# Patient Record
Sex: FEMALE | Race: WHITE | Employment: UNEMPLOYED | URBAN - METROPOLITAN AREA
[De-identification: names, ages, dates, MRNs, and addresses within clinical notes are randomized per-mention and may not be internally consistent; named-entity substitution may affect disease eponyms.]

---

## 2017-10-04 PROBLEM — G89.4 CHRONIC PAIN SYNDROME: Status: ACTIVE | Noted: 2017-10-04

## 2019-04-05 ENCOUNTER — HOSPITAL ENCOUNTER (OUTPATIENT)
Dept: ULTRASOUND IMAGING | Age: 59
Discharge: HOME | End: 2019-04-07
Payer: COMMERCIAL

## 2019-04-05 DIAGNOSIS — R60.0 LOCALIZED EDEMA: ICD-10-CM

## 2019-04-05 PROCEDURE — 93971 EXTREMITY STUDY: CPT

## 2019-05-07 ENCOUNTER — APPOINTMENT (OUTPATIENT)
Dept: CT IMAGING | Age: 59
DRG: 870 | End: 2019-05-07
Payer: COMMERCIAL

## 2019-05-07 ENCOUNTER — APPOINTMENT (OUTPATIENT)
Dept: GENERAL RADIOLOGY | Age: 59
DRG: 870 | End: 2019-05-07
Payer: COMMERCIAL

## 2019-05-07 ENCOUNTER — ANESTHESIA (OUTPATIENT)
Dept: ICU | Age: 59
DRG: 870 | End: 2019-05-07
Payer: COMMERCIAL

## 2019-05-07 ENCOUNTER — ANESTHESIA EVENT (OUTPATIENT)
Dept: ICU | Age: 59
DRG: 870 | End: 2019-05-07
Payer: COMMERCIAL

## 2019-05-07 ENCOUNTER — HOSPITAL ENCOUNTER (INPATIENT)
Age: 59
LOS: 17 days | Discharge: NURSING, REHAB OR LONG-TERM CARE | DRG: 870 | End: 2019-05-24
Attending: EMERGENCY MEDICINE | Admitting: INTERNAL MEDICINE
Payer: COMMERCIAL

## 2019-05-07 DIAGNOSIS — D68.9 COAGULOPATHY (HCC): ICD-10-CM

## 2019-05-07 DIAGNOSIS — N17.9 ACUTE RENAL FAILURE, UNSPECIFIED ACUTE RENAL FAILURE TYPE (HCC): ICD-10-CM

## 2019-05-07 DIAGNOSIS — I87.1 VENOUS STENOSIS OF RIGHT UPPER EXTREMITY: ICD-10-CM

## 2019-05-07 DIAGNOSIS — A41.9 SEPTIC SHOCK (HCC): ICD-10-CM

## 2019-05-07 DIAGNOSIS — J18.9 PNEUMONIA DUE TO ORGANISM: ICD-10-CM

## 2019-05-07 DIAGNOSIS — R65.21 SEPTIC SHOCK (HCC): ICD-10-CM

## 2019-05-07 DIAGNOSIS — Z79.2 ANTIBIOTIC LONG-TERM USE: ICD-10-CM

## 2019-05-07 DIAGNOSIS — K52.9 COLITIS: ICD-10-CM

## 2019-05-07 DIAGNOSIS — I87.8 POOR VENOUS ACCESS: ICD-10-CM

## 2019-05-07 DIAGNOSIS — A41.9 SEPSIS, DUE TO UNSPECIFIED ORGANISM: ICD-10-CM

## 2019-05-07 DIAGNOSIS — R56.9 SEIZURE-LIKE ACTIVITY (HCC): Primary | ICD-10-CM

## 2019-05-07 DIAGNOSIS — R41.0 DISORIENTATION: ICD-10-CM

## 2019-05-07 DIAGNOSIS — Z01.89 ENCOUNTER FOR LUMBAR PUNCTURE: ICD-10-CM

## 2019-05-07 LAB
AADO2: 412.1 MMHG
ABO/RH: NORMAL
ACETAMINOPHEN LEVEL: 7.8 MCG/ML (ref 10–30)
ALBUMIN SERPL-MCNC: 2.5 G/DL (ref 3.5–5.2)
ALP BLD-CCNC: 157 U/L (ref 35–104)
ALT SERPL-CCNC: 3440 U/L (ref 0–32)
AMMONIA: 76 UMOL/L (ref 11–51)
AMPHETAMINE SCREEN, URINE: NOT DETECTED
AMYLASE: 115 U/L (ref 20–100)
ANION GAP SERPL CALCULATED.3IONS-SCNC: 12 MMOL/L (ref 7–16)
ANION GAP SERPL CALCULATED.3IONS-SCNC: 13 MMOL/L (ref 7–16)
ANION GAP SERPL CALCULATED.3IONS-SCNC: 25 MMOL/L (ref 7–16)
ANISOCYTOSIS: ABNORMAL
ANISOCYTOSIS: ABNORMAL
ANTIBODY SCREEN: NORMAL
APTT: 39.3 SEC (ref 24.5–35.1)
AST SERPL-CCNC: 5412 U/L (ref 0–31)
B.E.: -9.2 MMOL/L (ref -3–3)
B.E.: -9.5 MMOL/L (ref -3–3)
BACTERIA: ABNORMAL /HPF
BACTERIA: ABNORMAL /HPF
BARBITURATE SCREEN URINE: NOT DETECTED
BASOPHILS ABSOLUTE: 0 E9/L (ref 0–0.2)
BASOPHILS ABSOLUTE: 0 E9/L (ref 0–0.2)
BASOPHILS RELATIVE PERCENT: 0.3 % (ref 0–2)
BASOPHILS RELATIVE PERCENT: 0.9 % (ref 0–2)
BENZODIAZEPINE SCREEN, URINE: NOT DETECTED
BETA-HYDROXYBUTYRATE: 1.12 MMOL/L (ref 0.02–0.27)
BILIRUB SERPL-MCNC: 0.7 MG/DL (ref 0–1.2)
BILIRUB SERPL-MCNC: 0.9 MG/DL (ref 0–1.2)
BILIRUBIN DIRECT: 0.6 MG/DL (ref 0–0.3)
BILIRUBIN URINE: ABNORMAL
BILIRUBIN URINE: ABNORMAL
BILIRUBIN, INDIRECT: 0.1 MG/DL (ref 0–1)
BLOOD BANK DISPENSE STATUS: NORMAL
BLOOD BANK PRODUCT CODE: NORMAL
BLOOD, URINE: ABNORMAL
BLOOD, URINE: ABNORMAL
BPU ID: NORMAL
BUN BLDV-MCNC: 70 MG/DL (ref 8–23)
BUN BLDV-MCNC: 71 MG/DL (ref 8–23)
BUN BLDV-MCNC: 73 MG/DL (ref 8–23)
BURR CELLS: ABNORMAL
CALCIUM SERPL-MCNC: 7 MG/DL (ref 8.6–10.2)
CALCIUM SERPL-MCNC: 7.4 MG/DL (ref 8.6–10.2)
CALCIUM SERPL-MCNC: 8.9 MG/DL (ref 8.6–10.2)
CANNABINOID SCREEN URINE: NOT DETECTED
CHLORIDE BLD-SCNC: 100 MMOL/L (ref 98–107)
CHLORIDE BLD-SCNC: 100 MMOL/L (ref 98–107)
CHLORIDE BLD-SCNC: 84 MMOL/L (ref 98–107)
CHLORIDE URINE RANDOM: <20 MMOL/L
CHP ED QC CHECK: NORMAL
CLARITY: ABNORMAL
CLARITY: ABNORMAL
CO2: 19 MMOL/L (ref 22–29)
CO2: 20 MMOL/L (ref 22–29)
CO2: 20 MMOL/L (ref 22–29)
COCAINE METABOLITE SCREEN URINE: NOT DETECTED
COHB: 0.3 % (ref 0–1.5)
COHB: 0.4 % (ref 0–1.5)
COLOR: ABNORMAL
COLOR: YELLOW
CREAT SERPL-MCNC: 3.2 MG/DL (ref 0.5–1)
CREAT SERPL-MCNC: 3.3 MG/DL (ref 0.5–1)
CREAT SERPL-MCNC: 4.2 MG/DL (ref 0.5–1)
CREATININE URINE: 100 MG/DL (ref 29–226)
CRITICAL: ABNORMAL
CRITICAL: ABNORMAL
DATE ANALYZED: ABNORMAL
DATE ANALYZED: ABNORMAL
DATE OF COLLECTION: ABNORMAL
DATE OF COLLECTION: ABNORMAL
DESCRIPTION BLOOD BANK: NORMAL
EOSINOPHIL, URINE: 0 % (ref 0–1)
EOSINOPHILS ABSOLUTE: 0 E9/L (ref 0.05–0.5)
EOSINOPHILS ABSOLUTE: 0.17 E9/L (ref 0.05–0.5)
EOSINOPHILS RELATIVE PERCENT: 0.2 % (ref 0–6)
EOSINOPHILS RELATIVE PERCENT: 0.9 % (ref 0–6)
EPITHELIAL CELLS, UA: ABNORMAL /HPF
EPITHELIAL CELLS, UA: ABNORMAL /HPF
ETHANOL: <10 MG/DL (ref 0–0.08)
FILM ARRAY ADENOVIRUS: NORMAL
FILM ARRAY BORDETELLA PERTUSSIS: NORMAL
FILM ARRAY CHLAMYDOPHILIA PNEUMONIAE: NORMAL
FILM ARRAY CORONAVIRUS 229E: NORMAL
FILM ARRAY CORONAVIRUS HKU1: NORMAL
FILM ARRAY CORONAVIRUS NL63: NORMAL
FILM ARRAY CORONAVIRUS OC43: NORMAL
FILM ARRAY INFLUENZA A VIRUS 09H1: NORMAL
FILM ARRAY INFLUENZA A VIRUS H1: NORMAL
FILM ARRAY INFLUENZA A VIRUS H3: NORMAL
FILM ARRAY INFLUENZA A VIRUS: NORMAL
FILM ARRAY INFLUENZA B: NORMAL
FILM ARRAY METAPNEUMOVIRUS: NORMAL
FILM ARRAY MYCOPLASMA PNEUMONIAE: NORMAL
FILM ARRAY PARAINFLUENZA VIRUS 1: NORMAL
FILM ARRAY PARAINFLUENZA VIRUS 2: NORMAL
FILM ARRAY PARAINFLUENZA VIRUS 3: NORMAL
FILM ARRAY PARAINFLUENZA VIRUS 4: NORMAL
FILM ARRAY RESPIRATORY SYNCITIAL VIRUS: NORMAL
FILM ARRAY RHINOVIRUS/ENTEROVIRUS: NORMAL
FIO2: 80 %
GFR AFRICAN AMERICAN: 13
GFR AFRICAN AMERICAN: 17
GFR AFRICAN AMERICAN: 18
GFR NON-AFRICAN AMERICAN: 11 ML/MIN/1.73
GFR NON-AFRICAN AMERICAN: 14 ML/MIN/1.73
GFR NON-AFRICAN AMERICAN: 15 ML/MIN/1.73
GLUCOSE BLD-MCNC: 112 MG/DL
GLUCOSE BLD-MCNC: 165 MG/DL (ref 74–99)
GLUCOSE BLD-MCNC: 240 MG/DL (ref 74–99)
GLUCOSE BLD-MCNC: 241 MG/DL (ref 74–99)
GLUCOSE BLD-MCNC: 63 MG/DL (ref 74–99)
GLUCOSE URINE: NEGATIVE MG/DL
GLUCOSE URINE: NEGATIVE MG/DL
HCO3: 16.6 MMOL/L (ref 22–26)
HCO3: 16.8 MMOL/L (ref 22–26)
HCT VFR BLD CALC: 35.2 % (ref 34–48)
HCT VFR BLD CALC: 45.7 % (ref 34–48)
HEMOGLOBIN: 11.6 G/DL (ref 11.5–15.5)
HEMOGLOBIN: 15.2 G/DL (ref 11.5–15.5)
HHB: 2.5 % (ref 0–5)
HHB: 4.3 % (ref 0–5)
INR BLD: 4.3
INR BLD: 8.4
KETONES, URINE: 15 MG/DL
KETONES, URINE: ABNORMAL MG/DL
LAB: ABNORMAL
LAB: ABNORMAL
LACTATE DEHYDROGENASE: 903 U/L (ref 135–214)
LACTIC ACID: 2.1 MMOL/L (ref 0.5–2.2)
LACTIC ACID: 2.2 MMOL/L (ref 0.5–2.2)
LACTIC ACID: 2.6 MMOL/L (ref 0.5–2.2)
LACTIC ACID: 3 MMOL/L (ref 0.5–2.2)
LACTIC ACID: 3.4 MMOL/L (ref 0.5–2.2)
LEUKOCYTE ESTERASE, URINE: ABNORMAL
LEUKOCYTE ESTERASE, URINE: ABNORMAL
LIPASE: 127 U/L (ref 13–60)
LIPASE: 169 U/L (ref 13–60)
LYMPHOCYTES ABSOLUTE: 0.94 E9/L (ref 1.5–4)
LYMPHOCYTES ABSOLUTE: 1.5 E9/L (ref 1.5–4)
LYMPHOCYTES RELATIVE PERCENT: 4.3 % (ref 20–42)
LYMPHOCYTES RELATIVE PERCENT: 7.8 % (ref 20–42)
Lab: ABNORMAL
Lab: ABNORMAL
MAGNESIUM: 1.6 MG/DL (ref 1.6–2.6)
MAGNESIUM: 1.6 MG/DL (ref 1.6–2.6)
MCH RBC QN AUTO: 34.5 PG (ref 26–35)
MCH RBC QN AUTO: 34.6 PG (ref 26–35)
MCHC RBC AUTO-ENTMCNC: 33 % (ref 32–34.5)
MCHC RBC AUTO-ENTMCNC: 33.3 % (ref 32–34.5)
MCV RBC AUTO: 103.6 FL (ref 80–99.9)
MCV RBC AUTO: 105.1 FL (ref 80–99.9)
METER GLUCOSE: 109 MG/DL (ref 74–99)
METER GLUCOSE: 111 MG/DL (ref 74–99)
METER GLUCOSE: 119 MG/DL (ref 74–99)
METER GLUCOSE: 63 MG/DL (ref 74–99)
METER GLUCOSE: 88 MG/DL (ref 74–99)
METER GLUCOSE: <40 MG/DL (ref 74–99)
METHADONE SCREEN, URINE: NOT DETECTED
METHB: 0.2 % (ref 0–1.5)
METHB: 0.3 % (ref 0–1.5)
MODE: ABNORMAL
MODE: AC
MONOCYTES ABSOLUTE: 0.94 E9/L (ref 0.1–0.95)
MONOCYTES ABSOLUTE: 1.4 E9/L (ref 0.1–0.95)
MONOCYTES RELATIVE PERCENT: 5.2 % (ref 2–12)
MONOCYTES RELATIVE PERCENT: 6.1 % (ref 2–12)
NEUTROPHILS ABSOLUTE: 15.79 E9/L (ref 1.8–7.3)
NEUTROPHILS ABSOLUTE: 21.06 E9/L (ref 1.8–7.3)
NEUTROPHILS RELATIVE PERCENT: 84.3 % (ref 43–80)
NEUTROPHILS RELATIVE PERCENT: 89.6 % (ref 43–80)
NITRITE, URINE: NEGATIVE
NITRITE, URINE: NEGATIVE
O2 CONTENT: 18.3 ML/DL
O2 SATURATION: 95.7 % (ref 92–98.5)
O2 SATURATION: 97.5 % (ref 92–98.5)
O2HB: 95.1 % (ref 94–97)
O2HB: 96.9 % (ref 94–97)
OPERATOR ID: 1023
OPERATOR ID: 2593
OPIATE SCREEN URINE: POSITIVE
PATIENT TEMP: 37 C
PATIENT TEMP: 38 C
PCO2: 35.9 MMHG (ref 35–45)
PCO2: 38.5 MMHG (ref 35–45)
PDW BLD-RTO: 13.1 FL (ref 11.5–15)
PDW BLD-RTO: 13.3 FL (ref 11.5–15)
PEEP/CPAP: 5 CMH2O
PFO2: 1.26 MMHG/%
PH BLOOD GAS: 7.26 (ref 7.35–7.45)
PH BLOOD GAS: 7.28 (ref 7.35–7.45)
PH UA: 5 (ref 5–9)
PH UA: 5 (ref 5–9)
PHENCYCLIDINE SCREEN URINE: NOT DETECTED
PHOSPHORUS: 4.6 MG/DL (ref 2.5–4.5)
PLATELET # BLD: 233 E9/L (ref 130–450)
PLATELET # BLD: 329 E9/L (ref 130–450)
PMV BLD AUTO: 11.5 FL (ref 7–12)
PMV BLD AUTO: 11.9 FL (ref 7–12)
PO2: 100.6 MMHG (ref 60–100)
PO2: 92.7 MMHG (ref 60–100)
POTASSIUM REFLEX MAGNESIUM: 3.6 MMOL/L (ref 3.5–5)
POTASSIUM REFLEX MAGNESIUM: 3.7 MMOL/L (ref 3.5–5)
POTASSIUM SERPL-SCNC: 4.4 MMOL/L (ref 3.5–5)
POTASSIUM, UR: 48.2 MMOL/L
PROCALCITONIN: 99.25 NG/ML (ref 0–0.08)
PROMYELOCYTES PERCENT: 1.7 % (ref 0–0)
PROPOXYPHENE SCREEN: NOT DETECTED
PROTEIN UA: 30 MG/DL
PROTEIN UA: 30 MG/DL
PROTHROMBIN TIME: 47.5 SEC (ref 9.3–12.4)
PROTHROMBIN TIME: 93 SEC (ref 9.3–12.4)
RBC # BLD: 3.35 E12/L (ref 3.5–5.5)
RBC # BLD: 4.41 E12/L (ref 3.5–5.5)
RBC UA: ABNORMAL /HPF (ref 0–2)
RBC UA: ABNORMAL /HPF (ref 0–2)
RI(T): 4.1
RR MECHANICAL: 14 B/MIN
SALICYLATE, SERUM: <0.3 MG/DL (ref 0–30)
SODIUM BLD-SCNC: 129 MMOL/L (ref 132–146)
SODIUM BLD-SCNC: 132 MMOL/L (ref 132–146)
SODIUM BLD-SCNC: 132 MMOL/L (ref 132–146)
SODIUM URINE: 22 MMOL/L
SOURCE, BLOOD GAS: ABNORMAL
SOURCE, BLOOD GAS: ABNORMAL
SPECIFIC GRAVITY UA: 1.02 (ref 1–1.03)
SPECIFIC GRAVITY UA: >=1.03 (ref 1–1.03)
THB: 12.5 G/DL (ref 11.5–16.5)
THB: 13.6 G/DL (ref 11.5–16.5)
TIME ANALYZED: 1411
TIME ANALYZED: 621
TOTAL PROTEIN: 6.2 G/DL (ref 6.4–8.3)
TRICYCLIC ANTIDEPRESSANTS SCREEN SERUM: NEGATIVE NG/ML
TROPONIN: <0.01 NG/ML (ref 0–0.03)
UREA NITROGEN, UR: 251 MG/DL (ref 800–1666)
UROBILINOGEN, URINE: 0.2 E.U./DL
UROBILINOGEN, URINE: 1 E.U./DL
VT MECHANICAL: 400 ML
WBC # BLD: 18.8 E9/L (ref 4.5–11.5)
WBC # BLD: 23.4 E9/L (ref 4.5–11.5)
WBC UA: ABNORMAL /HPF (ref 0–5)
WBC UA: ABNORMAL /HPF (ref 0–5)
YEAST: ABNORMAL
YEAST: ABNORMAL

## 2019-05-07 PROCEDURE — 93010 ELECTROCARDIOGRAM REPORT: CPT | Performed by: INTERNAL MEDICINE

## 2019-05-07 PROCEDURE — 82805 BLOOD GASES W/O2 SATURATION: CPT

## 2019-05-07 PROCEDURE — 5A1955Z RESPIRATORY VENTILATION, GREATER THAN 96 CONSECUTIVE HOURS: ICD-10-PCS | Performed by: INTERNAL MEDICINE

## 2019-05-07 PROCEDURE — 94002 VENT MGMT INPAT INIT DAY: CPT

## 2019-05-07 PROCEDURE — 2500000003 HC RX 250 WO HCPCS: Performed by: EMERGENCY MEDICINE

## 2019-05-07 PROCEDURE — 84100 ASSAY OF PHOSPHORUS: CPT

## 2019-05-07 PROCEDURE — 2580000003 HC RX 258: Performed by: EMERGENCY MEDICINE

## 2019-05-07 PROCEDURE — 82247 BILIRUBIN TOTAL: CPT

## 2019-05-07 PROCEDURE — 83735 ASSAY OF MAGNESIUM: CPT

## 2019-05-07 PROCEDURE — 82962 GLUCOSE BLOOD TEST: CPT

## 2019-05-07 PROCEDURE — 82140 ASSAY OF AMMONIA: CPT

## 2019-05-07 PROCEDURE — 36556 INSERT NON-TUNNEL CV CATH: CPT

## 2019-05-07 PROCEDURE — 71045 X-RAY EXAM CHEST 1 VIEW: CPT

## 2019-05-07 PROCEDURE — 70450 CT HEAD/BRAIN W/O DYE: CPT

## 2019-05-07 PROCEDURE — 6360000002 HC RX W HCPCS: Performed by: INTERNAL MEDICINE

## 2019-05-07 PROCEDURE — 6360000002 HC RX W HCPCS

## 2019-05-07 PROCEDURE — 2580000003 HC RX 258

## 2019-05-07 PROCEDURE — 0BH17EZ INSERTION OF ENDOTRACHEAL AIRWAY INTO TRACHEA, VIA NATURAL OR ARTIFICIAL OPENING: ICD-10-PCS | Performed by: INTERNAL MEDICINE

## 2019-05-07 PROCEDURE — 6370000000 HC RX 637 (ALT 250 FOR IP): Performed by: INTERNAL MEDICINE

## 2019-05-07 PROCEDURE — 80307 DRUG TEST PRSMV CHEM ANLYZR: CPT

## 2019-05-07 PROCEDURE — 31500 INSERT EMERGENCY AIRWAY: CPT

## 2019-05-07 PROCEDURE — 2500000003 HC RX 250 WO HCPCS

## 2019-05-07 PROCEDURE — C9113 INJ PANTOPRAZOLE SODIUM, VIA: HCPCS | Performed by: INTERNAL MEDICINE

## 2019-05-07 PROCEDURE — 2580000003 HC RX 258: Performed by: INTERNAL MEDICINE

## 2019-05-07 PROCEDURE — G0480 DRUG TEST DEF 1-7 CLASSES: HCPCS

## 2019-05-07 PROCEDURE — 85610 PROTHROMBIN TIME: CPT

## 2019-05-07 PROCEDURE — 36415 COLL VENOUS BLD VENIPUNCTURE: CPT

## 2019-05-07 PROCEDURE — 83690 ASSAY OF LIPASE: CPT

## 2019-05-07 PROCEDURE — 87581 M.PNEUMON DNA AMP PROBE: CPT

## 2019-05-07 PROCEDURE — 82570 ASSAY OF URINE CREATININE: CPT

## 2019-05-07 PROCEDURE — 80048 BASIC METABOLIC PNL TOTAL CA: CPT

## 2019-05-07 PROCEDURE — 87798 DETECT AGENT NOS DNA AMP: CPT

## 2019-05-07 PROCEDURE — 74176 CT ABD & PELVIS W/O CONTRAST: CPT

## 2019-05-07 PROCEDURE — 87088 URINE BACTERIA CULTURE: CPT

## 2019-05-07 PROCEDURE — 36430 TRANSFUSION BLD/BLD COMPNT: CPT

## 2019-05-07 PROCEDURE — 87040 BLOOD CULTURE FOR BACTERIA: CPT

## 2019-05-07 PROCEDURE — 82150 ASSAY OF AMYLASE: CPT

## 2019-05-07 PROCEDURE — P9059 PLASMA, FRZ BETWEEN 8-24HOUR: HCPCS

## 2019-05-07 PROCEDURE — 99253 IP/OBS CNSLTJ NEW/EST LOW 45: CPT | Performed by: SURGERY

## 2019-05-07 PROCEDURE — 86900 BLOOD TYPING SEROLOGIC ABO: CPT

## 2019-05-07 PROCEDURE — 84133 ASSAY OF URINE POTASSIUM: CPT

## 2019-05-07 PROCEDURE — 84484 ASSAY OF TROPONIN QUANT: CPT

## 2019-05-07 PROCEDURE — 87486 CHLMYD PNEUM DNA AMP PROBE: CPT

## 2019-05-07 PROCEDURE — 80074 ACUTE HEPATITIS PANEL: CPT

## 2019-05-07 PROCEDURE — 6360000002 HC RX W HCPCS: Performed by: EMERGENCY MEDICINE

## 2019-05-07 PROCEDURE — 83605 ASSAY OF LACTIC ACID: CPT

## 2019-05-07 PROCEDURE — 2500000003 HC RX 250 WO HCPCS: Performed by: INTERNAL MEDICINE

## 2019-05-07 PROCEDURE — 84540 ASSAY OF URINE/UREA-N: CPT

## 2019-05-07 PROCEDURE — 85730 THROMBOPLASTIN TIME PARTIAL: CPT

## 2019-05-07 PROCEDURE — 99285 EMERGENCY DEPT VISIT HI MDM: CPT

## 2019-05-07 PROCEDURE — 85025 COMPLETE CBC W/AUTO DIFF WBC: CPT

## 2019-05-07 PROCEDURE — 82947 ASSAY GLUCOSE BLOOD QUANT: CPT

## 2019-05-07 PROCEDURE — 86901 BLOOD TYPING SEROLOGIC RH(D): CPT

## 2019-05-07 PROCEDURE — 2000000000 HC ICU R&B

## 2019-05-07 PROCEDURE — 84300 ASSAY OF URINE SODIUM: CPT

## 2019-05-07 PROCEDURE — 87633 RESP VIRUS 12-25 TARGETS: CPT

## 2019-05-07 PROCEDURE — 31500 INSERT EMERGENCY AIRWAY: CPT | Performed by: ANESTHESIOLOGY

## 2019-05-07 PROCEDURE — 82010 KETONE BODYS QUAN: CPT

## 2019-05-07 PROCEDURE — 81001 URINALYSIS AUTO W/SCOPE: CPT

## 2019-05-07 PROCEDURE — 82248 BILIRUBIN DIRECT: CPT

## 2019-05-07 PROCEDURE — 83615 LACTATE (LD) (LDH) ENZYME: CPT

## 2019-05-07 PROCEDURE — 84145 PROCALCITONIN (PCT): CPT

## 2019-05-07 PROCEDURE — 80053 COMPREHEN METABOLIC PANEL: CPT

## 2019-05-07 PROCEDURE — P9047 ALBUMIN (HUMAN), 25%, 50ML: HCPCS | Performed by: INTERNAL MEDICINE

## 2019-05-07 PROCEDURE — 93005 ELECTROCARDIOGRAM TRACING: CPT | Performed by: EMERGENCY MEDICINE

## 2019-05-07 PROCEDURE — 86850 RBC ANTIBODY SCREEN: CPT

## 2019-05-07 PROCEDURE — 82436 ASSAY OF URINE CHLORIDE: CPT

## 2019-05-07 PROCEDURE — 87205 SMEAR GRAM STAIN: CPT

## 2019-05-07 RX ORDER — DEXTROSE MONOHYDRATE 25 G/50ML
INJECTION, SOLUTION INTRAVENOUS
Status: COMPLETED
Start: 2019-05-07 | End: 2019-05-07

## 2019-05-07 RX ORDER — SODIUM CHLORIDE, SODIUM LACTATE, POTASSIUM CHLORIDE, CALCIUM CHLORIDE 600; 310; 30; 20 MG/100ML; MG/100ML; MG/100ML; MG/100ML
INJECTION, SOLUTION INTRAVENOUS CONTINUOUS
Status: DISCONTINUED | OUTPATIENT
Start: 2019-05-07 | End: 2019-05-07

## 2019-05-07 RX ORDER — 0.9 % SODIUM CHLORIDE 0.9 %
250 INTRAVENOUS SOLUTION INTRAVENOUS ONCE
Status: COMPLETED | OUTPATIENT
Start: 2019-05-07 | End: 2019-05-07

## 2019-05-07 RX ORDER — SODIUM CHLORIDE 0.9 % (FLUSH) 0.9 %
10 SYRINGE (ML) INJECTION EVERY 12 HOURS SCHEDULED
Status: DISCONTINUED | OUTPATIENT
Start: 2019-05-07 | End: 2019-05-24 | Stop reason: HOSPADM

## 2019-05-07 RX ORDER — MIDAZOLAM HYDROCHLORIDE 1 MG/ML
INJECTION INTRAMUSCULAR; INTRAVENOUS PRN
Status: DISCONTINUED | OUTPATIENT
Start: 2019-05-07 | End: 2019-05-07 | Stop reason: HOSPADM

## 2019-05-07 RX ORDER — 0.9 % SODIUM CHLORIDE 0.9 %
1000 INTRAVENOUS SOLUTION INTRAVENOUS ONCE
Status: COMPLETED | OUTPATIENT
Start: 2019-05-07 | End: 2019-05-07

## 2019-05-07 RX ORDER — 0.9 % SODIUM CHLORIDE 0.9 %
30 INTRAVENOUS SOLUTION INTRAVENOUS ONCE
Status: COMPLETED | OUTPATIENT
Start: 2019-05-07 | End: 2019-05-07

## 2019-05-07 RX ORDER — SODIUM CHLORIDE 0.9 % (FLUSH) 0.9 %
10 SYRINGE (ML) INJECTION PRN
Status: DISCONTINUED | OUTPATIENT
Start: 2019-05-07 | End: 2019-05-24 | Stop reason: HOSPADM

## 2019-05-07 RX ORDER — DEXTROSE, SODIUM CHLORIDE, SODIUM LACTATE, POTASSIUM CHLORIDE, AND CALCIUM CHLORIDE 5; .6; .31; .03; .02 G/100ML; G/100ML; G/100ML; G/100ML; G/100ML
INJECTION, SOLUTION INTRAVENOUS CONTINUOUS
Status: DISCONTINUED | OUTPATIENT
Start: 2019-05-07 | End: 2019-05-09

## 2019-05-07 RX ORDER — PROPOFOL 10 MG/ML
10 INJECTION, EMULSION INTRAVENOUS
Status: DISCONTINUED | OUTPATIENT
Start: 2019-05-07 | End: 2019-05-08

## 2019-05-07 RX ORDER — 0.9 % SODIUM CHLORIDE 0.9 %
250 INTRAVENOUS SOLUTION INTRAVENOUS ONCE
Status: DISCONTINUED | OUTPATIENT
Start: 2019-05-07 | End: 2019-05-10

## 2019-05-07 RX ORDER — ALBUMIN (HUMAN) 12.5 G/50ML
50 SOLUTION INTRAVENOUS ONCE
Status: COMPLETED | OUTPATIENT
Start: 2019-05-07 | End: 2019-05-07

## 2019-05-07 RX ORDER — 0.9 % SODIUM CHLORIDE 0.9 %
1000 INTRAVENOUS SOLUTION INTRAVENOUS ONCE
Status: DISCONTINUED | OUTPATIENT
Start: 2019-05-07 | End: 2019-05-10

## 2019-05-07 RX ORDER — ONDANSETRON 2 MG/ML
4 INJECTION INTRAMUSCULAR; INTRAVENOUS EVERY 6 HOURS PRN
Status: DISCONTINUED | OUTPATIENT
Start: 2019-05-07 | End: 2019-05-24 | Stop reason: HOSPADM

## 2019-05-07 RX ORDER — LEVOFLOXACIN 5 MG/ML
500 INJECTION, SOLUTION INTRAVENOUS ONCE
Status: DISCONTINUED | OUTPATIENT
Start: 2019-05-07 | End: 2019-05-07

## 2019-05-07 RX ORDER — MIDAZOLAM HYDROCHLORIDE 1 MG/ML
INJECTION INTRAMUSCULAR; INTRAVENOUS
Status: COMPLETED
Start: 2019-05-07 | End: 2019-05-07

## 2019-05-07 RX ORDER — PETROLATUM 42 G/100G
OINTMENT TOPICAL 3 TIMES DAILY
Status: DISCONTINUED | OUTPATIENT
Start: 2019-05-07 | End: 2019-05-24 | Stop reason: HOSPADM

## 2019-05-07 RX ORDER — PETROLATUM 42 G/100G
OINTMENT TOPICAL 3 TIMES DAILY PRN
Status: DISCONTINUED | OUTPATIENT
Start: 2019-05-07 | End: 2019-05-24 | Stop reason: HOSPADM

## 2019-05-07 RX ORDER — LIDOCAINE HYDROCHLORIDE 10 MG/ML
INJECTION, SOLUTION EPIDURAL; INFILTRATION; INTRACAUDAL; PERINEURAL
Status: COMPLETED
Start: 2019-05-07 | End: 2019-05-07

## 2019-05-07 RX ORDER — PANTOPRAZOLE SODIUM 40 MG/10ML
40 INJECTION, POWDER, LYOPHILIZED, FOR SOLUTION INTRAVENOUS ONCE
Status: COMPLETED | OUTPATIENT
Start: 2019-05-07 | End: 2019-05-07

## 2019-05-07 RX ADMIN — SODIUM CHLORIDE 2586 ML: 9 INJECTION, SOLUTION INTRAVENOUS at 00:53

## 2019-05-07 RX ADMIN — Medication 10 MCG/MIN: at 07:41

## 2019-05-07 RX ADMIN — FAMOTIDINE 20 MG: 10 INJECTION, SOLUTION INTRAVENOUS at 09:12

## 2019-05-07 RX ADMIN — PHYTONADIONE 10 MG: 10 INJECTION, EMULSION INTRAMUSCULAR; INTRAVENOUS; SUBCUTANEOUS at 09:59

## 2019-05-07 RX ADMIN — PROPOFOL 10 MCG/KG/MIN: 10 INJECTION, EMULSION INTRAVENOUS at 10:21

## 2019-05-07 RX ADMIN — METRONIDAZOLE 500 MG: 500 INJECTION, SOLUTION INTRAVENOUS at 06:33

## 2019-05-07 RX ADMIN — PROPOFOL 10 MCG/KG/MIN: 10 INJECTION, EMULSION INTRAVENOUS at 22:42

## 2019-05-07 RX ADMIN — SODIUM CHLORIDE 250 ML: 9 INJECTION, SOLUTION INTRAVENOUS at 09:12

## 2019-05-07 RX ADMIN — PETROLATUM: 42 OINTMENT TOPICAL at 20:57

## 2019-05-07 RX ADMIN — SODIUM CHLORIDE, SODIUM LACTATE, POTASSIUM CHLORIDE, CALCIUM CHLORIDE AND DEXTROSE MONOHYDRATE: 5; 600; 310; 30; 20 INJECTION, SOLUTION INTRAVENOUS at 10:35

## 2019-05-07 RX ADMIN — MIDAZOLAM HYDROCHLORIDE 2 MG: 1 INJECTION, SOLUTION INTRAMUSCULAR; INTRAVENOUS at 07:28

## 2019-05-07 RX ADMIN — Medication 10 ML: at 20:56

## 2019-05-07 RX ADMIN — METRONIDAZOLE 500 MG: 500 INJECTION, SOLUTION INTRAVENOUS at 16:00

## 2019-05-07 RX ADMIN — Medication 10 ML: at 09:13

## 2019-05-07 RX ADMIN — Medication 500 MG: at 20:57

## 2019-05-07 RX ADMIN — PETROLATUM: 42 OINTMENT TOPICAL at 16:30

## 2019-05-07 RX ADMIN — CEFEPIME HYDROCHLORIDE 2 G: 2 INJECTION, POWDER, FOR SOLUTION INTRAVENOUS at 05:26

## 2019-05-07 RX ADMIN — Medication 50 MCG/HR: at 07:41

## 2019-05-07 RX ADMIN — MIDAZOLAM 2 MG: 1 INJECTION INTRAMUSCULAR; INTRAVENOUS at 07:42

## 2019-05-07 RX ADMIN — PHENYLEPHRINE HYDROCHLORIDE 100 MCG: 10 INJECTION INTRAVENOUS at 07:28

## 2019-05-07 RX ADMIN — PANTOPRAZOLE SODIUM 40 MG: 40 INJECTION, POWDER, LYOPHILIZED, FOR SOLUTION INTRAVENOUS at 10:00

## 2019-05-07 RX ADMIN — LIDOCAINE HYDROCHLORIDE 5 ML: 10 INJECTION, SOLUTION EPIDURAL; INFILTRATION; INTRACAUDAL; PERINEURAL at 07:15

## 2019-05-07 RX ADMIN — Medication 500 MG: at 16:48

## 2019-05-07 RX ADMIN — ALBUMIN (HUMAN) 50 G: 0.25 INJECTION, SOLUTION INTRAVENOUS at 11:30

## 2019-05-07 RX ADMIN — DEXTROSE MONOHYDRATE 50 ML: 25 INJECTION, SOLUTION INTRAVENOUS at 10:13

## 2019-05-07 RX ADMIN — SODIUM CHLORIDE 1000 ML: 9 INJECTION, SOLUTION INTRAVENOUS at 03:31

## 2019-05-07 RX ADMIN — SODIUM CHLORIDE, POTASSIUM CHLORIDE, SODIUM LACTATE AND CALCIUM CHLORIDE: 600; 310; 30; 20 INJECTION, SOLUTION INTRAVENOUS at 09:11

## 2019-05-07 RX ADMIN — DEXTROSE MONOHYDRATE 50 ML: 25 INJECTION, SOLUTION INTRAVENOUS at 10:35

## 2019-05-07 RX ADMIN — SODIUM CHLORIDE, SODIUM LACTATE, POTASSIUM CHLORIDE, CALCIUM CHLORIDE AND DEXTROSE MONOHYDRATE: 5; 600; 310; 30; 20 INJECTION, SOLUTION INTRAVENOUS at 17:27

## 2019-05-07 ASSESSMENT — PULMONARY FUNCTION TESTS
PIF_VALUE: 19
PIF_VALUE: 25
PIF_VALUE: 22
PIF_VALUE: 27
PIF_VALUE: 23
PIF_VALUE: 24
PIF_VALUE: 25
PIF_VALUE: 25
PIF_VALUE: 22
PIF_VALUE: 21
PIF_VALUE: 26
PIF_VALUE: 25
PIF_VALUE: 22
PIF_VALUE: 23
PIF_VALUE: 24

## 2019-05-07 ASSESSMENT — PAIN SCALES - WONG BAKER
WONGBAKER_NUMERICALRESPONSE: 0
WONGBAKER_NUMERICALRESPONSE: 8
WONGBAKER_NUMERICALRESPONSE: 0
WONGBAKER_NUMERICALRESPONSE: 0

## 2019-05-07 ASSESSMENT — PAIN DESCRIPTION - LOCATION: LOCATION: ABDOMEN

## 2019-05-07 ASSESSMENT — PAIN DESCRIPTION - PAIN TYPE: TYPE: ACUTE PAIN

## 2019-05-07 ASSESSMENT — PAIN DESCRIPTION - ORIENTATION: ORIENTATION: MID

## 2019-05-07 NOTE — FLOWSHEET NOTE
Inpatient Wound Care(Initial Consult)4409    Admit Date: 5/7/2019 12:26 AM    Reason for consult:  Bilateral buttocks    Significant history:  Admitted from home for lethargy   Arthritis     Asthma     High blood pressure     Hyperlipidemia     Irregular heart beat     Migraines, neuralgic     PONV (postoperative nausea and vomiting)     Thyroid disorder     Findings:       05/07/19 1551   Wound 05/07/19 Buttocks Left   Date First Assessed/Time First Assessed: 05/07/19 0810   Present on Hospital Admission: Yes  Location: Buttocks  Wound Location Orientation: Left   Wound Image   (both buttocks in one photo)   Wound Deep tissue/Injury   Dressing/Treatment Pharmaceutical agent (see MAR)   Wound Length (cm) 9 cm   Wound Width (cm) 6 cm   Wound Depth (cm)   (obscured)   Wound Surface Area (cm^2) 54 cm^2   Change in Wound Size % (l*w) -28.57   Wound Assessment Purple   Rea-wound Assessment Blanchable erythema   Purple%Wound Bed 100   Wound 05/07/19 Buttocks Right;Mid; Inner purple, non blanchable, surrounded by reddened non blanchable area   Date First Assessed/Time First Assessed: 05/07/19 0813   Location: Buttocks  Wound Location Orientation: Right;Mid; Inner  Wound Description (Comments): purple, non blanchable, surrounded by reddened non blanchable area   Wound Deep tissue/Injury   Dressing/Treatment Pharmaceutical agent (see MAR)   Wound Length (cm) 9 cm   Wound Width (cm) 11 cm   Wound Depth (cm)   (obscured)   Wound Surface Area (cm^2) 99 cm^2   Change in Wound Size % (l*w) -37.5   Wound Assessment Purple   Drainage Amount None   Rea-wound Assessment Blanchable erythema   Purple%Wound Bed 100     **Informed Consent**    The patient has given verbal consent to have photos taken of bilateral buttocks and inserted into their chart as part of their permanent medical record for purposes of documentation, treatment management and/or medical review.    All Images taken on 5/7/19 of patient name: Esequiel House were transmitted and stored on secured [employer] located within [facility] by a registered [employer] Mobile Application Device. Impression:  Bilateral buttocks DTI      Plan:  Aquaphor to buttocks and dry skin  Continue with Preventive care.  Marilee Hendrickson 5/7/2019 3:55 PM

## 2019-05-07 NOTE — ED NOTES
x2 attempts made for ABGs. Pulse still very thready, and pt very cold to touch. Unsuccessful at this time for ABGs. Dr Damien Mata aware of difficulty.  Bear hugger placed on pt at this time and warm fluids started     Luz Fernandez RN  05/07/19 [id number]

## 2019-05-07 NOTE — CONSULTS
GENERAL SURGERY  CONSULT NOTE  5/7/2019    Physician Consulted: Dr. Macey Simmons  Reason for Consult: ?C diff  Referring Physician: Dr. Mihai Blue is a 59 y.o. female who presents for evaluation for possible C diff colitis. Patient was reported to have had watery, non-bloody, diarrhea for 2 weeks after being on Abx (Clindamycin, Levaquin) for RLE Skin infx. Very weak and dehydrated for past 2-3 days, so came to ED. Presented to ED in Septic Shock w MSOF - hypotensive, leukocytosis 18, LA 3.4, AST 5000s, ALT 3000s, Procal 99, and INR 8.4. Currently intubated and sedated in MICU, on pressors, so hx limited and gathered from chart. Surgery consulted due possible C diff based on clinical history and CT scan without contrast showing thickening of rectum and sigmoid. Past Medical History:   Diagnosis Date    Arthritis     Asthma     High blood pressure     Hyperlipidemia     Irregular heart beat     Migraines, neuralgic     PONV (postoperative nausea and vomiting)     Thyroid disorder        Past Surgical History:   Procedure Laterality Date    CHOLECYSTECTOMY  September 21, 1992    DILATION AND CURETTAGE OF UTERUS  1983    LUMBAR SPINE SURGERY  July 5, 2001    LUMBAR SPINE SURGERY  2005    LASE    NERVE BLOCK  05/29/13    therapeutic caudal with modified Charlestine Gala OTHER SURGICAL HISTORY N/A 5-9-16    Surgical Replacement medtronic lumbar spinal cord stimulator    SPINE SURGERY  07/30/2007    Spinal Cord Stimulator Implant    TUBAL LIGATION  April 8, 1994       Medications Prior to Admission    Prior to Admission medications    Medication Sig Start Date End Date Taking? Authorizing Provider   morphine (MS CONTIN) 30 MG extended release tablet Take 1 tablet by mouth 2 times daily for 30 days THIS PRESCRIPTION IS A 30 DAY SUPPLY. ( ICD: 10  G 89.4).  Earliest Fill Date: 1/6/18 1/6/18 5/7/19 Yes Jane Otero MD   pregabalin (LYRICA) 200 MG capsule Take 1 capsule by mouth every 8 hours . Patient taking differently: Take 100 mg by mouth every 8 hours. 12/6/17 5/7/19 Yes OSMANI Patricia CNP   orphenadrine (NORFLEX) 100 MG extended release tablet Take 1 tablet by mouth 2 times daily 3/14/17 5/7/19 Yes OSMANI Nixon CNP   ALBUTEROL SULFATE IN Inhale 2 puffs into the lungs 2 times daily   Yes Historical Provider, MD   guaiFENesin (MUCINEX) 600 MG SR tablet Take 1,200 mg by mouth 2 times daily   Yes Historical Provider, MD   Cholecalciferol (VITAMIN D3) 2000 UNITS CAPS Take 1 capsule by mouth daily   Yes Historical Provider, MD   Ferrous Gluconate (IRON) 240 (27 FE) MG TABS Take 2 tablets by mouth daily   Yes Historical Provider, MD   b complex vitamins capsule Take 1 capsule by mouth daily   Yes Historical Provider, MD   Coenzyme Q10 (CO Q 10) 100 MG CAPS Take 1 capsule by mouth daily   Yes Historical Provider, MD   sucralfate (CARAFATE) 1 GM tablet Take 1 g by mouth 4 times daily. Yes Historical Provider, MD   meclizine (ANTIVERT) 25 MG tablet Take 25 mg by mouth 2 times daily. Yes Historical Provider, MD   atenolol (TENORMIN) 25 MG tablet Take 25 mg by mouth 2 times daily. Yes Historical Provider, MD   thyroid (ARMOUR THYROID) 30 MG tablet Take 30 mg by mouth daily. Yes Historical Provider, MD   loratadine-pseudoephedrine (CLARITIN-D 24 HOUR)  MG per tablet Take 1 tablet by mouth daily. Yes Historical Provider, MD   Calcium Carbonate Antacid (TUMS PO) Take 1 tablet by mouth as needed. Yes Historical Provider, MD   zolpidem (AMBIEN) 10 MG tablet Take 1 tablet by mouth nightly as needed for Sleep for up to 90 days 3 MONTH SUPPLY. DO NOT FILL UNTIL 1-6-17. . 1/6/18 4/6/18  Gerard White MD   orphenadrine (NORFLEX) 100 MG extended release tablet Take 1 tablet by mouth 2 times daily 12/6/17 3/6/18  Mahesh BlandonOSMANI - CNP   Glucosamine-Chondroit-Vit C-Mn (GLUCOSAMINE CHONDR 1500 COMPLX PO) Take by mouth    Historical Provider, MD   orphenadrine (NORFLEX) 100 MG extended release tablet Take 1 tablet by mouth 2 times daily 6/12/17 7/12/17  OSMANI Guerra CNP   Handicap Placard MISC by Does not apply route 2 year duration.  8/16/16   OSMANI Guerra CNP   Probiotic Product (PROBIOTIC DAILY PO) Take 1 capsule by mouth daily    Historical Provider, MD       Allergies   Allergen Reactions    Aciphex [Rabeprazole Sodium] Diarrhea     Weakness dizzy    Aleve [Naproxen Sodium]      Stomach upset    Amitriptyline-Perphenazine [Perphenazine-Amitriptyline]      Leg cramps involuntary movements    Amoxil [Amoxicillin]      diarrhea    Aspirin      Stomach upset    Bentyl [Dicyclomine Hcl]      Yeast infection,itch    Celebrex [Celecoxib]      Stomach ache    Cephalexin     Codeine      headache    Compazine [Prochlorperazine Maleate]      Involuntary leg movement    Cyclosporine     Demerol      Weak and dizzy    Ditropan [Oxybutynin Chloride]      Dizzy,nausea    Doxycycline      Yeast infection    Effexor [Venlafaxine Hydrochloride]      Dizzy, nausea, stomache pain    Elavil [Amitriptyline Hcl]     Entex La      itching    Fentanyl      Stop breathing, must use very slowly    Flexeril [Cyclobenzaprine Hcl]      Dizzy,diarrhea    Keflex [Cephalexin]      Itching, raw caused tinea fungal infection    Ketorolac Tromethamine      Stomach upset    Lansoprazole     Lipitor [Atorvastatin]      High liver enzymes    Loprox [Ciclopirox]      itch    Medrol [Methylprednisolone]      Bleeding and chill    Midazolam Hcl     Motrin [Ibuprofen Micronized]      Stomach upset    Perphenazine      Legs cramp involuntary movement    Phenergan [Promethazine Hcl]      Legs jerking    Potassium-Containing Compounds      diarrhea    Prevacid [Lansoprazole]      diarrhea    Prochlorperazine Edisylate      Legs jerking    Protonix [Pantoprazole Sodium]      diarrhea    Reglan [Metoclopramide Hcl]      Arms jerking,involuntary,involuntary leg movement    Septra [Bactrim]      Itching ,infected rash, tinea fungal infection    Talwin Nx [Pentazocine-Naloxone]      Stomach ache    Tape Kathlee Phelps Tape]      rash    Tetracyclines & Related      Yeast infection    Toradol [Ketorolac Tromethamine]      Stomach ache    Ultracet [Tramadol-Acetaminophen]      Dizzy weak nausea    Vancenase [Beclomethasone Dipropionate]      dizzy    Versed [Midazolam]      Stop breathe, must use very slowly    Vicodin [Hydrocodone-Acetaminophen]      Stomach ache    Vioxx      Stomach ache    Amoxil [Amoxicillin] Diarrhea and Rash     Rash,diarrhea    Baclofen Nausea And Vomiting       Family History   Problem Relation Age of Onset    Cancer Father     Heart Disease Father     Heart Disease Mother     High Cholesterol Mother     Mental Illness Mother     Diabetes Maternal Grandmother        Social History     Tobacco Use    Smoking status: Former Smoker    Smokeless tobacco: Never Used   Substance Use Topics    Alcohol use: No    Drug use: No         Review of Systems: pertinent ROS listed in HPI, all others negative       PHYSICAL EXAM:    Vitals:    05/07/19 1400   BP: (!) 118/55   Pulse: 83   Resp: 15   Temp:    SpO2: 100%       GENERAL:  Intubated and Sedated on Pressors  HEAD:  Normocephalic. Atraumatic. LUNGS:  No increased work of breathing. CARDIOVASCULAR: RR  ABDOMEN:  Soft, non-distended, non-tender. No guarding, rigidity, rebound. Of note, pt did react to pain w/ re-insertion of NG Tube.   SKIN:  Cool extremities, multiple old wounds b/l legs      LABS:    CBC  Recent Labs     05/07/19  0050   WBC 18.8*   HGB 15.2   HCT 45.7        BMP  Recent Labs     05/07/19  1118      K 3.6      CO2 19*   BUN 70*   CREATININE 3.3*   CALCIUM 7.0*     Liver Function  Recent Labs     05/07/19  0050 05/07/19  0935   AMYLASE  --  115*   LIPASE 169* 127*   BILITOT 0.9 0.7   BILIDIR  --  0.6*   AST 5,412*  -- ALT 3,440*  --    ALKPHOS 157*  --    PROT 6.2*  --    LABALBU 2.5*  --      No results for input(s): LACTATE in the last 72 hours. Recent Labs     05/07/19  1118   INR 4.3       RADIOLOGY:    Ct Abdomen Pelvis Wo Contrast Additional Contrast? None    Result Date: 5/7/2019  EXAM:  CT Abdomen and Pelvis Without Contrast EXAM DATE/TIME:  5/7/2019 2:30 AM CLINICAL HISTORY:  59years old, female; Pain and signs and symptoms; Other: Diarrhea; Abdominal pain; Generalized TECHNIQUE:  Imaging protocol: Axial computed tomography images of the abdomen and pelvis without contrast. Coronal and sagittal reformatted images were created and reviewed. Radiation optimization: All CT scans at this facility use at least one of these dose optimization techniques: automated exposure control; mA and/or kV adjustment per patient size (includes targeted exams where dose is matched to clinical indication); or iterative reconstruction. COMPARISON:  No relevant prior studies available. FINDINGS:  Tubes, catheters and devices: Subcutaneous stimulator at the anterolateral aspect of the left lower quadrant abdomen with an associated electrode extending into the spinal canal at the level of L2-3 and extending superiorly with tip at the level of T6. Lungs: Moderate bilateral dependent atelectasis versus consolidations with air bronchograms. Mild to moderate subsegmental atelectasis in the visualized lungs bilaterally. ABDOMEN:  Liver: Hepatomegaly to 16.5 cm in length at the midclavicular line and diffuse hepatic steatosis. Gallbladder and bile ducts: The gallbladder is surgically absent with cholecystectomy clips in the gallbladder fossa. No biliary ductal dilatation. Pancreas: Moderate pancreatic atrophy. No ductal dilatation. Spleen: Grossly unremarkable. No splenomegaly. Adrenals: Unremarkable. No mass. Kidneys and ureters: Grossly unremarkable. No hydronephrosis. No nephrolithiasis. No significant perinephric stranding.   Stomach and bowel: Moderate diffuse mucosal thickening from the rectum to the mid sigmoid colon with mild adjacent stranding, mild mucosal thickening throughout the descending colon with adjacent stranding, and suggestion of mucosal thickening throughout the distal transverse colon as well as from the proximal most transverse colon to the cecum concerning for infectious/inflammatory proctocolitis. The unopacified loops of small bowel appear grossly unremarkable without evidence of obstruction. Moderate hiatal hernia. Appendix: Normal contrast-filled appendix inferior to the cecum. No periappendiceal free air or abscess seen. PELVIS:  Bladder: The urinary bladder is decompressed but appears grossly unremarkable. No bladder calculi. Reproductive: Grossly unremarkable ovaries and uterus. ABDOMEN and PELVIS:  Intraperitoneal space: No significant fluid collection. No free air. Prominent intra-abdominal fat. Bones/joints: Diffuse age related osteopenia and moderate degenerative changes. Moderate levoscoliosis of the lower lumbar spine. No acute fracture. No dislocation. Soft tissues: Huge amount of subcutaneous fat consistent with morbid obesity. Vasculature: The abdominal aorta appears grossly unremarkable. Incidental bilateral inferior pelvic phleboliths. Lymph nodes: Unremarkable. No enlarged lymph nodes. 1. Moderate diffuse mucosal thickening from the rectum to the mid sigmoid colon with mild adjacent stranding, mild mucosal thickening throughout the descending colon with adjacent stranding, and suggestion of mucosal thickening throughout the distal transverse colon as well as from the proximal most transverse colon to the cecum concerning for infectious/inflammatory proctocolitis. 2. Moderate hiatal hernia. 3. Moderate bilateral dependent atelectases versus consolidations with air bronchograms. 4. No nephrolithiasis or obstructive uropathy. No perinephric stranding. 5. Normal appendix.  6. Additional intubation FINDINGS: Endotracheal tube is 1.7 cm above the jono. Nasogastric tube is coiled in expected location of the mid esophagus. There are interstitial and hazy opacities bilaterally notable at left hilar location and left lung base appearing to have increased slightly compared to prior. The heart is normal in size. No evidence pneumothorax. 1. Abnormal location of nasogastric tube which appears to be coiled within the mid esophagus. 2. Increased interstitial and hazy opacities at left hilar location and left lung base which could suggest increasing pneumonia or atelectasis. Short-term follow-up could be helpful for further evaluation. ALERT:  THIS IS AN ABNORMAL REPORT. Xr Chest Portable    Result Date: 2019  Patient MRN:  [id number] : 1960 Age: 59 years Gender: Female Order Date:  2019 12:45 AM EXAM: XR CHEST PORTABLE COMPARISON: 2004 INDICATION:  chest pain chest pain FINDINGS: Interstitial opacities are seen in bilateral perihilar and infrahilar locations. No obvious pleural effusion. The heart is normal size. No pneumothorax. Spinal stimulator demonstrated. Interstitial opacities in perihilar and infrahilar locations could suggest peribronchial thickening/inflammation. Continued follow-up could be helpful for further evaluation. ASSESSMENT/PLAN:  59 y.o. female with septic shock and MSOF of unknown etiology, rule out Cdiff    Abdominal exam is benign and CT scan does show some thickening of rectum and sigmoid, but not all that indicative as cause for her current condition  Continue fluid resuscitation  Continue broad spectrum Abx per medical team  Cdiff, Blood Cx pending  No acute operative intervention at this time    Plan discussed with Dr. David Childers.     Emmanuel Trevino DO  Resident, PGY-1  2019  3:07 PM

## 2019-05-07 NOTE — H&P
tablet by mouth 2 times daily for 30 days THIS PRESCRIPTION IS A 30 DAY SUPPLY. ( ICD: 10  G 89.4). Earliest Fill Date: 1/6/18  pregabalin (LYRICA) 200 MG capsule, Take 1 capsule by mouth every 8 hours . (Patient taking differently: Take 100 mg by mouth every 8 hours. )  orphenadrine (NORFLEX) 100 MG extended release tablet, Take 1 tablet by mouth 2 times daily  ALBUTEROL SULFATE IN, Inhale 2 puffs into the lungs 2 times daily  guaiFENesin (MUCINEX) 600 MG SR tablet, Take 1,200 mg by mouth 2 times daily  Cholecalciferol (VITAMIN D3) 2000 UNITS CAPS, Take 1 capsule by mouth daily  Ferrous Gluconate (IRON) 240 (27 FE) MG TABS, Take 2 tablets by mouth daily  b complex vitamins capsule, Take 1 capsule by mouth daily  Coenzyme Q10 (CO Q 10) 100 MG CAPS, Take 1 capsule by mouth daily  sucralfate (CARAFATE) 1 GM tablet, Take 1 g by mouth 4 times daily. meclizine (ANTIVERT) 25 MG tablet, Take 25 mg by mouth 2 times daily. atenolol (TENORMIN) 25 MG tablet, Take 25 mg by mouth 2 times daily. thyroid (ARMOUR THYROID) 30 MG tablet, Take 30 mg by mouth daily. loratadine-pseudoephedrine (CLARITIN-D 24 HOUR)  MG per tablet, Take 1 tablet by mouth daily. Calcium Carbonate Antacid (TUMS PO), Take 1 tablet by mouth as needed. zolpidem (AMBIEN) 10 MG tablet, Take 1 tablet by mouth nightly as needed for Sleep for up to 90 days 3 MONTH SUPPLY. DO NOT FILL UNTIL 1-6-17. .  orphenadrine (NORFLEX) 100 MG extended release tablet, Take 1 tablet by mouth 2 times daily  Glucosamine-Chondroit-Vit C-Mn (GLUCOSAMINE CHONDR 1500 COMPLX PO), Take by mouth  orphenadrine (NORFLEX) 100 MG extended release tablet, Take 1 tablet by mouth 2 times daily  Handicap Placard MISC, by Does not apply route 2 year duration. Probiotic Product (PROBIOTIC DAILY PO), Take 1 capsule by mouth daily    Allergies:  Aciphex [rabeprazole sodium]; Aleve [naproxen sodium]; Amitriptyline-perphenazine [perphenazine-amitriptyline];  Amoxil

## 2019-05-07 NOTE — ED PROVIDER NOTES
Department of Emergency Medicine   ED  Provider Note  Admit Date/RoomTime: 5/7/2019 12:26 AM  ED Room: TAVARES/TAVARES      History of Present Illness:  5/7/19, Time: 12:35 AM         Marilou Estrada is a 59 y.o. female presenting to the ED for AMS, beginning today. The complaint has been constant, moderate in severity, and worsened by nothing. SO reports that the patient has had diarrhea for the past 2 weeks and was on an antibiotic. SO reports that the patient's diarrhea has improved over the weekend but she has become altered. SO states that the patient is on morphine sulfate for back problems. Unable to obtain a complete HPI due to this patient's mental status. Review of Systems:   Unable to obtain a complete ROS due to the patients mental status.     --------------------------------------------- PAST HISTORY ---------------------------------------------  Past Medical History:  has a past medical history of Arthritis, Asthma, High blood pressure, Hyperlipidemia, Irregular heart beat, Migraines, neuralgic, PONV (postoperative nausea and vomiting), and Thyroid disorder. Past Surgical History:  has a past surgical history that includes Dilation and curettage of uterus (1983); Cholecystectomy (September 21, 1992); Tubal ligation (April 8, 1994); Lumbar spine surgery (July 5, 2001); Lumbar spine surgery (2005); Spine surgery (07/30/2007); Nerve Block (05/29/13); and other surgical history (N/A, 5-9-16). Social History:  reports that she has quit smoking. She has never used smokeless tobacco. She reports that she does not drink alcohol or use drugs. Family History: family history includes Cancer in her father; Diabetes in her maternal grandmother; Heart Disease in her father and mother; High Cholesterol in her mother; Mental Illness in her mother. The patients home medications have been reviewed. Allergies: Aciphex [rabeprazole sodium]; Aleve [naproxen sodium];  Amitriptyline-perphenazine [perphenazine-amitriptyline]; Amoxil [amoxicillin]; Aspirin; Bentyl [dicyclomine hcl]; Celebrex [celecoxib]; Cephalexin; Codeine; Compazine [prochlorperazine maleate]; Cyclosporine; Demerol; Ditropan [oxybutynin chloride]; Doxycycline; Effexor [venlafaxine hydrochloride]; Elavil [amitriptyline hcl]; Entex la; Fentanyl; Flexeril [cyclobenzaprine hcl]; Keflex [cephalexin]; Ketorolac tromethamine; Lansoprazole; Lipitor [atorvastatin]; Loprox [ciclopirox]; Medrol [methylprednisolone]; Midazolam hcl; Motrin [ibuprofen micronized]; Perphenazine; Phenergan [promethazine hcl]; Potassium-containing compounds; Prevacid [lansoprazole]; Prochlorperazine edisylate; Protonix [pantoprazole sodium]; Reglan [metoclopramide hcl]; Septra [bactrim]; Talwin nx [pentazocine-naloxone]; Tape [adhesive tape]; Tetracyclines & related; Toradol [ketorolac tromethamine]; Ultracet [tramadol-acetaminophen]; Vancenase [beclomethasone dipropionate]; Versed [midazolam]; Vicodin [hydrocodone-acetaminophen]; Vioxx; Amoxil [amoxicillin]; and Baclofen      ---------------------------------------------------PHYSICAL EXAM--------------------------------------    Constitutional/General: only answering to yes and no questions. Head: Normocephalic and atraumatic  Eyes: PERRL, EOMI. Mouth: Oropharynx clear, mucous membranes dry  Neck: Supple. Full ROM. Respiratory: Lungs clear to auscultation bilaterally, no wheezes, rales, or rhonchi. Not in respiratory distress   Cardiovascular:  Regular rate. Regular rhythm. No murmurs, gallops, or rubs. 2+ distal pulses  GI:  Abdomen Soft, right sided tenderness, Non distended. No rebound, guarding, or rigidity. Musculoskeletal: Moves all extremities x 4. Warm and well perfused. Integument: skin pale and cold.   Neurologic: GCS 15, 5/5 strength.   -------------------------------------------------- RESULTS -------------------------------------------------  I have personally reviewed all laboratory and imaging results for this patient. Results are listed below.      LABS:  Results for orders placed or performed during the hospital encounter of 05/07/19   Troponin   Result Value Ref Range    Troponin <0.01 0.00 - 0.03 ng/mL   Protime-INR   Result Value Ref Range    Protime 93.0 (H) 9.3 - 12.4 sec    INR 8.4 (HH)    APTT   Result Value Ref Range    aPTT 39.3 (H) 24.5 - 35.1 sec   CBC Auto Differential   Result Value Ref Range    WBC 18.8 (H) 4.5 - 11.5 E9/L    RBC 4.41 3.50 - 5.50 E12/L    Hemoglobin 15.2 11.5 - 15.5 g/dL    Hematocrit 45.7 34.0 - 48.0 %    .6 (H) 80.0 - 99.9 fL    MCH 34.5 26.0 - 35.0 pg    MCHC 33.3 32.0 - 34.5 %    RDW 13.1 11.5 - 15.0 fL    Platelets 665 175 - 761 E9/L    MPV 11.9 7.0 - 12.0 fL    Neutrophils % 84.3 (H) 43.0 - 80.0 %    Lymphocytes % 7.8 (L) 20.0 - 42.0 %    Monocytes % 5.2 2.0 - 12.0 %    Eosinophils % 0.9 0.0 - 6.0 %    Basophils % 0.9 0.0 - 2.0 %    Neutrophils # 15.79 (H) 1.80 - 7.30 E9/L    Lymphocytes # 1.50 1.50 - 4.00 E9/L    Monocytes # 0.94 0.10 - 0.95 E9/L    Eosinophils # 0.17 0.05 - 0.50 E9/L    Basophils # 0.00 0.00 - 0.20 E9/L    Promyelocytes Relative 1.7 0 - 0 %    Anisocytosis 2+     Damaso Cells 1+    Comprehensive Metabolic Panel   Result Value Ref Range    Sodium 129 (L) 132 - 146 mmol/L    Potassium 4.4 3.5 - 5.0 mmol/L    Chloride 84 (L) 98 - 107 mmol/L    CO2 20 (L) 22 - 29 mmol/L    Anion Gap 25 (H) 7 - 16 mmol/L    Glucose 63 (L) 74 - 99 mg/dL    BUN 73 (H) 8 - 23 mg/dL    CREATININE 4.2 (H) 0.5 - 1.0 mg/dL    GFR Non-African American 11 >=60 mL/min/1.73    GFR African American 13     Calcium 8.9 8.6 - 10.2 mg/dL    Total Protein 6.2 (L) 6.4 - 8.3 g/dL    Alb 2.5 (L) 3.5 - 5.2 g/dL    Total Bilirubin 0.9 0.0 - 1.2 mg/dL    Alkaline Phosphatase 157 (H) 35 - 104 U/L    ALT 3,440 (H) 0 - 32 U/L    AST 5,412 (H) 0 - 31 U/L   Lipase   Result Value Ref Range    Lipase 169 (H) 13 - 60 U/L   Lactic Acid, Plasma   Result Value Ref Range    Lactic Acid 3.4 (H) 0.5 - 2.2 mmol/L   Urinalysis   Result Value Ref Range    Color, UA Yellow Straw/Yellow    Clarity, UA SL CLOUDY Clear    Glucose, Ur Negative Negative mg/dL    Bilirubin Urine MODERATE (A) Negative    Ketones, Urine 15 (A) Negative mg/dL    Specific Gravity, UA >=1.030 1.005 - 1.030    Blood, Urine LARGE (A) Negative    pH, UA 5.0 5.0 - 9.0    Protein, UA 30 (A) Negative mg/dL    Urobilinogen, Urine 1.0 <2.0 E.U./dL    Nitrite, Urine Negative Negative    Leukocyte Esterase, Urine TRACE (A) Negative   Lactic Acid, Plasma   Result Value Ref Range    Lactic Acid 2.6 (H) 0.5 - 2.2 mmol/L   Microscopic Urinalysis   Result Value Ref Range    WBC, UA 2-5 0 - 5 /HPF    RBC, UA NONE 0 - 2 /HPF    Epi Cells MANY /HPF    Bacteria, UA MODERATE (A) /HPF    Yeast, UA MODERATE    Blood Gas, Arterial   Result Value Ref Range    Date Analyzed [id number]     Time Analyzed 0621     Source: Blood Arterial     pH, Blood Gas 7.259 (L) 7.350 - 7.450    PCO2 38.5 35.0 - 45.0 mmHg    PO2 92.7 60.0 - 100.0 mmHg    HCO3 16.8 (L) 22.0 - 26.0 mmol/L    B.E. -9.5 (L) -3.0 - 3.0 mmol/L    O2 Sat 95.7 92.0 - 98.5 %    O2Hb 95.1 94.0 - 97.0 %    COHb 0.3 0.0 - 1.5 %    MetHb 0.3 0.0 - 1.5 %    O2 Content 18.3 mL/dL    HHb 4.3 0.0 - 5.0 %    tHb (est) 13.6 11.5 - 16.5 g/dL    Mode NRB 15L     Date Of Collection      Time Collected      Pt Temp 38.0 C     ID [id number]     Lab [id number]     Critical(s) Notified . No Critical Values    POCT Glucose   Result Value Ref Range    Glucose 112 mg/dL    QC OK? okay    EKG 12 Lead   Result Value Ref Range    Ventricular Rate 93 BPM    Atrial Rate 93 BPM    P-R Interval 172 ms    QRS Duration 100 ms    Q-T Interval 396 ms    QTc Calculation (Bazett) 492 ms    P Axis 60 degrees    R Axis 61 degrees    T Axis 36 degrees       RADIOLOGY:  Interpreted by Radiologist.  CT Head WO Contrast   Final Result   1. No acute intracranial hemorrhage identified. 2. Additional nonacute/incidental findings as described above. This report has been electronically signed by David Tubbs MD.      [facility] Additional Contrast? None   Final Result   1. Moderate diffuse mucosal thickening from the rectum to the mid sigmoid colon    with mild adjacent stranding, mild mucosal thickening throughout the descending    colon with adjacent stranding, and suggestion of mucosal thickening throughout    the distal transverse colon as well as from the proximal most transverse colon    to the cecum concerning for infectious/inflammatory proctocolitis. 2. Moderate hiatal hernia. 3. Moderate bilateral dependent atelectases versus consolidations with air    bronchograms. 4. No nephrolithiasis or obstructive uropathy. No perinephric stranding. 5. Normal appendix. 6. Additional nonacute/incidental findings as described above. This report has been electronically signed by David Tubbs MD.      XR CHEST PORTABLE    (Results Pending)       EKG Interpretation    EKG: This EKG is signed and interpreted by the EP. Time: 0041  Rate: 93  Rhythm: Sinus  Interpretation: non-specific EKG and prolonged QT interval  Comparison: no previous EKG available    ------------------------- NURSING NOTES AND VITALS REVIEWED ---------------------------   The nursing notes within the ED encounter and vital signs as below have been reviewed by myself. BP (!) 83/52   Pulse 93   Temp 96.5 °F (35.8 °C) (Rectal)   Resp 20   Ht 5' (1.524 m)   Wt 190 lb (86.2 kg)   SpO2 91%   BMI 37.11 kg/m²   Oxygen Saturation Interpretation: Normal    The patients available past medical records and past encounters were reviewed.       ------------------------------ ED COURSE/MEDICAL DECISION MAKING----------------------  Medications   cefepime (MAXIPIME) 2 g IVPB extended (mini-bag) (2 g Intravenous New Bag 5/7/19 2949)   metronidazole (FLAGYL) 500 mg in NaCl 100 mL IVPB premix (500 mg Intravenous New Bag 5/7/19 9113)   levofloxacin (LEVAQUIN) 500 MG/100ML infusion 500 mg (has no administration in time range)   vancomycin (VANCOCIN) oral solution 125 mg (has no administration in time range)   0.9 % sodium chloride bolus (has no administration in time range)   phytonadione (ADULT) (VITAMIN K) 10 mg in dextrose 5 % 100 mL IVPB (has no administration in time range)   0.9 % sodium chloride bolus (0 mL/kg × 86.2 kg Intravenous Stopped 5/7/19 0310)   0.9 % sodium chloride bolus (0 mLs Intravenous Stopped 5/7/19 0456)       Medical Decision Making:    Patient arrived in the ED for altered mental status. Fluids given. XR of the chest, CT of the abdomen pelvis with IV contrast and CT of the head without contrast ordered. Lab work and EKG ordered. Patient's clinical presentation suspicious for pneumonia and C. diff colitis. I did review all diagnostics. He PG was eventually obtained demonstrated a metabolic acidosis. Patient was given vitamin K and FFP was ordered. Patient's blood pressure did improve with IV fluids. 2 large bore IVs were established. Central venous catheter attempted was made ×2 as below. Initial antibiotics were given for septic shock including cefepime and Flagyl. After reviewing CT scan of the lower half of the chest Levaquin was added as well as oral vancomycin. PROCEDURE  5/7/19       Time: 0213    CENTRAL LINE INSERTION ATTEMPT/FAILURE  Risks, benefits and alternatives (for applicable procedures below) described. Performed By: Ambika Hartley DO. Indication: vascular access, poor peripheral access and inability to gain peripheral access. Informed consent: The patient was counseled regarding the procedure, it's indications, risks, potential complications and alternatives and any questions were answered. Consent was obtained. .  Procedure: After routine sterile preparation, a left Central line was attempted by internal jugular vein approach and was unsuccessful. Ultrasound Guidance:   used.   Post-procedure Findings: A post procedural chest x-ray  was not indicated. Stopped   when INR was reported to be > 8. PROCEDURE  5/7/19       Time: 0213    CENTRAL LINE INSERTION ATTEMPT/FAILURE  Risks, benefits and alternatives (for applicable procedures below) described. Performed By: Binh Tierney DO. Indication: vascular access, poor peripheral access and inability to gain peripheral access. Informed consent: The patient was counseled regarding the procedure, it's indications, risks, potential complications and alternatives and any questions were answered. Consent was obtained. .  Procedure: After routine sterile preparation, a left Central line was attempted by femoral vein approach and was unsuccessful. Ultrasound Guidance:   used. Post-procedure Findings: A post procedural chest x-ray  was not indicated. Re-Evaluations:           Re-evaluation. Patients symptoms are improving with fluids     Consultations: Consultation was made with ICU attending Dr. Jessica Presley. He accepted admission. Consultation was made with Dr. Ember Mathews accepted admission. Counseling: The emergency provider has spoken with the patient and discussed todays results, in addition to providing specific details for the plan of care and counseling regarding the diagnosis and prognosis. Questions are answered at this time and they are agreeable with the plan.     --------------------------------- IMPRESSION AND DISPOSITION ---------------------------------    IMPRESSION  1. Septic shock ([address]    2. Pneumonia due to organism    3. Colitis    4. Acute renal failure, unspecified acute renal failure type ([address]    5. Sepsis, due to unspecified organism ([address]    6. Disorientation    7.  Coagulopathy ([facility] [address]        DISPOSITION  Disposition: Admit to CCU/ICU  Patient condition is stable    5/7/19, 12:35 AM.    This note is prepared by Kyle Grant, acting as Scribe for Brian Morales DO:  The scribe's documentation has been prepared under my direction and personally reviewed by me in its entirety. I confirm that the note above accurately reflects all work, treatment, procedures, and medical decision making performed by me.            Jean-Paul Askew,   05/07/19 1145

## 2019-05-07 NOTE — PROGRESS NOTES
[facility] Quality Flow/Interdisciplinary Rounds Progress Note        Quality Flow Rounds held on May 7, 2019    Disciplines Attending:  Bedside Nurse, Charge nurse, CRISTIANA, OT, PT, , and . Kavita Martin was admitted on 5/7/2019 12:26 AM    Anticipated Discharge Date:  Expected Discharge Date: 05/14/19    Disposition:    Lorenzo Score:  Lorenzo Scale Score: 11    Readmission Risk              Risk of Unplanned Readmission:        14           Discussed patient goal for the day, patient clinical progression, and barriers to discharge.   The following Goal(s) of the Day/Commitment(s) have been identified:  Consult for general surgery, monitor labs,       Kalani Ruby  May 7, 2019

## 2019-05-07 NOTE — CONSULTS
Department of Internal Medicine  Nephrology Resident Consult Note      Reason for Consult:  Acute kidney injury  Requesting Physician:  Dr. Newman Joseph:  lethargy    History Obtained From:  spouse, family member - son, electronic medical record    HISTORY OF PRESENT ILLNESS:  Briefly, Mrs. Phill Ballesteros is a 42-year-old female with h/o of hypothyroidism, lumbar radiculopathy s/p laminectomy, HLD, asthma, was brought to the ED for worsening lethargy. She was recently treated for lower extremity cellulitis with clindamycin and then levofloxacin around 2-3 weeks ago. She developed profuse diarrhea shortly thereafter. Family reported some vomiting initially and poor oral intake. Over the past 3 days, she became severely weak and dehydrated. At the ED, she was in shock requiring vasopressor despite aggressive fluid resuscitation and was intubated for airway protection. Labs were notable for creatinine of 4.2 mg/dL, BUN of 73 mg/dL, lactic acid of 3.4, sodium of 129 mmol/L, liver enzymes were also markedly elevated, reasons for this consult. Of note, she has no known history of kidney disease. She was on furosemide at home, but has not been taken since the onset of diarrhea.       Past Medical History:        Diagnosis Date    Arthritis     Asthma     High blood pressure     Hyperlipidemia     Irregular heart beat     Migraines, neuralgic     PONV (postoperative nausea and vomiting)     Thyroid disorder      Past Surgical History:        Procedure Laterality Date    CHOLECYSTECTOMY  September 21, 1992    DILATION AND CURETTAGE OF UTERUS  1983    LUMBAR SPINE SURGERY  July 5, 2001    LUMBAR SPINE SURGERY  2005    LASE    NERVE BLOCK  05/29/13    therapeutic caudal with modified Julio Somers OTHER SURGICAL HISTORY N/A 5-9-16    Surgical Replacement medtronic lumbar spinal cord stimulator    SPINE SURGERY  07/30/2007    Spinal Cord Stimulator Implant    TUBAL LIGATION  April 8, 1994     Current [promethazine hcl]; Potassium-containing compounds; Prevacid [lansoprazole]; Prochlorperazine edisylate; Protonix [pantoprazole sodium]; Reglan [metoclopramide hcl]; Septra [bactrim]; Talwin nx [pentazocine-naloxone]; Tape [adhesive tape]; Tetracyclines & related; Toradol [ketorolac tromethamine]; Ultracet [tramadol-acetaminophen]; Vancenase [beclomethasone dipropionate]; Versed [midazolam]; Vicodin [hydrocodone-acetaminophen]; Vioxx; Amoxil [amoxicillin]; and Baclofen    Social History:    TOBACCO:   reports that she has quit smoking. She has never used smokeless tobacco.  ETOH:   reports that she does not drink alcohol. DRUGS:   reports that she does not use drugs.     Family History:       Problem Relation Age of Onset    Cancer Father     Heart Disease Father     Heart Disease Mother     High Cholesterol Mother     Mental Illness Mother     Diabetes Maternal Grandmother      REVIEW OF SYSTEMS:    Review of systems not obtained due to patient factors - intubation  PHYSICAL EXAM:      Vitals:    VITALS:  BP 85/72   Pulse 90   Temp 97.9 °F (36.6 °C) (Bladder)   Resp 15   Ht 5' (1.524 m)   Wt 209 lb 3.2 oz (94.9 kg)   SpO2 95%   BMI 40.86 kg/m²   24HR INTAKE/OUTPUT:    Intake/Output Summary (Last 24 hours) at 5/7/2019 1406  Last data filed at 5/7/2019 0853  Gross per 24 hour   Intake --   Output 257 ml   Net -257 ml       Constitutional:  Intubated, sedated  HEENT:  MESSI, dry mucus membranes  Respiratory:  Clear breath sounds bilaterally  Cardiovascular/Edema:  Regular rate and rhythm, no murmurs appreciated  Gastrointestinal:  Soft, non-tender, bowel sounds present  Neurologic: sedated, withdraws to pain  Skin:  Cold to touch, mild edema, poor skin turgor  Other:  Erythematous lesion on lower extrtemities    DATA:    CBC:   Lab Results   Component Value Date    WBC 18.8 05/07/2019    RBC 4.41 05/07/2019    HGB 15.2 05/07/2019    HCT 45.7 05/07/2019    .6 05/07/2019    MCH 34.5 05/07/2019 distal transverse colon as well as from the proximal most transverse colon    to the cecum concerning for infectious/inflammatory proctocolitis. 2. Moderate hiatal hernia. 3. Moderate bilateral dependent atelectases versus consolidations with air    bronchograms. 4. No nephrolithiasis or obstructive uropathy. No perinephric stranding. 5. Normal appendix. 6. Additional nonacute/incidental findings as described above.        This report has been electronically signed by Martina Peng MD.       IMPRESSION/RECOMMENDATIONS:    Briefly, Mrs. Darlyn Aviles is a 27-year-old female with h/o of hypothyroidism, lumbar radiculopathy s/p laminectomy, HLD, asthma, was brought to the ED for worsening lethargy. She was recently treated for lower extremity cellulitis with clindamycin and then levofloxacin around 2-3 weeks ago. She developed profuse diarrhea shortly thereafter. Family reported some vomiting initially and poor oral intake. Over the past 3 days, she became severely weak and dehydrated. At the ED, she was in shock requiring vasopressor despite aggressive fluid resuscitation and was intubated for airway protection. Labs were notable for creatinine of 4.2 mg/dL, BUN of 73 mg/dL, lactic acid of 3.4, sodium of 129 mmol/L, liver enzymes were also markedly elevated, reasons for this consult. Of note, she has no known history of kidney disease. She was on furosemide at home, but has not been taken since the onset of diarrhea.     STEFANIA, stage 3, volume responsive pre-renal STEFANIA  (diarrhea, poor oral intake, shock), FeNa 0.5% vs established ATN  Creatinine slowly improving with fluid resuscitation     Respiratory failure, status post intubation  Acidemia (pH 7.259), with HAGMA (uremia, lactic acidosis, starvation ketoacidosis) and metabolic alkalosis ( vomiting)     Hemodynamic shock, hypovolemic and septic shock  Hyponatremia, multifactorial, due to hypovolemia and decreased GFR  Diarrhea  Shock liver  Hypoglycemia secondary to #6  Coagulopathy, high PT/INR    Plan:    Change IVF to D5 lactated ringers at 150 ml/hour  BMP every 8 hours  Albumin 50 gms IV once  Continue monitoring renal function  Family appraised regarding potential need for dialysis  Review urine sediment    Discussed with ICU team.    Thank you Dr. Celina Akers for allowing us to participate in the care of Mrs. Eun Evans.     Electronically signed by Ruba Gilbert MD on 5/7/2019 at 2:34 PM

## 2019-05-07 NOTE — CONSULTS
Consults     Department of Internal Medicine  Infectious Diseases   Consult Note      Reason for Consult:  Septic shock       Requesting Physician:  Dr Rafy Luevano:            Pt is currently intubated and sedated . History was obtained from the pt's family . Discussed with MICU team .    This is a 59 yrs old female presented to the ER with diarrhea,dehydration and generalized weakness . Pt was treated with clindamycin and then oral levaquin for lower extremity cellulitis 2 weeks ago . Pt developed diarrhea ~ 1 week ago. She had nausea - denied abdomen pain . Over past 2-3 days, she became very weak and dehydrated . She was hypothermic and hypotensive in the ER . IV fluid and levophed started . Intubated . WBC 18.8 K, ALT 3440,AST 5412 , lactic acid 3.4 , CT scan of abdomen and pelvis showed colon wall thickening . She was given IV vancomycin, oral vancomycin, and IV flagyl .      Past Medical History:      Past Medical History:   Diagnosis Date    Arthritis     Asthma     High blood pressure     Hyperlipidemia     Irregular heart beat     Migraines, neuralgic     PONV (postoperative nausea and vomiting)     Thyroid disorder          Past Surgical History:      Past Surgical History:   Procedure Laterality Date    CHOLECYSTECTOMY  September 21, 1992    DILATION AND CURETTAGE OF UTERUS  1983    LUMBAR SPINE SURGERY  July 5, 2001    LUMBAR SPINE SURGERY  2005    LASE    NERVE BLOCK  05/29/13    therapeutic caudal with modified Mikaela Ford OTHER SURGICAL HISTORY N/A 5-9-16    Surgical Replacement medtronic lumbar spinal cord stimulator    SPINE SURGERY  07/30/2007    Spinal Cord Stimulator Implant    TUBAL LIGATION  April 8, 1994         Current Medications:      Current Facility-Administered Medications   Medication Dose Route Frequency Provider Last Rate Last Dose    dextrose 50 % solution             0.9 % sodium chloride bolus  250 mL Intravenous Once Luis Rodriguez DO  phytonadione (ADULT) (VITAMIN K) 10 mg in dextrose 5 % 100 mL IVPB  10 mg Intravenous Once Idelia Rom,  mL/hr at 05/07/19 0959 10 mg at 05/07/19 0959    0.9 % sodium chloride bolus  250 mL Intravenous Once Reid Almanza MD 20 mL/hr at 05/07/19 0912 250 mL at 05/07/19 0912    metronidazole (FLAGYL) 500 mg in NaCl 100 mL IVPB premix  500 mg Intravenous Q8H Reid Almanza MD        norepinephrine (LEVOPHED) 16 mg in dextrose 5% 250 mL infusion  2 mcg/min Intravenous Continuous Reid Almanza MD 9.4 mL/hr at 05/07/19 0741 10 mcg/min at 05/07/19 0741    fentaNYL 5 mcg/mL in D5W 250 mL infusion  25 mcg/hr Intravenous Continuous Reid Almanza MD 10 mL/hr at 05/07/19 0807 50 mcg/hr at 05/07/19 0807    sodium chloride flush 0.9 % injection 10 mL  10 mL Intravenous 2 times per day Dequan Mejia MD   10 mL at 05/07/19 0913    sodium chloride flush 0.9 % injection 10 mL  10 mL Intravenous PRN Dequan Mejia MD        magnesium hydroxide (MILK OF MAGNESIA) 400 MG/5ML suspension 30 mL  30 mL Oral Daily PRN Dequan Mejia MD        ondansetron [facility]) injection 4 mg  4 mg Intravenous Q6H PRN Dequan Mejia MD        famotidine (PEPCID) injection 20 mg  20 mg Intravenous Daily Dequan Mejia MD   20 mg at 05/07/19 2896    lactated ringers infusion   Intravenous Continuous Lola Leal  mL/hr at 05/07/19 0911      vancomycin (VANCOCIN) 1,250 mg in dextrose 5 % 250 mL IVPB  1,250 mg Intravenous Once Lola Leal MD        propofol 1000 MG/100ML injection  10 mcg/kg/min Intravenous Titrated Dequan Camacho MD        vancomycin (VANCOCIN) oral solution 500 mg  500 mg Oral 4x Daily Dequan Camacho MD         Facility-Administered Medications Ordered in Other Encounters   Medication Dose Route Frequency Provider Last Rate Last Dose    midazolam (VERSED) injection    PRN Mell Mariano RN   2 mg at 05/07/19 0728    phenylephrine (ABHISHEK-SYNEPHRINE) injection    PRN Yuri Alex Anna Baldwin RN   100 mcg at 05/07/19 8879       Allergies:  Aciphex [rabeprazole sodium]; Aleve [naproxen sodium]; Amitriptyline-perphenazine [perphenazine-amitriptyline]; Amoxil [amoxicillin]; Aspirin; Bentyl [dicyclomine hcl]; Celebrex [celecoxib]; Cephalexin; Codeine; Compazine [prochlorperazine maleate]; Cyclosporine; Demerol; Ditropan [oxybutynin chloride]; Doxycycline; Effexor [venlafaxine hydrochloride]; Elavil [amitriptyline hcl]; Entex la; Fentanyl; Flexeril [cyclobenzaprine hcl]; Keflex [cephalexin]; Ketorolac tromethamine; Lansoprazole; Lipitor [atorvastatin]; Loprox [ciclopirox]; Medrol [methylprednisolone]; Midazolam hcl; Motrin [ibuprofen micronized]; Perphenazine; Phenergan [promethazine hcl]; Potassium-containing compounds; Prevacid [lansoprazole]; Prochlorperazine edisylate; Protonix [pantoprazole sodium]; Reglan [metoclopramide hcl]; Septra [bactrim]; Talwin nx [pentazocine-naloxone]; Tape [adhesive tape]; Tetracyclines & related; Toradol [ketorolac tromethamine]; Ultracet [tramadol-acetaminophen]; Vancenase [beclomethasone dipropionate]; Versed [midazolam]; Vicodin [hydrocodone-acetaminophen]; Vioxx;  Amoxil [amoxicillin]; and Baclofen    Social History:      Social History     Socioeconomic History    Marital status:      Spouse name: Not on file    Number of children: Not on file    Years of education: Not on file    Highest education level: Not on file   Occupational History    Not on file   Social Needs    Financial resource strain: Not on file    Food insecurity:     Worry: Not on file     Inability: Not on file    Transportation needs:     Medical: Not on file     Non-medical: Not on file   Tobacco Use    Smoking status: Former Smoker    Smokeless tobacco: Never Used   Substance and Sexual Activity    Alcohol use: No    Drug use: No    Sexual activity: Not on file   Lifestyle    Physical activity:     Days per week: Not on file     Minutes per session: Not on file  Stress: Not on file   Relationships    Social connections:     Talks on phone: Not on file     Gets together: Not on file     Attends Confucianism service: Not on file     Active member of club or organization: Not on file     Attends meetings of clubs or organizations: Not on file     Relationship status: Not on file    Intimate partner violence:     Fear of current or ex partner: Not on file     Emotionally abused: Not on file     Physically abused: Not on file     Forced sexual activity: Not on file   Other Topics Concern    Not on file   Social History Narrative    Not on file         Family History:     Family History   Problem Relation Age of Onset    Cancer Father     Heart Disease Father     Heart Disease Mother     High Cholesterol Mother     Mental Illness Mother     Diabetes Maternal Grandmother        REVIEW OF SYSTEMS:      CONSTITUTIONAL:  Hypothermia   GASTROINTESTINAL:  Diarrhea   GENITOURINARY:  Low urine output   INTEGUMENT:no rash   MUSCULOSKELETAL:  Weakness   NEUROLOGICAL:  Sedated     PHYSICAL EXAM:      Vitals:     Vitals:    05/07/19 0853   BP: 92/63   Pulse: 94   Resp: 15   Temp: 97 °F (36.1 °C)   SpO2: 95%       General Appearance:    Intubated, sedated    Head:    Normocephalic, atraumatic   Eyes:    No pallor, no icterus,   Ears:    No obvious deformity or drainage.    Nose:   No nasal drainage   Throat:   Mucosa dry, no oral thrush   Neck:   Supple, no lymphadenopathy   Lungs:     Clear to auscultation bilaterally   Heart:    Regular rate and rhythm, no murmur, rub or gallop   Abdomen:     Soft, non-tender, bowel sounds present    Extremities:   +++ edema, cold to touch   Pulses:   Dorsalis pedis palpable    Skin:   no rashes or lesions     CBC with Differential:      Lab Results   Component Value Date    WBC 18.8 05/07/2019    RBC 4.41 05/07/2019    HGB 15.2 05/07/2019    HCT 45.7 05/07/2019     05/07/2019    .6 05/07/2019    MCH 34.5 05/07/2019    MCHC 33.3

## 2019-05-07 NOTE — CONSULTS
[facility]  Internal Medicine Residency Program  History and Physical  MICU/consult    Patient:  Karyle Revere 59 y.o. female MRN: [id number]     Date of Service: 5/7/2019    Hospital Day: 1      Chief complaint: AMS  History of Present Illness   The patient is a 59 y.o. female with a PMH of HTN, HLD, Asthma, lumbar radiculopathy s/p  Laminectomy presented to ED for AMS. Per , patient was on multiple antibiotics (levoquin and then clindamycin) for treatment of lower extremities skin infection for 2-3 weeks. Started from 2 weeks ago, she began to have diarrhea, 2-3 times a day, no blood. She was able to ambulate 3 days ago. However, her condition getting worse since 2 days ago, she become more confused, weak and could not ambulate. She was then brought to Ed by  last night for further evaluation. Off the note, She was on chronic MS contin and lyrica in the past for treatment of back pain. Denies any anticoagulation use,  Not on tylenol for a long time, not on aspirin. In the ED, her BP was low 82/52, hypoxic on 15L Oxygen non rebreather mask . She was confused. Labs significant for Cr. 4.2, AG 25, lactic acid 2.6, ALT/AST 3440/5412, leukocytosis WBC 18.8, INR 8.4, initial ABG 3.556/43.2/88.1/40.8 c/w metabolic acidosis. Ct head was unremarkable, CT abdomen showed infectious/inflammatory proctocolitis, hepatomegaly with hepatic steatosis. She received 30cc/kg IVF plus another 1L fluid bolus, one dose of cefepime, flagyl (vanc and levaquin not given) in the ED. She was then transferred to MICU. Upon arrival, she was in septic shock, hypoxic with no improvement after 15L high flow oxygen, decision was made to intubate her. She was intubated and sedated on propofol and fentanyl.       Past Medical History:      Diagnosis Date    Arthritis     Asthma     High blood pressure     Hyperlipidemia     Irregular heart beat     Migraines, neuralgic     PONV (postoperative nausea 25 MG tablet Take 25 mg by mouth 2 times daily. Yes Historical Provider, MD   atenolol (TENORMIN) 25 MG tablet Take 25 mg by mouth 2 times daily. Yes Historical Provider, MD   thyroid (ARMOUR THYROID) 30 MG tablet Take 30 mg by mouth daily. Yes Historical Provider, MD   loratadine-pseudoephedrine (CLARITIN-D 24 HOUR)  MG per tablet Take 1 tablet by mouth daily. Yes Historical Provider, MD   Calcium Carbonate Antacid (TUMS PO) Take 1 tablet by mouth as needed. Yes Historical Provider, MD   zolpidem (AMBIEN) 10 MG tablet Take 1 tablet by mouth nightly as needed for Sleep for up to 90 days 3 MONTH SUPPLY. DO NOT FILL UNTIL 1-6-17. . 1/6/18 4/6/18  Clifford Bautista MD   orphenadrine (NORFLEX) 100 MG extended release tablet Take 1 tablet by mouth 2 times daily 12/6/17 3/6/18  OSMANI Zimmerman CNP   Glucosamine-Chondroit-Vit C-Mn (GLUCOSAMINE CHONDR 1500 COMPLX PO) Take by mouth    Historical Provider, MD   orphenadrine (NORFLEX) 100 MG extended release tablet Take 1 tablet by mouth 2 times daily 6/12/17 7/12/17  OSMANI Sunshine CNP   Handicap Placard MISC by Does not apply route 2 year duration. 8/16/16   OSMANI Sunshine CNP   Probiotic Product (PROBIOTIC DAILY PO) Take 1 capsule by mouth daily    Historical Provider, MD       Allergies:  Aciphex [rabeprazole sodium]; Aleve [naproxen sodium]; Amitriptyline-perphenazine [perphenazine-amitriptyline]; Amoxil [amoxicillin]; Aspirin; Bentyl [dicyclomine hcl]; Celebrex [celecoxib]; Cephalexin; Codeine; Compazine [prochlorperazine maleate]; Cyclosporine; Demerol; Ditropan [oxybutynin chloride]; Doxycycline; Effexor [venlafaxine hydrochloride]; Elavil [amitriptyline hcl]; Entex la; Fentanyl; Flexeril [cyclobenzaprine hcl]; Keflex [cephalexin]; Ketorolac tromethamine; Lansoprazole; Lipitor [atorvastatin]; Loprox [ciclopirox]; Medrol [methylprednisolone]; Midazolam hcl; Motrin [ibuprofen micronized]; Perphenazine;  Phenergan [promethazine hcl]; Potassium-containing compounds; Prevacid [lansoprazole]; Prochlorperazine edisylate; Protonix [pantoprazole sodium]; Reglan [metoclopramide hcl]; Septra [bactrim]; Talwin nx [pentazocine-naloxone]; Tape [adhesive tape]; Tetracyclines & related; Toradol [ketorolac tromethamine]; Ultracet [tramadol-acetaminophen]; Vancenase [beclomethasone dipropionate]; Versed [midazolam]; Vicodin [hydrocodone-acetaminophen]; Vioxx; Amoxil [amoxicillin]; and Baclofen    Social History:   TOBACCO:   reports that she has quit smoking. She has never used smokeless tobacco.  ETOH:   reports that she does not drink alcohol. OCCUPATION:     Family History:       Problem Relation Age of Onset    Cancer Father     Heart Disease Father     Heart Disease Mother     High Cholesterol Mother     Mental Illness Mother     Diabetes Maternal Grandmother        REVIEW OF SYSTEMS:    · Constitutional: No fever, no chills, no change in weight; good appetite  · HEENT: No blurred vision, no ear problems, no sore throat, no rhinorrhea. · Respiratory: No cough, no sputum production, no pleuritic chest pain, no shortness of breath  · Cardiology: No angina, no dyspnea on exertion, no paroxysmal nocturnal dyspnea, no orthopnea, no palpitation, no leg swelling. · Gastroenterology: No dysphagia, no reflux; no abdominal pain, no nausea or vomiting; no constipation or diarrhea.  No hematochezia   · Genitourinary: No dysuria, no frequency, hesitancy; no hematuria  · Musculoskeletal: no joint pain, no myalgia, no change in range of movement  · Neurology: no focal weakness in extremities, no slurred speech, no double vision, no tingling or numbness sensation  · Endocrinology: no temperature intolerance, no polyphagia, polydipsia or polyuria  · Hematology: no increased bleeding, no bruising, no lymphadenopathy  · Skin: no skin changes noticed by patient  · Psychology: no depressed mood, no suicidal ideation    Physical Exam   · Vitals: BP 110/68   Pulse 91   Temp 97 °F (36.1 °C) (Bladder)   Resp 15   Ht 5' (1.524 m)   Wt 209 lb 3.2 oz (94.9 kg)   SpO2 93%   BMI 40.86 kg/m²   ·    ·      · General Appearance:Intubated and sedated   · Skin: cold and pale blue in the finger tips, multiple rashes over lower extremities   · Head: normocephalic and atraumatic  · Eyes: pupils equal, round, and reactive to light, extraocular eye movements intact, conjunctivae normal  · ENT: tympanic membrane, external ear and ear canal normal bilaterally, nose without deformity, nasal mucosa and turbinates normal without polyps  · Neck: supple and non-tender without mass, no thyromegaly or thyroid nodules, no cervical lymphadenopathy  · Pulmonary/Chest: clear to auscultation bilaterally- no wheezes, rales or rhonchi, normal air movement, no respiratory distress  · Cardiovascular: normal rate, regular rhythm, normal S1 and S2, no murmurs, rubs, clicks, or gallops, distal pulses intact, no carotid bruits  · Abdomen: soft, right sided tenderness, Non distended.    No rebound tenderness   · Extremities: no cyanosis, clubbing or edema  · Musculoskeletal: normal range of motion, no joint swelling, deformity or tenderness  · Neurologic: reflexes normal and symmetric, no cranial nerve deficit, gait, coordination and speech normal     Lines     site day    Art line   None    TLC R Fem    PICC None    Hemoaccess None       Mechanical Ventilation:   Mode: A/C SIMV  PS  low tidal   TV:400  ml RR: 14  PEEP 5 cmH2O PS   FiO2 100%     ABG:     Lab Results   Component Value Date    PH 7.259 05/07/2019    PCO2 38.5 05/07/2019    PO2 92.7 05/07/2019    HCO3 16.8 05/07/2019    BE -9.5 05/07/2019    THB 13.6 05/07/2019    O2SAT 95.7 05/07/2019     Labs and Imaging Studies   Basic Labs  CBC:   Lab Results   Component Value Date    WBC 18.8 05/07/2019    RBC 4.41 05/07/2019    HGB 15.2 05/07/2019    HCT 45.7 05/07/2019    .6 05/07/2019    RDW 13.1 05/07/2019     05/07/2019 CMP:  Lab Results   Component Value Date     05/07/2019    K 4.4 05/07/2019    CL 84 05/07/2019    CO2 20 05/07/2019    BUN 73 05/07/2019    PROT 6.2 05/07/2019     PT/INR:  No results found for: PTINR    Imaging Studies:     Ct Abdomen Pelvis Wo Contrast Additional Contrast? None    Result Date: 5/7/2019  EXAM:  CT Abdomen and Pelvis Without Contrast EXAM DATE/TIME:  5/7/2019 2:30 AM CLINICAL HISTORY:  59years old, female; Pain and signs and symptoms; Other: Diarrhea; Abdominal pain; Generalized TECHNIQUE:  Imaging protocol: Axial computed tomography images of the abdomen and pelvis without contrast. Coronal and sagittal reformatted images were created and reviewed. Radiation optimization: All CT scans at this facility use at least one of these dose optimization techniques: automated exposure control; mA and/or kV adjustment per patient size (includes targeted exams where dose is matched to clinical indication); or iterative reconstruction. COMPARISON:  No relevant prior studies available. FINDINGS:  Tubes, catheters and devices: Subcutaneous stimulator at the anterolateral aspect of the left lower quadrant abdomen with an associated electrode extending into the spinal canal at the level of L2-3 and extending superiorly with tip at the level of T6. Lungs: Moderate bilateral dependent atelectasis versus consolidations with air bronchograms. Mild to moderate subsegmental atelectasis in the visualized lungs bilaterally. ABDOMEN:  Liver: Hepatomegaly to 16.5 cm in length at the midclavicular line and diffuse hepatic steatosis. Gallbladder and bile ducts: The gallbladder is surgically absent with cholecystectomy clips in the gallbladder fossa. No biliary ductal dilatation. Pancreas: Moderate pancreatic atrophy. No ductal dilatation. Spleen: Grossly unremarkable. No splenomegaly. Adrenals: Unremarkable. No mass. Kidneys and ureters: Grossly unremarkable. No hydronephrosis. No nephrolithiasis.  No significant perinephric stranding. Stomach and bowel: Moderate diffuse mucosal thickening from the rectum to the mid sigmoid colon with mild adjacent stranding, mild mucosal thickening throughout the descending colon with adjacent stranding, and suggestion of mucosal thickening throughout the distal transverse colon as well as from the proximal most transverse colon to the cecum concerning for infectious/inflammatory proctocolitis. The unopacified loops of small bowel appear grossly unremarkable without evidence of obstruction. Moderate hiatal hernia. Appendix: Normal contrast-filled appendix inferior to the cecum. No periappendiceal free air or abscess seen. PELVIS:  Bladder: The urinary bladder is decompressed but appears grossly unremarkable. No bladder calculi. Reproductive: Grossly unremarkable ovaries and uterus. ABDOMEN and PELVIS:  Intraperitoneal space: No significant fluid collection. No free air. Prominent intra-abdominal fat. Bones/joints: Diffuse age related osteopenia and moderate degenerative changes. Moderate levoscoliosis of the lower lumbar spine. No acute fracture. No dislocation. Soft tissues: Huge amount of subcutaneous fat consistent with morbid obesity. Vasculature: The abdominal aorta appears grossly unremarkable. Incidental bilateral inferior pelvic phleboliths. Lymph nodes: Unremarkable. No enlarged lymph nodes. 1. Moderate diffuse mucosal thickening from the rectum to the mid sigmoid colon with mild adjacent stranding, mild mucosal thickening throughout the descending colon with adjacent stranding, and suggestion of mucosal thickening throughout the distal transverse colon as well as from the proximal most transverse colon to the cecum concerning for infectious/inflammatory proctocolitis. 2. Moderate hiatal hernia. 3. Moderate bilateral dependent atelectases versus consolidations with air bronchograms. 4. No nephrolithiasis or obstructive uropathy. No perinephric stranding.  5. Normal appendix. 6. Additional nonacute/incidental findings as described above. This report has been electronically signed by Fab Izquierdo MD.    Ct Head Wo Contrast    Result Date: 2019  EXAM:  CT Head Without Contrast EXAM DATE/TIME:  2019 12:45 AM CLINICAL HISTORY:  59years old, female; Signs and symptoms; Altered mental status/memory loss; Confusion or disorientation; Additional info: Decreased alertness TECHNIQUE:  Imaging protocol: Axial computed tomography images of the head/brain without contrast. Coronal and sagittal reformatted images were created and reviewed. Radiation optimization: All CT scans at this facility use at least one of these dose optimization techniques: automated exposure control; mA and/or kV adjustment per patient size (includes targeted exams where dose is matched to clinical indication); or iterative reconstruction. COMPARISON:  No relevant prior studies available. FINDINGS:  Brain: No acute intracranial hemorrhage identified. Diffuse age-related mild cerebral atrophy and subtle small vessel ischemic changes. Incidental physiologic calcifications in the bilateral basal ganglia. Ventricles: Unremarkable for age. Sella: The pituitary gland appears grossly unremarkable. Bones/joints: Incidental hyperostosis frontalis interna. No acute fracture. Sinuses: Unremarkable. No acute sinusitis. No air-fluid levels. Mastoid air cells: Unremarkable. No mastoid effusions. Orbits: Unremarkable. Soft tissues: Unremarkable. Vasculature: Mild bilateral intracranial internal carotid artery atherosclerotic calcifications. 1. No acute intracranial hemorrhage identified. 2. Additional nonacute/incidental findings as described above.  This report has been electronically signed by Fab Izquierdo MD.    Xr Chest Portable    Result Date: 2019  Patient MRN:  [id number] : 1960 Age: 59 years Gender: Female Order Date:  2019 12:45 AM EXAM: XR CHEST PORTABLE COMPARISON:  2004 INDICATION:  chest pain chest pain FINDINGS: Interstitial opacities are seen in bilateral perihilar and infrahilar locations. No obvious pleural effusion. The heart is normal size. No pneumothorax. Spinal stimulator demonstrated. Interstitial opacities in perihilar and infrahilar locations could suggest peribronchial thickening/inflammation. Continued follow-up could be helpful for further evaluation.       EKG: normal sinus rhythm, Qt prolonged    Resident's Assessment and Plan     Sydnie Rincon is a 59 y.o. female    Neurology   Acute encephalopathy  2/2 septic shock vs drug overdose   Currently Intubated and sedated   Will check ammonia, TSH, Vitamin B12  Serum drug screen and urine drug screen  On chronic opioids for pain control   Monitor neuro status       Cardiovascular  Septic shock   2/2 possible C diff colitis   Started IV flagyl Q8h and PO vancomycin 500 mg 4 times daily   On pressors, levophed   Received 30 cc/kg IVF and 1 L fluid bolus, continue IVF  Pan culture sent  Check C diff EIA  ID consult        Pulmonary   Acute hypoxic respiratory failure  2/2 septic shock   S/p intubation   Initial ABG 1.456/10.2/28.7/12.7 c/w metabolic acidosis  Vent settin/400/5/100%  CXR: Interstitial opacities in perihilar and infrahilar   Check pro calcitonin   Daily ABG and CXR      GI  ? C diff colitis   Hx of 2-3 weeks antibiotics use and diarrhea after   Leukocytosis WBC 18.8>15  Ct abdomen showed  infectious/inflammatory proctocolitis  Started IV flagyl Q8h and PO vancomycin 500 mg 4 times daily   GS consult   Check C diff EIA      Shock liver   Transaminitis ALT/AST 3440/5412  Ct abdomen showed hepatomegaly with hepatic steatosis  INR 8.4, monitor INR  Will check serum drug screen   Monitor LFT   Give FFP and vitamin K       Renal   STEFANIA  Likely 2/2 dehydration from septic shock and diarrhea   Baseline Cr. 0.9, initial Cr. 4.2  Obtain urine electrolytes, creatinine and urea  IVF  Nephrology consult Monitor renal function     HAGMA  2/2 lactic acidosis and may have other contribute  Initial AG 25  Lactic acid mild elevated 2.6, continue trend   Check serum drug screen   IVF  Nephrology consult         PT/OT evaluation:  DVT prophylaxis/ GI prophylaxis: Pepcid  Disposition: MICU    Trev Marc MD, PGY-1   Attending physician: Dr. Vandana Lovett personally saw, examined and provided care for the patient. Radiographs, labs and medication list were reviewed by me independently. I spoke with bedside nursing, therapists and consultants. Critical care services and times documented are independent of procedures and multidisciplinary rounds with Residents. Additionally comprehensive, multidisciplinary rounds were conducted with the MICU team. The case was discussed in detail and plans for care were established. Review of Residents documentation was conducted and revisions were made as appropriate. I agree with the above documented exam, problem list and plan of care. Septic shock   Colitis ? C diff  IVF   Vanco PO and Flagyl  GS       Care reviewed with nursing staff, medical and surgical specialty care, primary care and the patient's family as available. \    Chart review/lab review/X-ray viewing/documentation and had long Conversation with patient/family re: prognosis, care options and any end of life issues:      Critical care time spent reviewing labs/films, examining patient, collaborating with other physicians more than 35  Minutes  excluding procedures . Anna Bradley M.D.   5/7/2019  11:59 PM

## 2019-05-07 NOTE — PROCEDURES
[address] [facility]  Department of Internal Medicine  Internal Medicine Residency Program  Procedure Note    Procedure: Insertion of TLC via  Right femoral vein    Indications:  Hemodynamic/CVP monitoring and IV access    Anesthesia: Local infiltration of 1% lidocaine. Consent:  Informed written consent obtained. Technique:  Procedure was done using strict aseptic technique. The patient's neck was prepped and draped in the usual sterile fashion. Ultrasound was used to identify the jugular vein. This area of the neck overlying the IJ was injected with 1% lidocaine. Then using the Seldinger technique, a large-bore needle was placed into the femoral vein with good backflow. A guidewire was placed. Then using an 11 blade, a small incision was made in the patient's skin. The large-bore needle was removed. A dilator was passed over the guidewire. Once completed, a triple lumen catheter was placed over the guidewire with the guidewire then subsequently removed. All three ports were drawn and flushed with good flow. Then the catheter was sutured in place, and a sterile dressing was applied. Number of sticks: 1. Number of Kits used: 1. Complications: No immediate complication. Estimated blood loss: About 5 ml. Comment: Patient tolerated the procedure well. Placement:  CXR was ordered to confirm placement and is in good position.     Libra De La Vega MD. PGY 3  ICU Attending: Dr. Hilda Muniz

## 2019-05-08 ENCOUNTER — APPOINTMENT (OUTPATIENT)
Dept: GENERAL RADIOLOGY | Age: 59
DRG: 870 | End: 2019-05-08
Payer: COMMERCIAL

## 2019-05-08 LAB
AADO2: 373.7 MMHG
ALBUMIN SERPL-MCNC: 2.4 G/DL (ref 3.5–5.2)
ALP BLD-CCNC: 102 U/L (ref 35–104)
ALT SERPL-CCNC: 1018 U/L (ref 0–32)
ANION GAP SERPL CALCULATED.3IONS-SCNC: 12 MMOL/L (ref 7–16)
ANION GAP SERPL CALCULATED.3IONS-SCNC: 13 MMOL/L (ref 7–16)
ANION GAP SERPL CALCULATED.3IONS-SCNC: 13 MMOL/L (ref 7–16)
ANISOCYTOSIS: ABNORMAL
ANISOCYTOSIS: ABNORMAL
AST SERPL-CCNC: 582 U/L (ref 0–31)
B.E.: -7.3 MMOL/L (ref -3–3)
BASOPHILS ABSOLUTE: 0 E9/L (ref 0–0.2)
BASOPHILS RELATIVE PERCENT: 0 % (ref 0–2)
BASOPHILS RELATIVE PERCENT: 0.2 % (ref 0–2)
BASOPHILS RELATIVE PERCENT: 0.5 % (ref 0–2)
BILIRUB SERPL-MCNC: 1 MG/DL (ref 0–1.2)
BILIRUBIN DIRECT: 0.7 MG/DL (ref 0–0.3)
BILIRUBIN, INDIRECT: 0.3 MG/DL (ref 0–1)
BUN BLDV-MCNC: 58 MG/DL (ref 8–23)
BUN BLDV-MCNC: 65 MG/DL (ref 8–23)
BUN BLDV-MCNC: 67 MG/DL (ref 8–23)
C DIFFICILE TOXIN, EIA: ABNORMAL
CALCIUM SERPL-MCNC: 6.9 MG/DL (ref 8.6–10.2)
CALCIUM SERPL-MCNC: 7.2 MG/DL (ref 8.6–10.2)
CALCIUM SERPL-MCNC: 7.4 MG/DL (ref 8.6–10.2)
CHLORIDE BLD-SCNC: 100 MMOL/L (ref 98–107)
CHLORIDE BLD-SCNC: 105 MMOL/L (ref 98–107)
CHLORIDE BLD-SCNC: 99 MMOL/L (ref 98–107)
CO2: 18 MMOL/L (ref 22–29)
CO2: 19 MMOL/L (ref 22–29)
CO2: 19 MMOL/L (ref 22–29)
COHB: 1.2 % (ref 0–1.5)
CREAT SERPL-MCNC: 2 MG/DL (ref 0.5–1)
CREAT SERPL-MCNC: 2.6 MG/DL (ref 0.5–1)
CREAT SERPL-MCNC: 2.8 MG/DL (ref 0.5–1)
CRITICAL: ABNORMAL
DATE ANALYZED: ABNORMAL
DATE OF COLLECTION: ABNORMAL
EKG ATRIAL RATE: 93 BPM
EKG P AXIS: 60 DEGREES
EKG P-R INTERVAL: 172 MS
EKG Q-T INTERVAL: 396 MS
EKG QRS DURATION: 100 MS
EKG QTC CALCULATION (BAZETT): 492 MS
EKG R AXIS: 61 DEGREES
EKG T AXIS: 36 DEGREES
EKG VENTRICULAR RATE: 93 BPM
EOSINOPHILS ABSOLUTE: 0 E9/L (ref 0.05–0.5)
EOSINOPHILS RELATIVE PERCENT: 0 % (ref 0–6)
EOSINOPHILS RELATIVE PERCENT: 0.2 % (ref 0–6)
EOSINOPHILS RELATIVE PERCENT: 0.4 % (ref 0–6)
FIO2: 70 %
FOLATE: 10.2 NG/ML (ref 4.8–24.2)
GFR AFRICAN AMERICAN: 21
GFR AFRICAN AMERICAN: 23
GFR AFRICAN AMERICAN: 31
GFR NON-AFRICAN AMERICAN: 17 ML/MIN/1.73
GFR NON-AFRICAN AMERICAN: 19 ML/MIN/1.73
GFR NON-AFRICAN AMERICAN: 25 ML/MIN/1.73
GLUCOSE BLD-MCNC: 149 MG/DL (ref 74–99)
GLUCOSE BLD-MCNC: 160 MG/DL (ref 74–99)
GLUCOSE BLD-MCNC: 172 MG/DL (ref 74–99)
HCO3: 16.3 MMOL/L (ref 22–26)
HCT VFR BLD CALC: 33.9 % (ref 34–48)
HCT VFR BLD CALC: 35.4 % (ref 34–48)
HCT VFR BLD CALC: 35.7 % (ref 34–48)
HEMOCCULT STL QL: NORMAL
HEMOCCULT STL QL: NORMAL
HEMOCCULT STL QL: POSITIVE
HEMOGLOBIN: 11.5 G/DL (ref 11.5–15.5)
HEMOGLOBIN: 11.7 G/DL (ref 11.5–15.5)
HEMOGLOBIN: 12 G/DL (ref 11.5–15.5)
HHB: 3.8 % (ref 0–5)
HYPOCHROMIA: ABNORMAL
INR BLD: 1.6
LAB: ABNORMAL
LACTIC ACID: 2.3 MMOL/L (ref 0.5–2.2)
LACTIC ACID: 2.6 MMOL/L (ref 0.5–2.2)
LACTIC ACID: 3.3 MMOL/L (ref 0.5–2.2)
LACTIC ACID: 3.6 MMOL/L (ref 0.5–2.2)
LYMPHOCYTES ABSOLUTE: 0.38 E9/L (ref 1.5–4)
LYMPHOCYTES ABSOLUTE: 0.72 E9/L (ref 1.5–4)
LYMPHOCYTES ABSOLUTE: 0.96 E9/L (ref 1.5–4)
LYMPHOCYTES RELATIVE PERCENT: 1.7 % (ref 20–42)
LYMPHOCYTES RELATIVE PERCENT: 2.6 % (ref 20–42)
LYMPHOCYTES RELATIVE PERCENT: 4 % (ref 20–42)
Lab: ABNORMAL
MAGNESIUM: 1.6 MG/DL (ref 1.6–2.6)
MAGNESIUM: 1.8 MG/DL (ref 1.6–2.6)
MCH RBC QN AUTO: 33.8 PG (ref 26–35)
MCH RBC QN AUTO: 34 PG (ref 26–35)
MCH RBC QN AUTO: 34.2 PG (ref 26–35)
MCHC RBC AUTO-ENTMCNC: 33.1 % (ref 32–34.5)
MCHC RBC AUTO-ENTMCNC: 33.6 % (ref 32–34.5)
MCHC RBC AUTO-ENTMCNC: 33.9 % (ref 32–34.5)
MCV RBC AUTO: 100.3 FL (ref 80–99.9)
MCV RBC AUTO: 101.7 FL (ref 80–99.9)
MCV RBC AUTO: 102.3 FL (ref 80–99.9)
METER GLUCOSE: 116 MG/DL (ref 74–99)
METER GLUCOSE: 129 MG/DL (ref 74–99)
METER GLUCOSE: 144 MG/DL (ref 74–99)
METHB: 0.3 % (ref 0–1.5)
MODE: AC
MONOCYTES ABSOLUTE: 0.72 E9/L (ref 0.1–0.95)
MONOCYTES ABSOLUTE: 0.94 E9/L (ref 0.1–0.95)
MONOCYTES ABSOLUTE: 0.96 E9/L (ref 0.1–0.95)
MONOCYTES RELATIVE PERCENT: 2.6 % (ref 2–12)
MONOCYTES RELATIVE PERCENT: 4 % (ref 2–12)
MONOCYTES RELATIVE PERCENT: 5.2 % (ref 2–12)
NEUTROPHILS ABSOLUTE: 17.58 E9/L (ref 1.8–7.3)
NEUTROPHILS ABSOLUTE: 22.17 E9/L (ref 1.8–7.3)
NEUTROPHILS ABSOLUTE: 22.65 E9/L (ref 1.8–7.3)
NEUTROPHILS RELATIVE PERCENT: 92 % (ref 43–80)
NEUTROPHILS RELATIVE PERCENT: 93 % (ref 43–80)
NEUTROPHILS RELATIVE PERCENT: 93.9 % (ref 43–80)
NUCLEATED RED BLOOD CELLS: 0.9 /100 WBC
O2 SATURATION: 96.1 % (ref 92–98.5)
O2HB: 94.7 % (ref 94–97)
OPERATOR ID: 2067
PATIENT TEMP: 37 C
PCO2: 28.1 MMHG (ref 35–45)
PDW BLD-RTO: 13.2 FL (ref 11.5–15)
PDW BLD-RTO: 13.3 FL (ref 11.5–15)
PDW BLD-RTO: 13.3 FL (ref 11.5–15)
PEEP/CPAP: 5 CMH2O
PFO2: 1.11 MMHG/%
PH BLOOD GAS: 7.38 (ref 7.35–7.45)
PHOSPHORUS: 1.5 MG/DL (ref 2.5–4.5)
PLATELET # BLD: 176 E9/L (ref 130–450)
PLATELET # BLD: 220 E9/L (ref 130–450)
PLATELET # BLD: 233 E9/L (ref 130–450)
PMV BLD AUTO: 11.1 FL (ref 7–12)
PMV BLD AUTO: 11.1 FL (ref 7–12)
PMV BLD AUTO: 11.2 FL (ref 7–12)
PO2: 77.7 MMHG (ref 60–100)
POTASSIUM REFLEX MAGNESIUM: 3.2 MMOL/L (ref 3.5–5)
POTASSIUM REFLEX MAGNESIUM: 4.1 MMOL/L (ref 3.5–5)
POTASSIUM SERPL-SCNC: 3.3 MMOL/L (ref 3.5–5)
PROMYELOCYTES PERCENT: 0.9 % (ref 0–0)
PROTHROMBIN TIME: 18.3 SEC (ref 9.3–12.4)
RBC # BLD: 3.38 E12/L (ref 3.5–5.5)
RBC # BLD: 3.46 E12/L (ref 3.5–5.5)
RBC # BLD: 3.51 E12/L (ref 3.5–5.5)
RI(T): 4.81
RR MECHANICAL: 14 B/MIN
SODIUM BLD-SCNC: 131 MMOL/L (ref 132–146)
SODIUM BLD-SCNC: 131 MMOL/L (ref 132–146)
SODIUM BLD-SCNC: 136 MMOL/L (ref 132–146)
SOURCE, BLOOD GAS: ABNORMAL
THB: 12.8 G/DL (ref 11.5–16.5)
TIME ANALYZED: 519
TOTAL PROTEIN: 4.7 G/DL (ref 6.4–8.3)
VITAMIN B-12: >2000 PG/ML (ref 211–946)
VITAMIN D 25-HYDROXY: 13 NG/ML (ref 30–100)
VT MECHANICAL: 400 ML
WBC # BLD: 18.9 E9/L (ref 4.5–11.5)
WBC # BLD: 24.1 E9/L (ref 4.5–11.5)
WBC # BLD: 24.1 E9/L (ref 4.5–11.5)

## 2019-05-08 PROCEDURE — C9113 INJ PANTOPRAZOLE SODIUM, VIA: HCPCS | Performed by: INTERNAL MEDICINE

## 2019-05-08 PROCEDURE — 36620 INSERTION CATHETER ARTERY: CPT

## 2019-05-08 PROCEDURE — 6360000002 HC RX W HCPCS: Performed by: INTERNAL MEDICINE

## 2019-05-08 PROCEDURE — 85025 COMPLETE CBC W/AUTO DIFF WBC: CPT

## 2019-05-08 PROCEDURE — 87040 BLOOD CULTURE FOR BACTERIA: CPT

## 2019-05-08 PROCEDURE — 85610 PROTHROMBIN TIME: CPT

## 2019-05-08 PROCEDURE — 2500000003 HC RX 250 WO HCPCS: Performed by: INTERNAL MEDICINE

## 2019-05-08 PROCEDURE — 6370000000 HC RX 637 (ALT 250 FOR IP): Performed by: STUDENT IN AN ORGANIZED HEALTH CARE EDUCATION/TRAINING PROGRAM

## 2019-05-08 PROCEDURE — 71045 X-RAY EXAM CHEST 1 VIEW: CPT

## 2019-05-08 PROCEDURE — 2580000003 HC RX 258: Performed by: INTERNAL MEDICINE

## 2019-05-08 PROCEDURE — 74018 RADEX ABDOMEN 1 VIEW: CPT

## 2019-05-08 PROCEDURE — 82607 VITAMIN B-12: CPT

## 2019-05-08 PROCEDURE — 82805 BLOOD GASES W/O2 SATURATION: CPT

## 2019-05-08 PROCEDURE — 83735 ASSAY OF MAGNESIUM: CPT

## 2019-05-08 PROCEDURE — 83605 ASSAY OF LACTIC ACID: CPT

## 2019-05-08 PROCEDURE — 94003 VENT MGMT INPAT SUBQ DAY: CPT

## 2019-05-08 PROCEDURE — 82306 VITAMIN D 25 HYDROXY: CPT

## 2019-05-08 PROCEDURE — 6370000000 HC RX 637 (ALT 250 FOR IP): Performed by: INTERNAL MEDICINE

## 2019-05-08 PROCEDURE — 80076 HEPATIC FUNCTION PANEL: CPT

## 2019-05-08 PROCEDURE — 84100 ASSAY OF PHOSPHORUS: CPT

## 2019-05-08 PROCEDURE — 99232 SBSQ HOSP IP/OBS MODERATE 35: CPT | Performed by: SURGERY

## 2019-05-08 PROCEDURE — 82746 ASSAY OF FOLIC ACID SERUM: CPT

## 2019-05-08 PROCEDURE — 82962 GLUCOSE BLOOD TEST: CPT

## 2019-05-08 PROCEDURE — 36592 COLLECT BLOOD FROM PICC: CPT

## 2019-05-08 PROCEDURE — 2000000000 HC ICU R&B

## 2019-05-08 PROCEDURE — 36415 COLL VENOUS BLD VENIPUNCTURE: CPT

## 2019-05-08 PROCEDURE — 80048 BASIC METABOLIC PNL TOTAL CA: CPT

## 2019-05-08 PROCEDURE — 87324 CLOSTRIDIUM AG IA: CPT

## 2019-05-08 RX ORDER — PANTOPRAZOLE SODIUM 40 MG/10ML
40 INJECTION, POWDER, LYOPHILIZED, FOR SOLUTION INTRAVENOUS DAILY
Status: DISCONTINUED | OUTPATIENT
Start: 2019-05-08 | End: 2019-05-10

## 2019-05-08 RX ORDER — HEPARIN SODIUM 10000 [USP'U]/ML
5000 INJECTION, SOLUTION INTRAVENOUS; SUBCUTANEOUS EVERY 8 HOURS
Status: DISCONTINUED | OUTPATIENT
Start: 2019-05-08 | End: 2019-05-08

## 2019-05-08 RX ORDER — POTASSIUM CHLORIDE 29.8 MG/ML
20 INJECTION INTRAVENOUS
Status: COMPLETED | OUTPATIENT
Start: 2019-05-08 | End: 2019-05-08

## 2019-05-08 RX ORDER — LACTULOSE 10 G/15ML
20 SOLUTION ORAL 3 TIMES DAILY
Status: DISCONTINUED | OUTPATIENT
Start: 2019-05-08 | End: 2019-05-08

## 2019-05-08 RX ORDER — MAGNESIUM SULFATE IN WATER 40 MG/ML
2 INJECTION, SOLUTION INTRAVENOUS ONCE
Status: COMPLETED | OUTPATIENT
Start: 2019-05-08 | End: 2019-05-08

## 2019-05-08 RX ORDER — HEPARIN SODIUM 10000 [USP'U]/ML
5000 INJECTION, SOLUTION INTRAVENOUS; SUBCUTANEOUS 3 TIMES DAILY
Status: DISCONTINUED | OUTPATIENT
Start: 2019-05-08 | End: 2019-05-24 | Stop reason: HOSPADM

## 2019-05-08 RX ORDER — ERGOCALCIFEROL 1.25 MG/1
50000 CAPSULE ORAL WEEKLY
Status: DISCONTINUED | OUTPATIENT
Start: 2019-05-08 | End: 2019-05-24 | Stop reason: HOSPADM

## 2019-05-08 RX ORDER — BISACODYL 10 MG
10 SUPPOSITORY, RECTAL RECTAL DAILY
Status: DISCONTINUED | OUTPATIENT
Start: 2019-05-08 | End: 2019-05-10

## 2019-05-08 RX ADMIN — POTASSIUM CHLORIDE 20 MEQ: 29.8 INJECTION, SOLUTION INTRAVENOUS at 05:01

## 2019-05-08 RX ADMIN — Medication 17 MCG/MIN: at 03:15

## 2019-05-08 RX ADMIN — SODIUM CHLORIDE, SODIUM LACTATE, POTASSIUM CHLORIDE, CALCIUM CHLORIDE AND DEXTROSE MONOHYDRATE: 5; 600; 310; 30; 20 INJECTION, SOLUTION INTRAVENOUS at 00:01

## 2019-05-08 RX ADMIN — SODIUM CHLORIDE, SODIUM LACTATE, POTASSIUM CHLORIDE, CALCIUM CHLORIDE AND DEXTROSE MONOHYDRATE: 5; 600; 310; 30; 20 INJECTION, SOLUTION INTRAVENOUS at 08:28

## 2019-05-08 RX ADMIN — Medication 500 MG: at 09:00

## 2019-05-08 RX ADMIN — Medication 10 ML: at 08:00

## 2019-05-08 RX ADMIN — PANTOPRAZOLE SODIUM 40 MG: 40 INJECTION, POWDER, FOR SOLUTION INTRAVENOUS at 09:00

## 2019-05-08 RX ADMIN — MAGNESIUM SULFATE 2 G: 2 INJECTION INTRAVENOUS at 03:15

## 2019-05-08 RX ADMIN — POTASSIUM CHLORIDE 20 MEQ: 29.8 INJECTION, SOLUTION INTRAVENOUS at 03:15

## 2019-05-08 RX ADMIN — SODIUM CHLORIDE, SODIUM LACTATE, POTASSIUM CHLORIDE, CALCIUM CHLORIDE AND DEXTROSE MONOHYDRATE: 5; 600; 310; 30; 20 INJECTION, SOLUTION INTRAVENOUS at 23:54

## 2019-05-08 RX ADMIN — METRONIDAZOLE 500 MG: 500 INJECTION, SOLUTION INTRAVENOUS at 00:10

## 2019-05-08 RX ADMIN — METRONIDAZOLE 500 MG: 500 INJECTION, SOLUTION INTRAVENOUS at 16:00

## 2019-05-08 RX ADMIN — SODIUM CHLORIDE 0.2 MCG/KG/HR: 9 INJECTION, SOLUTION INTRAVENOUS at 15:50

## 2019-05-08 RX ADMIN — METRONIDAZOLE 500 MG: 500 INJECTION, SOLUTION INTRAVENOUS at 23:56

## 2019-05-08 RX ADMIN — SODIUM CHLORIDE, SODIUM LACTATE, POTASSIUM CHLORIDE, CALCIUM CHLORIDE AND DEXTROSE MONOHYDRATE: 5; 600; 310; 30; 20 INJECTION, SOLUTION INTRAVENOUS at 15:49

## 2019-05-08 RX ADMIN — Medication 500 MG: at 12:55

## 2019-05-08 RX ADMIN — VASOPRESSIN 0.03 UNITS/MIN: 20 INJECTION INTRAVENOUS at 20:40

## 2019-05-08 RX ADMIN — PETROLATUM: 42 OINTMENT TOPICAL at 14:07

## 2019-05-08 RX ADMIN — Medication 500 MG: at 20:15

## 2019-05-08 RX ADMIN — POTASSIUM CHLORIDE 20 MEQ: 29.8 INJECTION, SOLUTION INTRAVENOUS at 03:49

## 2019-05-08 RX ADMIN — Medication 500 MG: at 17:00

## 2019-05-08 RX ADMIN — FAMOTIDINE 20 MG: 10 INJECTION, SOLUTION INTRAVENOUS at 10:00

## 2019-05-08 RX ADMIN — VASOPRESSIN 0.03 UNITS/MIN: 20 INJECTION INTRAVENOUS at 10:30

## 2019-05-08 RX ADMIN — PETROLATUM: 42 OINTMENT TOPICAL at 09:00

## 2019-05-08 RX ADMIN — SODIUM PHOSPHATE, MONOBASIC, MONOHYDRATE 15 MMOL: 276; 142 INJECTION, SOLUTION INTRAVENOUS at 10:27

## 2019-05-08 RX ADMIN — METRONIDAZOLE 500 MG: 500 INJECTION, SOLUTION INTRAVENOUS at 08:00

## 2019-05-08 RX ADMIN — PROPOFOL 10 MCG/KG/MIN: 10 INJECTION, EMULSION INTRAVENOUS at 12:45

## 2019-05-08 RX ADMIN — POTASSIUM CHLORIDE 20 MEQ: 29.8 INJECTION, SOLUTION INTRAVENOUS at 06:11

## 2019-05-08 RX ADMIN — BISACODYL 10 MG: 10 SUPPOSITORY RECTAL at 11:27

## 2019-05-08 RX ADMIN — HEPARIN SODIUM 5000 UNITS: 10000 INJECTION INTRAVENOUS; SUBCUTANEOUS at 15:30

## 2019-05-08 RX ADMIN — PETROLATUM: 42 OINTMENT TOPICAL at 20:03

## 2019-05-08 RX ADMIN — Medication 25 MCG/HR: at 12:53

## 2019-05-08 RX ADMIN — Medication 10 ML: at 20:03

## 2019-05-08 RX ADMIN — HEPARIN SODIUM 5000 UNITS: 10000 INJECTION INTRAVENOUS; SUBCUTANEOUS at 20:48

## 2019-05-08 ASSESSMENT — PULMONARY FUNCTION TESTS
PIF_VALUE: 9
PIF_VALUE: 18
PIF_VALUE: 18
PIF_VALUE: 13
PIF_VALUE: 12
PIF_VALUE: 24
PIF_VALUE: 12
PIF_VALUE: 12
PIF_VALUE: 29
PIF_VALUE: 12
PIF_VALUE: 24
PIF_VALUE: 26
PIF_VALUE: 18
PIF_VALUE: 11
PIF_VALUE: 22
PIF_VALUE: 14
PIF_VALUE: 11
PIF_VALUE: 9
PIF_VALUE: 21
PIF_VALUE: 12
PIF_VALUE: 10
PIF_VALUE: 11
PIF_VALUE: 19
PIF_VALUE: 10
PIF_VALUE: 12
PIF_VALUE: 27
PIF_VALUE: 14

## 2019-05-08 ASSESSMENT — PAIN SCALES - WONG BAKER
WONGBAKER_NUMERICALRESPONSE: 0

## 2019-05-08 ASSESSMENT — PAIN SCALES - GENERAL: PAINLEVEL_OUTOF10: 0

## 2019-05-08 NOTE — PROGRESS NOTES
Patient continues to reach for lines and equipment despite alternative measures to deter. Two point bilateral soft wrist restraints maintained for patient safety.   Electronically signed by Jose Palma RN on 5/8/2019 at 10:59 AM

## 2019-05-08 NOTE — PLAN OF CARE
RN  Outcome: Met This Shift  5/7/2019 2040 by Parul Drew RN  Outcome: Met This Shift     Problem: Falls - Risk of:  Goal: Absence of physical injury  Description  Absence of physical injury  5/8/2019 0023 by Parul Drew RN  Outcome: Met This Shift  5/7/2019 2040 by Parul Drew RN  Outcome: Met This Shift     Problem: Gas Exchange - Impaired:  Goal: Levels of oxygenation will improve  Description  Levels of oxygenation will improve  5/8/2019 0023 by Parul Drew RN  Outcome: Not Met This Shift  5/7/2019 2040 by Parul Drew RN  Outcome: Not Met This Shift     Problem: Infection, Septic Shock:  Goal: Will show no infection signs and symptoms  Description  Will show no infection signs and symptoms  Outcome: Not Met This Shift     Problem: Venous Thromboembolism:  Goal: Will show no signs or symptoms of venous thromboembolism  Description  Will show no signs or symptoms of venous thromboembolism  Outcome: Met This Shift     Problem: Venous Thromboembolism:  Goal: Absence of signs or symptoms of impaired coagulation  Description  Absence of signs or symptoms of impaired coagulation  Outcome: Met This Shift     Problem: Skin Integrity:  Goal: Will show no infection signs and symptoms  Description  Will show no infection signs and symptoms  5/8/2019 0023 by Parul Drew RN  Outcome: Met This Shift  5/7/2019 2040 by Parul Drew RN  Outcome: Not Met This Shift     Problem: Skin Integrity:  Goal: Absence of new skin breakdown  Description  Absence of new skin breakdown  5/8/2019 0023 by Parul Drew RN  Outcome: Met This Shift  5/7/2019 2040 by Parul Drew RN  Outcome: Met This Shift   Plan of care discussed with patient / family.

## 2019-05-08 NOTE — PROGRESS NOTES
Nutrition Assessment    Type and Reason for Visit: Initial, Consult    Nutrition Summary: Pt at nutritional risk d/t inability to consume po 2/2 intubation. Noted multi-system organ failure. At further risk d/t B/L SDTI. Will monitor nutrition progression/provide recs as needed. Nutrition Recommendations: Continue NPO (Please consult if nutrition support initiated). Noted ammonia 76, continue to monitor   Propofol providing ~150 lipid kcals at current rate     Malnutrition Assessment:  · Malnutrition Status: Insufficient data  · Findings of the 6 clinical characteristics of malnutrition (Minimum of 2 out of 6 clinical characteristics is required to make the diagnosis of moderate or severe Protein Calorie Malnutrition based on AND/ASPEN Guidelines):  1. Energy Intake-Less than or equal to 50% of estimated energy requirement, (since admit x 1 day)    2. Weight Loss-Unable to assess   3. Fat Loss-No significant subcutaneous fat loss  4. Muscle Loss-No significant muscle mass loss   5. Fluid Accumulation-No significant fluid accumulation  6.  Strength-Not measured    Nutrition Risk Level:  Moderate    Nutrient Needs:  · Estimated Daily Total Kcal: 3133-5150(PS10: MV 10.9, tmax 38.4; RMR 1871)  · Estimated Daily Protein (g): 70-80(1.5-1.8 g/kg ) Monitor ammonia w/ shock liver   · Estimated Daily Total Fluid (ml/day): per critical care management     Nutrition Diagnosis:   · Problem: Inadequate oral intake  · Etiology: related to Impaired respiratory function-inability to consume food     Signs and symptoms:  as evidenced by NPO status due to medical condition, Intubation    Objective Information:  · Nutrition-Focused Physical Findings: Pt intubated/sedated, hypotension on pressor (MAP 68-73), +I/O's, +1/+2 edema, hypoactive BS, diarrhea noted PTA, OGT LIS, hypophosphatemia      · Wound Type: Multiple, Deep Tissue Injury(abrasions )     · Current Nutrition Therapies:  · Oral Diet Orders: NPO · Anthropometric Measures:  · Ht: 5' (152.4 cm)   · Current Body Wt: 200 lb (90.7 kg)(5/8 bedscale )  · Admission Body Wt: 209 lb (94.8 kg)(5/7 first measured )  · Usual Body Wt: (UTO no EMR hx on file )  · % Weight Change:  unable to properly assess wt changes   · Ideal Body Wt: 100 lb (45.4 kg), % Ideal Body 200%  · BMI Classification: BMI 35.0 - 39.9 Obese Class II    Nutrition Interventions:   Continued Inpatient Monitoring, Coordination of Care, Education not appropriate at this time    Nutrition Evaluation:   · Evaluation: Goals set   · Goals: Nutrition Progression     · Monitoring: Nutrition Progression, Skin Integrity, Wound Healing, I&O, Mental Status/Confusion, Weight, Pertinent Labs, Monitor Bowel Function, Monitor Hemodynamic Status, Diarrhea      Electronically signed by Nyla Curling, RD, LD on 5/8/19 at 2:50 PM    Contact Number: [phone]

## 2019-05-08 NOTE — PROGRESS NOTES
INR 1.6 05/08/2019     Warfarin PT/INR:  No components found for: Eva Raymond  ABG:    Lab Results   Component Value Date    PH 7.382 05/08/2019    PCO2 28.1 05/08/2019    PO2 77.7 05/08/2019    HCO3 16.3 05/08/2019    BE -7.3 05/08/2019    O2SAT 96.1 05/08/2019       Urine studies:  Urine sodium: 22  Urine chloride: <20  Urine creatinine: 100  Urine urea: 251  Urine potassium: 48.2    Fraction excretion of sodium: 0.5%  Fraction excretion of urea: 12.6%      Radiology Review:      CT head 5/7/19   1. No acute intracranial hemorrhage identified. 2. Additional nonacute/incidental findings as described above.        This report has been electronically signed by Jyoti Alvarez MD.     CT abdomen 5/7/19   1. Moderate diffuse mucosal thickening from the rectum to the mid sigmoid colon    with mild adjacent stranding, mild mucosal thickening throughout the descending    colon with adjacent stranding, and suggestion of mucosal thickening throughout    the distal transverse colon as well as from the proximal most transverse colon    to the cecum concerning for infectious/inflammatory proctocolitis. 2. Moderate hiatal hernia. 3. Moderate bilateral dependent atelectases versus consolidations with air    bronchograms. 4. No nephrolithiasis or obstructive uropathy. No perinephric stranding. 5. Normal appendix. 6. Additional nonacute/incidental findings as described above.        This report has been electronically signed by Jyoti Alvarez MD.     CXR 5/8/19       1. Satisfactory placement of nasogastric tube.       2. Persistent interstitial pulmonary edema, pneumonia, or atelectasis   bilaterally. Continued follow-up could be helpful for further   evaluation. BRIEF SUMMARY OF INITIAL CONSULT:  Briefly, Mrs. West Jack is a 25-year-old female with h/o of hypothyroidism, lumbar radiculopathy s/p laminectomy, HLD, asthma, was brought to the ED for worsening lethargy.  She was recently treated for lower extremity cellulitis with clindamycin and then levofloxacin around 2-3 weeks ago. She developed profuse diarrhea shortly thereafter. Family reported some vomiting initially and poor oral intake. Over the past 3 days, she became severely weak and dehydrated. At the ED, she was in shock requiring vasopressor despite aggressive fluid resuscitation and was intubated for airway protection. Labs were notable for creatinine of 4.2 mg/dL, BUN of 73 mg/dL, lactic acid of 3.4, sodium of 129 mmol/L, liver enzymes were also markedly elevated, reasons for this consult. Of note, she has no known history of kidney disease. She was on furosemide at home, but has not been taken since the onset of diarrhea.       IMPRESSION/RECOMMENDATIONS:      STEFANIA, stage 3, volume responsive pre-renal STEFANIA  (diarrhea, poor oral intake, shock), FeNa 0.5% , FeUrea 12%  Creatinine continues to improve with fair urine output    Normal pH (7.382) with HAGMA (uremia, lactic acidosis)   Hemodynamic shock, hypovolemic and septic shock  Hyponatremia, multifactorial, due to hypovolemia and decreased GFR  Hypophosphatemia, multifactorial, due to poor oral intake and vit D deficiency (25-hydroxy Vit D 13 ng/ml)  Respiratory failure, status post intubation  Shock liver, imrpoving  Hypoglycemia secondary to #6, resolved  Coagulopathy, high PT/INR  Suspected C. difficile infection, on vancomycin and metronidazole    Plan:    Continue D5 lactated ringers, reduce rate to 125 ml/hour  Repeat BMP at 4 pm  BMP daily  Continue to monitor renal function  Ergocalciferol 50,000 weekly  No need for dialysis for now      Electronically signed by Oscar Salmeron MD on 5/8/2019 at 1:49 PM

## 2019-05-08 NOTE — PROGRESS NOTES
P Quality Flow/Interdisciplinary Rounds Progress Note        Quality Flow Rounds held on May 8, 2019    Disciplines Attending:  FRANKLYN, RN, PT/OT    Edel Finney was admitted on 5/7/2019 12:26 AM    Anticipated Discharge Date:  Expected Discharge Date: 05/14/19    Disposition:    Lorenzo Score:  Lorenzo Scale Score: 11    Readmission Risk              Risk of Unplanned Readmission:        16           Discussed patient goal for the day, patient clinical progression, and barriers to discharge. The following Goal(s) of the Day/Commitment(s) have been identified:  KUB this a.m., wean pressor, sedation and vent as tolerated.        Keyshawn Madrigal  May 8, 2019

## 2019-05-08 NOTE — PROGRESS NOTES
Sedation vacation, stopped propofol per resident, patient tolerated one hour off propofol before unable to tolerate, coughing, increase in HR and BP  Patient did not open eyes to command, blinking noticed

## 2019-05-08 NOTE — PROGRESS NOTES
[facility]  PROGRESS NOTE  ATTENDING NOTE    S:  Intubated, sedated, on pressors. Art line put in today. O:  BP (!) 124/53   Pulse 95   Temp 101.1 °F (38.4 °C)   Resp 24   Ht 5' (1.524 m)   Wt 209 lb 3.2 oz (94.9 kg)   SpO2 92%   BMI 40.86 kg/m²   Gen:  NAD  Abd:  Soft, nd, TTP with deep palpation. Patient had a BM today that was green and bloody per report    ASSESSMENT/PLAN:  Colitis--c diff vs. Ischemic  --await stool cultures--if negative will need flex sig for diagnosis  --c/w weaning levophed and continue hydration  --c/w antibiotics. No surgical intervention needed at this time.       Bill Armas MD, MSc, FACS  5/8/2019  1:50 PM

## 2019-05-08 NOTE — CONSULTS
Department of Internal Medicine  Nephrology Resident Consult Note      Events reviewed. No events overnight. SUBJECTIVE: We are following Mrs. Haji for acute kidney injury. She remains intubated and sedated.     PHYSICAL EXAM:      Vitals:    VITALS:  BP (!) 124/53   Pulse 95   Temp 101.1 °F (38.4 °C)   Resp 24   Ht 5' (1.524 m)   Wt 209 lb 3.2 oz (94.9 kg)   SpO2 92%   BMI 40.86 kg/m²   24HR INTAKE/OUTPUT:      Intake/Output Summary (Last 24 hours) at 5/8/2019 1349  Last data filed at 5/8/2019 1200  Gross per 24 hour   Intake 5855 ml   Output 1685 ml   Net 4170 ml       Constitutional:  Intubated, sedated  HEENT:  MESSI, dry mucus membranes  Respiratory:  Coarse breath sounds bilaterally, frothy secretions from ET  Cardiovascular/Edema:  Regular rate and rhythm, no murmurs appreciated  Gastrointestinal:  Soft, non-tender, bowel sounds present  Neurologic: sedated, withdraws to pain  Skin:  warm, + edema  Other:  Erythematous lesion on lower extrtemities    DATA:    CBC:   Lab Results   Component Value Date    WBC 18.9 05/08/2019    RBC 3.38 05/08/2019    HGB 11.5 05/08/2019    HCT 33.9 05/08/2019    .3 05/08/2019    MCH 34.0 05/08/2019    MCHC 33.9 05/08/2019    RDW 13.2 05/08/2019     05/08/2019    MPV 11.1 05/08/2019     CMP:    Lab Results   Component Value Date     05/08/2019    K 4.1 05/08/2019     05/08/2019    CO2 19 05/08/2019    BUN 65 05/08/2019    CREATININE 2.6 05/08/2019    GFRAA 23 05/08/2019    LABGLOM 19 05/08/2019    GLUCOSE 160 05/08/2019    PROT 4.7 05/08/2019    LABALBU 2.4 05/08/2019    LABALBU 4.4 08/09/2011    CALCIUM 7.4 05/08/2019    BILITOT 1.0 05/08/2019    ALKPHOS 102 05/08/2019     05/08/2019    ALT 1,018 05/08/2019     Magnesium:    Lab Results   Component Value Date    MG 1.8 05/08/2019     Phosphorus:    Lab Results   Component Value Date    PHOS 1.5 05/08/2019     PT/INR:    Lab Results   Component Value Date    PROTIME 18.3 05/08/2019 INR 1.6 05/08/2019     Warfarin PT/INR:  No components found for: Sneha Sands  ABG:    Lab Results   Component Value Date    PH 7.382 05/08/2019    PCO2 28.1 05/08/2019    PO2 77.7 05/08/2019    HCO3 16.3 05/08/2019    BE -7.3 05/08/2019    O2SAT 96.1 05/08/2019       Urine studies:  Urine sodium: 22  Urine chloride: <20  Urine creatinine: 100  Urine urea: 251  Urine potassium: 48.2    Fraction excretion of sodium: 0.5%  Fraction excretion of urea: 12.6%      Radiology Review:      CT head 5/7/19   1. No acute intracranial hemorrhage identified. 2. Additional nonacute/incidental findings as described above.        This report has been electronically signed by Arley Oquendo MD.     CT abdomen 5/7/19   1. Moderate diffuse mucosal thickening from the rectum to the mid sigmoid colon    with mild adjacent stranding, mild mucosal thickening throughout the descending    colon with adjacent stranding, and suggestion of mucosal thickening throughout    the distal transverse colon as well as from the proximal most transverse colon    to the cecum concerning for infectious/inflammatory proctocolitis. 2. Moderate hiatal hernia. 3. Moderate bilateral dependent atelectases versus consolidations with air    bronchograms. 4. No nephrolithiasis or obstructive uropathy. No perinephric stranding. 5. Normal appendix. 6. Additional nonacute/incidental findings as described above.        This report has been electronically signed by Arley Oquendo MD.     CXR 5/8/19       1. Satisfactory placement of nasogastric tube.       2. Persistent interstitial pulmonary edema, pneumonia, or atelectasis   bilaterally. Continued follow-up could be helpful for further   evaluation. BRIEF SUMMARY OF INITIAL CONSULT:  Briefly, Mrs. Jaqui Maguire is a 23-year-old female with h/o of hypothyroidism, lumbar radiculopathy s/p laminectomy, HLD, asthma, was brought to the ED for worsening lethargy.  She was recently treated for lower extremity cellulitis with clindamycin and then levofloxacin around 2-3 weeks ago. She developed profuse diarrhea shortly thereafter. Family reported some vomiting initially and poor oral intake. Over the past 3 days, she became severely weak and dehydrated. At the ED, she was in shock requiring vasopressor despite aggressive fluid resuscitation and was intubated for airway protection. Labs were notable for creatinine of 4.2 mg/dL, BUN of 73 mg/dL, lactic acid of 3.4, sodium of 129 mmol/L, liver enzymes were also markedly elevated, reasons for this consult. Of note, she has no known history of kidney disease. She was on furosemide at home, but has not been taken since the onset of diarrhea.       IMPRESSION/RECOMMENDATIONS:      STEFANIA, stage 3, volume responsive pre-renal STEFANIA  (diarrhea, poor oral intake, shock), FeNa 0.5% , FeUrea 12%  Creatinine continues to improve with fair urine output    Normal pH (7.382) with HAGMA (uremia, lactic acidosis)   Hemodynamic shock, hypovolemic and septic shock  Hyponatremia, multifactorial, due to hypovolemia and decreased GFR  Hypophosphatemia, multifactorial, due to poor oral intake and vit D deficiency (25-hydroxy Vit D 13 ng/ml)  Respiratory failure, status post intubation  Shock liver, imrpoving  Hypoglycemia secondary to #6, resolved  Coagulopathy, high PT/INR  Suspected C. difficile infection, on vancomycin and metronidazole    Plan:    Continue D5 lactated ringers, reduce rate to 125 ml/hour  Repeat BMP at 4 pm  BMP daily  Continue to monitor renal function  No need for dialysis for now      Electronically signed by Wilder Ramos MD on 5/8/2019 at 1:49 PM

## 2019-05-08 NOTE — PROGRESS NOTES
C/C: Septic shock, resp failure, MOFS , ? C diff infection     Discussed with pt's family   Pt remained intubated, sedated   On Levophed   Afebrile     Current Facility-Administered Medications   Medication Dose Route Frequency Provider Last Rate Last Dose    bisacodyl (DULCOLAX) suppository 10 mg  10 mg Rectal Daily Francis Abbasi DO   10 mg at 05/08/19 1127    lactulose (CHRONULAC) 10 GM/15ML solution 20 g  20 g Oral TID Dequan Camacho MD   Stopped at 05/08/19 0800    vasopressin 20 Units in dextrose 5 % 100 mL infusion  0.03 Units/min Intravenous Continuous Dequan Camacho MD   Stopped at 05/08/19 1032    pantoprazole (PROTONIX) injection 40 mg  40 mg Intravenous Daily Dequan Camacho MD   40 mg at 05/08/19 0900    sodium phosphate 15 mmol in dextrose 5 % 250 mL IVPB  15 mmol Intravenous Once Eusebia Tapia MD 62.5 mL/hr at 05/08/19 1027 15 mmol at 05/08/19 1027    0.9 % sodium chloride bolus  250 mL Intravenous Once Rocco Liu DO        metronidazole (FLAGYL) 500 mg in NaCl 100 mL IVPB premix  500 mg Intravenous Geronimo Lee MD   Stopped at 05/08/19 1128    norepinephrine (LEVOPHED) 16 mg in dextrose 5% 250 mL infusion  2 mcg/min Intravenous Continuous Annelise De Luna MD 11.3 mL/hr at 05/08/19 0830 12 mcg/min at 05/08/19 0830    fentaNYL 5 mcg/mL in D5W 250 mL infusion  25 mcg/hr Intravenous Continuous Annelise De Luna MD 5 mL/hr at 05/07/19 1422 25 mcg/hr at 05/07/19 1422    sodium chloride flush 0.9 % injection 10 mL  10 mL Intravenous 2 times per day Anjali Sparks MD   10 mL at 05/08/19 0800    sodium chloride flush 0.9 % injection 10 mL  10 mL Intravenous PRN Anjali Sparks MD        magnesium hydroxide (MILK OF MAGNESIA) 400 MG/5ML suspension 30 mL  30 mL Oral Daily PRN Anjali Sparks MD        ondansetron [facility]) injection 4 mg  4 mg Intravenous Q6H PRN Anjali Sparks MD        famotidine (PEPCID) injection 20 mg  20 mg Intravenous Daily Anjali Sparks MD   20 mg at 05/08/19 1000    propofol 1000 MG/100ML injection  10 mcg/kg/min Intravenous Titrated Dequan Camacho MD 5.7 mL/hr at 05/08/19 1052 10 mcg/kg/min at 05/08/19 1052    vancomycin (VANCOCIN) oral solution 500 mg  500 mg Oral 4x Daily Dequan Camacho MD   500 mg at 05/08/19 0900    dextrose 5 % in lactated ringers infusion   Intravenous Continuous Manus MD Hector 125 mL/hr at 05/08/19 0914      0.9 % sodium chloride bolus  1,000 mL Intravenous Once Audelia Nugent MD        mineral oil-hydrophilic petrolatum (HYDROPHOR) ointment   Topical TID Jaky Villa MD        And    mineral oil-hydrophilic petrolatum (HYDROPHOR) ointment   Topical TID PRN Jaky Villa MD               REVIEW OF SYSTEMS:       CONSTITUTIONAL:  Hypothermia   GASTROINTESTINAL:  Diarrhea   GENITOURINARY:  Urine out put - improving    INTEGUMENT:no rash   MUSCULOSKELETAL:  Weakness   NEUROLOGICAL:  Sedated       Objective:    BP (!) 124/53   Pulse 95   Temp 101.1 °F (38.4 °C)   Resp 24   Ht 5' (1.524 m)   Wt 209 lb 3.2 oz (94.9 kg)   SpO2 92%   BMI 40.86 kg/m²       General Appearance:    Intubated, sedated    Head:    Normocephalic, atraumatic   Eyes:    No pallor, no icterus,   Ears:    No obvious deformity or drainage.    Nose:   No nasal drainage   Throat:   Mucosa dry, no oral thrush   Neck:   Supple, no lymphadenopathy   Lungs:     Clear to auscultation bilaterally   Heart:    Regular rate and rhythm, no murmur, rub or gallop   Abdomen:     Soft, non-tender, bowel sounds present    Extremities:   +++ edema, cold to touch   Pulses:   Dorsalis pedis palpable    Skin:   no rashes or lesions      CBC with Differential:      Lab Results   Component Value Date    WBC 24.1 05/08/2019    RBC 3.51 05/08/2019    HGB 12.0 05/08/2019    HCT 35.7 05/08/2019     05/08/2019    .7 05/08/2019    MCH 34.2 05/08/2019    MCHC 33.6 05/08/2019    RDW 13.3 05/08/2019    NRBC 0.9 05/08/2019    LYMPHOPCT 2.6 05/08/2019    PROMYELOPCT 0.9 05/08/2019    MONOPCT 2.6 05/08/2019    BASOPCT 0.5 05/08/2019    MONOSABS 0.72 05/08/2019    LYMPHSABS 0.72 05/08/2019    EOSABS 0.00 05/08/2019    BASOSABS 0.00 05/08/2019       CMP:    Lab Results   Component Value Date     05/08/2019    K 4.1 05/08/2019     05/08/2019    CO2 19 05/08/2019    BUN 65 05/08/2019    CREATININE 2.6 05/08/2019    GFRAA 23 05/08/2019    LABGLOM 19 05/08/2019    GLUCOSE 160 05/08/2019    PROT 4.7 05/08/2019    LABALBU 2.4 05/08/2019    LABALBU 4.4 08/09/2011    CALCIUM 7.4 05/08/2019    BILITOT 1.0 05/08/2019    ALKPHOS 102 05/08/2019     05/08/2019    ALT 1,018 05/08/2019       Hepatic Function Panel:    Lab Results   Component Value Date    ALKPHOS 102 05/08/2019    ALT 1,018 05/08/2019     05/08/2019    PROT 4.7 05/08/2019    BILITOT 1.0 05/08/2019    BILIDIR 0.7 05/08/2019    IBILI 0.3 05/08/2019    LABALBU 2.4 05/08/2019    LABALBU 4.4 08/09/2011       MICROBIOLOGY:     Blood culture - neg to date      Radiology :     Chest X ray     CT scan of abdomen and pelvis -     1. Moderate diffuse mucosal thickening from the rectum to the mid sigmoid colon   with mild adjacent stranding, mild mucosal thickening throughout the descending   colon with adjacent stranding, and suggestion of mucosal thickening throughout   the distal transverse colon as well as from the proximal most transverse colon   to the cecum concerning for infectious/inflammatory proctocolitis. 2. Moderate hiatal hernia. 3. Moderate bilateral dependent atelectases versus consolidations with air   bronchograms. 4. No nephrolithiasis or obstructive uropathy. No perinephric stranding. 5. Normal appendix. 6. Additional nonacute/incidental findings as described above.            IMPRESSION:      1. Septic shock due to  C diff infection ( clinically )   2. MOFS   3. Lactic acidosis, shock liver, leukocytosis   4. Respiratory failure         RECOMMENDATIONS:      1.  Oral vancomycin 500 mg po q 6 hrs , Flagyl 500 mg IV q 8 hrs   2. CBC with diff   3. Stool for C diff toxin assay ( but pt is already on vanco and flagyl - reduces assays sensitivity )  4.  Surgery consult appreciated               12:32 PM      5/8/2019

## 2019-05-08 NOTE — PROGRESS NOTES
PEEP 5 cmH2O PS   FiO2 100%     ABG:     Lab Results   Component Value Date    PH 7.382 05/08/2019    PCO2 28.1 05/08/2019    PO2 77.7 05/08/2019    HCO3 16.3 05/08/2019    BE -7.3 05/08/2019    THB 12.8 05/08/2019    O2SAT 96.1 05/08/2019     Labs and Imaging Studies   Basic Labs  CBC:   Lab Results   Component Value Date    WBC 18.9 05/08/2019    RBC 3.38 05/08/2019    HGB 11.5 05/08/2019    HCT 33.9 05/08/2019    .3 05/08/2019    RDW 13.2 05/08/2019     05/08/2019     CMP:  Lab Results   Component Value Date     05/08/2019    K 4.1 05/08/2019     05/08/2019    CO2 19 05/08/2019    BUN 65 05/08/2019    PROT 4.7 05/08/2019     PT/INR:  No results found for: PTINR    Imaging Studies:     Ct Abdomen Pelvis Wo Contrast Additional Contrast? None    Result Date: 5/7/2019  EXAM:  CT Abdomen and Pelvis Without Contrast EXAM DATE/TIME:  5/7/2019 2:30 AM CLINICAL HISTORY:  59years old, female; Pain and signs and symptoms; Other: Diarrhea; Abdominal pain; Generalized TECHNIQUE:  Imaging protocol: Axial computed tomography images of the abdomen and pelvis without contrast. Coronal and sagittal reformatted images were created and reviewed. Radiation optimization: All CT scans at this facility use at least one of these dose optimization techniques: automated exposure control; mA and/or kV adjustment per patient size (includes targeted exams where dose is matched to clinical indication); or iterative reconstruction. COMPARISON:  No relevant prior studies available. FINDINGS:  Tubes, catheters and devices: Subcutaneous stimulator at the anterolateral aspect of the left lower quadrant abdomen with an associated electrode extending into the spinal canal at the level of L2-3 and extending superiorly with tip at the level of T6. Lungs: Moderate bilateral dependent atelectasis versus consolidations with air bronchograms. Mild to moderate subsegmental atelectasis in the visualized lungs bilaterally. ABDOMEN:  Liver: Hepatomegaly to 16.5 cm in length at the midclavicular line and diffuse hepatic steatosis. Gallbladder and bile ducts: The gallbladder is surgically absent with cholecystectomy clips in the gallbladder fossa. No biliary ductal dilatation. Pancreas: Moderate pancreatic atrophy. No ductal dilatation. Spleen: Grossly unremarkable. No splenomegaly. Adrenals: Unremarkable. No mass. Kidneys and ureters: Grossly unremarkable. No hydronephrosis. No nephrolithiasis. No significant perinephric stranding. Stomach and bowel: Moderate diffuse mucosal thickening from the rectum to the mid sigmoid colon with mild adjacent stranding, mild mucosal thickening throughout the descending colon with adjacent stranding, and suggestion of mucosal thickening throughout the distal transverse colon as well as from the proximal most transverse colon to the cecum concerning for infectious/inflammatory proctocolitis. The unopacified loops of small bowel appear grossly unremarkable without evidence of obstruction. Moderate hiatal hernia. Appendix: Normal contrast-filled appendix inferior to the cecum. No periappendiceal free air or abscess seen. PELVIS:  Bladder: The urinary bladder is decompressed but appears grossly unremarkable. No bladder calculi. Reproductive: Grossly unremarkable ovaries and uterus. ABDOMEN and PELVIS:  Intraperitoneal space: No significant fluid collection. No free air. Prominent intra-abdominal fat. Bones/joints: Diffuse age related osteopenia and moderate degenerative changes. Moderate levoscoliosis of the lower lumbar spine. No acute fracture. No dislocation. Soft tissues: Huge amount of subcutaneous fat consistent with morbid obesity. Vasculature: The abdominal aorta appears grossly unremarkable. Incidental bilateral inferior pelvic phleboliths. Lymph nodes: Unremarkable. No enlarged lymph nodes.      1. Moderate diffuse mucosal thickening from the rectum to the mid sigmoid colon with mild adjacent stranding, mild mucosal thickening throughout the descending colon with adjacent stranding, and suggestion of mucosal thickening throughout the distal transverse colon as well as from the proximal most transverse colon to the cecum concerning for infectious/inflammatory proctocolitis. 2. Moderate hiatal hernia. 3. Moderate bilateral dependent atelectases versus consolidations with air bronchograms. 4. No nephrolithiasis or obstructive uropathy. No perinephric stranding. 5. Normal appendix. 6. Additional nonacute/incidental findings as described above. This report has been electronically signed by Lizette Webb MD.    Ct Head Wo Contrast    Result Date: 5/7/2019  EXAM:  CT Head Without Contrast EXAM DATE/TIME:  5/7/2019 12:45 AM CLINICAL HISTORY:  59years old, female; Signs and symptoms; Altered mental status/memory loss; Confusion or disorientation; Additional info: Decreased alertness TECHNIQUE:  Imaging protocol: Axial computed tomography images of the head/brain without contrast. Coronal and sagittal reformatted images were created and reviewed. Radiation optimization: All CT scans at this facility use at least one of these dose optimization techniques: automated exposure control; mA and/or kV adjustment per patient size (includes targeted exams where dose is matched to clinical indication); or iterative reconstruction. COMPARISON:  No relevant prior studies available. FINDINGS:  Brain: No acute intracranial hemorrhage identified. Diffuse age-related mild cerebral atrophy and subtle small vessel ischemic changes. Incidental physiologic calcifications in the bilateral basal ganglia. Ventricles: Unremarkable for age. Sella: The pituitary gland appears grossly unremarkable. Bones/joints: Incidental hyperostosis frontalis interna. No acute fracture. Sinuses: Unremarkable. No acute sinusitis. No air-fluid levels. Mastoid air cells: Unremarkable. No mastoid effusions. Orbits: Unremarkable.   Soft tissues: Unremarkable. Vasculature: Mild bilateral intracranial internal carotid artery atherosclerotic calcifications. 1. No acute intracranial hemorrhage identified. 2. Additional nonacute/incidental findings as described above. This report has been electronically signed by Thao Gilbert MD.    Xr Chest Portable    Result Date: 2019  Patient MRN:  [id number] : 1960 Age: 59 years Gender: Female Order Date:  2019 12:45 AM EXAM: XR CHEST PORTABLE COMPARISON: 2004 INDICATION:  chest pain chest pain FINDINGS: Interstitial opacities are seen in bilateral perihilar and infrahilar locations. No obvious pleural effusion. The heart is normal size. No pneumothorax. Spinal stimulator demonstrated. Interstitial opacities in perihilar and infrahilar locations could suggest peribronchial thickening/inflammation. Continued follow-up could be helpful for further evaluation. EKG: normal sinus rhythm, Qt prolonged    Resident's Assessment and Plan     Roxie Rucker is a 59 y.o. female    Neurology   Acute encephalopathy  2/2 septic shock vs drug overdose   -Currently Intubated and sedated   -Will check ammonia: 76   - lactulose order  -On chronic opioids for pain control   -Monitor neuro status       Cardiovascular  Septic shock   2/2 possible C diff colitis   -On pressors, levophed, wean with MAP >65  -Received 30 cc/kg IVF and 1 L fluid bolus, continue IVF  -Pan culture sent: no growth, C. Diff EIA : C.  Diff toxin A and B detected  -continie IV flagyl Q8h and PO vancomycin 500 mg 4 times daily   -ID consult : input appreciated     Pulmonary   Acute hypoxic respiratory failure  2/2 septic shock   -S/p intubation on mechanical ventilation for airway protection  -Initial ABG 8.064/17.9/90.2/62.9 c/w metabolic acidosis  -CXR: Interstitial opacities in perihilar and infrahilar   -Pro calcitonin :99.25  Daily ABG and CXR    GI  C diff colitis   -Hx of 2-3 weeks antibiotics use and diarrhea after   -Leukocytosis WBC 18.9 today  -Ct abdomen showed  infectious/inflammatory proctocolitis  -C diff EIA: c. Diff toxin A and B  -continue IV flagyl Q8h and PO vancomycin 500 mg 4 times daily   -GS consult : no active intervention    Shock liver   Transaminitis ALT/AST 3440/5412>>1018/587  -Ct abdomen showed hepatomegaly with hepatic steatosis  -NR 8.4, monitor INR 1.6 today  -serum drug screen   -Monitor LFT   -s/p Give FFP and vitamin K     Renal   STEFANIA, prerenal  Likely 2/2 dehydration from septic shock and diarrhea   -Baseline Cr. 0.9, initial Cr. 4.2>>2.6  -IVF  -Nephrology consult   -Monitor renal function     Lactic acidosis  Likely 2/2 sepsis vs transminitis  -Lactic acid mild elevated 2.6>3.3, continue trend  -Continue IVF  -Nephrology consult     Macrocytic anemia   Hb: 11.5, MCV: 105.1  -Folate and vitamin B 12: wnl  -monitor CBC    PT/OT evaluation:  DVT prophylaxis/ GI prophylaxis: Pepcid / heparin  Disposition: MICU    Dalton Brizuela MD, PGY-1  Internal Medicine resident   Attending physician: Dr. Irving Perdomo  I personally saw, examined and provided care for the patient. Radiographs, labs and medication list were reviewed by me independently. I spoke with bedside nursing, therapists and consultants. Critical care services and times documented are independent of procedures and multidisciplinary rounds with Residents. Additionally comprehensive, multidisciplinary rounds were conducted with the MICU team. The case was discussed in detail and plans for care were established. Review of Residents documentation was conducted and revisions were made as appropriate. I agree with the above documented exam, problem list and plan of care. I personally saw, examined and provided care for the patient. Radiographs, labs and medication list were reviewed by me independently. I spoke with bedside nursing, therapists and consultants.  Critical care services and times documented are independent of procedures and multidisciplinary rounds with Residents. Additionally comprehensive, multidisciplinary rounds were conducted with the MICU team. The case was discussed in detail and plans for care were established. Review of Residents documentation was conducted and revisions were made as appropriate. I agree with the above documented exam, problem list and plan of care.     Septic shock   C diff colitis  resp failure  Improving   Liberate from MV in am   Discuss with    Ruben Rowe

## 2019-05-08 NOTE — PROCEDURES
Sierra Chase is a 59 y.o. female patient. 1. Septic shock ([address]    2. Pneumonia due to organism    3. Colitis    4. Acute renal failure, unspecified acute renal failure type ([address]    5. Sepsis, due to unspecified organism ([facility] [address]    6. Disorientation    7. Coagulopathy ([address]      Past Medical History:   Diagnosis Date    Arthritis     Asthma     High blood pressure     Hyperlipidemia     Irregular heart beat     Migraines, neuralgic     PONV (postoperative nausea and vomiting)     Thyroid disorder      Blood pressure (!) 95/52, pulse 90, temperature 100.9 °F (38.3 °C), temperature source Bladder, resp. rate 17, height 5' (1.524 m), weight 209 lb 3.2 oz (94.9 kg), SpO2 93 %. Insert Arterial Line  Date/Time: 5/8/2019 8:56 AM  Performed by: Kostas Monroy DO  Authorized by: Niels Joshi MD   Consent: Written consent obtained. Procedure consent: procedure consent matches procedure scheduled  Relevant documents: relevant documents present and verified  Test results: test results available and properly labeled  Site marked: the operative site was marked  Imaging studies: imaging studies available  Required items: required blood products, implants, devices, and special equipment available  Patient identity confirmed: arm band  Time out: Immediately prior to procedure a \"time out\" was called to verify the correct patient, procedure, equipment, support staff and site/side marked as required. Preparation: Patient was prepped and draped in the usual sterile fashion.   Indications: multiple ABGs, respiratory failure and hemodynamic monitoring  Location: right radial  Anesthesia: local infiltration    Anesthesia:  Local Anesthetic: lidocaine 1% without epinephrine  Davey's test normal: yes  Needle gauge: 20  Seldinger technique: Seldinger technique used  Number of attempts: 1  Post-procedure: line sutured and dressing applied  Post-procedure CMS: normal  Patient tolerance: Patient tolerated the procedure well with no immediate complications          Airam Clark, DO  5/8/2019

## 2019-05-09 ENCOUNTER — APPOINTMENT (OUTPATIENT)
Dept: GENERAL RADIOLOGY | Age: 59
DRG: 870 | End: 2019-05-09
Payer: COMMERCIAL

## 2019-05-09 LAB
AADO2: 247.7 MMHG
ALBUMIN SERPL-MCNC: 1.9 G/DL (ref 3.5–5.2)
ALP BLD-CCNC: 76 U/L (ref 35–104)
ALT SERPL-CCNC: 551 U/L (ref 0–32)
ANION GAP SERPL CALCULATED.3IONS-SCNC: 15 MMOL/L (ref 7–16)
ANION GAP SERPL CALCULATED.3IONS-SCNC: 9 MMOL/L (ref 7–16)
ANISOCYTOSIS: ABNORMAL
AST SERPL-CCNC: 156 U/L (ref 0–31)
B.E.: -4.1 MMOL/L (ref -3–3)
BASOPHILS ABSOLUTE: 0.09 E9/L (ref 0–0.2)
BASOPHILS RELATIVE PERCENT: 0.5 % (ref 0–2)
BILIRUB SERPL-MCNC: 1.4 MG/DL (ref 0–1.2)
BILIRUBIN DIRECT: 0.5 MG/DL (ref 0–0.3)
BILIRUBIN, INDIRECT: 0.9 MG/DL (ref 0–1)
BUN BLDV-MCNC: 36 MG/DL (ref 8–23)
BUN BLDV-MCNC: 50 MG/DL (ref 8–23)
CALCIUM SERPL-MCNC: 7.1 MG/DL (ref 8.6–10.2)
CALCIUM SERPL-MCNC: 7.4 MG/DL (ref 8.6–10.2)
CHLORIDE BLD-SCNC: 105 MMOL/L (ref 98–107)
CHLORIDE BLD-SCNC: 106 MMOL/L (ref 98–107)
CO2: 18 MMOL/L (ref 22–29)
CO2: 23 MMOL/L (ref 22–29)
COHB: 1.1 % (ref 0–1.5)
CREAT SERPL-MCNC: 1.1 MG/DL (ref 0.5–1)
CREAT SERPL-MCNC: 1.4 MG/DL (ref 0.5–1)
CRITICAL: ABNORMAL
DATE ANALYZED: ABNORMAL
DATE OF COLLECTION: ABNORMAL
EOSINOPHILS ABSOLUTE: 0.06 E9/L (ref 0.05–0.5)
EOSINOPHILS RELATIVE PERCENT: 0.3 % (ref 0–6)
FIO2: 50 %
GFR AFRICAN AMERICAN: 46
GFR AFRICAN AMERICAN: >60
GFR NON-AFRICAN AMERICAN: 38 ML/MIN/1.73
GFR NON-AFRICAN AMERICAN: 51 ML/MIN/1.73
GLUCOSE BLD-MCNC: 150 MG/DL (ref 74–99)
GLUCOSE BLD-MCNC: 167 MG/DL (ref 74–99)
HAV IGM SER IA-ACNC: NORMAL
HCO3: 18.1 MMOL/L (ref 22–26)
HCT VFR BLD CALC: 34.3 % (ref 34–48)
HEMOGLOBIN: 11.8 G/DL (ref 11.5–15.5)
HEPATITIS B CORE IGM ANTIBODY: NORMAL
HEPATITIS B SURFACE ANTIGEN INTERPRETATION: NORMAL
HEPATITIS C ANTIBODY INTERPRETATION: NORMAL
HHB: 5.3 % (ref 0–5)
IMMATURE GRANULOCYTES #: 0.5 E9/L
IMMATURE GRANULOCYTES %: 2.7 % (ref 0–5)
INR BLD: 1.6
LAB: ABNORMAL
LACTIC ACID: 2.5 MMOL/L (ref 0.5–2.2)
LACTIC ACID: 2.8 MMOL/L (ref 0.5–2.2)
LACTIC ACID: 2.9 MMOL/L (ref 0.5–2.2)
LACTIC ACID: 3.4 MMOL/L (ref 0.5–2.2)
LYMPHOCYTES ABSOLUTE: 1.35 E9/L (ref 1.5–4)
LYMPHOCYTES RELATIVE PERCENT: 7.3 % (ref 20–42)
Lab: ABNORMAL
MAGNESIUM: 1.4 MG/DL (ref 1.6–2.6)
MAGNESIUM: 1.6 MG/DL (ref 1.6–2.6)
MCH RBC QN AUTO: 34 PG (ref 26–35)
MCHC RBC AUTO-ENTMCNC: 34.4 % (ref 32–34.5)
MCV RBC AUTO: 98.8 FL (ref 80–99.9)
METER GLUCOSE: 123 MG/DL (ref 74–99)
METER GLUCOSE: 132 MG/DL (ref 74–99)
METER GLUCOSE: 143 MG/DL (ref 74–99)
METHB: 0 % (ref 0–1.5)
MODE: AC
MONOCYTES ABSOLUTE: 0.58 E9/L (ref 0.1–0.95)
MONOCYTES RELATIVE PERCENT: 3.1 % (ref 2–12)
NEUTROPHILS ABSOLUTE: 16.03 E9/L (ref 1.8–7.3)
NEUTROPHILS RELATIVE PERCENT: 86.1 % (ref 43–80)
O2 SATURATION: 94.6 % (ref 92–98.5)
O2HB: 93.6 % (ref 94–97)
OPERATOR ID: 2067
ORGANISM: ABNORMAL
PATIENT TEMP: 37 C
PCO2: 25.5 MMHG (ref 35–45)
PDW BLD-RTO: 13.2 FL (ref 11.5–15)
PEEP/CPAP: 5 CMH2O
PFO2: 1.35 MMHG/%
PH BLOOD GAS: 7.47 (ref 7.35–7.45)
PHOSPHORUS: 1.4 MG/DL (ref 2.5–4.5)
PHOSPHORUS: 1.5 MG/DL (ref 2.5–4.5)
PLATELET # BLD: 126 E9/L (ref 130–450)
PMV BLD AUTO: 11.4 FL (ref 7–12)
PO2: 67.6 MMHG (ref 60–100)
POTASSIUM REFLEX MAGNESIUM: 3.2 MMOL/L (ref 3.5–5)
POTASSIUM SERPL-SCNC: 3.3 MMOL/L (ref 3.5–5)
PROTHROMBIN TIME: 17.9 SEC (ref 9.3–12.4)
RBC # BLD: 3.47 E12/L (ref 3.5–5.5)
RI(T): 3.66
RR MECHANICAL: 14 B/MIN
SODIUM BLD-SCNC: 138 MMOL/L (ref 132–146)
SODIUM BLD-SCNC: 138 MMOL/L (ref 132–146)
SOURCE, BLOOD GAS: ABNORMAL
THB: 12.7 G/DL (ref 11.5–16.5)
TIME ANALYZED: 533
TOTAL PROTEIN: 4.3 G/DL (ref 6.4–8.3)
URINE CULTURE, ROUTINE: ABNORMAL
URINE CULTURE, ROUTINE: ABNORMAL
VT MECHANICAL: 400 ML
WBC # BLD: 18.6 E9/L (ref 4.5–11.5)

## 2019-05-09 PROCEDURE — 2580000003 HC RX 258: Performed by: INTERNAL MEDICINE

## 2019-05-09 PROCEDURE — 6360000002 HC RX W HCPCS: Performed by: INTERNAL MEDICINE

## 2019-05-09 PROCEDURE — 99232 SBSQ HOSP IP/OBS MODERATE 35: CPT | Performed by: SURGERY

## 2019-05-09 PROCEDURE — 2000000000 HC ICU R&B

## 2019-05-09 PROCEDURE — 85610 PROTHROMBIN TIME: CPT

## 2019-05-09 PROCEDURE — 94003 VENT MGMT INPAT SUBQ DAY: CPT

## 2019-05-09 PROCEDURE — C9113 INJ PANTOPRAZOLE SODIUM, VIA: HCPCS | Performed by: INTERNAL MEDICINE

## 2019-05-09 PROCEDURE — 83735 ASSAY OF MAGNESIUM: CPT

## 2019-05-09 PROCEDURE — 83605 ASSAY OF LACTIC ACID: CPT

## 2019-05-09 PROCEDURE — 6370000000 HC RX 637 (ALT 250 FOR IP): Performed by: INTERNAL MEDICINE

## 2019-05-09 PROCEDURE — P9047 ALBUMIN (HUMAN), 25%, 50ML: HCPCS | Performed by: INTERNAL MEDICINE

## 2019-05-09 PROCEDURE — 82805 BLOOD GASES W/O2 SATURATION: CPT

## 2019-05-09 PROCEDURE — 82962 GLUCOSE BLOOD TEST: CPT

## 2019-05-09 PROCEDURE — 2500000003 HC RX 250 WO HCPCS: Performed by: INTERNAL MEDICINE

## 2019-05-09 PROCEDURE — 36415 COLL VENOUS BLD VENIPUNCTURE: CPT

## 2019-05-09 PROCEDURE — 80076 HEPATIC FUNCTION PANEL: CPT

## 2019-05-09 PROCEDURE — 84100 ASSAY OF PHOSPHORUS: CPT

## 2019-05-09 PROCEDURE — 71045 X-RAY EXAM CHEST 1 VIEW: CPT

## 2019-05-09 PROCEDURE — 85025 COMPLETE CBC W/AUTO DIFF WBC: CPT

## 2019-05-09 PROCEDURE — 80048 BASIC METABOLIC PNL TOTAL CA: CPT

## 2019-05-09 PROCEDURE — 37799 UNLISTED PX VASCULAR SURGERY: CPT

## 2019-05-09 PROCEDURE — 99291 CRITICAL CARE FIRST HOUR: CPT | Performed by: INTERNAL MEDICINE

## 2019-05-09 RX ORDER — ALBUMIN (HUMAN) 12.5 G/50ML
25 SOLUTION INTRAVENOUS 2 TIMES DAILY
Status: COMPLETED | OUTPATIENT
Start: 2019-05-09 | End: 2019-05-11

## 2019-05-09 RX ORDER — HEPARIN SODIUM (PORCINE) LOCK FLUSH IV SOLN 100 UNIT/ML 100 UNIT/ML
3 SOLUTION INTRAVENOUS EVERY 12 HOURS SCHEDULED
Status: DISCONTINUED | OUTPATIENT
Start: 2019-05-09 | End: 2019-05-24 | Stop reason: HOSPADM

## 2019-05-09 RX ORDER — POTASSIUM CHLORIDE 29.8 MG/ML
40 INJECTION INTRAVENOUS ONCE
Status: COMPLETED | OUTPATIENT
Start: 2019-05-09 | End: 2019-05-10

## 2019-05-09 RX ORDER — HEPARIN SODIUM (PORCINE) LOCK FLUSH IV SOLN 100 UNIT/ML 100 UNIT/ML
3 SOLUTION INTRAVENOUS PRN
Status: DISCONTINUED | OUTPATIENT
Start: 2019-05-09 | End: 2019-05-24 | Stop reason: HOSPADM

## 2019-05-09 RX ORDER — DEXTROSE, SODIUM CHLORIDE, AND POTASSIUM CHLORIDE 5; .9; .15 G/100ML; G/100ML; G/100ML
INJECTION INTRAVENOUS CONTINUOUS
Status: DISCONTINUED | OUTPATIENT
Start: 2019-05-09 | End: 2019-05-12

## 2019-05-09 RX ORDER — MAGNESIUM SULFATE 1 G/100ML
1 INJECTION INTRAVENOUS ONCE
Status: COMPLETED | OUTPATIENT
Start: 2019-05-09 | End: 2019-05-09

## 2019-05-09 RX ORDER — SODIUM CHLORIDE 0.9 % (FLUSH) 0.9 %
10 SYRINGE (ML) INJECTION PRN
Status: DISCONTINUED | OUTPATIENT
Start: 2019-05-09 | End: 2019-05-24 | Stop reason: HOSPADM

## 2019-05-09 RX ORDER — POTASSIUM CHLORIDE 29.8 MG/ML
20 INJECTION INTRAVENOUS
Status: DISPENSED | OUTPATIENT
Start: 2019-05-09 | End: 2019-05-09

## 2019-05-09 RX ORDER — LIDOCAINE HYDROCHLORIDE 10 MG/ML
5 INJECTION, SOLUTION EPIDURAL; INFILTRATION; INTRACAUDAL; PERINEURAL ONCE
Status: DISCONTINUED | OUTPATIENT
Start: 2019-05-09 | End: 2019-05-10

## 2019-05-09 RX ORDER — FUROSEMIDE 10 MG/ML
40 INJECTION INTRAMUSCULAR; INTRAVENOUS ONCE
Status: COMPLETED | OUTPATIENT
Start: 2019-05-09 | End: 2019-05-09

## 2019-05-09 RX ADMIN — METRONIDAZOLE 500 MG: 500 INJECTION, SOLUTION INTRAVENOUS at 23:37

## 2019-05-09 RX ADMIN — MAGNESIUM SULFATE HEPTAHYDRATE 1 G: 1 INJECTION, SOLUTION INTRAVENOUS at 10:15

## 2019-05-09 RX ADMIN — HEPARIN SODIUM 5000 UNITS: 10000 INJECTION INTRAVENOUS; SUBCUTANEOUS at 20:57

## 2019-05-09 RX ADMIN — FUROSEMIDE 40 MG: 10 INJECTION, SOLUTION INTRAMUSCULAR; INTRAVENOUS at 17:01

## 2019-05-09 RX ADMIN — ALBUMIN (HUMAN) 25 G: 0.25 INJECTION, SOLUTION INTRAVENOUS at 11:15

## 2019-05-09 RX ADMIN — PANTOPRAZOLE SODIUM 40 MG: 40 INJECTION, POWDER, FOR SOLUTION INTRAVENOUS at 08:33

## 2019-05-09 RX ADMIN — Medication 10 ML: at 20:29

## 2019-05-09 RX ADMIN — POTASSIUM CHLORIDE 40 MEQ: 29.8 INJECTION, SOLUTION INTRAVENOUS at 21:47

## 2019-05-09 RX ADMIN — Medication 10 ML: at 08:33

## 2019-05-09 RX ADMIN — SODIUM CHLORIDE, SODIUM LACTATE, POTASSIUM CHLORIDE, CALCIUM CHLORIDE AND DEXTROSE MONOHYDRATE: 5; 600; 310; 30; 20 INJECTION, SOLUTION INTRAVENOUS at 06:58

## 2019-05-09 RX ADMIN — SODIUM CHLORIDE 0.6 MCG/KG/HR: 9 INJECTION, SOLUTION INTRAVENOUS at 02:37

## 2019-05-09 RX ADMIN — Medication 3 MCG/MIN: at 22:44

## 2019-05-09 RX ADMIN — SODIUM CHLORIDE 0.4 MCG/KG/HR: 9 INJECTION, SOLUTION INTRAVENOUS at 13:49

## 2019-05-09 RX ADMIN — POTASSIUM PHOSPHATE, MONOBASIC AND POTASSIUM PHOSPHATE, DIBASIC 15 MMOL: 224; 236 INJECTION, SOLUTION, CONCENTRATE INTRAVENOUS at 08:23

## 2019-05-09 RX ADMIN — PETROLATUM: 42 OINTMENT TOPICAL at 13:53

## 2019-05-09 RX ADMIN — Medication 500 MG: at 13:00

## 2019-05-09 RX ADMIN — MAGNESIUM SULFATE IN DEXTROSE 1 G: 10 INJECTION, SOLUTION INTRAVENOUS at 21:45

## 2019-05-09 RX ADMIN — Medication 500 MG: at 17:00

## 2019-05-09 RX ADMIN — PETROLATUM: 42 OINTMENT TOPICAL at 20:29

## 2019-05-09 RX ADMIN — METRONIDAZOLE 500 MG: 500 INJECTION, SOLUTION INTRAVENOUS at 16:00

## 2019-05-09 RX ADMIN — SODIUM CHLORIDE 0.5 MCG/KG/HR: 9 INJECTION, SOLUTION INTRAVENOUS at 20:55

## 2019-05-09 RX ADMIN — HEPARIN SODIUM 5000 UNITS: 10000 INJECTION INTRAVENOUS; SUBCUTANEOUS at 14:00

## 2019-05-09 RX ADMIN — ALBUMIN (HUMAN) 25 G: 0.25 INJECTION, SOLUTION INTRAVENOUS at 21:12

## 2019-05-09 RX ADMIN — ALTEPLASE 2 MG: 2.2 INJECTION, POWDER, LYOPHILIZED, FOR SOLUTION INTRAVENOUS at 22:55

## 2019-05-09 RX ADMIN — PETROLATUM: 42 OINTMENT TOPICAL at 08:34

## 2019-05-09 RX ADMIN — DEXTROSE, SODIUM CHLORIDE, AND POTASSIUM CHLORIDE: 5; .9; .15 INJECTION INTRAVENOUS at 11:15

## 2019-05-09 RX ADMIN — MAGNESIUM SULFATE HEPTAHYDRATE 1 G: 1 INJECTION, SOLUTION INTRAVENOUS at 08:50

## 2019-05-09 RX ADMIN — Medication 175 MCG/HR: at 19:18

## 2019-05-09 RX ADMIN — FAMOTIDINE 20 MG: 10 INJECTION, SOLUTION INTRAVENOUS at 08:34

## 2019-05-09 RX ADMIN — POTASSIUM CHLORIDE 20 MEQ: 29.8 INJECTION, SOLUTION INTRAVENOUS at 06:50

## 2019-05-09 RX ADMIN — Medication 500 MG: at 09:00

## 2019-05-09 RX ADMIN — Medication 100 MCG/HR: at 10:04

## 2019-05-09 RX ADMIN — Medication 500 MG: at 20:56

## 2019-05-09 RX ADMIN — HEPARIN SODIUM 5000 UNITS: 10000 INJECTION INTRAVENOUS; SUBCUTANEOUS at 08:00

## 2019-05-09 RX ADMIN — METRONIDAZOLE 500 MG: 500 INJECTION, SOLUTION INTRAVENOUS at 08:00

## 2019-05-09 ASSESSMENT — PAIN SCALES - GENERAL
PAINLEVEL_OUTOF10: 0

## 2019-05-09 ASSESSMENT — PULMONARY FUNCTION TESTS
PIF_VALUE: 17
PIF_VALUE: 18
PIF_VALUE: 19
PIF_VALUE: 16
PIF_VALUE: 15
PIF_VALUE: 14
PIF_VALUE: 11
PIF_VALUE: 14
PIF_VALUE: 17
PIF_VALUE: 18
PIF_VALUE: 13
PIF_VALUE: 17
PIF_VALUE: 15
PIF_VALUE: 14
PIF_VALUE: 18
PIF_VALUE: 18
PIF_VALUE: 14
PIF_VALUE: 19
PIF_VALUE: 13
PIF_VALUE: 14
PIF_VALUE: 16
PIF_VALUE: 18
PIF_VALUE: 16
PIF_VALUE: 15
PIF_VALUE: 14
PIF_VALUE: 17
PIF_VALUE: 23
PIF_VALUE: 14
PIF_VALUE: 18

## 2019-05-09 NOTE — PROGRESS NOTES
C/C: Septic shock, resp failure, MOFS , C diff infection      Discussed with pt's family   Pt remained in the ventilator   Loose stool   On levophed   Afebrile       Current Facility-Administered Medications   Medication Dose Route Frequency Provider Last Rate Last Dose    potassium phosphate 15 mmol in dextrose 5 % 250 mL IVPB  15 mmol Intravenous Once Pankaj Denton MD 62.5 mL/hr at 05/09/19 0823 15 mmol at 05/09/19 0823    magnesium sulfate 1 g in dextrose 5% 100 mL IVPB  1 g Intravenous Once Dequan Camacho MD        albumin human 25 % solution 25 g  25 g Intravenous BID Jf Ashford MD        dextrose 5 % and 0.9 % NaCl with KCl 20 mEq infusion   Intravenous Continuous Jf Archer MD        bisacodyl (DULCOLAX) suppository 10 mg  10 mg Rectal Daily Janeal Midget, DO   10 mg at 05/08/19 1127    vasopressin 20 Units in dextrose 5 % 100 mL infusion  0.03 Units/min Intravenous Continuous Dequan Camacho MD   Stopped at 05/08/19 2250    pantoprazole (PROTONIX) injection 40 mg  40 mg Intravenous Daily Dequan Camacho MD   40 mg at 05/09/19 0833    dexmedetomidine (PRECEDEX) 400 mcg in sodium chloride 0.9 % 100 mL infusion  0.2 mcg/kg/hr Intravenous Continuous Pankaj Denton MD 13.6 mL/hr at 05/09/19 0237 0.6 mcg/kg/hr at 05/09/19 0237    heparin (porcine) injection 5,000 Units  5,000 Units Subcutaneous TID Dequan Camacho MD   5,000 Units at 05/09/19 0800    vitamin D (ERGOCALCIFEROL) capsule 50,000 Units  50,000 Units Oral Weekly Osvaldo Candelario MD   Stopped at 05/08/19 2015    0.9 % sodium chloride bolus  250 mL Intravenous Once Velora Cables, DO        metronidazole (FLAGYL) 500 mg in NaCl 100 mL IVPB premix  500 mg Intravenous Rafael Gaona MD   Stopped at 05/09/19 0953    norepinephrine (LEVOPHED) 16 mg in dextrose 5% 250 mL infusion  2 mcg/min Intravenous Continuous Chucho Restrepo MD 2.8 mL/hr at 05/09/19 0638 3 mcg/min at 05/09/19 0638    fentaNYL 5 mcg/mL in D5W 250 mL infusion  25 mcg/hr Intravenous Continuous Dago Jimenez MD 20 mL/hr at 05/09/19 1004 100 mcg/hr at 05/09/19 1004    sodium chloride flush 0.9 % injection 10 mL  10 mL Intravenous 2 times per day Beverley Rayo MD   10 mL at 05/09/19 2227    sodium chloride flush 0.9 % injection 10 mL  10 mL Intravenous PRN Beverley Rayo MD        magnesium hydroxide (MILK OF MAGNESIA) 400 MG/5ML suspension 30 mL  30 mL Oral Daily PRN Beverley Rayo MD        ondansetron [facility]) injection 4 mg  4 mg Intravenous Q6H PRN Beverley Rayo MD        famotidine (PEPCID) injection 20 mg  20 mg Intravenous Daily Beverley Rayo MD   20 mg at 05/09/19 2941    vancomycin (VANCOCIN) oral solution 500 mg  500 mg Oral 4x Daily Dequan Camacho MD   500 mg at 05/09/19 0900    0.9 % sodium chloride bolus  1,000 mL Intravenous Once Beverley Rayo MD        mineral oil-hydrophilic petrolatum (HYDROPHOR) ointment   Topical TID Justin Brown MD        And    mineral oil-hydrophilic petrolatum (HYDROPHOR) ointment   Topical TID PRN Justin Brown MD               REVIEW OF SYSTEMS:       CONSTITUTIONAL:  no fever   GASTROINTESTINAL:  Diarrhea   GENITOURINARY:  Urine out put - improving    INTEGUMENT:no rash   MUSCULOSKELETAL:  Weakness   NEUROLOGICAL:  Sedated       Objective:    /72   Pulse 79   Temp 100.8 °F (38.2 °C) (Core)   Resp 26   Ht 5' (1.524 m)   Wt 229 lb (103.9 kg)   SpO2 93%   BMI 44.72 kg/m²       General Appearance:    Intubated, sedated    Head:    Normocephalic, atraumatic   Eyes:    No pallor, no icterus,   Ears:    No obvious deformity or drainage.    Nose:   No nasal drainage   Throat:   Mucosa dry, no oral thrush   Neck:   Supple, no lymphadenopathy   Lungs:     Scattered rhonchi   Heart:    Regular rate and rhythm   Abdomen:     Soft, bowel sounds present , FMS with loose stool    Extremities:   +++ edema, cold to touch   Pulses:   Dorsalis pedis palpable    Skin:   no rashes or lesions      CBC with atelectases versus consolidations with air   bronchograms. 4. No nephrolithiasis or obstructive uropathy. No perinephric stranding. 5. Normal appendix. 6. Additional nonacute/incidental findings as described above.            IMPRESSION:      1. Septic shock due to  C diff infection    2. MOFS   3. Lactic acidosis, shock liver, leukocytosis   4. Respiratory failure   5. Candiduria         RECOMMENDATIONS:      1. Oral vancomycin 500 mg po q 6 hrs , Flagyl 500 mg IV q 8 hrs   2. CBC with diff   3. No other abx for now   4.  Contact isolation                    10:17 AM      5/9/2019

## 2019-05-09 NOTE — FLOWSHEET NOTE
05/08/19 2000   Assessment   Less Restrictive Alternative PC;RP;RO;VR;PM;AL   Special Consideration/Risk Factors N   Justification   Clinical Justification L;T;E;U   Education   Discontinuation Criteria Absence   Criteria Explained Yes   Patient's Response NL   Family Notification S   Restraint Monitoring Q60 Minutes   Visual/Safety Check (q 60 mins) SD   Restraint  Monitoring Q2 Hours   Circulation NS   Range of Motion P   Fluids IV   Food/Meal N   Elimination UC   Restraint Type   Soft Restraint B Wrist CONTINUED   Vital Signs   Temp 100.4 °F (38 °C)   Temp Source CORE   Pulse 79   Heart Rate Source Monitor   Resp 21   BP (!) 107/54   BP Location Arterial   MAP (mmHg) 73   Patient Position Lying left side;Semi fowlers   Level of Consciousness 1   MEWS Score 3   Patient Currently in Pain Other (comment)  (CPOT)       Pt continues to reach for lines and tubes, despite attempts to deter. Restraints continued for patient safety.

## 2019-05-09 NOTE — PROGRESS NOTES
[facility] IV team PICC line placement  Electronically signed by Leora Salamanca RN on 5/9/2019 at 7:13 PM

## 2019-05-09 NOTE — FLOWSHEET NOTE
05/09/19 0000   Assessment   Less Restrictive Alternative PC;RP;RO;VR;PM;AL   Special Consideration/Risk Factors N   Justification   Clinical Justification L;T;E;U   Education   Discontinuation Criteria Absence   Criteria Explained Yes   Patient's Response NL   Family Notification S   Restraint Monitoring Q60 Minutes   Visual/Safety Check (q 60 mins) SD   Restraint  Monitoring Q2 Hours   Circulation NS   Range of Motion P   Fluids IV   Food/Meal N   Elimination UC   Restraint Type   Soft Restraint B Wrist CONTINUED   Vital Signs   Temp 99.3 °F (37.4 °C)   Temp Source CORE   Pulse 82   Heart Rate Source Monitor   Resp 22   BP (!) 112/54   BP Location Arterial   MAP (mmHg) 74   Patient Position Semi fowlers   Level of Consciousness 1   MEWS Score 3   Patient Currently in Pain Other (comment)  (CPOT)     Pt continues to reach for lines and tubes, despite attempts to deter. Restraints continued for patient safety.

## 2019-05-09 NOTE — CARE COORDINATION
SW Discharge planning:    SW met with patient and patients . Patient in on a vent. Information provided by patients . Patient lives with her  in a 1 story home with a couple steps to enter. Patient owns a ww, shower chair, cane, and bsc. No history of heladio or hhc. SW will follow and assist as needed.  Jose Enrique Marley [facility]

## 2019-05-09 NOTE — PLAN OF CARE
Problem: Restraint Use - Nonviolent/Non-Self-Destructive Behavior:  Goal: Absence of restraint-related injury  Description  Absence of restraint-related injury  5/9/2019 0055 by Deondre Crowe RN  Outcome: Met This Shift     Problem: Restraint Use - Nonviolent/Non-Self-Destructive Behavior:  Goal: Absence of restraint indications  Description  Absence of restraint indications  5/9/2019 0055 by Deondre Crowe RN  Outcome: Not Met This Shift     Problem: Infection, Septic Shock:  Goal: Will show no infection signs and symptoms  Description  Will show no infection signs and symptoms  Outcome: Not Met This Shift

## 2019-05-09 NOTE — PROGRESS NOTES
use.     Past Medical History:   Diagnosis Date    Arthritis     Asthma     High blood pressure     Hyperlipidemia     Irregular heart beat     Migraines, neuralgic     PONV (postoperative nausea and vomiting)     Thyroid disorder      Past Surgical History:   Procedure Laterality Date    CHOLECYSTECTOMY  September 21, 1992    DILATION AND CURETTAGE OF UTERUS  1983    LUMBAR SPINE SURGERY  July 5, 2001   [address] SURGERY  2005    LASE    NERVE BLOCK  05/29/13    therapeutic caudal with modified Lori Stone OTHER SURGICAL HISTORY N/A 5-9-16    Surgical Replacement medtronic lumbar spinal cord stimulator    SPINE SURGERY  07/30/2007    Spinal Cord Stimulator Implant    TUBAL LIGATION  April 8, 1994       Family History   Problem Relation Age of Onset    Cancer Father     Heart Disease Father     Heart Disease Mother     High Cholesterol Mother     Mental Illness Mother     Diabetes Maternal Grandmother      Allergies   Allergen Reactions    Aciphex [Rabeprazole Sodium] Diarrhea     Weakness dizzy    Aleve [Naproxen Sodium]      Stomach upset    Amitriptyline-Perphenazine [Perphenazine-Amitriptyline]      Leg cramps involuntary movements    Amoxil [Amoxicillin]      diarrhea    Aspirin      Stomach upset    Bentyl [Dicyclomine Hcl]      Yeast infection,itch    Celebrex [Celecoxib]      Stomach ache    Cephalexin     Codeine      headache    Compazine [Prochlorperazine Maleate]      Involuntary leg movement    Cyclosporine     Demerol      Weak and dizzy    Ditropan [Oxybutynin Chloride]      Dizzy,nausea    Doxycycline      Yeast infection    Effexor [Venlafaxine Hydrochloride]      Dizzy, nausea, stomache pain    Elavil [Amitriptyline Hcl]     Entex La      itching    Fentanyl      Stop breathing, must use very slowly    Flexeril [Cyclobenzaprine Hcl]      Dizzy,diarrhea    Keflex [Cephalexin]      Itching, raw caused tinea fungal infection    Ketorolac Tromethamine Stomach upset    Lansoprazole     Lipitor [Atorvastatin]      High liver enzymes    Loprox [Ciclopirox]      itch    Medrol [Methylprednisolone]      Bleeding and chill    Midazolam Hcl     Motrin [Ibuprofen Micronized]      Stomach upset    Perphenazine      Legs cramp involuntary movement    Phenergan [Promethazine Hcl]      Legs jerking    Potassium-Containing Compounds      diarrhea    Prevacid [Lansoprazole]      diarrhea    Prochlorperazine Edisylate      Legs jerking    Protonix [Pantoprazole Sodium]      diarrhea    Reglan [Metoclopramide Hcl]      Arms jerking,involuntary,involuntary leg movement    Septra [Bactrim]      Itching ,infected rash, tinea fungal infection    Talwin Nx [Pentazocine-Naloxone]      Stomach ache    Tape Patterson Schlichter Tape]      rash    Tetracyclines & Related      Yeast infection    Toradol [Ketorolac Tromethamine]      Stomach ache    Ultracet [Tramadol-Acetaminophen]      Dizzy weak nausea    Vancenase [Beclomethasone Dipropionate]      dizzy    Versed [Midazolam]      Stop breathe, must use very slowly    Vicodin [Hydrocodone-Acetaminophen]      Stomach ache    Vioxx      Stomach ache    Amoxil [Amoxicillin] Diarrhea and Rash     Rash,diarrhea    Baclofen Nausea And Vomiting     Social History     Tobacco History     Smoking Status  Former Smoker    Smokeless Tobacco Use  Never Used          Alcohol History     Alcohol Use Status  No          Drug Use     Drug Use Status  No          Sexual Activity     Sexually Active  Not Asked                  Physical Exam:   /64   Pulse 75   Temp 99 °F (37.2 °C) (Core)   Resp 19   Ht 5' (1.524 m)   Wt 229 lb (103.9 kg)   SpO2 94%   BMI 44.72 kg/m²      CONSTITUTIONAL: Pt sedated on Fentanyl. Intubated. NEURO: Unresponsive, sedated. Appears stated age. HEENT: Dry mucous membranes. Intubated. Poor hygiene. LUNGS: Currently on ventilator. Lungs clear to auscultation at this time.    CARDIOVASCULAR: Regular

## 2019-05-09 NOTE — PROGRESS NOTES
symmetric, no cranial nerve deficit, gait, coordination and speech normal     Lines     site day    Art line   None    TLC R Fem    PICC None    Hemoaccess None       Mechanical Ventilation:   Mode: A/C SIMV  PS  low tidal   TV:400  ml RR: 14  PEEP 5 cmH2O PS   FiO2 100%     ABG:     Lab Results   Component Value Date    PH 7.468 05/09/2019    PCO2 25.5 05/09/2019    PO2 67.6 05/09/2019    HCO3 18.1 05/09/2019    BE -4.1 05/09/2019    THB 12.7 05/09/2019    O2SAT 94.6 05/09/2019     Labs and Imaging Studies   Basic Labs  CBC:   Lab Results   Component Value Date    WBC 18.6 05/09/2019    RBC 3.47 05/09/2019    HGB 11.8 05/09/2019    HCT 34.3 05/09/2019    MCV 98.8 05/09/2019    RDW 13.2 05/09/2019     05/09/2019     CMP:  Lab Results   Component Value Date     05/09/2019    K 3.2 05/09/2019     05/09/2019    CO2 18 05/09/2019    BUN 50 05/09/2019    PROT 4.3 05/09/2019     PT/INR:  No results found for: PTINR    Imaging Studies:     Ct Abdomen Pelvis Wo Contrast Additional Contrast? None    Result Date: 5/7/2019  EXAM:  CT Abdomen and Pelvis Without Contrast EXAM DATE/TIME:  5/7/2019 2:30 AM CLINICAL HISTORY:  59years old, female; Pain and signs and symptoms; Other: Diarrhea; Abdominal pain; Generalized TECHNIQUE:  Imaging protocol: Axial computed tomography images of the abdomen and pelvis without contrast. Coronal and sagittal reformatted images were created and reviewed. Radiation optimization: All CT scans at this facility use at least one of these dose optimization techniques: automated exposure control; mA and/or kV adjustment per patient size (includes targeted exams where dose is matched to clinical indication); or iterative reconstruction. COMPARISON:  No relevant prior studies available.  FINDINGS:  Tubes, catheters and devices: Subcutaneous stimulator at the anterolateral aspect of the left lower quadrant abdomen with an associated electrode extending into the spinal canal at the level of L2-3 and extending superiorly with tip at the level of T6. Lungs: Moderate bilateral dependent atelectasis versus consolidations with air bronchograms. Mild to moderate subsegmental atelectasis in the visualized lungs bilaterally. ABDOMEN:  Liver: Hepatomegaly to 16.5 cm in length at the midclavicular line and diffuse hepatic steatosis. Gallbladder and bile ducts: The gallbladder is surgically absent with cholecystectomy clips in the gallbladder fossa. No biliary ductal dilatation. Pancreas: Moderate pancreatic atrophy. No ductal dilatation. Spleen: Grossly unremarkable. No splenomegaly. Adrenals: Unremarkable. No mass. Kidneys and ureters: Grossly unremarkable. No hydronephrosis. No nephrolithiasis. No significant perinephric stranding. Stomach and bowel: Moderate diffuse mucosal thickening from the rectum to the mid sigmoid colon with mild adjacent stranding, mild mucosal thickening throughout the descending colon with adjacent stranding, and suggestion of mucosal thickening throughout the distal transverse colon as well as from the proximal most transverse colon to the cecum concerning for infectious/inflammatory proctocolitis. The unopacified loops of small bowel appear grossly unremarkable without evidence of obstruction. Moderate hiatal hernia. Appendix: Normal contrast-filled appendix inferior to the cecum. No periappendiceal free air or abscess seen. PELVIS:  Bladder: The urinary bladder is decompressed but appears grossly unremarkable. No bladder calculi. Reproductive: Grossly unremarkable ovaries and uterus. ABDOMEN and PELVIS:  Intraperitoneal space: No significant fluid collection. No free air. Prominent intra-abdominal fat. Bones/joints: Diffuse age related osteopenia and moderate degenerative changes. Moderate levoscoliosis of the lower lumbar spine. No acute fracture. No dislocation. Soft tissues: Huge amount of subcutaneous fat consistent with morbid obesity. Vasculature:  The abdominal aorta appears grossly unremarkable. Incidental bilateral inferior pelvic phleboliths. Lymph nodes: Unremarkable. No enlarged lymph nodes. 1. Moderate diffuse mucosal thickening from the rectum to the mid sigmoid colon with mild adjacent stranding, mild mucosal thickening throughout the descending colon with adjacent stranding, and suggestion of mucosal thickening throughout the distal transverse colon as well as from the proximal most transverse colon to the cecum concerning for infectious/inflammatory proctocolitis. 2. Moderate hiatal hernia. 3. Moderate bilateral dependent atelectases versus consolidations with air bronchograms. 4. No nephrolithiasis or obstructive uropathy. No perinephric stranding. 5. Normal appendix. 6. Additional nonacute/incidental findings as described above. This report has been electronically signed by David Tubbs MD.    Ct Head Wo Contrast    Result Date: 5/7/2019  EXAM:  CT Head Without Contrast EXAM DATE/TIME:  5/7/2019 12:45 AM CLINICAL HISTORY:  59years old, female; Signs and symptoms; Altered mental status/memory loss; Confusion or disorientation; Additional info: Decreased alertness TECHNIQUE:  Imaging protocol: Axial computed tomography images of the head/brain without contrast. Coronal and sagittal reformatted images were created and reviewed. Radiation optimization: All CT scans at this facility use at least one of these dose optimization techniques: automated exposure control; mA and/or kV adjustment per patient size (includes targeted exams where dose is matched to clinical indication); or iterative reconstruction. COMPARISON:  No relevant prior studies available. FINDINGS:  Brain: No acute intracranial hemorrhage identified. Diffuse age-related mild cerebral atrophy and subtle small vessel ischemic changes. Incidental physiologic calcifications in the bilateral basal ganglia. Ventricles: Unremarkable for age. Sella:  The pituitary gland appears grossly airway protection  -Initial ABG 3.270/39.6/60.9/48.5 c/w metabolic acidosis  -CXR: Interstitial opacities in perihilar and infrahilar   -Pro calcitonin :99.25  -Daily ABG and CXR  >started on albumin and iv lasix 1 dose    GI  C diff colitis   -Hx of 2-3 weeks antibiotics use and diarrhea after   -Leukocytosis WBC 18.9 today  -Ct abdomen showed  infectious/inflammatory proctocolitis  -C diff EIA: c. Diff toxin A and B  -continue IV flagyl Q8h and PO vancomycin 500 mg 4 times daily   -GS consult : no active intervention    Shock liver   Transaminitis ALT/AST 3440/5412>>1018/587  -Ct abdomen showed hepatomegaly with hepatic steatosis  -NR 8.4, monitor INR 1.6 today  -serum drug screen   -Monitor LFT   -s/p Give FFP and vitamin K     Renal   STEFANIA, prerenal, improving  Likely 2/2 dehydration from septic shock and diarrhea   -Baseline Cr. 0.9, initial Cr. 4.2>>2.6  -IVF  -Nephrology consult   -Monitor renal function     Lactic acidosis  Likely 2/2 sepsis vs transminitis  -Lactic acid mild elevated 2.6>3.3, continue trend  -Continue  IVF at 60 ml/hr  -Nephrology consult     Macrocytic anemia   Hb: 11.5, MCV: 105.1  -Folate and vitamin B 12: wnl  -monitor CBC    PT/OT evaluation:  DVT prophylaxis/ GI prophylaxis: Pepcid / heparin  Disposition: MICU    Ranulfo Shah MD, PGY-1  Internal Medicine resident   Attending physician: Dr. Shirley Tran    I personally saw, examined and provided care for the patient. Radiographs, labs and medication list were reviewed by me independently. I spoke with bedside nursing, therapists and consultants. Critical care services and times documented are independent of procedures and multidisciplinary rounds with Residents. Additionally comprehensive, multidisciplinary rounds were conducted with the MICU team. The case was discussed in detail and plans for care were established. Review of Residents documentation was conducted and revisions were made as appropriate.  I agree with the above documented exam, problem list and plan of care. Acute Hypoxic resp failure  C diff sepsis  Still weak  Hold on feeding   Wean sedation   Liberate from MV   Possible extubation when off pressors and more alert and awake       Care reviewed with nursing staff, medical and surgical specialty care, primary care and the patient's family as available. Chart review/lab review/X-ray viewing/documentation and had long Conversation with patient/family re: prognosis, care options and any end of life issues:      Critical care time spent reviewing labs/films, examining patient, collaborating with other physicians more than 35  Minutes  excluding procedures . Kiara Redd M.D.   5/9/2019  10:16 PM

## 2019-05-09 NOTE — PROGRESS NOTES
Department of Internal Medicine  Nephrology Consult Note      Events reviewed. No events overnight. SUBJECTIVE: We are following Mrs. Haji for acute kidney injury. She remains intubated and sedated.     PHYSICAL EXAM:      Vitals:    VITALS:  BP (!) 124/53   Pulse 95   Temp 101.1 °F (38.4 °C)   Resp 24   Ht 5' (1.524 m)   Wt 209 lb 3.2 oz (94.9 kg)   SpO2 92%   BMI 40.86 kg/m²   24HR INTAKE/OUTPUT:      Intake/Output Summary (Last 24 hours) at 5/8/2019 1349  Last data filed at 5/8/2019 1200  Gross per 24 hour   Intake 5855 ml   Output 1685 ml   Net 4170 ml       Constitutional:  Intubated, sedated  HEENT:  MESSI, dry mucus membranes  Respiratory:  Coarse breath sounds bilaterally, frothy secretions from ET  Cardiovascular/Edema:  Regular rate and rhythm, no murmurs appreciated  Gastrointestinal:  Soft, non-tender, bowel sounds present  Neurologic: sedated, withdraws to pain  Skin:  warm, + edema  Other:  Erythematous lesion on lower extrtemities    DATA:    CBC:   Lab Results   Component Value Date    WBC 18.9 05/08/2019    RBC 3.38 05/08/2019    HGB 11.5 05/08/2019    HCT 33.9 05/08/2019    .3 05/08/2019    MCH 34.0 05/08/2019    MCHC 33.9 05/08/2019    RDW 13.2 05/08/2019     05/08/2019    MPV 11.1 05/08/2019     CMP:    Lab Results   Component Value Date     05/08/2019    K 4.1 05/08/2019     05/08/2019    CO2 19 05/08/2019    BUN 65 05/08/2019    CREATININE 2.6 05/08/2019    GFRAA 23 05/08/2019    LABGLOM 19 05/08/2019    GLUCOSE 160 05/08/2019    PROT 4.7 05/08/2019    LABALBU 2.4 05/08/2019    LABALBU 4.4 08/09/2011    CALCIUM 7.4 05/08/2019    BILITOT 1.0 05/08/2019    ALKPHOS 102 05/08/2019     05/08/2019    ALT 1,018 05/08/2019     Magnesium:    Lab Results   Component Value Date    MG 1.8 05/08/2019     Phosphorus:    Lab Results   Component Value Date    PHOS 1.5 05/08/2019     PT/INR:    Lab Results   Component Value Date    PROTIME 18.3 05/08/2019    INR 1.6 05/08/2019     Warfarin PT/INR:  No components found for: Kwan England  ABG:    Lab Results   Component Value Date    PH 7.382 05/08/2019    PCO2 28.1 05/08/2019    PO2 77.7 05/08/2019    HCO3 16.3 05/08/2019    BE -7.3 05/08/2019    O2SAT 96.1 05/08/2019       Urine studies:  Urine sodium: 22  Urine chloride: <20  Urine creatinine: 100  Urine urea: 251  Urine potassium: 48.2    Fraction excretion of sodium: 0.5%  Fraction excretion of urea: 12.6%      Radiology Review:      CT head 5/7/19   1. No acute intracranial hemorrhage identified. 2. Additional nonacute/incidental findings as described above.        This report has been electronically signed by Kaden Damon MD.     CT abdomen 5/7/19   1. Moderate diffuse mucosal thickening from the rectum to the mid sigmoid colon    with mild adjacent stranding, mild mucosal thickening throughout the descending    colon with adjacent stranding, and suggestion of mucosal thickening throughout    the distal transverse colon as well as from the proximal most transverse colon    to the cecum concerning for infectious/inflammatory proctocolitis. 2. Moderate hiatal hernia. 3. Moderate bilateral dependent atelectases versus consolidations with air    bronchograms. 4. No nephrolithiasis or obstructive uropathy. No perinephric stranding. 5. Normal appendix. 6. Additional nonacute/incidental findings as described above.        This report has been electronically signed by Kaden Damon MD.       Chest x-ray May 19 thousand 19    Significant worsening of right-sided infiltrate and atelectasis and   small right effusion                     BRIEF SUMMARY OF INITIAL CONSULT:  Briefly, Mrs. Michelle Diego is a 73-year-old female with h/o of hypothyroidism, lumbar radiculopathy s/p laminectomy, HLD, asthma, was brought to the ED for worsening lethargy. She was recently treated for lower extremity cellulitis with clindamycin and then levofloxacin around 2-3 weeks ago.  She developed profuse diarrhea shortly thereafter. Family reported some vomiting initially and poor oral intake. Over the past 3 days, she became severely weak and dehydrated. At the ED, she was in shock requiring vasopressor despite aggressive fluid resuscitation and was intubated for airway protection. Labs were notable for creatinine of 4.2 mg/dL, BUN of 73 mg/dL, lactic acid of 3.4, sodium of 129 mmol/L, liver enzymes were also markedly elevated, reasons for this consult. Of note, she has no known history of kidney disease. She was on furosemide at home, but has not been taken since the onset of diarrhea. Problems resolved:    Hyponatremia, multifactorial, due to hypovolemia and decreased GFR, resolved  Hypoglycemia secondary to #6, resolved  Coagulopathy, high PT/INR, resolved    IMPRESSION/RECOMMENDATIONS:      STEFANIA, stage 3, volume responsive pre-renal STEFANIA  (diarrhea, poor oral intake, shock), FeNa 0.5% , FeUrea 12%. Renal function continues to improve with decreasing creatinine and increased urine output. Chest x-ray showing increasing bilateral infiltrates probably consistent with volume overload.     Respiratory failure, status post intubation; chest x-ray to increasing infiltrates  Alkalemia (pH: 7.468) with respiratory alkalosis, HAGMA (uremia, lactic acidosis)   Hemodynamic shock, hypovolemic and septic shock, melena likely improving with decreasing dose of pressors  Hypophosphatemia, multifactorial, due to poor oral intake and vit D deficiency (25-hydroxy Vit D 13 ng/ml)  Vitamin D deficiency on ergocalciferol  Hypomagnesemia, secondary to no intake and diarrhea  Shock liver, LFTs improving  C. difficile infection, on vancomycin and metronidazole  Severe hypoalbuminemia, multifactorial  Nutrition, nothing by mouth    Plan:    Change IV fluids to D5 normal + 20 KCL at 60 cc/hour   Lasix 40 mg IV ×1  Albumin 25 g twice a day ×6 doses  Replace potassium  Replace phosphorus  Replace magnesium  Continue to monitor renal function    Discussed with ICU team.    Electronically signed by Oscar Salmeron MD on 5/8/2019 at 1:49 PM

## 2019-05-09 NOTE — PLAN OF CARE
Problem: Restraint Use - Nonviolent/Non-Self-Destructive Behavior:  Goal: Absence of restraint-related injury  Description  Absence of restraint-related injury  Outcome: Met This Shift     Problem: Restraint Use - Nonviolent/Non-Self-Destructive Behavior:  Goal: Absence of restraint indications  Description  Absence of restraint indications  Outcome: Not Met This Shift       Careplan reviewed with family as available.

## 2019-05-09 NOTE — PROGRESS NOTES
Subjective: The patient is intubated and sedated  Remains on pressor support  Had a bm today      Objective:    BP 87/67   Pulse 78   Temp 100.8 °F (38.2 °C) (Core)   Resp 22   Ht 5' (1.524 m)   Wt 229 lb (103.9 kg)   SpO2 94%   BMI 44.72 kg/m²     In: 4204 [I.V.:4114; NG/GT:90]  Out: 2415     HEENT: intubated NCAT,  PERRLA, No JVD  Heart:  RRR, no murmurs, gallops, or rubs.   Lungs:  CTA bilaterally, no wheeze, rales or rhonchi  Abd: bowel sounds hypoactive, distended but soft abdomen   Extrem:  No clubbing, cyanosis, or edema     Recent Labs     05/08/19  0605 05/08/19  1249 05/09/19  0525   WBC 24.1* 18.9* 18.6*   HGB 12.0 11.5 11.8   HCT 35.7 33.9* 34.3    176 126*       Recent Labs     05/08/19  0605 05/08/19  1743 05/09/19  0525   * 136 138   K 4.1 3.3* 3.2*    105 105   CO2 19* 18* 18*   BUN 65* 58* 50*   CREATININE 2.6* 2.0* 1.4*   CALCIUM 7.4* 6.9* 7.1*       Assessment:    Patient Active Problem List   Diagnosis    Post lumbar laminectomy syndrome    Herniated disc L4-L5    Lumbar sprain     Status post lumbar spinal cord stimulator 2007    Spinal cord stimulator dysfunction/Charging of SCS    Lumbar radiculopathy    Chronic pain syndrome    Septic shock (HCC)    Colitis       Plan:    Admit to micu for eval of sepsis / shock from GI source - Cdiff positive   intubated on fentanyl  aggressive Iv fluids, pressors - wean as able   abx therapy with po vanc and flagyl per ID   black liquid stool in fms   Supportive care ,   Multiorgan system failure including acute liver failure - improvign lft's k   Continue  icu care   Discussed  with  today and daily at bedside      Guarded prognosis but appears to be improving       DVT and GERD prophylaxis  All consultants notes reviewed    Rodrigue Zapata MD  12:28 PM  5/9/2019

## 2019-05-09 NOTE — PROGRESS NOTES
[facility]  PROGRESS NOTE  ATTENDING NOTE    S:  Intubated, sedated, on pressors but greatly decreased    O:  BP 87/67   Pulse 78   Temp 100.8 °F (38.2 °C) (Core)   Resp 22   Ht 5' (1.524 m)   Wt 229 lb (103.9 kg)   SpO2 94%   BMI 44.72 kg/m²   Gen:  NAD  Abd:  Soft, nd, TTP with deep palpation--mild, improved since yesterday     FMS in now with green and dark    ASSESSMENT/PLAN:  Colitis--c diff   --c/w weaning levophed and continue hydration  --c/w antibiotics. No surgical intervention needed at this time.       Rene Armstrong MD, MSc, FACS  5/9/2019  12:24 PM

## 2019-05-09 NOTE — FLOWSHEET NOTE
Patient continues to reach for lines and tubes despite alternative measures to deter. Two point soft restraints maintained for patient safety.   Electronically signed by Roger Mccall RN on 5/9/2019 at 12:29 PM

## 2019-05-10 ENCOUNTER — APPOINTMENT (OUTPATIENT)
Dept: GENERAL RADIOLOGY | Age: 59
DRG: 870 | End: 2019-05-10
Payer: COMMERCIAL

## 2019-05-10 LAB
AADO2: 226.7 MMHG
ALBUMIN SERPL-MCNC: 2.3 G/DL (ref 3.5–5.2)
ALP BLD-CCNC: 47 U/L (ref 35–104)
ALT SERPL-CCNC: 295 U/L (ref 0–32)
ANION GAP SERPL CALCULATED.3IONS-SCNC: 12 MMOL/L (ref 7–16)
ANISOCYTOSIS: ABNORMAL
AST SERPL-CCNC: 95 U/L (ref 0–31)
B.E.: -1.4 MMOL/L (ref -3–3)
BASOPHILS ABSOLUTE: 0 E9/L (ref 0–0.2)
BASOPHILS RELATIVE PERCENT: 0.2 % (ref 0–2)
BILIRUB SERPL-MCNC: 1.7 MG/DL (ref 0–1.2)
BILIRUBIN DIRECT: 0.4 MG/DL (ref 0–0.3)
BILIRUBIN, INDIRECT: 1.3 MG/DL (ref 0–1)
BUN BLDV-MCNC: 29 MG/DL (ref 8–23)
CALCIUM SERPL-MCNC: 7 MG/DL (ref 8.6–10.2)
CHLORIDE BLD-SCNC: 104 MMOL/L (ref 98–107)
CO2: 23 MMOL/L (ref 22–29)
COHB: 1 % (ref 0–1.5)
CREAT SERPL-MCNC: 0.9 MG/DL (ref 0.5–1)
CRITICAL: ABNORMAL
DATE ANALYZED: ABNORMAL
DATE OF COLLECTION: ABNORMAL
EOSINOPHILS ABSOLUTE: 0 E9/L (ref 0.05–0.5)
EOSINOPHILS RELATIVE PERCENT: 0.6 % (ref 0–6)
FIO2: 50 %
GFR AFRICAN AMERICAN: >60
GFR NON-AFRICAN AMERICAN: >60 ML/MIN/1.73
GLUCOSE BLD-MCNC: 132 MG/DL (ref 74–99)
HCO3: 21.8 MMOL/L (ref 22–26)
HCT VFR BLD CALC: 33 % (ref 34–48)
HEMOGLOBIN: 11.2 G/DL (ref 11.5–15.5)
HHB: 3.6 % (ref 0–5)
HYPOCHROMIA: ABNORMAL
INR BLD: 1.5
LAB: ABNORMAL
LACTIC ACID: 2.3 MMOL/L (ref 0.5–2.2)
LACTIC ACID: 2.3 MMOL/L (ref 0.5–2.2)
LACTIC ACID: 2.6 MMOL/L (ref 0.5–2.2)
LACTIC ACID: 2.6 MMOL/L (ref 0.5–2.2)
LYMPHOCYTES ABSOLUTE: 0.83 E9/L (ref 1.5–4)
LYMPHOCYTES RELATIVE PERCENT: 6.1 % (ref 20–42)
Lab: ABNORMAL
MAGNESIUM: 1.6 MG/DL (ref 1.6–2.6)
MCH RBC QN AUTO: 34.4 PG (ref 26–35)
MCHC RBC AUTO-ENTMCNC: 33.9 % (ref 32–34.5)
MCV RBC AUTO: 101.2 FL (ref 80–99.9)
METER GLUCOSE: 112 MG/DL (ref 74–99)
METER GLUCOSE: 138 MG/DL (ref 74–99)
METER GLUCOSE: 143 MG/DL (ref 74–99)
METER GLUCOSE: 146 MG/DL (ref 74–99)
METHB: 0.2 % (ref 0–1.5)
MODE: AC
MONOCYTES ABSOLUTE: 0.7 E9/L (ref 0.1–0.95)
MONOCYTES RELATIVE PERCENT: 5.2 % (ref 2–12)
NEUTROPHILS ABSOLUTE: 12.37 E9/L (ref 1.8–7.3)
NEUTROPHILS RELATIVE PERCENT: 88.7 % (ref 43–80)
NUCLEATED RED BLOOD CELLS: 0.9 /100 WBC
O2 SATURATION: 96.4 % (ref 92–98.5)
O2HB: 95.2 % (ref 94–97)
OPERATOR ID: 2067
PATIENT TEMP: 37 C
PCO2: 31.8 MMHG (ref 35–45)
PDW BLD-RTO: 13.5 FL (ref 11.5–15)
PEEP/CPAP: 5 CMH2O
PFO2: 1.63 MMHG/%
PH BLOOD GAS: 7.45 (ref 7.35–7.45)
PHOSPHORUS: 1.7 MG/DL (ref 2.5–4.5)
PLATELET # BLD: 90 E9/L (ref 130–450)
PLATELET CONFIRMATION: NORMAL
PMV BLD AUTO: 12 FL (ref 7–12)
PO2: 81.5 MMHG (ref 60–100)
POTASSIUM REFLEX MAGNESIUM: 4.4 MMOL/L (ref 3.5–5)
PROTHROMBIN TIME: 17.1 SEC (ref 9.3–12.4)
RBC # BLD: 3.26 E12/L (ref 3.5–5.5)
RI(T): 2.78
RR MECHANICAL: 14 B/MIN
SODIUM BLD-SCNC: 139 MMOL/L (ref 132–146)
SOURCE, BLOOD GAS: ABNORMAL
THB: 12.2 G/DL (ref 11.5–16.5)
TIME ANALYZED: 352
TOTAL PROTEIN: 4.8 G/DL (ref 6.4–8.3)
VT MECHANICAL: 400 ML
WBC # BLD: 13.9 E9/L (ref 4.5–11.5)

## 2019-05-10 PROCEDURE — 82805 BLOOD GASES W/O2 SATURATION: CPT

## 2019-05-10 PROCEDURE — 2580000003 HC RX 258: Performed by: INTERNAL MEDICINE

## 2019-05-10 PROCEDURE — 2500000003 HC RX 250 WO HCPCS: Performed by: INTERNAL MEDICINE

## 2019-05-10 PROCEDURE — 36592 COLLECT BLOOD FROM PICC: CPT

## 2019-05-10 PROCEDURE — P9047 ALBUMIN (HUMAN), 25%, 50ML: HCPCS | Performed by: INTERNAL MEDICINE

## 2019-05-10 PROCEDURE — 2000000000 HC ICU R&B

## 2019-05-10 PROCEDURE — 83735 ASSAY OF MAGNESIUM: CPT

## 2019-05-10 PROCEDURE — C1751 CATH, INF, PER/CENT/MIDLINE: HCPCS

## 2019-05-10 PROCEDURE — 71045 X-RAY EXAM CHEST 1 VIEW: CPT

## 2019-05-10 PROCEDURE — 99291 CRITICAL CARE FIRST HOUR: CPT | Performed by: INTERNAL MEDICINE

## 2019-05-10 PROCEDURE — 99232 SBSQ HOSP IP/OBS MODERATE 35: CPT | Performed by: SURGERY

## 2019-05-10 PROCEDURE — 6360000002 HC RX W HCPCS: Performed by: INTERNAL MEDICINE

## 2019-05-10 PROCEDURE — 80076 HEPATIC FUNCTION PANEL: CPT

## 2019-05-10 PROCEDURE — 83605 ASSAY OF LACTIC ACID: CPT

## 2019-05-10 PROCEDURE — 36600 WITHDRAWAL OF ARTERIAL BLOOD: CPT

## 2019-05-10 PROCEDURE — 36415 COLL VENOUS BLD VENIPUNCTURE: CPT

## 2019-05-10 PROCEDURE — 6370000000 HC RX 637 (ALT 250 FOR IP): Performed by: INTERNAL MEDICINE

## 2019-05-10 PROCEDURE — 80048 BASIC METABOLIC PNL TOTAL CA: CPT

## 2019-05-10 PROCEDURE — 94003 VENT MGMT INPAT SUBQ DAY: CPT

## 2019-05-10 PROCEDURE — 85025 COMPLETE CBC W/AUTO DIFF WBC: CPT

## 2019-05-10 PROCEDURE — 85610 PROTHROMBIN TIME: CPT

## 2019-05-10 PROCEDURE — 84100 ASSAY OF PHOSPHORUS: CPT

## 2019-05-10 PROCEDURE — C9113 INJ PANTOPRAZOLE SODIUM, VIA: HCPCS | Performed by: INTERNAL MEDICINE

## 2019-05-10 PROCEDURE — 82962 GLUCOSE BLOOD TEST: CPT

## 2019-05-10 RX ORDER — MIDODRINE HYDROCHLORIDE 5 MG/1
5 TABLET ORAL
Status: DISCONTINUED | OUTPATIENT
Start: 2019-05-11 | End: 2019-05-14

## 2019-05-10 RX ORDER — CHLORHEXIDINE GLUCONATE 0.12 MG/ML
15 RINSE ORAL 2 TIMES DAILY
Status: DISCONTINUED | OUTPATIENT
Start: 2019-05-10 | End: 2019-05-24 | Stop reason: HOSPADM

## 2019-05-10 RX ORDER — MAGNESIUM SULFATE IN WATER 40 MG/ML
2 INJECTION, SOLUTION INTRAVENOUS ONCE
Status: COMPLETED | OUTPATIENT
Start: 2019-05-10 | End: 2019-05-10

## 2019-05-10 RX ORDER — FUROSEMIDE 10 MG/ML
40 INJECTION INTRAMUSCULAR; INTRAVENOUS ONCE
Status: COMPLETED | OUTPATIENT
Start: 2019-05-10 | End: 2019-05-10

## 2019-05-10 RX ORDER — ALBUMIN (HUMAN) 12.5 G/50ML
25 SOLUTION INTRAVENOUS ONCE
Status: COMPLETED | OUTPATIENT
Start: 2019-05-10 | End: 2019-05-10

## 2019-05-10 RX ADMIN — Medication 500 MG: at 08:15

## 2019-05-10 RX ADMIN — SODIUM PHOSPHATE, MONOBASIC, MONOHYDRATE 30 MMOL: 276; 142 INJECTION, SOLUTION INTRAVENOUS at 09:18

## 2019-05-10 RX ADMIN — SODIUM CHLORIDE 0.7 MCG/KG/HR: 9 INJECTION, SOLUTION INTRAVENOUS at 09:38

## 2019-05-10 RX ADMIN — CHLORHEXIDINE GLUCONATE 0.12% ORAL RINSE 15 ML: 1.2 LIQUID ORAL at 20:54

## 2019-05-10 RX ADMIN — DEXTROSE, SODIUM CHLORIDE, AND POTASSIUM CHLORIDE: 5; .9; .15 INJECTION INTRAVENOUS at 17:17

## 2019-05-10 RX ADMIN — Medication 10 ML: at 08:05

## 2019-05-10 RX ADMIN — HEPARIN SODIUM 5000 UNITS: 10000 INJECTION INTRAVENOUS; SUBCUTANEOUS at 08:06

## 2019-05-10 RX ADMIN — Medication 10 ML: at 20:49

## 2019-05-10 RX ADMIN — FAMOTIDINE 20 MG: 10 INJECTION, SOLUTION INTRAVENOUS at 08:05

## 2019-05-10 RX ADMIN — METRONIDAZOLE 500 MG: 500 INJECTION, SOLUTION INTRAVENOUS at 16:19

## 2019-05-10 RX ADMIN — Medication 2 MCG/MIN: at 13:51

## 2019-05-10 RX ADMIN — Medication 500 MG: at 21:21

## 2019-05-10 RX ADMIN — PETROLATUM: 42 OINTMENT TOPICAL at 08:04

## 2019-05-10 RX ADMIN — Medication 500 MG: at 13:10

## 2019-05-10 RX ADMIN — MAGNESIUM SULFATE HEPTAHYDRATE 2 G: 40 INJECTION, SOLUTION INTRAVENOUS at 13:40

## 2019-05-10 RX ADMIN — PETROLATUM: 42 OINTMENT TOPICAL at 20:54

## 2019-05-10 RX ADMIN — Medication 100 MCG/HR: at 15:06

## 2019-05-10 RX ADMIN — HEPARIN SODIUM 5000 UNITS: 10000 INJECTION INTRAVENOUS; SUBCUTANEOUS at 20:47

## 2019-05-10 RX ADMIN — PETROLATUM: 42 OINTMENT TOPICAL at 13:09

## 2019-05-10 RX ADMIN — FUROSEMIDE 40 MG: 10 INJECTION, SOLUTION INTRAMUSCULAR; INTRAVENOUS at 13:09

## 2019-05-10 RX ADMIN — METRONIDAZOLE 500 MG: 500 INJECTION, SOLUTION INTRAVENOUS at 08:06

## 2019-05-10 RX ADMIN — Medication 10 ML: at 21:13

## 2019-05-10 RX ADMIN — ALBUMIN (HUMAN) 25 G: 0.25 INJECTION, SOLUTION INTRAVENOUS at 20:51

## 2019-05-10 RX ADMIN — HEPARIN SODIUM 5000 UNITS: 10000 INJECTION INTRAVENOUS; SUBCUTANEOUS at 13:10

## 2019-05-10 RX ADMIN — Medication 10 ML: at 05:39

## 2019-05-10 RX ADMIN — Medication 10 ML: at 05:38

## 2019-05-10 RX ADMIN — Medication 200 MCG/HR: at 03:27

## 2019-05-10 RX ADMIN — Medication 500 MG: at 17:06

## 2019-05-10 RX ADMIN — ALBUMIN (HUMAN) 25 G: 0.25 INJECTION, SOLUTION INTRAVENOUS at 08:05

## 2019-05-10 RX ADMIN — SODIUM CHLORIDE 0.7 MCG/KG/HR: 9 INJECTION, SOLUTION INTRAVENOUS at 17:16

## 2019-05-10 RX ADMIN — SODIUM CHLORIDE 0.7 MCG/KG/HR: 9 INJECTION, SOLUTION INTRAVENOUS at 03:45

## 2019-05-10 RX ADMIN — DEXTROSE, SODIUM CHLORIDE, AND POTASSIUM CHLORIDE: 5; .9; .15 INJECTION INTRAVENOUS at 03:28

## 2019-05-10 RX ADMIN — PANTOPRAZOLE SODIUM 40 MG: 40 INJECTION, POWDER, FOR SOLUTION INTRAVENOUS at 08:05

## 2019-05-10 RX ADMIN — Medication 10 ML: at 05:34

## 2019-05-10 RX ADMIN — ALBUMIN (HUMAN) 25 G: 0.25 INJECTION, SOLUTION INTRAVENOUS at 14:04

## 2019-05-10 ASSESSMENT — PULMONARY FUNCTION TESTS
PIF_VALUE: 17
PIF_VALUE: 23
PIF_VALUE: 12
PIF_VALUE: 14
PIF_VALUE: 23
PIF_VALUE: 8
PIF_VALUE: 22
PIF_VALUE: 11
PIF_VALUE: 16
PIF_VALUE: 11
PIF_VALUE: 15
PIF_VALUE: 12
PIF_VALUE: 17
PIF_VALUE: 14
PIF_VALUE: 19
PIF_VALUE: 21
PIF_VALUE: 34
PIF_VALUE: 23
PIF_VALUE: 14
PIF_VALUE: 9
PIF_VALUE: 13
PIF_VALUE: 21
PIF_VALUE: 12
PIF_VALUE: 14
PIF_VALUE: 14
PIF_VALUE: 22
PIF_VALUE: 16
PIF_VALUE: 23
PIF_VALUE: 15
PIF_VALUE: 13
PIF_VALUE: 24
PIF_VALUE: 16
PIF_VALUE: 14
PIF_VALUE: 16

## 2019-05-10 ASSESSMENT — PAIN SCALES - GENERAL: PAINLEVEL_OUTOF10: 0

## 2019-05-10 NOTE — PLAN OF CARE
Problem: Restraint Use - Nonviolent/Non-Self-Destructive Behavior:  Goal: Absence of restraint-related injury  Description  Absence of restraint-related injury  5/10/2019 0134 by Alexander Last RN  Outcome: Met This Shift     Problem: Restraint Use - Nonviolent/Non-Self-Destructive Behavior:  Goal: Absence of restraint indications  Description  Absence of restraint indications  5/10/2019 0134 by Alexander Last RN  Outcome: Not Met This Shift

## 2019-05-10 NOTE — PLAN OF CARE
Problem: Restraint Use - Nonviolent/Non-Self-Destructive Behavior:  Goal: Absence of restraint-related injury  Description  Absence of restraint-related injury  Outcome: Met This Shift     Problem: Restraint Use - Nonviolent/Non-Self-Destructive Behavior:  Goal: Absence of restraint indications  Description  Absence of restraint indications  Outcome: Not Met This Shift

## 2019-05-10 NOTE — PROGRESS NOTES
Subjective: The patient is intubated and sedated  Remains on pressor support  Had a bm today      Objective:    /73   Pulse 81   Temp 99.3 °F (37.4 °C)   Resp 22   Ht 5' (1.524 m)   Wt 226 lb 13.7 oz (102.9 kg)   SpO2 94%   BMI 44.30 kg/m²     In: 4017 [I.V.:3937; NG/GT:80]  Out: 4025     HEENT: intubated NCAT,  PERRLA, No JVD  Heart:  RRR, no murmurs, gallops, or rubs.   Lungs:  CTA bilaterally, no wheeze, rales or rhonchi  Abd: bowel sounds hypoactive, distended but soft abdomen   Extrem:  No clubbing, cyanosis, or edema     Recent Labs     05/08/19  1249 05/09/19  0525 05/10/19  0540   WBC 18.9* 18.6* 13.9*   HGB 11.5 11.8 11.2*   HCT 33.9* 34.3 33.0*    126* 90*       Recent Labs     05/09/19  0525 05/09/19  2030 05/10/19  0540    138 139   K 3.2* 3.3* 4.4    106 104   CO2 18* 23 23   BUN 50* 36* 29*   CREATININE 1.4* 1.1* 0.9   CALCIUM 7.1* 7.4* 7.0*       Assessment:    Patient Active Problem List   Diagnosis    Post lumbar laminectomy syndrome    Herniated disc L4-L5    Lumbar sprain     Status post lumbar spinal cord stimulator 2007    Spinal cord stimulator dysfunction/Charging of SCS    Lumbar radiculopathy    Chronic pain syndrome    Septic shock ([facility] [address]    Colitis       Plan:    Admit to micu for eval of sepsis / shock from GI source - Cdiff positive   intubated on fentanyl, wean pressors as able   Diurese d/t volume overlad on CXR with aggressive fluids   abx therapy with po vanc and flagyl per ID   black liquid stool in fms   Supportive care ,   Multiorgan system failure including acute liver failure - improvign lft's    Continue  icu care   Discussed  with  today and daily at bedside      Guarded prognosis but appears to be improving       DVT and GERD prophylaxis  All consultants notes reviewed    Prescott Dandy, MD  11:30 AM  5/10/2019

## 2019-05-10 NOTE — PROGRESS NOTES
[facility]  PROGRESS NOTE  ATTENDING NOTE    S:  Intubated, sedated, on pressors still    O:  BP (!) 95/54   Pulse 72   Temp 99.9 °F (37.7 °C)   Resp 20   Ht 5' (1.524 m)   Wt 226 lb 13.7 oz (102.9 kg)   SpO2 94%   BMI 44.30 kg/m²   Gen:  NAD  Abd:  Soft, nd, TTP with deep palpation--mild, improving    FMS in now with green and dark    ASSESSMENT/PLAN:  Colitis--c diff   --c/w weaning levophed and continue hydration  --c/w antibiotics. --ok to start tube feeds  No surgical intervention needed at this time.       Luz Miramontes MD, MSc, FACS  5/10/2019  4:50 PM

## 2019-05-10 NOTE — PROGRESS NOTES
Lines     site day    Art line   None    TLC R Fem    PICC None    Hemoaccess None       Mechanical Ventilation:   Mode: A/C SIMV  PS  low tidal   TV:400  ml RR: 14  PEEP 5 cmH2O PS   FiO2 100%     ABG:     Lab Results   Component Value Date    PH 7.454 05/10/2019    PCO2 31.8 05/10/2019    PO2 81.5 05/10/2019    HCO3 21.8 05/10/2019    BE -1.4 05/10/2019    THB 12.2 05/10/2019    O2SAT 96.4 05/10/2019     Labs and Imaging Studies   Basic Labs  CBC:   Lab Results   Component Value Date    WBC 13.9 05/10/2019    RBC 3.26 05/10/2019    HGB 11.2 05/10/2019    HCT 33.0 05/10/2019    .2 05/10/2019    RDW 13.5 05/10/2019    PLT 90 05/10/2019     CMP:  Lab Results   Component Value Date     05/10/2019    K 4.4 05/10/2019     05/10/2019    CO2 23 05/10/2019    BUN 29 05/10/2019    PROT 4.8 05/10/2019     PT/INR:  No results found for: PTINR    I    Assessment and Plan     Martina Magana is a 59 y.o. female    Neurology   Acute encephalopathy, improving  2/2 septic shock vs drug overdose   -Currently Intubated and sedated   -hx of chronic opioids for pain control   -Monitor mental status   -SBT daily  Weans sedation   Possible extubation   Replace magnesium     Cardiovascular  Septic shock:  2/2 possible C diff colitis   +5 L  Lasix as needed    Pulmonary   Acute hypoxic respiratory failure vs pulmonary edema  2/2 septic shock   -S/p intubation on mechanical ventilation for airway protection  -Initial ABG 0.627/91.4/05.9/05.8 c/w metabolic acidosis  -CXR: Interstitial opacities in perihilar and infrahilar   Got repeated Lasix by ID  Will get CXR in am     GI  C diff colitis   -Hx of 2-3 weeks antibiotics use and diarrhea after   -wbc 13.9   -Ct abdomen showed  infectious/inflammatory proctocolitis  -C diff EIA: c. Diff toxin A and B  -continue IV flagyl Q8h and PO vancomycin 500 mg 4 times daily   -GS consult : no active intervention    Shock liver   Transaminitis ALT/AST 3440/5412>>1018/587  Improved   INR 1.5   Renal   STEFANIA, prerenal, improving  Likely 2/2 dehydration from septic shock and diarrhea   -Baseline Cr. 0.9, initial Cr. 4.2>>2.6  -IVF  -Nephrology consult   -Lasix as she has pulmonary edema     Lactic acidosis  Improved     Macrocytic anemia   Hb: 11.5, MCV: 105.1  -Folate and vitamin B 12: wnl  -monitor CBC    PT/OT evaluation:  DVT prophylaxis/ GI prophylaxis: Pepcid / heparin  Disposition: MICU  Will liberate from MV as soon       Care reviewed with nursing staff, medical and surgical specialty care, primary care and the patient's family as available. Chart review/lab review/X-ray viewing/documentation and had long Conversation with patient/family re: prognosis, care options and any end of life issues:      Critical care time spent reviewing labs/films, examining patient, collaborating with other physicians more than 35  Minutes  excluding procedures . Srinath Hamilton M.D.   5/10/2019  12:57 PM        Laron Hamilton MD,

## 2019-05-10 NOTE — FLOWSHEET NOTE
05/10/19 0000   Assessment   Less Restrictive Alternative PC;RP;RE;DE;RO;PM;AL;VR   Special Consideration/Risk Factors N   Justification   Clinical Justification L;T;E;U   Education   Discontinuation Criteria Absence   Criteria Explained Yes   Patient's Response NL   Family Notification S   Restraint Monitoring Q60 Minutes   Visual/Safety Check (q 60 mins) SD   Restraint  Monitoring Q2 Hours   Circulation NS;ED   Range of Motion P   Fluids IV   Food/Meal N   Elimination UC   Restraint Type   Soft Restraint B Wrist CONTINUED   Vital Signs   Temp 99.3 °F (37.4 °C)   Temp Source CORE   Pulse 86   Heart Rate Source Monitor   Resp 21   BP (!) 97/58   BP Location Left upper arm   BP Upper/Lower Upper   MAP (mmHg) 67   Patient Position Semi fowlers   Level of Consciousness 1   MEWS Score 4   Patient Currently in Pain Other (comment)  (CPOT)     Pt continues to reach for lines and tubes, despite attempts to deter. Restraints continued for patient safety.

## 2019-05-10 NOTE — PROGRESS NOTES
Stopped at 05/08/19 2015    0.9 % sodium chloride bolus  250 mL Intravenous Once Meghan Herrera DO        metronidazole (FLAGYL) 500 mg in NaCl 100 mL IVPB premix  500 mg Intravenous Q8H Isis Meredith MD   Stopped at 05/10/19 0906    norepinephrine (LEVOPHED) 16 mg in dextrose 5% 250 mL infusion  2 mcg/min Intravenous Continuous Isis Meredith MD   Stopped at 05/10/19 0655    fentaNYL 5 mcg/mL in D5W 250 mL infusion  25 mcg/hr Intravenous Continuous Isis Meredith MD 20 mL/hr at 05/10/19 0649 100 mcg/hr at 05/10/19 0649    sodium chloride flush 0.9 % injection 10 mL  10 mL Intravenous 2 times per day Agustina Wesley MD   10 mL at 05/10/19 0805    sodium chloride flush 0.9 % injection 10 mL  10 mL Intravenous PRN Agustina Wesley MD   10 mL at 05/10/19 0539    magnesium hydroxide (MILK OF MAGNESIA) 400 MG/5ML suspension 30 mL  30 mL Oral Daily PRN Agustina Wesley MD        ondansetron [facility]) injection 4 mg  4 mg Intravenous Q6H PRN Agustina Wesley MD        famotidine (PEPCID) injection 20 mg  20 mg Intravenous Daily Agustina Wesley MD   20 mg at 05/10/19 0805    vancomycin (VANCOCIN) oral solution 500 mg  500 mg Oral 4x Daily Dequan Camacho MD   500 mg at 05/10/19 0815    0.9 % sodium chloride bolus  1,000 mL Intravenous Once Agustina Wesley MD        mineral oil-hydrophilic petrolatum (HYDROPHOR) ointment   Topical TID Erasmo Jon MD        And    mineral oil-hydrophilic petrolatum (HYDROPHOR) ointment   Topical TID PRN Erasmo Jon MD               REVIEW OF SYSTEMS:       CONSTITUTIONAL:  no fever   GASTROINTESTINAL:  Diarrhea   GENITOURINARY:  Urine out put - improving    INTEGUMENT:no rash   MUSCULOSKELETAL:  Weakness   NEUROLOGICAL:  Sedated       Objective:    /73   Pulse 81   Temp 99.3 °F (37.4 °C)   Resp 22   Ht 5' (1.524 m)   Wt 226 lb 13.7 oz (102.9 kg)   SpO2 94%   BMI 44.30 kg/m²       General Appearance:    Intubated, more awake    Head:    Normocephalic, atraumatic   Eyes:    No pallor, no icterus,   Ears:    No obvious deformity or drainage. Nose:   No nasal drainage   Throat:   Mucosa dry, no oral thrush   Neck:   Supple, no lymphadenopathy   Lungs:     Scattered rhonchi   Heart:    Regular rate and rhythm   Abdomen:     Soft, bowel sounds present , FMS with loose stool    Extremities:   +++ edema, cold to touch   Pulses:   Dorsalis pedis palpable    Skin:   no rashes or lesions      CBC with Differential:      Lab Results   Component Value Date    WBC 13.9 05/10/2019    RBC 3.26 05/10/2019    HGB 11.2 05/10/2019    HCT 33.0 05/10/2019    PLT 90 05/10/2019    .2 05/10/2019    MCH 34.4 05/10/2019    MCHC 33.9 05/10/2019    RDW 13.5 05/10/2019    NRBC 0.9 05/10/2019    LYMPHOPCT 6.1 05/10/2019    PROMYELOPCT 0.9 05/08/2019    MONOPCT 5.2 05/10/2019    BASOPCT 0.2 05/10/2019    MONOSABS 0.70 05/10/2019    LYMPHSABS 0.83 05/10/2019    EOSABS 0.00 05/10/2019    BASOSABS 0.00 05/10/2019       CMP:    Lab Results   Component Value Date     05/10/2019    K 4.4 05/10/2019     05/10/2019    CO2 23 05/10/2019    BUN 29 05/10/2019    CREATININE 0.9 05/10/2019    GFRAA >60 05/10/2019    LABGLOM >60 05/10/2019    GLUCOSE 132 05/10/2019    PROT 4.8 05/10/2019    LABALBU 2.3 05/10/2019    LABALBU 4.4 08/09/2011    CALCIUM 7.0 05/10/2019    BILITOT 1.7 05/10/2019    ALKPHOS 47 05/10/2019    AST 95 05/10/2019     05/10/2019       Hepatic Function Panel:    Lab Results   Component Value Date    ALKPHOS 47 05/10/2019     05/10/2019    AST 95 05/10/2019    PROT 4.8 05/10/2019    BILITOT 1.7 05/10/2019    BILIDIR 0.4 05/10/2019    IBILI 1.3 05/10/2019    LABALBU 2.3 05/10/2019    LABALBU 4.4 08/09/2011       MICROBIOLOGY:     Blood culture - neg to date   Urine cx - candida        Radiology :     Chest X ray-  Significant worsening of right-sided infiltrate and atelectasis and  small right effusion       CT scan of abdomen and pelvis -     1.  Moderate diffuse mucosal thickening from the rectum to the mid sigmoid colon   with mild adjacent stranding, mild mucosal thickening throughout the descending   colon with adjacent stranding, and suggestion of mucosal thickening throughout   the distal transverse colon as well as from the proximal most transverse colon   to the cecum concerning for infectious/inflammatory proctocolitis. 2. Moderate hiatal hernia. 3. Moderate bilateral dependent atelectases versus consolidations with air   bronchograms. 4. No nephrolithiasis or obstructive uropathy. No perinephric stranding. 5. Normal appendix. 6. Additional nonacute/incidental findings as described above.            IMPRESSION:      1. Septic shock due to  C diff infection    2. MOFS - slowly improving   3. Lactic acidosis, shock liver, leukocytosis   4. Respiratory failure   5. Candiduria         RECOMMENDATIONS:      1. Oral vancomycin 500 mg po q 6 hrs , Flagyl 500 mg IV q 8 hrs   2. CBC with diff   3. No other abx for now   4.  Contact isolation                    11:30 AM      5/10/2019

## 2019-05-10 NOTE — FLOWSHEET NOTE
05/09/19 2000   Assessment   Less Restrictive Alternative PC;RP;RE;DE;RO;VR;PM;AL   Special Consideration/Risk Factors N   Justification   Clinical Justification L;T;E;U   Education   Discontinuation Criteria Absence   Criteria Explained Yes   Patient's Response NL   Family Notification S   Restraint Monitoring Q60 Minutes   Visual/Safety Check (q 60 mins) SD   Restraint  Monitoring Q2 Hours   Circulation NS   Range of Motion P   Fluids IV   Food/Meal N   Elimination UC   Restraint Type   Soft Restraint B Wrist CONTINUED   Vital Signs   Temp 100.2 °F (37.9 °C)   Temp Source CORE   Pulse 75   Heart Rate Source Monitor   Resp 19   /65   BP Location Left upper arm   BP Upper/Lower Upper   MAP (mmHg) 83   Patient Position Semi fowlers   Level of Consciousness 1   MEWS Score 2   Patient Currently in Pain Other (comment)  (CPOT)     Pt continues to reach for lines and tubes, despite attempts to deter. Restraints continued for patient safety.

## 2019-05-10 NOTE — PROGRESS NOTES
Department of Internal Medicine  Nephrology Consult Note      Events reviewed. No events overnight. SUBJECTIVE: We are following Mrs. Haji for acute kidney injury. She remains intubated and sedated.   Urine output is adequate  PHYSICAL EXAM:      Vitals:    VITALS:  BP (!) 124/53   Pulse 95   Temp 101.1 °F (38.4 °C)   Resp 24   Ht 5' (1.524 m)   Wt 209 lb 3.2 oz (94.9 kg)   SpO2 92%   BMI 40.86 kg/m²   24HR INTAKE/OUTPUT:      Intake/Output Summary (Last 24 hours) at 5/8/2019 1349  Last data filed at 5/8/2019 1200  Gross per 24 hour   Intake 5855 ml   Output 1685 ml   Net 4170 ml       Constitutional:  Intubated, sedated  HEENT:  MESSI, mucous membranes are moist  Respiratory:  Coarse breath sounds bilaterally, frothy secretions from ET  Cardiovascular/Edema:  Regular rate and rhythm, no murmurs appreciated  Gastrointestinal:  Soft, non-tender, bowel sounds present  Neurologic: sedated, withdraws to pain  Skin:  warm, + edema  Other:  Erythematous lesion on lower extrtemities    DATA:    CBC:   Lab Results   Component Value Date    WBC 18.9 05/08/2019    RBC 3.38 05/08/2019    HGB 11.5 05/08/2019    HCT 33.9 05/08/2019    .3 05/08/2019    MCH 34.0 05/08/2019    MCHC 33.9 05/08/2019    RDW 13.2 05/08/2019     05/08/2019    MPV 11.1 05/08/2019     CMP:    Lab Results   Component Value Date     05/08/2019    K 4.1 05/08/2019     05/08/2019    CO2 19 05/08/2019    BUN 65 05/08/2019    CREATININE 2.6 05/08/2019    GFRAA 23 05/08/2019    LABGLOM 19 05/08/2019    GLUCOSE 160 05/08/2019    PROT 4.7 05/08/2019    LABALBU 2.4 05/08/2019    LABALBU 4.4 08/09/2011    CALCIUM 7.4 05/08/2019    BILITOT 1.0 05/08/2019    ALKPHOS 102 05/08/2019     05/08/2019    ALT 1,018 05/08/2019     Magnesium:    Lab Results   Component Value Date    MG 1.8 05/08/2019     Phosphorus:    Lab Results   Component Value Date    PHOS 1.5 05/08/2019     PT/INR:    Lab Results   Component Value Date PROTIME 18.3 05/08/2019    INR 1.6 05/08/2019     Warfarin PT/INR:  No components found for: PTPATWAR, PTINRWAR  ABG:    Lab Results   Component Value Date    PH 7.382 05/08/2019    PCO2 28.1 05/08/2019    PO2 77.7 05/08/2019    HCO3 16.3 05/08/2019    BE -7.3 05/08/2019    O2SAT 96.1 05/08/2019       Urine studies:  Urine sodium: 22  Urine chloride: <20  Urine creatinine: 100  Urine urea: 251  Urine potassium: 48.2    Fraction excretion of sodium: 0.5%  Fraction excretion of urea: 12.6%      Radiology Review:      CT head 5/7/19   1. No acute intracranial hemorrhage identified. 2. Additional nonacute/incidental findings as described above.        This report has been electronically signed by Zunilda Carlson MD.     CT abdomen 5/7/19   1. Moderate diffuse mucosal thickening from the rectum to the mid sigmoid colon    with mild adjacent stranding, mild mucosal thickening throughout the descending    colon with adjacent stranding, and suggestion of mucosal thickening throughout    the distal transverse colon as well as from the proximal most transverse colon    to the cecum concerning for infectious/inflammatory proctocolitis. 2. Moderate hiatal hernia. 3. Moderate bilateral dependent atelectases versus consolidations with air    bronchograms. 4. No nephrolithiasis or obstructive uropathy. No perinephric stranding. 5. Normal appendix. 6. Additional nonacute/incidental findings as described above.        This report has been electronically signed by Zunilda Carlson MD.       Chest x-ray May 19 thousand 19    Significant worsening of right-sided infiltrate and atelectasis and   small right effusion                 ChCHF with marginal improvement and decreasing edema. est x-ray      BRIEF SUMMARY OF INITIAL CONSULT:  Briefly, Mrs. Ginny Juarez is a 69-year-old female with h/o of hypothyroidism, lumbar radiculopathy s/p laminectomy, HLD, asthma, was brought to the ED for worsening lethargy.  She was recently treated for lower extremity cellulitis with clindamycin and then levofloxacin around 2-3 weeks ago. She developed profuse diarrhea shortly thereafter. Family reported some vomiting initially and poor oral intake. Over the past 3 days, she became severely weak and dehydrated. At the ED, she was in shock requiring vasopressor despite aggressive fluid resuscitation and was intubated for airway protection. Labs were notable for creatinine of 4.2 mg/dL, BUN of 73 mg/dL, lactic acid of 3.4, sodium of 129 mmol/L, liver enzymes were also markedly elevated, reasons for this consult. Of note, she has no known history of kidney disease. She was on furosemide at home, but has not been taken since the onset of diarrhea. Problems resolved:    Hyponatremia, multifactorial, due to hypovolemia and decreased GFR, resolved  Hypoglycemia secondary to #6, resolved  Coagulopathy, high PT/INR, resolved    IMPRESSION/RECOMMENDATIONS:      STEFANIA, stage 3, volume responsive pre-renal STEFANIA  (diarrhea, poor oral intake, shock), FeNa 0.5% , FeUrea 12%. Renal function continues to improve with decreasing creatinine and increased urine output. Chest x-ray showing increasing bilateral infiltrates probably consistent with volume overload.     Respiratory failure, status post intubation; chest x-ray to increasing infiltrates  Alkalemia (pH: 7.468) with respiratory alkalosis, HAGMA (uremia, lactic acidosis)   Hemodynamic shock, hypovolemic and septic shock, melena likely improving with decreasing dose of pressors  Hypophosphatemia, multifactorial, due to poor oral intake and vit D deficiency (25-hydroxy Vit D 13 ng/ml)  Vitamin D deficiency on ergocalciferol  Hypomagnesemia, secondary to no intake and diarrhea  Shock liver, LFTs improving  C. difficile infection, on vancomycin and metronidazole  Severe hypoalbuminemia, multifactorial  Nutrition, nothing by mouth    Plan:    Continue with gentle IV hydration   30 mmol of IV sodium phosphate today   Chest x-ray results reviewed, give Lasix 40 mg IV once   Discussed with ICU team.    Electronically signed by Wilder Ramos MD on 5/8/2019 at 1:49 PM

## 2019-05-11 ENCOUNTER — APPOINTMENT (OUTPATIENT)
Dept: GENERAL RADIOLOGY | Age: 59
DRG: 870 | End: 2019-05-11
Payer: COMMERCIAL

## 2019-05-11 LAB
6AM URINE: <10 NG/ML
AADO2: 235 MMHG
ALBUMIN SERPL-MCNC: 2.6 G/DL (ref 3.5–5.2)
ALP BLD-CCNC: 36 U/L (ref 35–104)
ALT SERPL-CCNC: 157 U/L (ref 0–32)
ANION GAP SERPL CALCULATED.3IONS-SCNC: 14 MMOL/L (ref 7–16)
ANION GAP SERPL CALCULATED.3IONS-SCNC: 7 MMOL/L (ref 7–16)
AST SERPL-CCNC: 31 U/L (ref 0–31)
B.E.: -1.3 MMOL/L (ref -3–3)
BASOPHILS ABSOLUTE: 0.02 E9/L (ref 0–0.2)
BASOPHILS RELATIVE PERCENT: 0.2 % (ref 0–2)
BILIRUB SERPL-MCNC: 1.8 MG/DL (ref 0–1.2)
BILIRUBIN DIRECT: 0.7 MG/DL (ref 0–0.3)
BILIRUBIN, INDIRECT: 1.1 MG/DL (ref 0–1)
BUN BLDV-MCNC: 15 MG/DL (ref 8–23)
BUN BLDV-MCNC: 18 MG/DL (ref 8–23)
CALCIUM SERPL-MCNC: 6.7 MG/DL (ref 8.6–10.2)
CALCIUM SERPL-MCNC: 6.8 MG/DL (ref 8.6–10.2)
CHLORIDE BLD-SCNC: 102 MMOL/L (ref 98–107)
CHLORIDE BLD-SCNC: 105 MMOL/L (ref 98–107)
CO2: 24 MMOL/L (ref 22–29)
CO2: 31 MMOL/L (ref 22–29)
CODEINE, URINE: <20 NG/ML
COHB: 1.3 % (ref 0–1.5)
CREAT SERPL-MCNC: 0.8 MG/DL (ref 0.5–1)
CREAT SERPL-MCNC: 0.8 MG/DL (ref 0.5–1)
CRITICAL: ABNORMAL
DATE ANALYZED: ABNORMAL
DATE OF COLLECTION: ABNORMAL
EOSINOPHILS ABSOLUTE: 0.1 E9/L (ref 0.05–0.5)
EOSINOPHILS RELATIVE PERCENT: 1 % (ref 0–6)
FIO2: 50 %
GFR AFRICAN AMERICAN: >60
GFR AFRICAN AMERICAN: >60
GFR NON-AFRICAN AMERICAN: >60 ML/MIN/1.73
GFR NON-AFRICAN AMERICAN: >60 ML/MIN/1.73
GLUCOSE BLD-MCNC: 137 MG/DL (ref 74–99)
GLUCOSE BLD-MCNC: 162 MG/DL (ref 74–99)
HCO3: 21.4 MMOL/L (ref 22–26)
HCT VFR BLD CALC: 28.7 % (ref 34–48)
HCT VFR BLD CALC: 28.9 % (ref 34–48)
HEMOGLOBIN: 9.7 G/DL (ref 11.5–15.5)
HEMOGLOBIN: 9.7 G/DL (ref 11.5–15.5)
HHB: 4.4 % (ref 0–5)
HYDROCODONE, URINE: <20 NG/ML
HYDROMORPHONE, URINE: <20 NG/ML
IMMATURE GRANULOCYTES #: 0.17 E9/L
IMMATURE GRANULOCYTES %: 1.7 % (ref 0–5)
INR BLD: 1.7
LAB: ABNORMAL
LACTIC ACID: 1.9 MMOL/L (ref 0.5–2.2)
LACTIC ACID: 2.4 MMOL/L (ref 0.5–2.2)
LYMPHOCYTES ABSOLUTE: 0.86 E9/L (ref 1.5–4)
LYMPHOCYTES RELATIVE PERCENT: 8.7 % (ref 20–42)
Lab: ABNORMAL
MAGNESIUM: 1.4 MG/DL (ref 1.6–2.6)
MCH RBC QN AUTO: 34 PG (ref 26–35)
MCHC RBC AUTO-ENTMCNC: 33.8 % (ref 32–34.5)
MCV RBC AUTO: 100.7 FL (ref 80–99.9)
METER GLUCOSE: 107 MG/DL (ref 74–99)
METER GLUCOSE: 136 MG/DL (ref 74–99)
METER GLUCOSE: 142 MG/DL (ref 74–99)
METER GLUCOSE: 150 MG/DL (ref 74–99)
METER GLUCOSE: 157 MG/DL (ref 74–99)
METHB: 0.3 % (ref 0–1.5)
MODE: AC
MONOCYTES ABSOLUTE: 0.54 E9/L (ref 0.1–0.95)
MONOCYTES RELATIVE PERCENT: 5.4 % (ref 2–12)
MORPHINE URINE: 2112 NG/ML
NEUTROPHILS ABSOLUTE: 8.22 E9/L (ref 1.8–7.3)
NEUTROPHILS RELATIVE PERCENT: 83 % (ref 43–80)
NORHYDROCODONE, URINE: <20 NG/ML
NOROXYCODONE, URINE: <20 NG/ML
NOROXYMORPHONE, URINE: <20 NG/ML
O2 SATURATION: 95.5 % (ref 92–98.5)
O2HB: 94 % (ref 94–97)
OPERATOR ID: 2464
OXYCODONE, URINE CONFIRMATION: <20 NG/ML
OXYMORPHONE, URINE: <20 NG/ML
PATIENT TEMP: 37 C
PCO2: 29.1 MMHG (ref 35–45)
PDW BLD-RTO: 13.3 FL (ref 11.5–15)
PEEP/CPAP: 5 CMH2O
PFO2: 1.53 MMHG/%
PH BLOOD GAS: 7.48 (ref 7.35–7.45)
PHOSPHORUS: 1.6 MG/DL (ref 2.5–4.5)
PLATELET # BLD: 73 E9/L (ref 130–450)
PLATELET CONFIRMATION: NORMAL
PMV BLD AUTO: 11.2 FL (ref 7–12)
PO2: 76.3 MMHG (ref 60–100)
POTASSIUM REFLEX MAGNESIUM: 2.9 MMOL/L (ref 3.5–5)
POTASSIUM SERPL-SCNC: 3.7 MMOL/L (ref 3.5–5)
PROTHROMBIN TIME: 19.6 SEC (ref 9.3–12.4)
RBC # BLD: 2.85 E12/L (ref 3.5–5.5)
RI(T): 3.08
RR MECHANICAL: 14 B/MIN
SODIUM BLD-SCNC: 140 MMOL/L (ref 132–146)
SODIUM BLD-SCNC: 143 MMOL/L (ref 132–146)
SOURCE, BLOOD GAS: ABNORMAL
THB: 10.6 G/DL (ref 11.5–16.5)
TIME ANALYZED: 406
TOTAL PROTEIN: 4.7 G/DL (ref 6.4–8.3)
VT MECHANICAL: 400 ML
WBC # BLD: 9.9 E9/L (ref 4.5–11.5)

## 2019-05-11 PROCEDURE — 6360000002 HC RX W HCPCS: Performed by: INTERNAL MEDICINE

## 2019-05-11 PROCEDURE — 82962 GLUCOSE BLOOD TEST: CPT

## 2019-05-11 PROCEDURE — 82805 BLOOD GASES W/O2 SATURATION: CPT

## 2019-05-11 PROCEDURE — 83735 ASSAY OF MAGNESIUM: CPT

## 2019-05-11 PROCEDURE — 2500000003 HC RX 250 WO HCPCS: Performed by: INTERNAL MEDICINE

## 2019-05-11 PROCEDURE — 85014 HEMATOCRIT: CPT

## 2019-05-11 PROCEDURE — 6370000000 HC RX 637 (ALT 250 FOR IP): Performed by: INTERNAL MEDICINE

## 2019-05-11 PROCEDURE — P9047 ALBUMIN (HUMAN), 25%, 50ML: HCPCS | Performed by: INTERNAL MEDICINE

## 2019-05-11 PROCEDURE — 85610 PROTHROMBIN TIME: CPT

## 2019-05-11 PROCEDURE — 2580000003 HC RX 258: Performed by: INTERNAL MEDICINE

## 2019-05-11 PROCEDURE — 85018 HEMOGLOBIN: CPT

## 2019-05-11 PROCEDURE — 36592 COLLECT BLOOD FROM PICC: CPT

## 2019-05-11 PROCEDURE — 99232 SBSQ HOSP IP/OBS MODERATE 35: CPT | Performed by: SURGERY

## 2019-05-11 PROCEDURE — 94003 VENT MGMT INPAT SUBQ DAY: CPT

## 2019-05-11 PROCEDURE — 36415 COLL VENOUS BLD VENIPUNCTURE: CPT

## 2019-05-11 PROCEDURE — 80076 HEPATIC FUNCTION PANEL: CPT

## 2019-05-11 PROCEDURE — 84100 ASSAY OF PHOSPHORUS: CPT

## 2019-05-11 PROCEDURE — 83605 ASSAY OF LACTIC ACID: CPT

## 2019-05-11 PROCEDURE — 99291 CRITICAL CARE FIRST HOUR: CPT | Performed by: INTERNAL MEDICINE

## 2019-05-11 PROCEDURE — 85025 COMPLETE CBC W/AUTO DIFF WBC: CPT

## 2019-05-11 PROCEDURE — 80048 BASIC METABOLIC PNL TOTAL CA: CPT

## 2019-05-11 PROCEDURE — 99254 IP/OBS CNSLTJ NEW/EST MOD 60: CPT | Performed by: INTERNAL MEDICINE

## 2019-05-11 PROCEDURE — 36600 WITHDRAWAL OF ARTERIAL BLOOD: CPT

## 2019-05-11 PROCEDURE — 71045 X-RAY EXAM CHEST 1 VIEW: CPT

## 2019-05-11 PROCEDURE — 2000000000 HC ICU R&B

## 2019-05-11 RX ORDER — MAGNESIUM SULFATE IN WATER 40 MG/ML
2 INJECTION, SOLUTION INTRAVENOUS ONCE
Status: COMPLETED | OUTPATIENT
Start: 2019-05-11 | End: 2019-05-11

## 2019-05-11 RX ORDER — MAGNESIUM SULFATE 1 G/100ML
1 INJECTION INTRAVENOUS ONCE
Status: DISCONTINUED | OUTPATIENT
Start: 2019-05-11 | End: 2019-05-11

## 2019-05-11 RX ORDER — POTASSIUM CHLORIDE 29.8 MG/ML
40 INJECTION INTRAVENOUS ONCE
Status: COMPLETED | OUTPATIENT
Start: 2019-05-11 | End: 2019-05-11

## 2019-05-11 RX ORDER — FUROSEMIDE 10 MG/ML
40 INJECTION INTRAMUSCULAR; INTRAVENOUS ONCE
Status: COMPLETED | OUTPATIENT
Start: 2019-05-11 | End: 2019-05-11

## 2019-05-11 RX ADMIN — FUROSEMIDE 40 MG: 10 INJECTION, SOLUTION INTRAMUSCULAR; INTRAVENOUS at 10:15

## 2019-05-11 RX ADMIN — POTASSIUM CHLORIDE 40 MEQ: 29.8 INJECTION, SOLUTION INTRAVENOUS at 11:32

## 2019-05-11 RX ADMIN — Medication 500 MG: at 16:35

## 2019-05-11 RX ADMIN — HEPARIN SODIUM 5000 UNITS: 10000 INJECTION INTRAVENOUS; SUBCUTANEOUS at 20:01

## 2019-05-11 RX ADMIN — MIDODRINE HYDROCHLORIDE 5 MG: 5 TABLET ORAL at 08:16

## 2019-05-11 RX ADMIN — PETROLATUM: 42 OINTMENT TOPICAL at 20:01

## 2019-05-11 RX ADMIN — PETROLATUM: 42 OINTMENT TOPICAL at 04:00

## 2019-05-11 RX ADMIN — Medication 10 ML: at 08:33

## 2019-05-11 RX ADMIN — METRONIDAZOLE 500 MG: 500 INJECTION, SOLUTION INTRAVENOUS at 16:31

## 2019-05-11 RX ADMIN — METRONIDAZOLE 500 MG: 500 INJECTION, SOLUTION INTRAVENOUS at 00:19

## 2019-05-11 RX ADMIN — Medication 500 MG: at 20:01

## 2019-05-11 RX ADMIN — Medication 500 MG: at 12:28

## 2019-05-11 RX ADMIN — Medication 10 ML: at 00:17

## 2019-05-11 RX ADMIN — POTASSIUM CHLORIDE 40 MEQ: 29.8 INJECTION, SOLUTION INTRAVENOUS at 08:10

## 2019-05-11 RX ADMIN — MIDODRINE HYDROCHLORIDE 5 MG: 5 TABLET ORAL at 12:25

## 2019-05-11 RX ADMIN — Medication 10 ML: at 05:04

## 2019-05-11 RX ADMIN — SODIUM CHLORIDE 1 MCG/KG/HR: 9 INJECTION, SOLUTION INTRAVENOUS at 18:00

## 2019-05-11 RX ADMIN — MAGNESIUM SULFATE HEPTAHYDRATE 2 G: 40 INJECTION, SOLUTION INTRAVENOUS at 08:22

## 2019-05-11 RX ADMIN — Medication 125 MCG/HR: at 16:33

## 2019-05-11 RX ADMIN — CHLORHEXIDINE GLUCONATE 0.12% ORAL RINSE 15 ML: 1.2 LIQUID ORAL at 20:10

## 2019-05-11 RX ADMIN — CHLORHEXIDINE GLUCONATE 0.12% ORAL RINSE 15 ML: 1.2 LIQUID ORAL at 08:15

## 2019-05-11 RX ADMIN — FAMOTIDINE 20 MG: 10 INJECTION, SOLUTION INTRAVENOUS at 08:15

## 2019-05-11 RX ADMIN — Medication 100 MCG/HR: at 05:05

## 2019-05-11 RX ADMIN — SODIUM CHLORIDE 1 MCG/KG/HR: 9 INJECTION, SOLUTION INTRAVENOUS at 14:33

## 2019-05-11 RX ADMIN — HEPARIN SODIUM 5000 UNITS: 10000 INJECTION INTRAVENOUS; SUBCUTANEOUS at 08:15

## 2019-05-11 RX ADMIN — SODIUM CHLORIDE 0.7 MCG/KG/HR: 9 INJECTION, SOLUTION INTRAVENOUS at 00:21

## 2019-05-11 RX ADMIN — PETROLATUM: 42 OINTMENT TOPICAL at 08:33

## 2019-05-11 RX ADMIN — METRONIDAZOLE 500 MG: 500 INJECTION, SOLUTION INTRAVENOUS at 08:16

## 2019-05-11 RX ADMIN — PETROLATUM: 42 OINTMENT TOPICAL at 13:30

## 2019-05-11 RX ADMIN — Medication 500 MG: at 08:43

## 2019-05-11 RX ADMIN — SODIUM CHLORIDE 0.7 MCG/KG/HR: 9 INJECTION, SOLUTION INTRAVENOUS at 07:08

## 2019-05-11 RX ADMIN — Medication 10 ML: at 20:01

## 2019-05-11 RX ADMIN — DEXTROSE, SODIUM CHLORIDE, AND POTASSIUM CHLORIDE: 5; .9; .15 INJECTION INTRAVENOUS at 13:23

## 2019-05-11 RX ADMIN — SODIUM CHLORIDE, PRESERVATIVE FREE 300 UNITS: 5 INJECTION INTRAVENOUS at 10:20

## 2019-05-11 RX ADMIN — ALBUMIN (HUMAN) 25 G: 0.25 INJECTION, SOLUTION INTRAVENOUS at 08:16

## 2019-05-11 RX ADMIN — SODIUM CHLORIDE 1 MCG/KG/HR: 9 INJECTION, SOLUTION INTRAVENOUS at 23:09

## 2019-05-11 RX ADMIN — MIDODRINE HYDROCHLORIDE 5 MG: 5 TABLET ORAL at 16:34

## 2019-05-11 RX ADMIN — ALBUMIN (HUMAN) 25 G: 0.25 INJECTION, SOLUTION INTRAVENOUS at 20:01

## 2019-05-11 RX ADMIN — HEPARIN SODIUM 5000 UNITS: 10000 INJECTION INTRAVENOUS; SUBCUTANEOUS at 13:28

## 2019-05-11 RX ADMIN — METRONIDAZOLE 500 MG: 500 INJECTION, SOLUTION INTRAVENOUS at 23:12

## 2019-05-11 ASSESSMENT — PAIN SCALES - WONG BAKER
WONGBAKER_NUMERICALRESPONSE: 0

## 2019-05-11 ASSESSMENT — PULMONARY FUNCTION TESTS
PIF_VALUE: 24
PIF_VALUE: 25
PIF_VALUE: 23
PIF_VALUE: 18
PIF_VALUE: 23
PIF_VALUE: 28
PIF_VALUE: 21
PIF_VALUE: 22
PIF_VALUE: 23
PIF_VALUE: 22
PIF_VALUE: 20
PIF_VALUE: 24
PIF_VALUE: 28
PIF_VALUE: 22
PIF_VALUE: 28
PIF_VALUE: 22
PIF_VALUE: 21
PIF_VALUE: 23
PIF_VALUE: 24
PIF_VALUE: 24
PIF_VALUE: 22
PIF_VALUE: 14
PIF_VALUE: 23
PIF_VALUE: 24
PIF_VALUE: 24
PIF_VALUE: 23
PIF_VALUE: 24
PIF_VALUE: 20
PIF_VALUE: 23
PIF_VALUE: 16
PIF_VALUE: 26

## 2019-05-11 ASSESSMENT — PAIN SCALES - GENERAL: PAINLEVEL_OUTOF10: 2

## 2019-05-11 NOTE — PLAN OF CARE
Problem: Restraint Use - Nonviolent/Non-Self-Destructive Behavior:  Goal: Absence of restraint-related injury  Description  Absence of restraint-related injury  5/11/2019 0405 by Omar Pa RN  Outcome: Met This Shift     Problem: Falls - Risk of:  Goal: Will remain free from falls  Description  Will remain free from falls  Outcome: Met This Shift     Problem: Tissue Perfusion, Altered:  Goal: Circulatory function within specified parameters  Description  Circulatory function within specified parameters  Outcome: Met This Shift     Problem: Restraint Use - Nonviolent/Non-Self-Destructive Behavior:  Goal: Absence of restraint indications  Description  Absence of restraint indications  5/11/2019 0405 by Omar Pa RN  Outcome: Not Met This Shift

## 2019-05-11 NOTE — PLAN OF CARE
Plan of care discussed with patient / family. Rod Doyle 11:59 AM    Problem: Restraint Use - Nonviolent/Non-Self-Destructive Behavior:  Goal: Absence of restraint-related injury  Description  Absence of restraint-related injury  5/11/2019 1158 by Rdo Doyle RN  Outcome: Met This Shift  5/11/2019 0405 by Jono Villa RN  Outcome: Met This Shift  5/10/2019 2250 by Jono Villa RN  Outcome: Met This Shift     Problem: Risk for Impaired Skin Integrity  Goal: Tissue integrity - skin and mucous membranes  Description  Structural intactness and normal physiological function of skin and  mucous membranes.   Outcome: Met This Shift     Problem: Falls - Risk of:  Goal: Will remain free from falls  Description  Will remain free from falls  5/11/2019 0405 by Jono Villa RN  Outcome: Met This Shift     Problem: Tissue Perfusion, Altered:  Goal: Circulatory function within specified parameters  Description  Circulatory function within specified parameters  5/11/2019 0405 by Jono Villa RN  Outcome: Met This Shift     Problem: Skin Integrity:  Goal: Absence of new skin breakdown  Description  Absence of new skin breakdown  Outcome: Met This Shift

## 2019-05-11 NOTE — PROGRESS NOTES
C/C: Septic shock, resp failure, MOFS , C diff infection      No events, wbc better  Loose stool   Afebrile       Current Facility-Administered Medications   Medication Dose Route Frequency Provider Last Rate Last Dose    potassium chloride 40 mEq in 100 mL IVPB  40 mEq Intravenous Once Rancho Amato MD        Followed by   Rankin Sos potassium chloride 40 mEq in 100 mL IVPB  40 mEq Intravenous Once Rancho Amato MD   40 mEq at 05/11/19 0810    potassium phosphate 15 mmol in dextrose 5 % 250 mL IVPB  15 mmol Intravenous Once Angelito Flores MD   Stopped at 05/11/19 1010    chlorhexidine (PERIDEX) 0.12 % solution 15 mL  15 mL Mouth/Throat BID Kathleen Ortiz MD   15 mL at 05/11/19 0815    midodrine (PROAMATINE) tablet 5 mg  5 mg Oral TID WC Rancho Amato MD   5 mg at 05/11/19 0816    albumin human 25 % solution 25 g  25 g Intravenous BID Jf Hurd MD   25 g at 05/11/19 0816    dextrose 5 % and 0.9 % NaCl with KCl 20 mEq infusion   Intravenous Continuous Jf Archer MD 60 mL/hr at 05/10/19 1717      sodium chloride flush 0.9 % injection 10 mL  10 mL Intravenous PRN Angelito Flores MD   10 mL at 05/11/19 0504    heparin flush 100 UNIT/ML injection 300 Units  3 mL Intravenous 2 times per day Angelito Flores MD   300 Units at 05/11/19 1020    heparin flush 100 UNIT/ML injection 300 Units  3 mL Intracatheter PRN Angelito Flores MD        vasopressin 20 Units in dextrose 5 % 100 mL infusion  0.03 Units/min Intravenous Continuous Dequan Camacho MD   Stopped at 05/08/19 2250    dexmedetomidine (PRECEDEX) 400 mcg in sodium chloride 0.9 % 100 mL infusion  0.2 mcg/kg/hr Intravenous Continuous Angelito Flores MD 9.1 mL/hr at 05/11/19 1014 0.4 mcg/kg/hr at 05/11/19 1014    heparin (porcine) injection 5,000 Units  5,000 Units Subcutaneous TID Dequan Camacho MD   5,000 Units at 05/11/19 0815    vitamin D (ERGOCALCIFEROL) capsule 50,000 Units  50,000 Units Oral Weekly Lisa Russo, MD   Stopped at 05/08/19 2015    metronidazole (FLAGYL) 500 mg in NaCl 100 mL IVPB premix  500 mg Intravenous Q8H Lindsey Moncada MD   Stopped at 05/11/19 0923    norepinephrine (LEVOPHED) 16 mg in dextrose 5% 250 mL infusion  2 mcg/min Intravenous Continuous Lindsey Moncada MD   Stopped at 05/10/19 2046    fentaNYL 5 mcg/mL in D5W 250 mL infusion  25 mcg/hr Intravenous Continuous Lindsey Moncada MD 20 mL/hr at 05/11/19 0505 100 mcg/hr at 05/11/19 0505    sodium chloride flush 0.9 % injection 10 mL  10 mL Intravenous 2 times per day Aviva Garcia MD   10 mL at 05/11/19 0833    sodium chloride flush 0.9 % injection 10 mL  10 mL Intravenous PRN Aviva Garcia MD   10 mL at 05/11/19 0504    magnesium hydroxide (MILK OF MAGNESIA) 400 MG/5ML suspension 30 mL  30 mL Oral Daily PRN Aviva Garcia MD        ondansetron [facility]) injection 4 mg  4 mg Intravenous Q6H PRN Aviva Garcia MD        famotidine (PEPCID) injection 20 mg  20 mg Intravenous Daily Aviva Garcia MD   20 mg at 05/11/19 0815    vancomycin (VANCOCIN) oral solution 500 mg  500 mg Oral 4x Daily Dequan Camacho MD   500 mg at 05/11/19 0843    mineral oil-hydrophilic petrolatum (HYDROPHOR) ointment   Topical TID Kali Pickett MD        And    mineral oil-hydrophilic petrolatum (HYDROPHOR) ointment   Topical TID PRN Kali Pickett MD               REVIEW OF SYSTEMS:       CONSTITUTIONAL:  no fever   GASTROINTESTINAL:  Diarrhea   GENITOURINARY:  Urine out put - improving    INTEGUMENT:no rash   MUSCULOSKELETAL:  Weakness   NEUROLOGICAL:  Sedated       Objective:    /61   Pulse 73   Temp 99.5 °F (37.5 °C) (Core)   Resp 17   Ht 5' (1.524 m)   Wt 221 lb 1.9 oz (100.3 kg)   SpO2 96%   BMI 43.18 kg/m²       General Appearance:    Intubated, more awake    Head:    Normocephalic, atraumatic   Eyes:    No pallor, no icterus,   Ears:    No obvious deformity or drainage.    Nose:   No nasal drainage   Throat:   Mucosa dry, no oral thrush   Neck:   Supple, no lymphadenopathy   Lungs:     Scattered rhonchi   Heart:    Regular rate and rhythm   Abdomen:     Soft, bowel sounds present , FMS with loose stool    Extremities:   +++ edema, cold to touch   Pulses:   Dorsalis pedis palpable    Skin:   no rashes or lesions      CBC with Differential:      Lab Results   Component Value Date    WBC 9.9 05/11/2019    RBC 2.85 05/11/2019    HGB 9.7 05/11/2019    HCT 28.7 05/11/2019    PLT 73 05/11/2019    .7 05/11/2019    MCH 34.0 05/11/2019    MCHC 33.8 05/11/2019    RDW 13.3 05/11/2019    NRBC 0.9 05/10/2019    LYMPHOPCT 8.7 05/11/2019    PROMYELOPCT 0.9 05/08/2019    MONOPCT 5.4 05/11/2019    BASOPCT 0.2 05/11/2019    MONOSABS 0.54 05/11/2019    LYMPHSABS 0.86 05/11/2019    EOSABS 0.10 05/11/2019    BASOSABS 0.02 05/11/2019       CMP:    Lab Results   Component Value Date     05/11/2019    K 2.9 05/11/2019     05/11/2019    CO2 24 05/11/2019    BUN 18 05/11/2019    CREATININE 0.8 05/11/2019    GFRAA >60 05/11/2019    LABGLOM >60 05/11/2019    GLUCOSE 137 05/11/2019    PROT 4.7 05/11/2019    LABALBU 2.6 05/11/2019    LABALBU 4.4 08/09/2011    CALCIUM 6.7 05/11/2019    BILITOT 1.8 05/11/2019    ALKPHOS 36 05/11/2019    AST 31 05/11/2019     05/11/2019       Hepatic Function Panel:    Lab Results   Component Value Date    ALKPHOS 36 05/11/2019     05/11/2019    AST 31 05/11/2019    PROT 4.7 05/11/2019    BILITOT 1.8 05/11/2019    BILIDIR 0.7 05/11/2019    IBILI 1.1 05/11/2019    LABALBU 2.6 05/11/2019    LABALBU 4.4 08/09/2011       MICROBIOLOGY:     Blood culture - neg to date   Urine cx - candida        Radiology :     Chest X ray-  Significant worsening of right-sided infiltrate and atelectasis and  small right effusion       CT scan of abdomen and pelvis -     1.  Moderate diffuse mucosal thickening from the rectum to the mid sigmoid colon   with mild adjacent stranding, mild mucosal thickening throughout the descending   colon with adjacent stranding, and suggestion of mucosal thickening throughout   the distal transverse colon as well as from the proximal most transverse colon   to the cecum concerning for infectious/inflammatory proctocolitis. 2. Moderate hiatal hernia. 3. Moderate bilateral dependent atelectases versus consolidations with air   bronchograms. 4. No nephrolithiasis or obstructive uropathy. No perinephric stranding. 5. Normal appendix. 6. Additional nonacute/incidental findings as described above.            IMPRESSION:      1. Septic shock due to  C diff infection    2. MOFS - slowly improving   3. Lactic acidosis, shock liver, leukocytosis   4. Respiratory failure   5. Candiduria         RECOMMENDATIONS:      1. Oral vancomycin 500 mg po q 6 hrs , Flagyl 500 mg IV q 8 hrs   2.  Contact isolation         10:56 AM      5/11/2019

## 2019-05-11 NOTE — PLAN OF CARE
coagulation  Outcome: Met This Shift     Problem: Skin Integrity:  Goal: Will show no infection signs and symptoms  Description  Will show no infection signs and symptoms  Outcome: Met This Shift     Problem: Skin Integrity:  Goal: Absence of new skin breakdown  Description  Absence of new skin breakdown  5/11/2019 1817 by Saleem Gonzalez RN  Outcome: Met This Shift     Problem: Restraint Use - Nonviolent/Non-Self-Destructive Behavior:  Goal: Absence of restraint indications  Description  Absence of restraint indications  Outcome: Not Met This Shift     Problem: Discharge Planning:  Goal: Discharged to appropriate level of care  Description  Discharged to appropriate level of care  Outcome: Not Met This Shift     Problem: Infection - Ventilator-Associated Pneumonia:  Goal: Absence of pulmonary infection  Description  Absence of pulmonary infection  Outcome: Not Met This Shift   Plan of care discussed with patient / family.

## 2019-05-11 NOTE — PROGRESS NOTES
Subjective:    Awake and alert but intubated     at bedside    Objective:    BP (!) 86/50   Pulse 72   Temp 99.3 °F (37.4 °C) (Core)   Resp 20   Ht 5' (1.524 m)   Wt 221 lb 1.9 oz (100.3 kg)   SpO2 (!) 88%   BMI 43.18 kg/m²   Skin: Warm and dry  Neck: Supple, no JVD  Heart:  RRR, no murmurs, gallops, or rubs. Lungs:  Scattered bilateral rhonchi  Abd: bowel sounds present, nontender, nondistended, no masses  Extrem:  No clubbing, cyanosis, trace edema, pulses intact    I/O last 3 completed shifts: In: 3443.9 [I.V.:2873.9; NG/GT:270; IV Piggyback:300]  Out: 5112 [Urine:3905; Emesis/NG output:150; Stool:170]    Results      Component Value Units   POCT Glucose [[id number]] (Abnormal) Collected: 19 1208   Updated: 19 1213     Meter Glucose 150High  mg/dL   POCT Glucose [[id number]] (Abnormal) Collected: 19 0830   Updated: 19 0833     Meter Glucose 107High  mg/dL   CBC Auto Differential [[id number]] (Abnormal) Collected: 19   Updated: 19    Specimen Source: Blood     WBC 9.9 E9/L    RBC 2.85Low  E12/L    Hemoglobin 9.7Low  g/dL    Hematocrit 28.7Low  %    .7High  fL    MCH 34.0 pg    MCHC 33.8 %    RDW 13.3 fL    Platelets 81RXA  S4/D    MPV 11.2 fL    Neutrophils % 83. 0High  %    Immature Granulocytes % 1.7 %    Lymphocytes % 8.7Low  %    Monocytes % 5.4 %    Eosinophils % 1.0 %    Basophils % 0.2 %    Neutrophils # 8. 22High  E9/L    Immature Granulocytes # 0.17 E9/L    Lymphocytes # 0.86Low  E9/L    Monocytes # 0.54 E9/L    Eosinophils # 0.10 E9/L    Basophils # 0.02 E9/L   Platelet Confirmation [[id number]] Collected: 19   Updated: 19     Platelet Confirmation CONFIRMED   XR CHEST PORTABLE [[id number]] Resulted: 19 0758   Updated: 19    Narrative:     Patient MRN:  [id number]  : 1960  Age: 59 years  Gender: Female    Order Date:  2019 6:00 AM    EXAM: XR CHEST PORTABLE    COMPARISON: May 10, Collected: 05/10/19 1334   Updated: 05/10/19 1336     Meter Glucose 143High           XR CHEST PORTABLE   Final Result      Interstitial and hazy opacities throughout both lungs which appear to   have increased throughout left lung since yesterday's exam. Opacities   appear similar on the right. Continued follow-up could be helpful for   further evaluation. XR CHEST PORTABLE   Final Result   CHF with marginal improvement and decreasing edema. XR CHEST PORTABLE   Final Result   Significant worsening of right-sided infiltrate and atelectasis and   small right effusion               XR ABDOMEN (KUB) (SINGLE AP VIEW)   Final Result   No acute process               XR CHEST PORTABLE   Final Result      1. Satisfactory placement of nasogastric tube. 2. Persistent interstitial pulmonary edema, pneumonia, or atelectasis   bilaterally. Continued follow-up could be helpful for further   evaluation. XR Chest Abdomen Ng Placement   Final Result      Nasogastric tubing appears to be appropriately configured. Right common femoral vein catheter appears to be peripherally   configured. XR CHEST PORTABLE   Final Result      1. Abnormal location of nasogastric tube which appears to be coiled   within the mid esophagus. 2. Increased interstitial and hazy opacities at left hilar location   and left lung base which could suggest increasing pneumonia or   atelectasis. Short-term follow-up could be helpful for further   evaluation. ALERT:  THIS IS AN ABNORMAL REPORT. CT Head WO Contrast   Final Result   1. No acute intracranial hemorrhage identified. 2. Additional nonacute/incidental findings as described above. This report has been electronically signed by Shy Guerra MD.      [address] [facility] Additional Contrast? None   Final Result   1.  Moderate diffuse mucosal thickening from the rectum to the mid sigmoid colon    with mild adjacent stranding, mild mucosal Historical Provider, MD   b complex vitamins capsule Take 1 capsule by mouth daily   Yes Historical Provider, MD   Coenzyme Q10 (CO Q 10) 100 MG CAPS Take 1 capsule by mouth daily   Yes Historical Provider, MD   sucralfate (CARAFATE) 1 GM tablet Take 1 g by mouth 4 times daily. Yes Historical Provider, MD   meclizine (ANTIVERT) 25 MG tablet Take 25 mg by mouth 2 times daily. Yes Historical Provider, MD   atenolol (TENORMIN) 25 MG tablet Take 25 mg by mouth 2 times daily. Yes Historical Provider, MD   thyroid (ARMOUR THYROID) 30 MG tablet Take 30 mg by mouth daily. Yes Historical Provider, MD   loratadine-pseudoephedrine (CLARITIN-D 24 HOUR)  MG per tablet Take 1 tablet by mouth daily. Yes Historical Provider, MD   Calcium Carbonate Antacid (TUMS PO) Take 1 tablet by mouth as needed. Yes Historical Provider, MD   zolpidem (AMBIEN) 10 MG tablet Take 1 tablet by mouth nightly as needed for Sleep for up to 90 days 3 MONTH SUPPLY. DO NOT FILL UNTIL 1-6-17. . 1/6/18 4/6/18  Bautista Nobles MD   orphenadrine (NORFLEX) 100 MG extended release tablet Take 1 tablet by mouth 2 times daily 12/6/17 3/6/18  OSMANI Combs CNP   Glucosamine-Chondroit-Vit C-Mn (GLUCOSAMINE CHONDR 1500 COMPLX PO) Take by mouth    Historical Provider, MD   orphenadrine (NORFLEX) 100 MG extended release tablet Take 1 tablet by mouth 2 times daily 6/12/17 7/12/17  OSMANI Zavala CNP   Handicap Placard MISC by Does not apply route 2 year duration.  8/16/16   OSMANI Zavala CNP   Probiotic Product (PROBIOTIC DAILY PO) Take 1 capsule by mouth daily    Historical Provider, MD        Current Facility-Administered Medications   Medication Dose Route Frequency Provider Last Rate Last Dose    potassium chloride 40 mEq in 100 mL IVPB  40 mEq Intravenous Once Ap hSarma MD   40 mEq at 05/11/19 1132    potassium phosphate 15 mmol in dextrose 5 % 250 mL IVPB  15 mmol Intravenous Once Niue Rodolfo Garcia MD 62.5 mL/hr at 05/11/19 1100      chlorhexidine (PERIDEX) 0.12 % solution 15 mL  15 mL Mouth/Throat BID Kathleen Hutchinson MD   15 mL at 05/11/19 0815    midodrine (PROAMATINE) tablet 5 mg  5 mg Oral TID  Marshall La MD   5 mg at 05/11/19 1225    albumin human 25 % solution 25 g  25 g Intravenous BID Jf Logan MD   25 g at 05/11/19 0816    dextrose 5 % and 0.9 % NaCl with KCl 20 mEq infusion   Intravenous Continuous Jf Archer MD 60 mL/hr at 05/11/19 1323      sodium chloride flush 0.9 % injection 10 mL  10 mL Intravenous PRN Archie De La Cruz MD   10 mL at 05/11/19 0504    heparin flush 100 UNIT/ML injection 300 Units  3 mL Intravenous 2 times per day Archie De La Cruz MD   300 Units at 05/11/19 1020    heparin flush 100 UNIT/ML injection 300 Units  3 mL Intracatheter PRN Archie De La Cruz MD        dexmedetomidine (PRECEDEX) 400 mcg in sodium chloride 0.9 % 100 mL infusion  0.2 mcg/kg/hr Intravenous Continuous Archie De La Cruz MD 22.7 mL/hr at 05/11/19 1210 1 mcg/kg/hr at 05/11/19 1210    heparin (porcine) injection 5,000 Units  5,000 Units Subcutaneous TID Dequan Camacho MD   5,000 Units at 05/11/19 0815    vitamin D (ERGOCALCIFEROL) capsule 50,000 Units  50,000 Units Oral Weekly Tika Payan MD   Stopped at 05/08/19 2015    metronidazole (FLAGYL) 500 mg in NaCl 100 mL IVPB premix  500 mg Intravenous Sadia Smith MD   Stopped at 05/11/19 0923    norepinephrine (LEVOPHED) 16 mg in dextrose 5% 250 mL infusion  2 mcg/min Intravenous Continuous Ajay Chandler MD   Stopped at 05/10/19 2046    fentaNYL 5 mcg/mL in D5W 250 mL infusion  25 mcg/hr Intravenous Continuous Ajay Chandler MD 25 mL/hr at 05/11/19 1325 125 mcg/hr at 05/11/19 1325    sodium chloride flush 0.9 % injection 10 mL  10 mL Intravenous 2 times per day Winsome Heller MD   10 mL at 05/11/19 0833    sodium chloride flush 0.9 % injection 10 mL  10 mL Intravenous PRN Winsome Heller MD 10 mL at 05/11/19 0504    magnesium hydroxide (MILK OF MAGNESIA) 400 MG/5ML suspension 30 mL  30 mL Oral Daily PRN Brennen Travis MD        [facility]etron [facility]) injection 4 mg  4 mg Intravenous Q6H PRN Brennen Travis MD        famotidine (PEPCID) injection 20 mg  20 mg Intravenous Daily Brennen Travis MD   20 mg at 05/11/19 0815    vancomycin (VANCOCIN) oral solution 500 mg  500 mg Oral 4x Daily Dequan Camacho MD   500 mg at 05/11/19 1228    mineral oil-hydrophilic petrolatum (HYDROPHOR) ointment   Topical TID Raven Canales MD        And    mineral oil-hydrophilic petrolatum (HYDROPHOR) ointment   Topical TID PRN Raven Canales MD               Problem list:    Active Problems:    Septic shock ([address]    Colitis  Resolved Problems:    * No resolved hospital problems. *      Assessment:    1. Septic shock due to Clostridium difficile enterocolitis    2. Acute kidney injury secondary to shock and hypovolemia due to diarrhea    3. Acute hypoxic respiratory failure due to septic shock    4. Clostridium difficile enterocolitis    5. Shock liver    6. Thrombocytopenia likely due to sepsis    Plan:    1. Continue intravenous metronidazole and oral vancomycin    2. Continue fluid management as per nephrology. 3. Continue nutritional support    4.  Wean from mechanical ventilation as condition allows      Uday Johnson D.O., FACOI  1:29 PM  5/11/2019

## 2019-05-11 NOTE — PROGRESS NOTES
Department of Internal Medicine  Nephrology Consult Note      Events reviewed. No events overnight. SUBJECTIVE: We are following Mrs. Haji for acute kidney injury. She remains intubated and sedated.   Urine output is adequate  PHYSICAL EXAM:      Vitals:    VITALS:  BP (!) 124/53   Pulse 95   Temp 101.1 °F (38.4 °C)   Resp 24   Ht 5' (1.524 m)   Wt 209 lb 3.2 oz (94.9 kg)   SpO2 92%   BMI 40.86 kg/m²   24HR INTAKE/OUTPUT:      Intake/Output Summary (Last 24 hours) at 5/8/2019 1349  Last data filed at 5/8/2019 1200  Gross per 24 hour   Intake 5855 ml   Output 1685 ml   Net 4170 ml       Constitutional:  Intubated, sedated  HEENT:  MESSI, mucous membranes are moist  Respiratory:  Coarse breath sounds bilaterally, frothy secretions from ET  Cardiovascular/Edema:  Regular rate and rhythm, no murmurs appreciated  Gastrointestinal:  Soft, non-tender, bowel sounds present  Neurologic: sedated, withdraws to pain  Skin:  warm, + edema  Other:  Erythematous lesion on lower extrtemities    DATA:    CBC:   Lab Results   Component Value Date    WBC 18.9 05/08/2019    RBC 3.38 05/08/2019    HGB 11.5 05/08/2019    HCT 33.9 05/08/2019    .3 05/08/2019    MCH 34.0 05/08/2019    MCHC 33.9 05/08/2019    RDW 13.2 05/08/2019     05/08/2019    MPV 11.1 05/08/2019     CMP:    Lab Results   Component Value Date     05/08/2019    K 4.1 05/08/2019     05/08/2019    CO2 19 05/08/2019    BUN 65 05/08/2019    CREATININE 2.6 05/08/2019    GFRAA 23 05/08/2019    LABGLOM 19 05/08/2019    GLUCOSE 160 05/08/2019    PROT 4.7 05/08/2019    LABALBU 2.4 05/08/2019    LABALBU 4.4 08/09/2011    CALCIUM 7.4 05/08/2019    BILITOT 1.0 05/08/2019    ALKPHOS 102 05/08/2019     05/08/2019    ALT 1,018 05/08/2019     Magnesium:    Lab Results   Component Value Date    MG 1.8 05/08/2019     Phosphorus:    Lab Results   Component Value Date    PHOS 1.5 05/08/2019     PT/INR:    Lab Results   Component Value Date

## 2019-05-11 NOTE — CONSULTS
Inpatient consult to Oncology  Consult performed by: Ashley Selby MD  Consult ordered by: Jem Barnhart MD        [facility] [id number]  Dept: [phone]  Loc: [phone]  Attending Consult Note      Reason for Visit:   Thrombocytopenia. PCP:  Shirley Arias MD    History of Present Illness:      Mrs. Jaqui Maguire is a 62-year old lady with a PMH significant for HTN, Hyperlipidemia, asthma and thyroid disorder, recent skin infection treated with Clinda and levaquin, who had presented with diarrhea and lethargy, was in septic shock, was intubated, was found to have liver failure and STEFANIA, she was found to have Cdiff colitis, is on flagyl and Vancomycin. Upon presentation, her CBCD was remarkable for leukocytosis wbc 18.8, 84.3% N, hgb 15.2, hct 45.7, .6, plts 329, on 5/8 plts were 233, clumping reported, 5/9 plts were 126K,  5/10 90K, today wbc 9.9, hgb/hct 9.7/28.7, plts 73K. No bleeding. On prophylactic dose of Heparin. Review of Systems;  unable to obtain.     Past Medical History:      Diagnosis Date    Arthritis     Asthma     High blood pressure     Hyperlipidemia     Irregular heart beat     Migraines, neuralgic     PONV (postoperative nausea and vomiting)     Thyroid disorder      Patient Active Problem List   Diagnosis    Post lumbar laminectomy syndrome    Herniated disc L4-L5    Lumbar sprain     Status post lumbar spinal cord stimulator 2007    Spinal cord stimulator dysfunction/Charging of SCS    Lumbar radiculopathy    Chronic pain syndrome    Septic shock ([address]    Colitis        Past Surgical History:      Procedure Laterality Date    CHOLECYSTECTOMY  September 21, 1992    DILATION AND CURETTAGE OF UTERUS  1983    LUMBAR SPINE SURGERY  July 5, 2001    LUMBAR SPINE SURGERY  2005    LASE    NERVE BLOCK  05/29/13    therapeutic caudal with modified Salvatore [place] OTHER SURGICAL HISTORY N/A 5-9-16    Surgical Replacement medtronic lumbar spinal cord stimulator    SPINE SURGERY  07/30/2007    Spinal Cord Stimulator Implant    TUBAL LIGATION  April 8, 1994       Family History:  Family History   Problem Relation Age of Onset    Cancer Father     Heart Disease Father     Heart Disease Mother     High Cholesterol Mother     Mental Illness Mother     Diabetes Maternal Grandmother        Medications:  Reviewed and reconciled. Social History:  Social History     Socioeconomic History    Marital status:      Spouse name: Not on file    Number of children: Not on file    Years of education: Not on file    Highest education level: Not on file   Occupational History    Not on file   Social Needs    Financial resource strain: Not on file    Food insecurity:     Worry: Not on file     Inability: Not on file    Transportation needs:     Medical: Not on file     Non-medical: Not on file   Tobacco Use    Smoking status: Former Smoker    Smokeless tobacco: Never Used   Substance and Sexual Activity    Alcohol use: No    Drug use: No    Sexual activity: Not on file   Lifestyle    Physical activity:     Days per week: Not on file     Minutes per session: Not on file    Stress: Not on file   Relationships    Social connections:     Talks on phone: Not on file     Gets together: Not on file     Attends Ten[employer] service: Not on file     Active member of club or organization: Not on file     Attends meetings of clubs or organizations: Not on file     Relationship status: Not on file    Intimate partner violence:     Fear of current or ex partner: Not on file     Emotionally abused: Not on file     Physically abused: Not on file     Forced sexual activity: Not on file   Other Topics Concern    Not on file   Social History Narrative    Not on file       Allergies:   Allergies   Allergen Reactions    Aciphex [Rabeprazole Sodium] Diarrhea     Weakness dizzy    Aleve [Naproxen Sodium]      Stomach upset   

## 2019-05-11 NOTE — PROGRESS NOTES
[facility] Quality Flow/Interdisciplinary Rounds Progress Note        Quality Flow Rounds held on May 11, 2019    Disciplines Attending:  Bedside Nurse and ICU team     Davey Kim was admitted on 5/7/2019 12:26 AM    Anticipated Discharge Date:  Expected Discharge Date: 05/14/19    Disposition:    Lorenzo Score:  Lorenzo Scale Score: 10    Readmission Risk              Risk of Unplanned Readmission:        17           Discussed patient goal for the day, patient clinical progression, and barriers to discharge. The following Goal(s) of the Day/Commitment(s) have been identified:  Continue current support, consult hem/onc for thrombocytopenia.        Ralph Earing  May 11, 2019

## 2019-05-11 NOTE — PROGRESS NOTES
[facility]  Internal Medicine Residency Program  MICU progress note    Patient:  Kamla Ladd 59 y.o. female MRN: [id number]     Date of Service: 5/11/2019    Hospital Day: 5      Chief complaint: AMS  Subjective   Patient seen and examined this morning . Patient currently intubated , off sedation. Patient look lethargic and fluid overload,and following command off sedation  -CXR: increase haziness on b/l lung left >right, will continue to diuresis 1 dose today. 24 hr event:  Patient had continue loose stool, associated with electrolyte abnormality will replace as needed.  -patient currently on oral vancomycin and flagyl   -GS : no active intervention for surgery,ohyto resume tube feeding  - Hb and platelate drop, will monitor CBC and check  H and H 12 qhr and FOBT    Physical Exam   · Vitals: /62   Pulse 74   Temp 99.5 °F (37.5 °C) (Core)   Resp 25   Ht 5' (1.524 m)   Wt 221 lb 1.9 oz (100.3 kg)   SpO2 96%   BMI 43.18 kg/m²   ABP (Arterial line BP): 91/81  ABP mean (Arterial line mean): 86 mmHg    · General Appearance:Intubated and sedated.  Briefly responding off sedation   · Skin: cold and pale blue in the finger tips, multiple rashes over lower extremities   · Head: normocephalic and atraumatic  · Eyes: pupils equal, round, and reactive to light, extraocular eye movements intact, conjunctivae normal  · ENT: tympanic membrane, external ear and ear canal normal bilaterally, nose without deformity, nasal mucosa and turbinates normal without polyps  · Neck: supple and non-tender without mass, no thyromegaly or thyroid nodules, no cervical lymphadenopathy  · Pulmonary/Chest: clear to auscultation bilaterally- no wheezes, rales or rhonchi, normal air movement, no respiratory distress  · Cardiovascular: normal rate, regular rhythm, normal S1 and S2, no murmurs, rubs, clicks, or gallops, distal pulses intact, no carotid bruits  · Abdomen: soft, right sided tenderness, Non distended. No rebound tenderness   · Extremities: no cyanosis, clubbing or edema  · Musculoskeletal: normal range of motion, no joint swelling, deformity or tenderness  · Neurologic: reflexes normal and symmetric, no cranial nerve deficit, gait, coordination and speech normal     Lines     site day    Art line   None    TLC R Fem    PICC None    Hemoaccess None       Mechanical Ventilation:   Mode: A/C SIMV  PS  low tidal   TV:400  ml RR: 14  PEEP 5 cmH2O PS   FiO2 100%     ABG:     Lab Results   Component Value Date    PH 7.484 05/11/2019    PCO2 29.1 05/11/2019    PO2 76.3 05/11/2019    HCO3 21.4 05/11/2019    BE -1.3 05/11/2019    THB 10.6 05/11/2019    O2SAT 95.5 05/11/2019     Labs and Imaging Studies   Basic Labs  CBC:   Lab Results   Component Value Date    WBC 9.9 05/11/2019    RBC 2.85 05/11/2019    HGB 9.7 05/11/2019    HCT 28.7 05/11/2019    .7 05/11/2019    RDW 13.3 05/11/2019    PLT 73 05/11/2019     CMP:  Lab Results   Component Value Date     05/11/2019    K 2.9 05/11/2019     05/11/2019    CO2 24 05/11/2019    BUN 18 05/11/2019    PROT 4.7 05/11/2019     PT/INR:  No results found for: PTINR    Imaging Studies:     Ct Abdomen Pelvis Wo Contrast Additional Contrast? None    Result Date: 5/7/2019  EXAM:  CT Abdomen and Pelvis Without Contrast EXAM DATE/TIME:  5/7/2019 2:30 AM CLINICAL HISTORY:  59years old, female; Pain and signs and symptoms; Other: Diarrhea; Abdominal pain; Generalized TECHNIQUE:  Imaging protocol: Axial computed tomography images of the abdomen and pelvis without contrast. Coronal and sagittal reformatted images were created and reviewed. Radiation optimization: All CT scans at this facility use at least one of these dose optimization techniques: automated exposure control; mA and/or kV adjustment per patient size (includes targeted exams where dose is matched to clinical indication); or iterative reconstruction.  COMPARISON:  No relevant prior studies available. FINDINGS:  Tubes, catheters and devices: Subcutaneous stimulator at the anterolateral aspect of the left lower quadrant abdomen with an associated electrode extending into the spinal canal at the level of L2-3 and extending superiorly with tip at the level of T6. Lungs: Moderate bilateral dependent atelectasis versus consolidations with air bronchograms. Mild to moderate subsegmental atelectasis in the visualized lungs bilaterally. ABDOMEN:  Liver: Hepatomegaly to 16.5 cm in length at the midclavicular line and diffuse hepatic steatosis. Gallbladder and bile ducts: The gallbladder is surgically absent with cholecystectomy clips in the gallbladder fossa. No biliary ductal dilatation. Pancreas: Moderate pancreatic atrophy. No ductal dilatation. Spleen: Grossly unremarkable. No splenomegaly. Adrenals: Unremarkable. No mass. Kidneys and ureters: Grossly unremarkable. No hydronephrosis. No nephrolithiasis. No significant perinephric stranding. Stomach and bowel: Moderate diffuse mucosal thickening from the rectum to the mid sigmoid colon with mild adjacent stranding, mild mucosal thickening throughout the descending colon with adjacent stranding, and suggestion of mucosal thickening throughout the distal transverse colon as well as from the proximal most transverse colon to the cecum concerning for infectious/inflammatory proctocolitis. The unopacified loops of small bowel appear grossly unremarkable without evidence of obstruction. Moderate hiatal hernia. Appendix: Normal contrast-filled appendix inferior to the cecum. No periappendiceal free air or abscess seen. PELVIS:  Bladder: The urinary bladder is decompressed but appears grossly unremarkable. No bladder calculi. Reproductive: Grossly unremarkable ovaries and uterus. ABDOMEN and PELVIS:  Intraperitoneal space: No significant fluid collection. No free air. Prominent intra-abdominal fat.   Bones/joints: Diffuse age related osteopenia and moderate degenerative changes. Moderate levoscoliosis of the lower lumbar spine. No acute fracture. No dislocation. Soft tissues: Huge amount of subcutaneous fat consistent with morbid obesity. Vasculature: The abdominal aorta appears grossly unremarkable. Incidental bilateral inferior pelvic phleboliths. Lymph nodes: Unremarkable. No enlarged lymph nodes. 1. Moderate diffuse mucosal thickening from the rectum to the mid sigmoid colon with mild adjacent stranding, mild mucosal thickening throughout the descending colon with adjacent stranding, and suggestion of mucosal thickening throughout the distal transverse colon as well as from the proximal most transverse colon to the cecum concerning for infectious/inflammatory proctocolitis. 2. Moderate hiatal hernia. 3. Moderate bilateral dependent atelectases versus consolidations with air bronchograms. 4. No nephrolithiasis or obstructive uropathy. No perinephric stranding. 5. Normal appendix. 6. Additional nonacute/incidental findings as described above. This report has been electronically signed by Bebe Cheadle MD.    Ct Head Wo Contrast    Result Date: 5/7/2019  EXAM:  CT Head Without Contrast EXAM DATE/TIME:  5/7/2019 12:45 AM CLINICAL HISTORY:  59years old, female; Signs and symptoms; Altered mental status/memory loss; Confusion or disorientation; Additional info: Decreased alertness TECHNIQUE:  Imaging protocol: Axial computed tomography images of the head/brain without contrast. Coronal and sagittal reformatted images were created and reviewed. Radiation optimization: All CT scans at this facility use at least one of these dose optimization techniques: automated exposure control; mA and/or kV adjustment per patient size (includes targeted exams where dose is matched to clinical indication); or iterative reconstruction. COMPARISON:  No relevant prior studies available. FINDINGS:  Brain: No acute intracranial hemorrhage identified.  Diffuse age-related mild cerebral atrophy and subtle small vessel ischemic changes. Incidental physiologic calcifications in the bilateral basal ganglia. Ventricles: Unremarkable for age. Sella: The pituitary gland appears grossly unremarkable. Bones/joints: Incidental hyperostosis frontalis interna. No acute fracture. Sinuses: Unremarkable. No acute sinusitis. No air-fluid levels. Mastoid air cells: Unremarkable. No mastoid effusions. Orbits: Unremarkable. Soft tissues: Unremarkable. Vasculature: Mild bilateral intracranial internal carotid artery atherosclerotic calcifications. 1. No acute intracranial hemorrhage identified. 2. Additional nonacute/incidental findings as described above. This report has been electronically signed by Macarena Flores MD.    Xr Chest Portable    Result Date: 2019  Patient MRN:  [id number] : 1960 Age: 59 years Gender: Female Order Date:  2019 12:45 AM EXAM: XR CHEST PORTABLE COMPARISON: 2004 INDICATION:  chest pain chest pain FINDINGS: Interstitial opacities are seen in bilateral perihilar and infrahilar locations. No obvious pleural effusion. The heart is normal size. No pneumothorax. Spinal stimulator demonstrated. Interstitial opacities in perihilar and infrahilar locations could suggest peribronchial thickening/inflammation. Continued follow-up could be helpful for further evaluation. EKG: normal sinus rhythm, Qt prolonged    Resident's Assessment and Plan     Kamille Calderon is a 59 y.o. female    Neurology   Acute encephalopathy, improving  2/2 septic shock vs drug overdose   -Currently Intubated and off sedation   -hx of chronic opioids for pain control   -Monitor mental status   -SBT daily  >following command of sedation    Cardiovascular  Septic shock , improved  2/2 possible C diff colitis   -Off pressors, levophed,  -Pan culture sent: no growth, C. Diff EIA : C.  Diff toxin A and B detected  -continie IV flagyl Q8h and PO vancomycin 500 mg 4 times daily   -ID consult : input appreciated     Pulmonary   Acute hypoxic respiratory failure vs pulmonary edema  2/2 septic shock   -S/p intubation on mechanical ventilation for airway protection  -Initial ABG 6.297/98.1/96.9/17.4 c/w metabolic acidosis  -Pro calcitonin :99.25  -Daily ABG and CXR  >CXR today:increase haziness on b/l lung left >right, will continue to diuresis 1 dose today  >continue on albumin and repeat  iv lasix 1 dose today    GI  C diff colitis   -Hx of 2-3 weeks antibiotics use and diarrhea after   -Leukocytosis WBC 18.9 today  -Ct abdomen showed  infectious/inflammatory proctocolitis  -C diff EIA: c. Diff toxin A and B  -continue IV flagyl Q8h and PO vancomycin 500 mg 4 times daily   -continue contact isolation  - consult : no active intervention and okay to resume fedding  > will resume tube feeding    Shock liver   Transaminitis, improving  - ALT/AST 3440/5412>>1018/587>157/31  -Ct abdomen showed hepatomegaly with hepatic steatosis  -NR 8.4, monitor INR 1.6 today  -serum drug screen: positive for opiates  -Monitor LFT   -s/p Give FFP and vitamin K     Renal   STEFANIA, prerenal, resolved   Likely 2/2 dehydration from septic shock and diarrhea   -Baseline Cr. 0.9, initial Cr. 4.2>>2.6>0.8  -IVF: D5 AND 0.9 NS with KCl at 60 ml/hr  -Nephrology consult   -Monitor renal function     Lactic acidosis resolved  Likely 2/2 sepsis vs transminitis  -Lactic acid mild elevated 2.6>3.3, continue trend  -Continue  IVF at 60 ml/hr  -Nephrology consult     Anemia ,Macrocytic vs blood loss  Hb: 11.5, MCV: 105.1  Hb: 9.7 today, likely dilutional vs acute blood loss  -will check FOBT  -trend h and h q 12 hr  -Folate and vitamin B 12: wnl  -monitor CBC  -target Hb >7    Thrombocytopenia  -Plt: 76 today  -likely 2/2 infection   -HIT score 3, low probability  -monitor CBC    PT/OT evaluation:  DVT prophylaxis/ GI prophylaxis: Pepcid / heparin  Disposition: MICU    Loren Crump MD, PGY-1  Internal Medicine resident   Attending physician: Dr. Tracey Cole    I personally saw, examined and provided care for the patient. Radiographs, labs and medication list were reviewed by me independently. I spoke with bedside nursing, therapists and consultants. Critical care services and times documented are independent of procedures and multidisciplinary rounds with Residents. Additionally comprehensive, multidisciplinary rounds were conducted with the MICU team. The case was discussed in detail and plans for care were established. Review of Residents documentation was conducted and revisions were made as appropriate. I agree with the above documented exam, problem list and plan of care. Better  CXR looks fluid overload  Diuresis   Liberate from MV  Wean pressors  Discuss with    Follow CXR  . wean sedation      Care reviewed with nursing staff, medical and surgical specialty care, primary care and the patient's family as available. \    Chart review/lab review/X-ray viewing/documentation and had long Conversation with patient/family re: prognosis, care options and any end of life issues:      Critical care time spent reviewing labs/films, examining patient, collaborating with other physicians more than 32 Minutes  excluding procedures . Clay Cross M.D.   5/11/2019  11:14 PM      Kathleen Lafleur

## 2019-05-11 NOTE — PROGRESS NOTES
[facility]  PROGRESS NOTE  ATTENDING NOTE    S:  Intubated, more awake on precedex, off pressors, nodding yes to abdominal pain    O:  /64   Pulse 73   Temp 99.3 °F (37.4 °C) (Core)   Resp 21   Ht 5' (1.524 m)   Wt 221 lb 1.9 oz (100.3 kg)   SpO2 95%   BMI 43.18 kg/m²   Gen:  NAD  Abd:  Soft, nd, TTP LLQ and epigastric      ASSESSMENT/PLAN:  Colitis--c diff   --c/w abx  --ok for tube feeds    --no plans for surgery    Lyly Ingram MD, MSc, FACS  5/11/2019  8:56 AM

## 2019-05-12 ENCOUNTER — APPOINTMENT (OUTPATIENT)
Dept: CT IMAGING | Age: 59
DRG: 870 | End: 2019-05-12
Payer: COMMERCIAL

## 2019-05-12 ENCOUNTER — APPOINTMENT (OUTPATIENT)
Dept: GENERAL RADIOLOGY | Age: 59
DRG: 870 | End: 2019-05-12
Payer: COMMERCIAL

## 2019-05-12 LAB
AADO2: 226.1 MMHG
ALBUMIN SERPL-MCNC: 2.7 G/DL (ref 3.5–5.2)
ALP BLD-CCNC: 37 U/L (ref 35–104)
ALT SERPL-CCNC: 97 U/L (ref 0–32)
ANION GAP SERPL CALCULATED.3IONS-SCNC: 12 MMOL/L (ref 7–16)
ANION GAP SERPL CALCULATED.3IONS-SCNC: 12 MMOL/L (ref 7–16)
AST SERPL-CCNC: 20 U/L (ref 0–31)
B.E.: 2.1 MMOL/L (ref -3–3)
BASOPHILS ABSOLUTE: 0.03 E9/L (ref 0–0.2)
BASOPHILS RELATIVE PERCENT: 0.3 % (ref 0–2)
BILIRUB SERPL-MCNC: 1.3 MG/DL (ref 0–1.2)
BILIRUBIN DIRECT: 0.6 MG/DL (ref 0–0.3)
BILIRUBIN, INDIRECT: 0.7 MG/DL (ref 0–1)
BLOOD CULTURE, ROUTINE: NORMAL
BLOOD CULTURE, ROUTINE: NORMAL
BUN BLDV-MCNC: 11 MG/DL (ref 8–23)
BUN BLDV-MCNC: 13 MG/DL (ref 8–23)
CALCIUM SERPL-MCNC: 6.6 MG/DL (ref 8.6–10.2)
CALCIUM SERPL-MCNC: 6.6 MG/DL (ref 8.6–10.2)
CHLORIDE BLD-SCNC: 101 MMOL/L (ref 98–107)
CHLORIDE BLD-SCNC: 105 MMOL/L (ref 98–107)
CO2: 25 MMOL/L (ref 22–29)
CO2: 26 MMOL/L (ref 22–29)
COHB: 0.6 % (ref 0–1.5)
CREAT SERPL-MCNC: 0.7 MG/DL (ref 0.5–1)
CREAT SERPL-MCNC: 0.7 MG/DL (ref 0.5–1)
CRITICAL: ABNORMAL
CULTURE, BLOOD 2: NORMAL
DATE ANALYZED: ABNORMAL
DATE OF COLLECTION: ABNORMAL
EOSINOPHILS ABSOLUTE: 0.2 E9/L (ref 0.05–0.5)
EOSINOPHILS RELATIVE PERCENT: 2.2 % (ref 0–6)
FIO2: 50 %
GFR AFRICAN AMERICAN: >60
GFR AFRICAN AMERICAN: >60
GFR NON-AFRICAN AMERICAN: >60 ML/MIN/1.73
GFR NON-AFRICAN AMERICAN: >60 ML/MIN/1.73
GLUCOSE BLD-MCNC: 140 MG/DL (ref 74–99)
GLUCOSE BLD-MCNC: 146 MG/DL (ref 74–99)
HCO3: 25.5 MMOL/L (ref 22–26)
HCT VFR BLD CALC: 27.8 % (ref 34–48)
HCT VFR BLD CALC: 30.6 % (ref 34–48)
HEMOGLOBIN: 10 G/DL (ref 11.5–15.5)
HEMOGLOBIN: 9.3 G/DL (ref 11.5–15.5)
HHB: 3.8 % (ref 0–5)
IMMATURE GRANULOCYTES #: 0.18 E9/L
IMMATURE GRANULOCYTES %: 2 % (ref 0–5)
INR BLD: 1.7
LAB: ABNORMAL
LYMPHOCYTES ABSOLUTE: 0.92 E9/L (ref 1.5–4)
LYMPHOCYTES RELATIVE PERCENT: 10 % (ref 20–42)
Lab: ABNORMAL
MAGNESIUM: 1.4 MG/DL (ref 1.6–2.6)
MCH RBC QN AUTO: 34.4 PG (ref 26–35)
MCHC RBC AUTO-ENTMCNC: 33.5 % (ref 32–34.5)
MCV RBC AUTO: 103 FL (ref 80–99.9)
METER GLUCOSE: 134 MG/DL (ref 74–99)
METHB: 0.2 % (ref 0–1.5)
MODE: AC
MONOCYTES ABSOLUTE: 0.66 E9/L (ref 0.1–0.95)
MONOCYTES RELATIVE PERCENT: 7.2 % (ref 2–12)
NEUTROPHILS ABSOLUTE: 7.22 E9/L (ref 1.8–7.3)
NEUTROPHILS RELATIVE PERCENT: 78.3 % (ref 43–80)
O2 SATURATION: 96.2 % (ref 92–98.5)
O2HB: 95.4 % (ref 94–97)
OPERATOR ID: 1874
PATIENT TEMP: 37 C
PCO2: 34.8 MMHG (ref 35–45)
PDW BLD-RTO: 13.7 FL (ref 11.5–15)
PEEP/CPAP: 5 CMH2O
PFO2: 1.58 MMHG/%
PH BLOOD GAS: 7.48 (ref 7.35–7.45)
PHOSPHORUS: 1.2 MG/DL (ref 2.5–4.5)
PHOSPHORUS: 2 MG/DL (ref 2.5–4.5)
PLATELET # BLD: 104 E9/L (ref 130–450)
PMV BLD AUTO: 11.8 FL (ref 7–12)
PO2: 78.8 MMHG (ref 60–100)
POTASSIUM REFLEX MAGNESIUM: 4 MMOL/L (ref 3.5–5)
POTASSIUM SERPL-SCNC: 3.2 MMOL/L (ref 3.5–5)
PROTHROMBIN TIME: 19.8 SEC (ref 9.3–12.4)
RBC # BLD: 2.7 E12/L (ref 3.5–5.5)
RI(T): 2.87
RR MECHANICAL: 14 B/MIN
SODIUM BLD-SCNC: 139 MMOL/L (ref 132–146)
SODIUM BLD-SCNC: 142 MMOL/L (ref 132–146)
SOURCE, BLOOD GAS: ABNORMAL
THB: 10.2 G/DL (ref 11.5–16.5)
TIME ANALYZED: 549
TOTAL PROTEIN: 4.9 G/DL (ref 6.4–8.3)
VT MECHANICAL: 400 ML
WBC # BLD: 9.2 E9/L (ref 4.5–11.5)

## 2019-05-12 PROCEDURE — 2000000000 HC ICU R&B

## 2019-05-12 PROCEDURE — 85018 HEMOGLOBIN: CPT

## 2019-05-12 PROCEDURE — 6360000002 HC RX W HCPCS: Performed by: INTERNAL MEDICINE

## 2019-05-12 PROCEDURE — 2580000003 HC RX 258: Performed by: INTERNAL MEDICINE

## 2019-05-12 PROCEDURE — 6370000000 HC RX 637 (ALT 250 FOR IP): Performed by: INTERNAL MEDICINE

## 2019-05-12 PROCEDURE — 87206 SMEAR FLUORESCENT/ACID STAI: CPT

## 2019-05-12 PROCEDURE — 84100 ASSAY OF PHOSPHORUS: CPT

## 2019-05-12 PROCEDURE — 87088 URINE BACTERIA CULTURE: CPT

## 2019-05-12 PROCEDURE — 71045 X-RAY EXAM CHEST 1 VIEW: CPT

## 2019-05-12 PROCEDURE — 99291 CRITICAL CARE FIRST HOUR: CPT | Performed by: INTERNAL MEDICINE

## 2019-05-12 PROCEDURE — 82962 GLUCOSE BLOOD TEST: CPT

## 2019-05-12 PROCEDURE — 71250 CT THORAX DX C-: CPT

## 2019-05-12 PROCEDURE — 85610 PROTHROMBIN TIME: CPT

## 2019-05-12 PROCEDURE — 99232 SBSQ HOSP IP/OBS MODERATE 35: CPT | Performed by: SURGERY

## 2019-05-12 PROCEDURE — 87040 BLOOD CULTURE FOR BACTERIA: CPT

## 2019-05-12 PROCEDURE — 85014 HEMATOCRIT: CPT

## 2019-05-12 PROCEDURE — 80048 BASIC METABOLIC PNL TOTAL CA: CPT

## 2019-05-12 PROCEDURE — 85025 COMPLETE CBC W/AUTO DIFF WBC: CPT

## 2019-05-12 PROCEDURE — 87070 CULTURE OTHR SPECIMN AEROBIC: CPT

## 2019-05-12 PROCEDURE — 2500000003 HC RX 250 WO HCPCS: Performed by: INTERNAL MEDICINE

## 2019-05-12 PROCEDURE — 36415 COLL VENOUS BLD VENIPUNCTURE: CPT

## 2019-05-12 PROCEDURE — 83735 ASSAY OF MAGNESIUM: CPT

## 2019-05-12 PROCEDURE — 87186 SC STD MICRODIL/AGAR DIL: CPT

## 2019-05-12 PROCEDURE — 94003 VENT MGMT INPAT SUBQ DAY: CPT

## 2019-05-12 PROCEDURE — 80076 HEPATIC FUNCTION PANEL: CPT

## 2019-05-12 PROCEDURE — 82805 BLOOD GASES W/O2 SATURATION: CPT

## 2019-05-12 RX ORDER — POTASSIUM CHLORIDE 29.8 MG/ML
20 INJECTION INTRAVENOUS
Status: COMPLETED | OUTPATIENT
Start: 2019-05-12 | End: 2019-05-12

## 2019-05-12 RX ORDER — ACETAMINOPHEN 160 MG/5ML
650 SOLUTION ORAL EVERY 6 HOURS PRN
Status: DISCONTINUED | OUTPATIENT
Start: 2019-05-12 | End: 2019-05-24 | Stop reason: HOSPADM

## 2019-05-12 RX ORDER — FUROSEMIDE 10 MG/ML
40 INJECTION INTRAMUSCULAR; INTRAVENOUS ONCE
Status: COMPLETED | OUTPATIENT
Start: 2019-05-12 | End: 2019-05-12

## 2019-05-12 RX ORDER — MAGNESIUM SULFATE IN WATER 40 MG/ML
2 INJECTION, SOLUTION INTRAVENOUS ONCE
Status: COMPLETED | OUTPATIENT
Start: 2019-05-12 | End: 2019-05-12

## 2019-05-12 RX ADMIN — Medication 500 MG: at 09:15

## 2019-05-12 RX ADMIN — MIDODRINE HYDROCHLORIDE 5 MG: 5 TABLET ORAL at 17:21

## 2019-05-12 RX ADMIN — ACETAMINOPHEN 650 MG: 160 SOLUTION ORAL at 04:31

## 2019-05-12 RX ADMIN — POTASSIUM CHLORIDE 20 MEQ: 29.8 INJECTION, SOLUTION INTRAVENOUS at 04:16

## 2019-05-12 RX ADMIN — ACETAMINOPHEN 650 MG: 160 SOLUTION ORAL at 15:35

## 2019-05-12 RX ADMIN — Medication 500 MG: at 17:00

## 2019-05-12 RX ADMIN — POTASSIUM CHLORIDE 20 MEQ: 29.8 INJECTION, SOLUTION INTRAVENOUS at 05:20

## 2019-05-12 RX ADMIN — Medication 500 MG: at 21:06

## 2019-05-12 RX ADMIN — METRONIDAZOLE 500 MG: 500 INJECTION, SOLUTION INTRAVENOUS at 15:36

## 2019-05-12 RX ADMIN — SODIUM CHLORIDE 1.2 MCG/KG/HR: 9 INJECTION, SOLUTION INTRAVENOUS at 13:26

## 2019-05-12 RX ADMIN — POTASSIUM CHLORIDE 20 MEQ: 29.8 INJECTION, SOLUTION INTRAVENOUS at 21:49

## 2019-05-12 RX ADMIN — CALCIUM GLUCONATE 1 G: 98 INJECTION, SOLUTION INTRAVENOUS at 21:44

## 2019-05-12 RX ADMIN — FUROSEMIDE 40 MG: 10 INJECTION, SOLUTION INTRAMUSCULAR; INTRAVENOUS at 09:18

## 2019-05-12 RX ADMIN — SODIUM CHLORIDE 1 MCG/KG/HR: 9 INJECTION, SOLUTION INTRAVENOUS at 04:15

## 2019-05-12 RX ADMIN — Medication 200 MCG/HR: at 22:04

## 2019-05-12 RX ADMIN — CHLORHEXIDINE GLUCONATE 0.12% ORAL RINSE 15 ML: 1.2 LIQUID ORAL at 08:23

## 2019-05-12 RX ADMIN — DEXTROSE, SODIUM CHLORIDE, AND POTASSIUM CHLORIDE: 5; .9; .15 INJECTION INTRAVENOUS at 05:44

## 2019-05-12 RX ADMIN — SODIUM CHLORIDE 1.2 MCG/KG/HR: 9 INJECTION, SOLUTION INTRAVENOUS at 21:35

## 2019-05-12 RX ADMIN — Medication 10 ML: at 08:22

## 2019-05-12 RX ADMIN — POTASSIUM CHLORIDE 20 MEQ: 29.8 INJECTION, SOLUTION INTRAVENOUS at 22:47

## 2019-05-12 RX ADMIN — Medication 500 MG: at 12:59

## 2019-05-12 RX ADMIN — Medication 10 ML: at 21:04

## 2019-05-12 RX ADMIN — SODIUM CHLORIDE 1 MCG/KG/HR: 9 INJECTION, SOLUTION INTRAVENOUS at 08:22

## 2019-05-12 RX ADMIN — DEXTROSE, SODIUM CHLORIDE, AND POTASSIUM CHLORIDE: 5; .9; .15 INJECTION INTRAVENOUS at 08:14

## 2019-05-12 RX ADMIN — MAGNESIUM SULFATE HEPTAHYDRATE 2 G: 40 INJECTION, SOLUTION INTRAVENOUS at 08:20

## 2019-05-12 RX ADMIN — PETROLATUM: 42 OINTMENT TOPICAL at 13:33

## 2019-05-12 RX ADMIN — Medication 150 MCG/HR: at 11:45

## 2019-05-12 RX ADMIN — Medication 150 MCG/HR: at 02:48

## 2019-05-12 RX ADMIN — CHLORHEXIDINE GLUCONATE 0.12% ORAL RINSE 15 ML: 1.2 LIQUID ORAL at 21:03

## 2019-05-12 RX ADMIN — HEPARIN SODIUM 5000 UNITS: 10000 INJECTION INTRAVENOUS; SUBCUTANEOUS at 21:03

## 2019-05-12 RX ADMIN — PETROLATUM: 42 OINTMENT TOPICAL at 08:25

## 2019-05-12 RX ADMIN — METRONIDAZOLE 500 MG: 500 INJECTION, SOLUTION INTRAVENOUS at 08:24

## 2019-05-12 RX ADMIN — PETROLATUM: 42 OINTMENT TOPICAL at 21:04

## 2019-05-12 RX ADMIN — POTASSIUM PHOSPHATE, MONOBASIC AND POTASSIUM PHOSPHATE, DIBASIC 20 MMOL: 224; 236 INJECTION, SOLUTION, CONCENTRATE INTRAVENOUS at 09:35

## 2019-05-12 RX ADMIN — MIDODRINE HYDROCHLORIDE 5 MG: 5 TABLET ORAL at 11:41

## 2019-05-12 RX ADMIN — HEPARIN SODIUM 5000 UNITS: 10000 INJECTION INTRAVENOUS; SUBCUTANEOUS at 08:20

## 2019-05-12 RX ADMIN — BUMETANIDE 0.5 MG/HR: 0.25 INJECTION INTRAMUSCULAR; INTRAVENOUS at 12:59

## 2019-05-12 RX ADMIN — SODIUM CHLORIDE, PRESERVATIVE FREE 300 UNITS: 5 INJECTION INTRAVENOUS at 08:24

## 2019-05-12 RX ADMIN — HEPARIN SODIUM 5000 UNITS: 10000 INJECTION INTRAVENOUS; SUBCUTANEOUS at 14:00

## 2019-05-12 RX ADMIN — MIDODRINE HYDROCHLORIDE 5 MG: 5 TABLET ORAL at 08:56

## 2019-05-12 RX ADMIN — FAMOTIDINE 20 MG: 10 INJECTION, SOLUTION INTRAVENOUS at 08:23

## 2019-05-12 ASSESSMENT — PAIN SCALES - WONG BAKER
WONGBAKER_NUMERICALRESPONSE: 0

## 2019-05-12 ASSESSMENT — PULMONARY FUNCTION TESTS
PIF_VALUE: 16
PIF_VALUE: 15
PIF_VALUE: 15
PIF_VALUE: 16
PIF_VALUE: 18
PIF_VALUE: 20
PIF_VALUE: 16
PIF_VALUE: 15
PIF_VALUE: 16
PIF_VALUE: 16
PIF_VALUE: 22
PIF_VALUE: 24
PIF_VALUE: 16
PIF_VALUE: 15
PIF_VALUE: 21
PIF_VALUE: 22
PIF_VALUE: 15
PIF_VALUE: 17
PIF_VALUE: 16
PIF_VALUE: 26
PIF_VALUE: 16
PIF_VALUE: 24
PIF_VALUE: 15
PIF_VALUE: 16
PIF_VALUE: 15
PIF_VALUE: 19

## 2019-05-12 ASSESSMENT — PAIN SCALES - GENERAL
PAINLEVEL_OUTOF10: 0

## 2019-05-12 NOTE — PROGRESS NOTES
[facility] Quality Flow/Interdisciplinary Rounds Progress Note        Quality Flow Rounds held on May 12, 2019    Disciplines Attending:  Bedside Nurse, Charge nurse, CRISTIANA, OT, PT, , and . Maris Gonsales was admitted on 5/7/2019 12:26 AM    Anticipated Discharge Date:  Expected Discharge Date: 05/14/19    Disposition:    Lorenzo Score:  Lorenzo Scale Score: 11    Readmission Risk              Risk of Unplanned Readmission:        17           Discussed patient goal for the day, patient clinical progression, and barriers to discharge. The following Goal(s) of the Day/Commitment(s) have been identified:  CT chest without contrast, monitor labs.       Mary Lou Irizarry  May 12, 2019

## 2019-05-12 NOTE — FLOWSHEET NOTE
Patient continues to reach for lines and tubes despite alternative measures to deter. Two point soft restraints maintained for patient safety.   Electronically signed by Roger Mccall RN on 5/12/2019 at 11:23 AM

## 2019-05-12 NOTE — PROGRESS NOTES
Department of Internal Medicine  Nephrology Consult Note      Events reviewed. SUBJECTIVE: We are following Mrs. Haji for acute kidney injury.  She remains intubated    PHYSICAL EXAM:      Vitals:    VITALS:  BP (!) 124/53   Pulse 95   Temp 101.1 °F (38.4 °C)   Resp 24   Ht 5' (1.524 m)   Wt 209 lb 3.2 oz (94.9 kg)   SpO2 92%   BMI 40.86 kg/m²   24HR INTAKE/OUTPUT:      Intake/Output Summary (Last 24 hours) at 5/8/2019 1349  Last data filed at 5/8/2019 1200  Gross per 24 hour   Intake 5855 ml   Output 1685 ml   Net 4170 ml       Constitutional:  Intubated, awake  HEENT:  MESSI, endotracheal tube in place  Respiratory:  Coarse breath sounds bilaterally, frothy secretions from ET  Cardiovascular/Edema:  Regular rate and rhythm, no murmurs appreciated  Gastrointestinal:  Soft, non-tender, bowel sounds present  Neurologic: sedated, withdraws to pain  Skin:  warm, + edema  Other:  Erythematous lesion on lower extrtemities    DATA:    CBC:   Lab Results   Component Value Date    WBC 18.9 05/08/2019    RBC 3.38 05/08/2019    HGB 11.5 05/08/2019    HCT 33.9 05/08/2019    .3 05/08/2019    MCH 34.0 05/08/2019    MCHC 33.9 05/08/2019    RDW 13.2 05/08/2019     05/08/2019    MPV 11.1 05/08/2019     CMP:    Lab Results   Component Value Date     05/08/2019    K 4.1 05/08/2019     05/08/2019    CO2 19 05/08/2019    BUN 65 05/08/2019    CREATININE 2.6 05/08/2019    GFRAA 23 05/08/2019    LABGLOM 19 05/08/2019    GLUCOSE 160 05/08/2019    PROT 4.7 05/08/2019    LABALBU 2.4 05/08/2019    LABALBU 4.4 08/09/2011    CALCIUM 7.4 05/08/2019    BILITOT 1.0 05/08/2019    ALKPHOS 102 05/08/2019     05/08/2019    ALT 1,018 05/08/2019     Magnesium:    Lab Results   Component Value Date    MG 1.8 05/08/2019     Phosphorus:    Lab Results   Component Value Date    PHOS 1.5 05/08/2019     PT/INR:    Lab Results   Component Value Date    PROTIME 18.3 05/08/2019    INR 1.6 05/08/2019     Warfarin PT/INR:  No components found for: PTPATWAR, PTINRWAR  ABG:    Lab Results   Component Value Date    PH 7.382 05/08/2019    PCO2 28.1 05/08/2019    PO2 77.7 05/08/2019    HCO3 16.3 05/08/2019    BE -7.3 05/08/2019    O2SAT 96.1 05/08/2019       Urine studies:  Urine sodium: 22  Urine chloride: <20  Urine creatinine: 100  Urine urea: 251  Urine potassium: 48.2    Fraction excretion of sodium: 0.5%  Fraction excretion of urea: 12.6%      Radiology Review:      CT head 5/7/19   1. No acute intracranial hemorrhage identified. 2. Additional nonacute/incidental findings as described above.        This report has been electronically signed by Lindsey Islas MD.     CT abdomen 5/7/19   1. Moderate diffuse mucosal thickening from the rectum to the mid sigmoid colon    with mild adjacent stranding, mild mucosal thickening throughout the descending    colon with adjacent stranding, and suggestion of mucosal thickening throughout    the distal transverse colon as well as from the proximal most transverse colon    to the cecum concerning for infectious/inflammatory proctocolitis. 2. Moderate hiatal hernia. 3. Moderate bilateral dependent atelectases versus consolidations with air    bronchograms. 4. No nephrolithiasis or obstructive uropathy. No perinephric stranding. 5. Normal appendix. 6. Additional nonacute/incidental findings as described above.        This report has been electronically signed by Lindsey Islas MD.        Chest x-ray May 12, 2019        1. Limited visualization of nasogastric tube.       2. Redemonstration of interstitial and hazy opacities throughout both   lungs suggestive of interstitial pulmonary edema or pneumonia. Short-term follow-up could be helpful for further evaluation. BRIEF SUMMARY OF INITIAL CONSULT:  Briefly, Mrs. Micheal Arevalo is a 67-year-old female with h/o of hypothyroidism, lumbar radiculopathy s/p laminectomy, HLD, asthma, was brought to the ED for worsening lethargy. She was recently treated for lower extremity cellulitis with clindamycin and then levofloxacin around 2-3 weeks ago. She developed profuse diarrhea shortly thereafter. Family reported some vomiting initially and poor oral intake. Over the past 3 days, she became severely weak and dehydrated. At the ED, she was in shock requiring vasopressor despite aggressive fluid resuscitation and was intubated for airway protection. Labs were notable for creatinine of 4.2 mg/dL, BUN of 73 mg/dL, lactic acid of 3.4, sodium of 129 mmol/L, liver enzymes were also markedly elevated, reasons for this consult. Of note, she has no known history of kidney disease. She was on furosemide at home, but has not been taken since the onset of diarrhea. Problems resolved:    Hyponatremia, multifactorial, due to hypovolemia and decreased GFR, resolved  Hypoglycemia secondary to #6, resolved  Coagulopathy, high PT/INR, resolved  S/p Hemodynamic shock, hypovolemic and septic shock, melena likely improving with decreasing dose of pressors    IMPRESSION/RECOMMENDATIONS:      STEFANIA, stage 3, volume responsive pre-renal STEFANIA  (diarrhea, poor oral intake, shock), FeNa 0.5% , FeUrea 12%. Resolved.     Respiratory failure, status post intubation; chest x-ray to increasing infiltrates  Alkalemia (pH: 7.482) with respiratory alkalosis and metabolic alkalosis (contractional alkalosis)  Hypophosphatemia, multifactorial, due to poor oral intake and vit D deficiency (25-hydroxy Vit D 13 ng/ml)  Hypomagnesemia, secondary to no intake and diarrhea  Shock liver, LFTs improving  C. difficile infection, on vancomycin and metronidazole  Severe hypoalbuminemia, multifactorial  Nutrition, started on tube feedings    Plan:    Discontinue IV fluids  Start bumex drip 0.5 mg/hour   Replace phosphorus  Replace magnesium  Continue to monitor renal function

## 2019-05-12 NOTE — PROGRESS NOTES
[facility]  Internal Medicine Residency Program  MICU progress note    Patient:  Carmen Llanos 59 y.o. female MRN: [id number]     Date of Service: 5/12/2019    Hospital Day: 6      Chief complaint: AMS  Subjective   Patient seen and examined this morning . Patient currently intubated , off sedation. Patient alert, awake follows command off sedation and fluid overload  -CXR: increase haziness on b/l lung in right upper lobe, will continue to diuresis    24 hr event:  -Patient had continue loose stool, associated with electrolyte abnormality will replace as needed.  -patient currently on oral vancomycin and flagyl   -GS : no active intervention for surgery, resume tube feeding, tolerating  - Hb stable, plt improving  -patient febrile overnight, pan culture send, Ct chest ordered    Physical Exam   · Vitals: BP (!) 97/54   Pulse 77   Temp 101.5 °F (38.6 °C) (Core)   Resp 18   Ht 5' (1.524 m)   Wt 225 lb 5 oz (102.2 kg)   SpO2 96%   BMI 44.00 kg/m²   ABP (Arterial line BP): 91/81  ABP mean (Arterial line mean): 86 mmHg    · General Appearance:Intubated and sedated.  Briefly responding off sedation   · Skin: cold and pale blue in the finger tips, multiple rashes over lower extremities   · Head: normocephalic and atraumatic  · Eyes: pupils equal, round, and reactive to light, extraocular eye movements intact, conjunctivae normal  · ENT: tympanic membrane, external ear and ear canal normal bilaterally, nose without deformity, nasal mucosa and turbinates normal without polyps  · Neck: supple and non-tender without mass, no thyromegaly or thyroid nodules, no cervical lymphadenopathy  · Pulmonary/Chest: clear to auscultation bilaterally- no wheezes, rales or rhonchi, normal air movement, no respiratory distress  · Cardiovascular: normal rate, regular rhythm, normal S1 and S2, no murmurs, rubs, clicks, or gallops, distal pulses intact, no carotid bruits  · Abdomen: soft, right sided tenderness, Non distended. No rebound tenderness   · Extremities: no cyanosis, clubbing or edema  · Musculoskeletal: normal range of motion, no joint swelling, deformity or tenderness  · Neurologic: reflexes normal and symmetric, no cranial nerve deficit, gait, coordination and speech normal     Lines     site day    Art line   None    TLC R Fem    PICC None    Hemoaccess None       Mechanical Ventilation:   Mode: A/C SIMV  PS  low tidal   TV:400  ml RR: 14  PEEP 5 cmH2O PS   FiO2 100%     ABG:     Lab Results   Component Value Date    PH 7.482 05/12/2019    PCO2 34.8 05/12/2019    PO2 78.8 05/12/2019    HCO3 25.5 05/12/2019    BE 2.1 05/12/2019    THB 10.2 05/12/2019    O2SAT 96.2 05/12/2019     Labs and Imaging Studies   Basic Labs  CBC:   Lab Results   Component Value Date    WBC 9.2 05/12/2019    RBC 2.70 05/12/2019    HGB 9.3 05/12/2019    HCT 27.8 05/12/2019    .0 05/12/2019    RDW 13.7 05/12/2019     05/12/2019     CMP:  Lab Results   Component Value Date     05/12/2019    K 4.0 05/12/2019     05/12/2019    CO2 25 05/12/2019    BUN 13 05/12/2019    PROT 4.9 05/12/2019     PT/INR:  No results found for: PTINR    Imaging Studies:     Ct Abdomen Pelvis Wo Contrast Additional Contrast? None    Result Date: 5/7/2019  EXAM:  CT Abdomen and Pelvis Without Contrast EXAM DATE/TIME:  5/7/2019 2:30 AM CLINICAL HISTORY:  59years old, female; Pain and signs and symptoms; Other: Diarrhea; Abdominal pain; Generalized TECHNIQUE:  Imaging protocol: Axial computed tomography images of the abdomen and pelvis without contrast. Coronal and sagittal reformatted images were created and reviewed. Radiation optimization: All CT scans at this facility use at least one of these dose optimization techniques: automated exposure control; mA and/or kV adjustment per patient size (includes targeted exams where dose is matched to clinical indication); or iterative reconstruction.  COMPARISON:  No relevant prior studies available. FINDINGS:  Tubes, catheters and devices: Subcutaneous stimulator at the anterolateral aspect of the left lower quadrant abdomen with an associated electrode extending into the spinal canal at the level of L2-3 and extending superiorly with tip at the level of T6. Lungs: Moderate bilateral dependent atelectasis versus consolidations with air bronchograms. Mild to moderate subsegmental atelectasis in the visualized lungs bilaterally. ABDOMEN:  Liver: Hepatomegaly to 16.5 cm in length at the midclavicular line and diffuse hepatic steatosis. Gallbladder and bile ducts: The gallbladder is surgically absent with cholecystectomy clips in the gallbladder fossa. No biliary ductal dilatation. Pancreas: Moderate pancreatic atrophy. No ductal dilatation. Spleen: Grossly unremarkable. No splenomegaly. Adrenals: Unremarkable. No mass. Kidneys and ureters: Grossly unremarkable. No hydronephrosis. No nephrolithiasis. No significant perinephric stranding. Stomach and bowel: Moderate diffuse mucosal thickening from the rectum to the mid sigmoid colon with mild adjacent stranding, mild mucosal thickening throughout the descending colon with adjacent stranding, and suggestion of mucosal thickening throughout the distal transverse colon as well as from the proximal most transverse colon to the cecum concerning for infectious/inflammatory proctocolitis. The unopacified loops of small bowel appear grossly unremarkable without evidence of obstruction. Moderate hiatal hernia. Appendix: Normal contrast-filled appendix inferior to the cecum. No periappendiceal free air or abscess seen. PELVIS:  Bladder: The urinary bladder is decompressed but appears grossly unremarkable. No bladder calculi. Reproductive: Grossly unremarkable ovaries and uterus. ABDOMEN and PELVIS:  Intraperitoneal space: No significant fluid collection. No free air. Prominent intra-abdominal fat.   Bones/joints: Diffuse age related osteopenia and moderate degenerative changes. Moderate levoscoliosis of the lower lumbar spine. No acute fracture. No dislocation. Soft tissues: Huge amount of subcutaneous fat consistent with morbid obesity. Vasculature: The abdominal aorta appears grossly unremarkable. Incidental bilateral inferior pelvic phleboliths. Lymph nodes: Unremarkable. No enlarged lymph nodes. 1. Moderate diffuse mucosal thickening from the rectum to the mid sigmoid colon with mild adjacent stranding, mild mucosal thickening throughout the descending colon with adjacent stranding, and suggestion of mucosal thickening throughout the distal transverse colon as well as from the proximal most transverse colon to the cecum concerning for infectious/inflammatory proctocolitis. 2. Moderate hiatal hernia. 3. Moderate bilateral dependent atelectases versus consolidations with air bronchograms. 4. No nephrolithiasis or obstructive uropathy. No perinephric stranding. 5. Normal appendix. 6. Additional nonacute/incidental findings as described above. This report has been electronically signed by David Tubbs MD.    Ct Head Wo Contrast    Result Date: 5/7/2019  EXAM:  CT Head Without Contrast EXAM DATE/TIME:  5/7/2019 12:45 AM CLINICAL HISTORY:  59years old, female; Signs and symptoms; Altered mental status/memory loss; Confusion or disorientation; Additional info: Decreased alertness TECHNIQUE:  Imaging protocol: Axial computed tomography images of the head/brain without contrast. Coronal and sagittal reformatted images were created and reviewed. Radiation optimization: All CT scans at this facility use at least one of these dose optimization techniques: automated exposure control; mA and/or kV adjustment per patient size (includes targeted exams where dose is matched to clinical indication); or iterative reconstruction. COMPARISON:  No relevant prior studies available. FINDINGS:  Brain: No acute intracranial hemorrhage identified.  Diffuse age-related mild cerebral atrophy and subtle small vessel ischemic changes. Incidental physiologic calcifications in the bilateral basal ganglia. Ventricles: Unremarkable for age. Sella: The pituitary gland appears grossly unremarkable. Bones/joints: Incidental hyperostosis frontalis interna. No acute fracture. Sinuses: Unremarkable. No acute sinusitis. No air-fluid levels. Mastoid air cells: Unremarkable. No mastoid effusions. Orbits: Unremarkable. Soft tissues: Unremarkable. Vasculature: Mild bilateral intracranial internal carotid artery atherosclerotic calcifications. 1. No acute intracranial hemorrhage identified. 2. Additional nonacute/incidental findings as described above. This report has been electronically signed by Fab Izquierdo MD.    Xr Chest Portable    Result Date: 2019  Patient MRN:  [id number] : 1960 Age: 59 years Gender: Female Order Date:  2019 12:45 AM EXAM: XR CHEST PORTABLE COMPARISON: 2004 INDICATION:  chest pain chest pain FINDINGS: Interstitial opacities are seen in bilateral perihilar and infrahilar locations. No obvious pleural effusion. The heart is normal size. No pneumothorax. Spinal stimulator demonstrated. Interstitial opacities in perihilar and infrahilar locations could suggest peribronchial thickening/inflammation. Continued follow-up could be helpful for further evaluation. EKG: normal sinus rhythm, Qt prolonged    Resident's Assessment and Plan     Maribell Fowler is a 59 y.o. female    Neurology   Acute encephalopathy, improving  2/2 septic shock vs drug overdose  -Currently Intubated and  sedation   -hx of chronic opioids for pain control   -Monitor mental status   -SBT daily  >following command of sedation    Cardiovascular  Septic shock , improved  2/2 possible C diff colitis   -Off pressors, levophed,  -Pan culture sent: no growth, C. Diff EIA : C.  Diff toxin A and B detected  -repeat pan culture send  -continie IV flagyl Q8h and PO vancomycin 500 mg 4 times daily   -ID consult : input appreciated     Pulmonary   Acute hypoxic respiratory failure   2/2 sepsis ( d/t C.  Diff colitis) vs pulmonary edema  -S/p intubation on mechanical ventilation for airway protection  -Initial ABG 1.882/59.0/85.3/28.6 c/w metabolic acidosis  -Pro calcitonin :99.25  -Daily ABG and CXR  -CXR today:increase haziness on b/l lung right> left will continue to diuresis   - monitor BMP    GI  C diff colitis   -Hx of 2-3 weeks antibiotics use and diarrhea after   -Leukocytosis WBC 18.9>9.2  -Ct abdomen showed  infectious/inflammatory proctocolitis  -C diff EIA: c. Diff toxin A and B  -continue IV flagyl Q8h and PO vancomycin 500 mg 4 times daily   -continue contact isolation  -GS consult : no active intervention and okay to resume fedding  - continue tube feeding    Shock liver   Transaminitis, improving  - ALT/AST 3440/5412>>1018/587>157/31  -Ct abdomen showed hepatomegaly with hepatic steatosis  -INR 8.4, monitor INR 1.7 today  -serum drug screen: positive for opiates  -Monitor LFT   -s/p Give FFP and vitamin K     Renal   STEFANIA, prerenal, resolved   Likely 2/2 dehydration from septic shock and diarrhea   -Baseline Cr. 0.9, initial Cr. 4.2>>2.6>0.8  -IVF:d/eliseo  -started on bumex drip   -Nephrology consult   -Monitor renal function, urine output and BMP     Hypomagnesemia   -likely 2/2 to diarrhea  -monitor magnesium daily  - replace mg    Anemia ,Macrocytic vs blood loss  Hb: 11.5, MCV: 105.1  Hb: 9.7>9.3 today, likely dilutional vs acute blood loss  -trend h and h q 12 hr  -Folate and vitamin B 12: wnl  -monitor CBC  -target Hb >7    Thrombocytopenia, improving  -Plt: 73 today  -likely 2/2 infection   -HIT score 3, low probability  -oncology consulted: recommend transfuse if Plat<50   -will monitor CBC    PT/OT evaluation:  DVT prophylaxis/ GI prophylaxis: Pepcid / heparin  [facility]    Patricio Amato MD, PGY-1  Internal Medicine resident   Attending physician: Dr. Francisca Esparza I personally saw, examined and provided care for the patient. Radiographs, labs and medication list were reviewed by me independently. I spoke with bedside nursing, therapists and consultants. Critical care services and times documented are independent of procedures and multidisciplinary rounds with Residents. Additionally comprehensive, multidisciplinary rounds were conducted with the MICU team. The case was discussed in detail and plans for care were established. Review of Residents documentation was conducted and revisions were made as appropriate. I agree with the above documented exam, problem list and plan of care. acute pulmonary edema/Fluid overload  Cannot extubate  Discuss with Dr Katerina Boyd and plan for Bumex drip  Also has effusion may need tap if   Will aim for extubation next 24 h   Discuss with   PSV   Diuresis   CT scan compatibl with fluid overlaod      Care reviewed with nursing staff, medical and surgical specialty care, primary care and the patient's family as available. \    Chart review/lab review/X-ray viewing/documentation and had long Conversation with patient/family re: prognosis, care options and any end of life issues:      Critical care time spent reviewing labs/films, examining patient, collaborating with other physicians more than 36  Minutes  excluding procedures . Lord Elijah Sylvester M.D.   5/12/2019  9:59 PM

## 2019-05-12 NOTE — PROGRESS NOTES
C/C: Septic shock, resp failure, MOFS , C diff infection      No events, wbc better  Loose stool   Afebrile       Current Facility-Administered Medications   Medication Dose Route Frequency Provider Last Rate Last Dose    acetaminophen (TYLENOL) 160 MG/5ML solution 650 mg  650 mg Oral Q6H PRN Mery Calloway MD   650 mg at 05/12/19 1535    bumetanide (BUMEX) 12.5 mg in sodium chloride 0.9 % 125 mL infusion  0.5 mg/hr Intravenous Continuous Jf Archer MD 5 mL/hr at 05/12/19 1259 0.5 mg/hr at 05/12/19 1259    potassium phosphate 15 mmol in dextrose 5 % 250 mL IVPB  15 mmol Intravenous Once Heath Mendoza MD 62.5 mL/hr at 05/11/19 1100      chlorhexidine (PERIDEX) 0.12 % solution 15 mL  15 mL Mouth/Throat BID Kathleen Hutchinson MD   15 mL at 05/12/19 0823    midodrine (PROAMATINE) tablet 5 mg  5 mg Oral TID  Ziggy Mack MD   5 mg at 05/12/19 1721    sodium chloride flush 0.9 % injection 10 mL  10 mL Intravenous PRN Heath Mendoza MD   10 mL at 05/11/19 0504    heparin flush 100 UNIT/ML injection 300 Units  3 mL Intravenous 2 times per day Heath Mendoza MD   300 Units at 05/12/19 0824    heparin flush 100 UNIT/ML injection 300 Units  3 mL Intracatheter PRN Heath Mendoza MD        dexmedetomidine (PRECEDEX) 400 mcg in sodium chloride 0.9 % 100 mL infusion  0.2 mcg/kg/hr Intravenous Continuous Heath Mendoza MD 27.2 mL/hr at 05/12/19 1707 1.2 mcg/kg/hr at 05/12/19 1707    heparin (porcine) injection 5,000 Units  5,000 Units Subcutaneous TID Dequan Camacho MD   5,000 Units at 05/12/19 1400    vitamin D (ERGOCALCIFEROL) capsule 50,000 Units  50,000 Units Oral Weekly Mery Calloway MD   Stopped at 05/08/19 2015    metronidazole (FLAGYL) 500 mg in NaCl 100 mL IVPB premix  500 mg Intravenous Jyoti Vee MD   Stopped at 05/12/19 1632    norepinephrine (LEVOPHED) 16 mg in dextrose 5% 250 mL infusion  2 mcg/min Intravenous Continuous Wendy Rogers MD Stopped at 05/10/19 2046    fentaNYL 5 mcg/mL in D5W 250 mL infusion  25 mcg/hr Intravenous Continuous Janina Walker MD 30 mL/hr at 05/12/19 1723 150 mcg/hr at 05/12/19 1723    sodium chloride flush 0.9 % injection 10 mL  10 mL Intravenous 2 times per day Batsheva Lewis MD   10 mL at 05/12/19 0822    sodium chloride flush 0.9 % injection 10 mL  10 mL Intravenous PRN Batsheva Lewis MD   10 mL at 05/11/19 0504    magnesium hydroxide (MILK OF MAGNESIA) 400 MG/5ML suspension 30 mL  30 mL Oral Daily PRN Batsheva Lewis MD        ondansetron [facility]) injection 4 mg  4 mg Intravenous Q6H PRN Batsheva Lewis MD        famotidine (PEPCID) injection 20 mg  20 mg Intravenous Daily Batsheva Lewis MD   20 mg at 05/12/19 1225    vancomycin (VANCOCIN) oral solution 500 mg  500 mg Oral 4x Daily Dequan Camacho MD   500 mg at 05/12/19 1700    mineral oil-hydrophilic petrolatum (HYDROPHOR) ointment   Topical TID Mitzy Pink MD        And    mineral oil-hydrophilic petrolatum (HYDROPHOR) ointment   Topical TID PRN Mitzy Pink MD               REVIEW OF SYSTEMS:       CONSTITUTIONAL:  no fever   GASTROINTESTINAL:  Diarrhea   GENITOURINARY:  Urine out put - improving    INTEGUMENT:no rash   MUSCULOSKELETAL:  Weakness   NEUROLOGICAL:  Sedated       Objective:    /66   Pulse 68   Temp 100.9 °F (38.3 °C) (Core)   Resp 22   Ht 5' (1.524 m)   Wt 225 lb 5 oz (102.2 kg)   SpO2 97%   BMI 44.00 kg/m²       General Appearance:    Intubated, more awake    Head:    Normocephalic, atraumatic   Eyes:    No pallor, no icterus,   Ears:    No obvious deformity or drainage.    Nose:   No nasal drainage   Throat:   Mucosa dry, no oral thrush   Neck:   Supple, no lymphadenopathy   Lungs:     Scattered rhonchi   Heart:    Regular rate and rhythm   Abdomen:     Soft, bowel sounds present , FMS with loose stool    Extremities:   +++ edema, cold to touch   Pulses:   Dorsalis pedis palpable    Skin:   no rashes or lesions      CBC with Differential: Lab Results   Component Value Date    WBC 9.2 05/12/2019    RBC 2.70 05/12/2019    HGB 9.3 05/12/2019    HCT 27.8 05/12/2019     05/12/2019    .0 05/12/2019    MCH 34.4 05/12/2019    MCHC 33.5 05/12/2019    RDW 13.7 05/12/2019    NRBC 0.9 05/10/2019    LYMPHOPCT 10.0 05/12/2019    PROMYELOPCT 0.9 05/08/2019    MONOPCT 7.2 05/12/2019    BASOPCT 0.3 05/12/2019    MONOSABS 0.66 05/12/2019    LYMPHSABS 0.92 05/12/2019    EOSABS 0.20 05/12/2019    BASOSABS 0.03 05/12/2019       CMP:    Lab Results   Component Value Date     05/12/2019    K 3.2 05/12/2019    K 4.0 05/12/2019     05/12/2019    CO2 26 05/12/2019    BUN 11 05/12/2019    CREATININE 0.7 05/12/2019    GFRAA >60 05/12/2019    LABGLOM >60 05/12/2019    GLUCOSE 140 05/12/2019    PROT 4.9 05/12/2019    LABALBU 2.7 05/12/2019    LABALBU 4.4 08/09/2011    CALCIUM 6.6 05/12/2019    BILITOT 1.3 05/12/2019    ALKPHOS 37 05/12/2019    AST 20 05/12/2019    ALT 97 05/12/2019       Hepatic Function Panel:    Lab Results   Component Value Date    ALKPHOS 37 05/12/2019    ALT 97 05/12/2019    AST 20 05/12/2019    PROT 4.9 05/12/2019    BILITOT 1.3 05/12/2019    BILIDIR 0.6 05/12/2019    IBILI 0.7 05/12/2019    LABALBU 2.7 05/12/2019    LABALBU 4.4 08/09/2011       MICROBIOLOGY:     Blood culture - neg to date   Urine cx - candida        Radiology :     Chest X ray-  Significant worsening of right-sided infiltrate and atelectasis and  small right effusion       CT scan of abdomen and pelvis -     1. Moderate diffuse mucosal thickening from the rectum to the mid sigmoid colon   with mild adjacent stranding, mild mucosal thickening throughout the descending   colon with adjacent stranding, and suggestion of mucosal thickening throughout   the distal transverse colon as well as from the proximal most transverse colon   to the cecum concerning for infectious/inflammatory proctocolitis. 2. Moderate hiatal hernia.    3. Moderate bilateral dependent atelectases versus consolidations with air   bronchograms. 4. No nephrolithiasis or obstructive uropathy. No perinephric stranding. 5. Normal appendix. 6. Additional nonacute/incidental findings as described above.            IMPRESSION:      1. Septic shock due to  C diff infection    2. MOFS - slowly improving   3. Lactic acidosis, shock liver, leukocytosis   4. Respiratory failure   5. Candiduria         RECOMMENDATIONS:      1. Oral vancomycin 500 mg po q 6 hrs , Flagyl 500 mg IV q 8 hrs   2.  Contact isolation         6:54 PM      5/12/2019

## 2019-05-13 ENCOUNTER — APPOINTMENT (OUTPATIENT)
Dept: GENERAL RADIOLOGY | Age: 59
DRG: 870 | End: 2019-05-13
Payer: COMMERCIAL

## 2019-05-13 LAB
AADO2: 164.1 MMHG
ALBUMIN SERPL-MCNC: 2.5 G/DL (ref 3.5–5.2)
ALP BLD-CCNC: 42 U/L (ref 35–104)
ALT SERPL-CCNC: 82 U/L (ref 0–32)
ANION GAP SERPL CALCULATED.3IONS-SCNC: 15 MMOL/L (ref 7–16)
ANION GAP SERPL CALCULATED.3IONS-SCNC: 16 MMOL/L (ref 7–16)
ANION GAP SERPL CALCULATED.3IONS-SCNC: 9 MMOL/L (ref 7–16)
AST SERPL-CCNC: 20 U/L (ref 0–31)
B.E.: 3.3 MMOL/L (ref -3–3)
B.E.: 5.3 MMOL/L (ref -3–3)
BASOPHILS ABSOLUTE: 0.05 E9/L (ref 0–0.2)
BASOPHILS RELATIVE PERCENT: 0.5 % (ref 0–2)
BILIRUB SERPL-MCNC: 0.9 MG/DL (ref 0–1.2)
BILIRUBIN DIRECT: 0.4 MG/DL (ref 0–0.3)
BILIRUBIN, INDIRECT: 0.5 MG/DL (ref 0–1)
BUN BLDV-MCNC: 11 MG/DL (ref 8–23)
CALCIUM IONIZED: 0.92 MMOL/L (ref 1.15–1.33)
CALCIUM SERPL-MCNC: 6.5 MG/DL (ref 8.6–10.2)
CALCIUM SERPL-MCNC: 7 MG/DL (ref 8.6–10.2)
CALCIUM SERPL-MCNC: 7.4 MG/DL (ref 8.6–10.2)
CHLORIDE BLD-SCNC: 94 MMOL/L (ref 98–107)
CHLORIDE BLD-SCNC: 94 MMOL/L (ref 98–107)
CHLORIDE BLD-SCNC: 97 MMOL/L (ref 98–107)
CO2: 31 MMOL/L (ref 22–29)
CO2: 32 MMOL/L (ref 22–29)
CO2: 39 MMOL/L (ref 22–29)
COHB: 1.1 % (ref 0–1.5)
COHB: 1.5 % (ref 0–1.5)
CREAT SERPL-MCNC: 0.8 MG/DL (ref 0.5–1)
CRITICAL: ABNORMAL
CRITICAL: ABNORMAL
CULTURE, BLOOD 2: NORMAL
DATE ANALYZED: ABNORMAL
DATE ANALYZED: ABNORMAL
DATE OF COLLECTION: ABNORMAL
DATE OF COLLECTION: ABNORMAL
EOSINOPHILS ABSOLUTE: 0.27 E9/L (ref 0.05–0.5)
EOSINOPHILS RELATIVE PERCENT: 2.9 % (ref 0–6)
FIO2: 40 %
GFR AFRICAN AMERICAN: >60
GFR NON-AFRICAN AMERICAN: >60 ML/MIN/1.73
GLUCOSE BLD-MCNC: 138 MG/DL (ref 74–99)
GLUCOSE BLD-MCNC: 141 MG/DL (ref 74–99)
GLUCOSE BLD-MCNC: 152 MG/DL (ref 74–99)
HCO3: 26.3 MMOL/L (ref 22–26)
HCO3: 29.2 MMOL/L (ref 22–26)
HCT VFR BLD CALC: 30.6 % (ref 34–48)
HCT VFR BLD CALC: 37.7 % (ref 34–48)
HEMOGLOBIN: 10.2 G/DL (ref 11.5–15.5)
HEMOGLOBIN: 12.5 G/DL (ref 11.5–15.5)
HHB: 3 % (ref 0–5)
HHB: 6.6 % (ref 0–5)
IMMATURE GRANULOCYTES #: 0.17 E9/L
IMMATURE GRANULOCYTES %: 1.8 % (ref 0–5)
INR BLD: 1.9
LAB: ABNORMAL
LAB: ABNORMAL
LV EF: 60 %
LVEF MODALITY: NORMAL
LYMPHOCYTES ABSOLUTE: 1.11 E9/L (ref 1.5–4)
LYMPHOCYTES RELATIVE PERCENT: 12 % (ref 20–42)
Lab: ABNORMAL
Lab: ABNORMAL
MAGNESIUM: 1.1 MG/DL (ref 1.6–2.6)
MAGNESIUM: 1.1 MG/DL (ref 1.6–2.6)
MAGNESIUM: 1.6 MG/DL (ref 1.6–2.6)
MCH RBC QN AUTO: 34.6 PG (ref 26–35)
MCHC RBC AUTO-ENTMCNC: 33.3 % (ref 32–34.5)
MCV RBC AUTO: 103.7 FL (ref 80–99.9)
METHB: 0 % (ref 0–1.5)
METHB: 0.2 % (ref 0–1.5)
MODE: ABNORMAL
MODE: ABNORMAL
MONOCYTES ABSOLUTE: 0.63 E9/L (ref 0.1–0.95)
MONOCYTES RELATIVE PERCENT: 6.8 % (ref 2–12)
NEUTROPHILS ABSOLUTE: 7.03 E9/L (ref 1.8–7.3)
NEUTROPHILS RELATIVE PERCENT: 76 % (ref 43–80)
O2 SATURATION: 93.3 % (ref 92–98.5)
O2 SATURATION: 97 % (ref 92–98.5)
O2HB: 91.7 % (ref 94–97)
O2HB: 95.9 % (ref 94–97)
OPERATOR ID: 2593
OPERATOR ID: ABNORMAL
PATIENT TEMP: 37 C
PATIENT TEMP: 37 C
PCO2: 34.5 MMHG (ref 35–45)
PCO2: 40 MMHG (ref 35–45)
PDW BLD-RTO: 14 FL (ref 11.5–15)
PEEP/CPAP: 5 CMH2O
PFO2: 1.63 MMHG/%
PH BLOOD GAS: 7.48 (ref 7.35–7.45)
PH BLOOD GAS: 7.5 (ref 7.35–7.45)
PHOSPHORUS: 2.7 MG/DL (ref 2.5–4.5)
PHOSPHORUS: 2.8 MG/DL (ref 2.5–4.5)
PLATELET # BLD: 141 E9/L (ref 130–450)
PMV BLD AUTO: 12.2 FL (ref 7–12)
PO2: 65.1 MMHG (ref 60–100)
PO2: 88.3 MMHG (ref 60–100)
POTASSIUM REFLEX MAGNESIUM: 3.4 MMOL/L (ref 3.5–5)
POTASSIUM SERPL-SCNC: 2.8 MMOL/L (ref 3.5–5)
POTASSIUM SERPL-SCNC: 3.1 MMOL/L (ref 3.5–5)
PRO-BNP: 4028 PG/ML (ref 0–125)
PROTHROMBIN TIME: 21.9 SEC (ref 9.3–12.4)
PS: 10 CMH20
RBC # BLD: 2.95 E12/L (ref 3.5–5.5)
REASON FOR REJECTION: NORMAL
REASON FOR REJECTION: NORMAL
REJECTED TEST: NORMAL
REJECTED TEST: NORMAL
RI(T): 2.52
SODIUM BLD-SCNC: 142 MMOL/L (ref 132–146)
SODIUM BLD-SCNC: 142 MMOL/L (ref 132–146)
SODIUM BLD-SCNC: 143 MMOL/L (ref 132–146)
SOURCE, BLOOD GAS: ABNORMAL
SOURCE, BLOOD GAS: ABNORMAL
THB: 11.2 G/DL (ref 11.5–16.5)
THB: 12.5 G/DL (ref 11.5–16.5)
TIME ANALYZED: 2016
TIME ANALYZED: 448
TOTAL PROTEIN: 5.3 G/DL (ref 6.4–8.3)
WBC # BLD: 9.3 E9/L (ref 4.5–11.5)

## 2019-05-13 PROCEDURE — 87070 CULTURE OTHR SPECIMN AEROBIC: CPT

## 2019-05-13 PROCEDURE — 6360000002 HC RX W HCPCS: Performed by: INTERNAL MEDICINE

## 2019-05-13 PROCEDURE — 71045 X-RAY EXAM CHEST 1 VIEW: CPT

## 2019-05-13 PROCEDURE — 85018 HEMOGLOBIN: CPT

## 2019-05-13 PROCEDURE — 99233 SBSQ HOSP IP/OBS HIGH 50: CPT | Performed by: INTERNAL MEDICINE

## 2019-05-13 PROCEDURE — 2500000003 HC RX 250 WO HCPCS: Performed by: INTERNAL MEDICINE

## 2019-05-13 PROCEDURE — 36415 COLL VENOUS BLD VENIPUNCTURE: CPT

## 2019-05-13 PROCEDURE — 6370000000 HC RX 637 (ALT 250 FOR IP): Performed by: INTERNAL MEDICINE

## 2019-05-13 PROCEDURE — 80053 COMPREHEN METABOLIC PANEL: CPT

## 2019-05-13 PROCEDURE — 2580000003 HC RX 258: Performed by: INTERNAL MEDICINE

## 2019-05-13 PROCEDURE — 82330 ASSAY OF CALCIUM: CPT

## 2019-05-13 PROCEDURE — 84100 ASSAY OF PHOSPHORUS: CPT

## 2019-05-13 PROCEDURE — 94667 MNPJ CHEST WALL 1ST: CPT

## 2019-05-13 PROCEDURE — 80048 BASIC METABOLIC PNL TOTAL CA: CPT

## 2019-05-13 PROCEDURE — 82805 BLOOD GASES W/O2 SATURATION: CPT

## 2019-05-13 PROCEDURE — 94003 VENT MGMT INPAT SUBQ DAY: CPT

## 2019-05-13 PROCEDURE — 85025 COMPLETE CBC W/AUTO DIFF WBC: CPT

## 2019-05-13 PROCEDURE — 87077 CULTURE AEROBIC IDENTIFY: CPT

## 2019-05-13 PROCEDURE — 85014 HEMATOCRIT: CPT

## 2019-05-13 PROCEDURE — 85610 PROTHROMBIN TIME: CPT

## 2019-05-13 PROCEDURE — 82248 BILIRUBIN DIRECT: CPT

## 2019-05-13 PROCEDURE — 2700000000 HC OXYGEN THERAPY PER DAY

## 2019-05-13 PROCEDURE — 83880 ASSAY OF NATRIURETIC PEPTIDE: CPT

## 2019-05-13 PROCEDURE — 87186 SC STD MICRODIL/AGAR DIL: CPT

## 2019-05-13 PROCEDURE — 93306 TTE W/DOPPLER COMPLETE: CPT

## 2019-05-13 PROCEDURE — 94664 DEMO&/EVAL PT USE INHALER: CPT

## 2019-05-13 PROCEDURE — 2000000000 HC ICU R&B

## 2019-05-13 PROCEDURE — 83735 ASSAY OF MAGNESIUM: CPT

## 2019-05-13 PROCEDURE — 94799 UNLISTED PULMONARY SVC/PX: CPT

## 2019-05-13 RX ORDER — POTASSIUM CHLORIDE 29.8 MG/ML
20 INJECTION INTRAVENOUS
Status: DISCONTINUED | OUTPATIENT
Start: 2019-05-13 | End: 2019-05-13

## 2019-05-13 RX ORDER — POTASSIUM CHLORIDE 29.8 MG/ML
20 INJECTION INTRAVENOUS
Status: COMPLETED | OUTPATIENT
Start: 2019-05-13 | End: 2019-05-13

## 2019-05-13 RX ORDER — LACTOBACILLUS RHAMNOSUS GG 10B CELL
1 CAPSULE ORAL 2 TIMES DAILY
Status: DISCONTINUED | OUTPATIENT
Start: 2019-05-13 | End: 2019-05-24 | Stop reason: HOSPADM

## 2019-05-13 RX ORDER — SODIUM CHLORIDE 9 MG/ML
INJECTION, SOLUTION INTRAVENOUS CONTINUOUS
Status: DISCONTINUED | OUTPATIENT
Start: 2019-05-13 | End: 2019-05-13

## 2019-05-13 RX ORDER — MAGNESIUM SULFATE IN WATER 40 MG/ML
2 INJECTION, SOLUTION INTRAVENOUS ONCE
Status: COMPLETED | OUTPATIENT
Start: 2019-05-13 | End: 2019-05-13

## 2019-05-13 RX ORDER — ALBUTEROL SULFATE 0.63 MG/3ML
0.63 SOLUTION RESPIRATORY (INHALATION) EVERY 6 HOURS PRN
Status: DISCONTINUED | OUTPATIENT
Start: 2019-05-13 | End: 2019-05-18

## 2019-05-13 RX ORDER — LABETALOL HYDROCHLORIDE 5 MG/ML
10 INJECTION, SOLUTION INTRAVENOUS ONCE
Status: COMPLETED | OUTPATIENT
Start: 2019-05-13 | End: 2019-05-13

## 2019-05-13 RX ORDER — POTASSIUM CHLORIDE 7.45 MG/ML
10 INJECTION INTRAVENOUS
Status: COMPLETED | OUTPATIENT
Start: 2019-05-13 | End: 2019-05-13

## 2019-05-13 RX ORDER — SODIUM CHLORIDE 9 MG/ML
INJECTION, SOLUTION INTRAVENOUS ONCE
Status: COMPLETED | OUTPATIENT
Start: 2019-05-13 | End: 2019-05-13

## 2019-05-13 RX ORDER — MORPHINE SULFATE 2 MG/ML
2 INJECTION, SOLUTION INTRAMUSCULAR; INTRAVENOUS EVERY 4 HOURS PRN
Status: DISCONTINUED | OUTPATIENT
Start: 2019-05-13 | End: 2019-05-24 | Stop reason: HOSPADM

## 2019-05-13 RX ADMIN — POTASSIUM CHLORIDE 10 MEQ: 7.46 INJECTION, SOLUTION INTRAVENOUS at 22:39

## 2019-05-13 RX ADMIN — Medication 500 MG: at 09:23

## 2019-05-13 RX ADMIN — SODIUM CHLORIDE: 9 INJECTION, SOLUTION INTRAVENOUS at 15:53

## 2019-05-13 RX ADMIN — Medication 500 MG: at 20:35

## 2019-05-13 RX ADMIN — Medication 200 MCG/HR: at 11:43

## 2019-05-13 RX ADMIN — Medication 10 ML: at 09:23

## 2019-05-13 RX ADMIN — POTASSIUM CHLORIDE 10 MEQ: 7.46 INJECTION, SOLUTION INTRAVENOUS at 21:21

## 2019-05-13 RX ADMIN — SODIUM CHLORIDE 1.2 MCG/KG/HR: 9 INJECTION, SOLUTION INTRAVENOUS at 01:30

## 2019-05-13 RX ADMIN — MIDODRINE HYDROCHLORIDE 5 MG: 5 TABLET ORAL at 12:09

## 2019-05-13 RX ADMIN — HEPARIN SODIUM 5000 UNITS: 10000 INJECTION INTRAVENOUS; SUBCUTANEOUS at 14:04

## 2019-05-13 RX ADMIN — Medication 10 ML: at 20:37

## 2019-05-13 RX ADMIN — ACETAZOLAMIDE 500 MG: 500 INJECTION, POWDER, LYOPHILIZED, FOR SOLUTION INTRAVENOUS at 17:30

## 2019-05-13 RX ADMIN — HEPARIN SODIUM 5000 UNITS: 10000 INJECTION INTRAVENOUS; SUBCUTANEOUS at 20:09

## 2019-05-13 RX ADMIN — Medication 20 MEQ: at 08:05

## 2019-05-13 RX ADMIN — Medication 20 MEQ: at 09:36

## 2019-05-13 RX ADMIN — Medication 20 MEQ: at 11:43

## 2019-05-13 RX ADMIN — CHLORHEXIDINE GLUCONATE 0.12% ORAL RINSE 15 ML: 1.2 LIQUID ORAL at 09:25

## 2019-05-13 RX ADMIN — POTASSIUM CHLORIDE 10 MEQ: 7.46 INJECTION, SOLUTION INTRAVENOUS at 23:44

## 2019-05-13 RX ADMIN — LABETALOL HYDROCHLORIDE 10 MG: 5 INJECTION INTRAVENOUS at 19:17

## 2019-05-13 RX ADMIN — MAGNESIUM SULFATE 2 G: 2 INJECTION INTRAVENOUS at 06:35

## 2019-05-13 RX ADMIN — METRONIDAZOLE 500 MG: 500 INJECTION, SOLUTION INTRAVENOUS at 00:00

## 2019-05-13 RX ADMIN — SODIUM CHLORIDE 1.2 MCG/KG/HR: 9 INJECTION, SOLUTION INTRAVENOUS at 05:42

## 2019-05-13 RX ADMIN — Medication 1 CAPSULE: at 20:39

## 2019-05-13 RX ADMIN — METRONIDAZOLE 500 MG: 500 INJECTION, SOLUTION INTRAVENOUS at 23:45

## 2019-05-13 RX ADMIN — HEPARIN SODIUM 5000 UNITS: 10000 INJECTION INTRAVENOUS; SUBCUTANEOUS at 09:24

## 2019-05-13 RX ADMIN — MIDODRINE HYDROCHLORIDE 5 MG: 5 TABLET ORAL at 09:22

## 2019-05-13 RX ADMIN — ACETAMINOPHEN 650 MG: 160 SOLUTION ORAL at 15:09

## 2019-05-13 RX ADMIN — METRONIDAZOLE 500 MG: 500 INJECTION, SOLUTION INTRAVENOUS at 09:23

## 2019-05-13 RX ADMIN — Medication 500 MG: at 17:30

## 2019-05-13 RX ADMIN — PETROLATUM: 42 OINTMENT TOPICAL at 20:39

## 2019-05-13 RX ADMIN — FAMOTIDINE 20 MG: 10 INJECTION, SOLUTION INTRAVENOUS at 09:23

## 2019-05-13 RX ADMIN — PETROLATUM: 42 OINTMENT TOPICAL at 09:25

## 2019-05-13 RX ADMIN — MORPHINE SULFATE 2 MG: 2 INJECTION, SOLUTION INTRAMUSCULAR; INTRAVENOUS at 18:27

## 2019-05-13 RX ADMIN — ACETAMINOPHEN 650 MG: 160 SOLUTION ORAL at 01:56

## 2019-05-13 RX ADMIN — SODIUM CHLORIDE: 9 INJECTION, SOLUTION INTRAVENOUS at 16:21

## 2019-05-13 RX ADMIN — Medication 200 MCG/HR: at 05:02

## 2019-05-13 RX ADMIN — CALCIUM GLUCONATE 1 G: 98 INJECTION, SOLUTION INTRAVENOUS at 09:22

## 2019-05-13 RX ADMIN — ALBUTEROL SULFATE 0.63 MG: 0.63 SOLUTION RESPIRATORY (INHALATION) at 21:34

## 2019-05-13 RX ADMIN — SODIUM CHLORIDE, PRESERVATIVE FREE 300 UNITS: 5 INJECTION INTRAVENOUS at 09:24

## 2019-05-13 RX ADMIN — Medication 20 MEQ: at 16:21

## 2019-05-13 RX ADMIN — MIDODRINE HYDROCHLORIDE 5 MG: 5 TABLET ORAL at 17:30

## 2019-05-13 RX ADMIN — METRONIDAZOLE 500 MG: 500 INJECTION, SOLUTION INTRAVENOUS at 15:53

## 2019-05-13 RX ADMIN — MAGNESIUM SULFATE HEPTAHYDRATE 2 G: 40 INJECTION, SOLUTION INTRAVENOUS at 14:03

## 2019-05-13 RX ADMIN — SODIUM CHLORIDE 1 MCG/KG/HR: 9 INJECTION, SOLUTION INTRAVENOUS at 09:44

## 2019-05-13 RX ADMIN — Medication 500 MG: at 12:10

## 2019-05-13 RX ADMIN — BUMETANIDE 0.5 MG/HR: 0.25 INJECTION INTRAMUSCULAR; INTRAVENOUS at 09:44

## 2019-05-13 RX ADMIN — MAGNESIUM SULFATE HEPTAHYDRATE 2 G: 40 INJECTION, SOLUTION INTRAVENOUS at 21:21

## 2019-05-13 RX ADMIN — PETROLATUM: 42 OINTMENT TOPICAL at 14:05

## 2019-05-13 ASSESSMENT — PAIN SCALES - WONG BAKER

## 2019-05-13 ASSESSMENT — PULMONARY FUNCTION TESTS
PIF_VALUE: 15
PIF_VALUE: 16
PIF_VALUE: 15
PIF_VALUE: 17
PIF_VALUE: 16
PIF_VALUE: 16
PIF_VALUE: 15
PIF_VALUE: 15
PIF_VALUE: 16
PIF_VALUE: 16
PIF_VALUE: 15
PIF_VALUE: 16
PIF_VALUE: 15
PIF_VALUE: 16

## 2019-05-13 ASSESSMENT — PAIN SCALES - GENERAL
PAINLEVEL_OUTOF10: 3
PAINLEVEL_OUTOF10: 0
PAINLEVEL_OUTOF10: 0
PAINLEVEL_OUTOF10: 7
PAINLEVEL_OUTOF10: 0

## 2019-05-13 ASSESSMENT — PAIN DESCRIPTION - FREQUENCY: FREQUENCY: CONTINUOUS

## 2019-05-13 ASSESSMENT — PAIN DESCRIPTION - LOCATION: LOCATION: BACK

## 2019-05-13 ASSESSMENT — PAIN DESCRIPTION - ONSET: ONSET: ON-GOING

## 2019-05-13 ASSESSMENT — PAIN DESCRIPTION - PAIN TYPE: TYPE: CHRONIC PAIN

## 2019-05-13 ASSESSMENT — PAIN DESCRIPTION - ORIENTATION: ORIENTATION: RIGHT;LEFT

## 2019-05-13 ASSESSMENT — PAIN DESCRIPTION - DESCRIPTORS: DESCRIPTORS: ACHING;CONSTANT

## 2019-05-13 ASSESSMENT — PAIN DESCRIPTION - PROGRESSION: CLINICAL_PROGRESSION: NOT CHANGED

## 2019-05-13 NOTE — PROGRESS NOTES
· Additional Calories: d/c      · Anthropometric Measures:  · Ht: 5' (152.4 cm)   · Current Body Wt: 214 lb (97.1 kg)(5/13 actual )  · Admission Body Wt: 209 lb (94.8 kg)(5/7 first measured )  · Usual Body Wt: UTO no EMR hx on file   · Weight Change: CBW elevated since admit +I/O's at this time.  Unable to assess PTA hx    · Ideal Body Wt: 100 lb (45.4 kg), % [place] Body 209%(using adm wt)  · BMI Classification: BMI > or equal to 40.0 Obese Class III(using adm wt 41)    Nutrition Interventions:   Continued Inpatient Monitoring, Education not appropriate at this time, Coordination of Care(Pt status d/w RN)    Nutrition Evaluation:   · Evaluation: Goals set   · Goals: Nutrition Progression    · Monitoring: Nutrition Progression, Skin Integrity, Wound Healing, I&O, Mental Status/Confusion, Weight, Pertinent Labs, Monitor Bowel Function, Diarrhea      Electronically signed by Kal Galicia RD, LD on 5/13/19 at 1:41 PM    Contact Number: Ext [phone]

## 2019-05-13 NOTE — PLAN OF CARE
Problem: Inadequate oral food/beverage intake (NI-2.1)  Goal: Food and/or Nutrient Delivery  Description- Nutrition progression  Individualized approach for food/nutrient provision.   Outcome: Met This Shift

## 2019-05-13 NOTE — PROGRESS NOTES
C/C:  Severe C diff infection      Pt is intubated, awake and alert  Denies fever and chills  \"wants the tube out\"  Denies abdomen pain     Afebrile       Current Facility-Administered Medications   Medication Dose Route Frequency Provider Last Rate Last Dose    potassium chloride 20 mEq/50 mL IVPB (Central Line)  20 mEq Intravenous Q2H Zachery Barth MD 50 mL/hr at 05/13/19 1143 20 mEq at 05/13/19 1143    acetaminophen (TYLENOL) 160 MG/5ML solution 650 mg  650 mg Oral Q6H PRN Wilder Ramos MD   650 mg at 05/13/19 0156    bumetanide (BUMEX) 12.5 mg in sodium chloride 0.9 % 125 mL infusion  0.5 mg/hr Intravenous Continuous Jf Quintanilla MD 5 mL/hr at 05/13/19 0944 0.5 mg/hr at 05/13/19 0944    potassium phosphate 15 mmol in dextrose 5 % 250 mL IVPB  15 mmol Intravenous Once Angeli Landeros MD 62.5 mL/hr at 05/11/19 1100      chlorhexidine (PERIDEX) 0.12 % solution 15 mL  15 mL Mouth/Throat BID Kathleen Hutchinson MD   15 mL at 05/13/19 0925    midodrine (PROAMATINE) tablet 5 mg  5 mg Oral TID WC Darya Olivera MD   5 mg at 05/13/19 1209    sodium chloride flush 0.9 % injection 10 mL  10 mL Intravenous PRN Angeli Landeros MD   10 mL at 05/11/19 0504    heparin flush 100 UNIT/ML injection 300 Units  3 mL Intravenous 2 times per day Angeli Landeros MD   300 Units at 05/13/19 0924    heparin flush 100 UNIT/ML injection 300 Units  3 mL Intracatheter PRN Angeli Landeros MD        dexmedetomidine (PRECEDEX) 400 mcg in sodium chloride 0.9 % 100 mL infusion  0.2 mcg/kg/hr Intravenous Continuous Angeli Landeros MD 22.7 mL/hr at 05/13/19 0944 1 mcg/kg/hr at 05/13/19 0944    heparin (porcine) injection 5,000 Units  5,000 Units Subcutaneous TID Dequan Camacho MD   5,000 Units at 05/13/19 0924    vitamin D (ERGOCALCIFEROL) capsule 50,000 Units  50,000 Units Oral Weekly Wilder Ramos MD   Stopped at 05/08/19 2015    metronidazole (FLAGYL) 500 mg in NaCl 100 mL IVPB premix  500 mg rate and rhythm   Abdomen:     Soft, bowel sounds present , FMS with loose stool    Extremities:   + edema, cold to touch   Pulses:   Dorsalis pedis palpable    Skin:   no rashes or lesions      CBC with Differential:      Lab Results   Component Value Date    WBC 9.3 05/13/2019    RBC 2.95 05/13/2019    HGB 10.2 05/13/2019    HCT 30.6 05/13/2019     05/13/2019    .7 05/13/2019    MCH 34.6 05/13/2019    MCHC 33.3 05/13/2019    RDW 14.0 05/13/2019    NRBC 0.9 05/10/2019    LYMPHOPCT 12.0 05/13/2019    PROMYELOPCT 0.9 05/08/2019    MONOPCT 6.8 05/13/2019    BASOPCT 0.5 05/13/2019    MONOSABS 0.63 05/13/2019    LYMPHSABS 1.11 05/13/2019    EOSABS 0.27 05/13/2019    BASOSABS 0.05 05/13/2019       CMP:    Lab Results   Component Value Date     05/13/2019    K 2.8 05/13/2019    K 4.0 05/12/2019    CL 97 05/13/2019    CO2 31 05/13/2019    BUN 11 05/13/2019    CREATININE 0.8 05/13/2019    GFRAA >60 05/13/2019    LABGLOM >60 05/13/2019    GLUCOSE 141 05/13/2019    PROT 5.3 05/13/2019    LABALBU 2.5 05/13/2019    LABALBU 4.4 08/09/2011    CALCIUM 6.5 05/13/2019    BILITOT 0.9 05/13/2019    ALKPHOS 42 05/13/2019    AST 20 05/13/2019    ALT 82 05/13/2019       Hepatic Function Panel:    Lab Results   Component Value Date    ALKPHOS 42 05/13/2019    ALT 82 05/13/2019    AST 20 05/13/2019    PROT 5.3 05/13/2019    BILITOT 0.9 05/13/2019    BILIDIR 0.4 05/13/2019    IBILI 0.5 05/13/2019    LABALBU 2.5 05/13/2019    LABALBU 4.4 08/09/2011       MICROBIOLOGY:     Blood culture - neg to date   Urine cx - candida        Radiology :     CT chest -    Bilateral opacities and effusion        CT scan of abdomen and pelvis -     Diffuse mucosal thickening rectum to mid colon          IMPRESSION:      1. Septic shock -Improved   2. Sever C   C diff infection    2. MOFS - slowly improving   3. Lactic acidosis, shock liver, leukocytosis - improved   4. Respiratory failure   5. Candiduria         RECOMMENDATIONS:      1.

## 2019-05-13 NOTE — PROGRESS NOTES
PT EXTUBATED TO A 6L NASAL CANNULA FOLLOWING GOOD LEAK TEST EARLIER. PT HAS NO STRIDOR AT THIS TIME, O2 SAT NOT READING AT THIS TIME.

## 2019-05-13 NOTE — PLAN OF CARE
Problem: Restraint Use - Nonviolent/Non-Self-Destructive Behavior:  Goal: Absence of restraint-related injury  Description  Absence of restraint-related injury  5/13/2019 0958 by Kali Le  Outcome: Met This Shift     Problem: Restraint Use - Nonviolent/Non-Self-Destructive Behavior:  Goal: Absence of restraint indications  Description  Absence of restraint indications  5/13/2019 0958 by Kali Le  Outcome: Not Met This Shift

## 2019-05-13 NOTE — PROGRESS NOTES
Subjective: Patient being seen in coverage for hospital service. Chart reviewed and hospital course to date reviewed  Agent is in the intensive care unit on ventilator mildly sedated but still awake and responsive  Her current medications and treatment plan have been reviewed  Awake but still intubated  No problems overnight reported by staff  In no apparent distress     Objective:  BP (!) 82/45   Pulse 69   Temp 99.3 °F (37.4 °C) (Core)   Resp 16   Ht 5' (1.524 m)   Wt 214 lb 8.1 oz (97.3 kg)   SpO2 94%   BMI 41.89 kg/m²   Skin: Warm and dry  Eyes are open pupils are equal and reactive to light  Neck: No masses or nodes noted  Heart:  RRR, no murmurs, gallops, or rubs. Lungs:  CTA bilaterally, no wheeze, rales or rhonchi-she has very poor ventilatory excursion  Abd: bowel sounds present, nontender, nondistended, no masses  Extrem:  No clubbing, cyanosis, or edema, pulses intact    I/O last 3 completed shifts: In: 2253 [I.V.:2500; NG/GT:1052]  Out: 7401 [MJLPM:6401; MHNCL:849]    Results      Component Value Units   Hemoglobin and hematocrit, blood [[id number]] (Abnormal) Collected: 05/12/19 2040   Updated: 05/12/19 2116    Specimen Source: Blood     Hemoglobin 10. 0Low  g/dL    Hematocrit 30.6Low  %   Narrative:      05/12/2019  21:10   POCT Glucose [[id number]] (Abnormal) Collected: 05/12/19 2037   Updated: 05/12/19 2045     Meter Glucose 134High  mg/dL   Basic Metabolic Panel [[id number]] (Abnormal) Collected: 05/12/19 1725   Updated: 05/12/19 1811    Specimen Source: Blood     Sodium 139 mmol/L    Potassium 3.2Low  mmol/L    Chloride 101 mmol/L    CO2 26 mmol/L    Anion Gap 12 mmol/L    Glucose 140High  mg/dL    BUN 11 mg/dL    CREATININE 0.7 mg/dL    GFR Non-African American >60 mL/min/1.73    Comment: Chronic Kidney Disease: less than 60 ml/min/1.73 sq. m.         Kidney Failure: less than 15 ml/min/1.73 sq.m. Results valid for patients 18 years and older.         GFR African American >60    Calcium 6.6Low  mg/dL   Phosphorus [[id number]] (Abnormal) Collected: 19 1725   Updated: 19 1811    Specimen Source: Blood     Phosphorus 2.0Low  mg/dL   CULTURE BLOOD #1 [[id number]] Collected: 19 1445   Updated: 19 1635    Specimen Type: Blood     Blood Culture, Routine 5 Days- no growth   Narrative:     Source: BLOOD       Site: Blood&Blood             CT Chest WO Contrast [[id number]] Resulted: 19 1231   Updated: 19 1233    Narrative:     Patient MRN:  [id number]  : 1960  Age: 59 years  Gender: Female    Order Date:  2019 10:45 AM    EXAM: CT CHEST WO CONTRAST    COMPARISON: No prior CT. INDICATION:  check for right sided pathology      TECHNIQUE:  Low-dose CT acquisition technique included one of the  following options; 1. Automated exposure control, 2. Adjustment of mA  and/or kV according to the patient's size or 3. Use of iterative  reconstruction. FINDINGS:  Endotracheal tube is present with distal tip 1 cm above the jono. Nasogastric tube courses below the level diaphragm and is located in  the stomach. There are patchy airspace opacities bilaterally with  notable upper lobe and perihilar distribution. There is peribronchial  thickening bilaterally. More confluent opacities are seen at right  lung base with air bronchograms. There are moderate size bilateral  pleural effusions. No evidence of pneumothorax. The heart is normal in  size. There are few prominent paratracheal lymph nodes nodes and  prominent lymph node that level of the AP window measures up to 12 x 9  mm. View of the upper abdomen shows small ascites. There is thickened  appearance of wall of visualized loops of large bowel. Neuro spine  stimulator leads demonstrated. Impression:       1. Bilateral alveolar opacities in upper lobe in perihilar  distribution suggestive of interstitial pulmonary edema or pneumonia.     2. More confluent opacities are seen at right lung base related to  pneumonia and atelectasis. 3. Moderate-sized bilateral pleural effusions. 4. Prominent and mildly enlarged mediastinal lymph nodes. 5. Endotracheal tube is 1 cm above jono. CULTURE BLOOD #1 [[id number]] Collected: 19 1105   Updated: 19 1113    Specimen Type: Blood    CULTURE BLOOD #2 [[id number]] Collected: 19 1100   Updated: 19 1113    Specimen Source: Blood    Urine culture [[id number]] Collected: 19 0930   Updated: 19 0949    Specimen Type: Body Fluid    Specimen Source: Urine, clean catch    XR CHEST PORTABLE [[id number]] Resulted: 19 8997   Updated: 19 0836    Narrative:     Patient MRN:  [id number]  : 1960  Age: 59 years  Gender: Female    Order Date:  2019 6:00 AM    EXAM: XR CHEST PORTABLE    COMPARISON: May 11, 2019    INDICATION:  intubation   intubation     FINDINGS:    Endotracheal tube is present. Pearley Samantha is not optimally visualized. Patient is rotated. There are interstitial and hazy opacities  bilaterally appearing similar since prior. No obvious pneumothorax. Nasogastric tube courses below the level diaphragm. Impression:       1. Limited visualization of nasogastric tube. 2. Redemonstration of interstitial and hazy opacities throughout both  lungs suggestive of interstitial pulmonary edema or pneumonia. Short-term follow-up could be helpful for further evaluation. Basic Metabolic Panel w/ Reflex to MG [[id number]] (Abnormal) Collected: 19 0615   Updated: 19 0718    Specimen Source: Blood     Sodium 142 mmol/L    Potassium reflex Magnesium 4.0 mmol/L    Chloride 105 mmol/L    CO2 25 mmol/L    Anion Gap 12 mmol/L    Glucose 146High  mg/dL    BUN 13 mg/dL    CREATININE 0.7 mg/dL    GFR Non-African American >60 mL/min/1.73    Comment: Chronic Kidney Disease: less than 60 ml/min/1.73 sq. m.         Kidney Failure: less than 15 ml/min/1.73 sq.m. Results valid for patients 18 years and older.         GFR  >60 Calcium 6.6Low  mg/dL   Magnesium [[id number]] (Abnormal) Collected: 05/12/19 0615   Updated: 05/12/19 0718    Specimen Source: Blood     Magnesium 1.4Low  mg/dL   Phosphorus [[id number]] (Abnormal) Collected: 05/12/19 0615   Updated: 05/12/19 0718    Specimen Source: Blood     Phosphorus 1.2Low  mg/dL   Hepatic function panel [[id number]] (Abnormal) Collected: 05/12/19 0615   Updated: 05/12/19 0718    Specimen Source: Blood     Total Protein 4.9Low  g/dL    Alb 2.7Low  g/dL    Alkaline Phosphatase 37 U/L    ALT 97High  U/L    AST 20 U/L    Total Bilirubin 1.3High  mg/dL    Bilirubin, Direct 0.6High  mg/dL    Bilirubin, Indirect 0.7 mg/dL   CBC Auto Differential [[id number]] (Abnormal) Collected: 05/12/19 0615   Updated: 05/12/19 7070    Specimen Source: Blood     WBC 9.2 E9/L    RBC 2.70Low  E12/L    Hemoglobin 9.3Low  g/dL    Hematocrit 27.8Low  %    . 0High  fL    MCH 34.4 pg    MCHC 33.5 %    RDW 13.7 fL    Platelets 472EGI  N5/H    MPV 11.8 fL    Neutrophils % 78.3 %    Immature Granulocytes % 2.0 %    Lymphocytes % 10. 0Low  %    Monocytes % 7.2 %    Eosinophils % 2.2 %    Basophils % 0.3 %    Neutrophils # 7.22 E9/L    Immature Granulocytes # 0.18 E9/L    Lymphocytes # 0. 92Low  E9/L    Monocytes # 0.66 E9/L    Eosinophils # 0.20 E9/L    Basophils # 0.03 E9/L   Protime-INR [[id number]] (Abnormal) Collected: 05/12/19 0615   Updated: 05/12/19 0636    Specimen Source: Blood     Protime 19. 8High  sec    INR 1.7   Blood Gas, Arterial [[id number]] (Abnormal) Resulted: 05/12/19 0549   Updated: 05/12/19 0552    Specimen Source: Blood     Date Analyzed [id number]    Time Analyzed 0549    Source: Blood Arterial    pH, Blood Gas 7.482High     PCO2 34.8Low  mmHg    PO2 78.8 mmHg    HCO3 25.5 mmol/L    B.E. 2.1 mmol/L    O2 Sat 96.2 %    PO2/FIO2 1.58 mmHg/%    AaDO2 226.1 mmHg    RI(T) 2.87    O2Hb 95.4 %    COHb 0.6 %    MetHb 0.2 %    HHb 3.8 %    tHb (est) 10.2Low  g/dL    Mode AC    FIO2 50.0 %    Rr Mechanical 14.0 b/min    Vt Mechanical 400.0 mL    Peep/Cpap 5.0 cmH2O    Date Of Collection --    Time Collected --    Pt Temp 37.0 C     ID [id number]    Lab [id number]    Critical(s) Notified . No Critical Values   Culture Blood #1 [[id number]] Collected: 05/07/19 0035   Updated: 05/12/19 0235    Specimen Type: Blood     Blood Culture, Routine 5 Days- no growth   Narrative:     Source: BLOOD       Site: Blood&Blood             Culture Blood #2 [[id number]] Collected: 05/07/19 0050   Updated: 05/12/19 0235    Specimen Source: Blood     Culture, Blood 2 5 Days- no growth   Narrative:     Source: BLOOD       Site:              POCT Glucose [[id number]] (Abnormal) Collected: 05/11/19 2349   Updated: 05/11/19 2354     Meter Glucose 142High  mg/dL         XR CHEST PORTABLE   Final Result   1. Stable, enlarged cardiomediastinal silhouette with underlying   pulmonary edema. 2. Multifocal airspace disease likely suggestive follow-up multifocal   infiltrate/pneumonia and/or atelectasis, please see CT chest report   5/12/2019. CT Chest WO Contrast   Final Result      1. Bilateral alveolar opacities in upper lobe in perihilar   distribution suggestive of interstitial pulmonary edema or pneumonia. 2. More confluent opacities are seen at right lung base related to   pneumonia and atelectasis. 3. Moderate-sized bilateral pleural effusions. 4. Prominent and mildly enlarged mediastinal lymph nodes. 5. Endotracheal tube is 1 cm above jono. XR CHEST PORTABLE   Final Result      1. Limited visualization of nasogastric tube. 2. Redemonstration of interstitial and hazy opacities throughout both   lungs suggestive of interstitial pulmonary edema or pneumonia. Short-term follow-up could be helpful for further evaluation.       XR CHEST PORTABLE   Final Result      Interstitial and hazy opacities throughout both lungs which appear to   have increased throughout left lung since yesterday's exam. Opacities   appear similar on the right. Continued follow-up could be helpful for   further evaluation. XR CHEST PORTABLE   Final Result   CHF with marginal improvement and decreasing edema. XR CHEST PORTABLE   Final Result   Significant worsening of right-sided infiltrate and atelectasis and   small right effusion               XR ABDOMEN (KUB) (SINGLE AP VIEW)   Final Result   No acute process               XR CHEST PORTABLE   Final Result      1. Satisfactory placement of nasogastric tube. 2. Persistent interstitial pulmonary edema, pneumonia, or atelectasis   bilaterally. Continued follow-up could be helpful for further   evaluation. XR Chest Abdomen Ng Placement   Final Result      Nasogastric tubing appears to be appropriately configured. Right common femoral vein catheter appears to be peripherally   configured. XR CHEST PORTABLE   Final Result      1. Abnormal location of nasogastric tube which appears to be coiled   within the mid esophagus. 2. Increased interstitial and hazy opacities at left hilar location   and left lung base which could suggest increasing pneumonia or   atelectasis. Short-term follow-up could be helpful for further   evaluation. ALERT:  THIS IS AN ABNORMAL REPORT. CT Head WO Contrast   Final Result   1. No acute intracranial hemorrhage identified. 2. Additional nonacute/incidental findings as described above. This report has been electronically signed by Julian Mendoza MD.      [address] [facility] Additional Contrast? None   Final Result   1. Moderate diffuse mucosal thickening from the rectum to the mid sigmoid colon    with mild adjacent stranding, mild mucosal thickening throughout the descending    colon with adjacent stranding, and suggestion of mucosal thickening throughout    the distal transverse colon as well as from the proximal most transverse colon    to the cecum concerning for infectious/inflammatory proctocolitis.     2. Moderate hiatal hernia. 3. Moderate bilateral dependent atelectases versus consolidations with air    bronchograms. 4. No nephrolithiasis or obstructive uropathy. No perinephric stranding. 5. Normal appendix. 6. Additional nonacute/incidental findings as described above. This report has been electronically signed by Bonnie Dominguez MD.      XR CHEST PORTABLE   Final Result      Interstitial opacities in perihilar and infrahilar locations could   suggest peribronchial thickening/inflammation. Continued follow-up   could be helpful for further evaluation. XR CHEST PORTABLE    (Results Pending)       Prior to Admission medications    Medication Sig Start Date End Date Taking? Authorizing Provider   morphine (MS CONTIN) 30 MG extended release tablet Take 1 tablet by mouth 2 times daily for 30 days THIS PRESCRIPTION IS A 30 DAY SUPPLY. ( ICD: 10  G 89.4). Earliest Fill Date: 1/6/18 1/6/18 5/7/19 Yes Aamir Joshi MD   pregabalin (LYRICA) 200 MG capsule Take 1 capsule by mouth every 8 hours . Patient taking differently: Take 100 mg by mouth every 8 hours. 12/6/17 5/7/19 Yes OSMANI Baeza CNP   orphenadrine (NORFLEX) 100 MG extended release tablet Take 1 tablet by mouth 2 times daily 3/14/17 5/7/19 Yes OSMANI Guerra CNP   ALBUTEROL SULFATE IN Inhale 2 puffs into the lungs 2 times daily   Yes Historical Provider, MD   guaiFENesin (MUCINEX) 600 MG SR tablet Take 1,200 mg by mouth 2 times daily   Yes Historical Provider, MD   Cholecalciferol (VITAMIN D3) 2000 UNITS CAPS Take 1 capsule by mouth daily   Yes Historical Provider, MD   Ferrous Gluconate (IRON) 240 (27 FE) MG TABS Take 2 tablets by mouth daily   Yes Historical Provider, MD   b complex vitamins capsule Take 1 capsule by mouth daily   Yes Historical Provider, MD   Coenzyme Q10 (CO Q 10) 100 MG CAPS Take 1 capsule by mouth daily   Yes Historical Provider, MD   sucralfate (CARAFATE) 1 GM tablet Take 1 g by mouth 4 times daily. 12.5 mg in sodium chloride 0.9 % 125 mL infusion  0.5 mg/hr Intravenous Continuous Jf Archer MD 5 mL/hr at 05/12/19 1259 0.5 mg/hr at 05/12/19 1259    potassium phosphate 15 mmol in dextrose 5 % 250 mL IVPB  15 mmol Intravenous Once Jas Cheney MD 62.5 mL/hr at 05/11/19 1100      chlorhexidine (PERIDEX) 0.12 % solution 15 mL  15 mL Mouth/Throat BID Kathleen Hutchinson MD   15 mL at 05/12/19 2103    midodrine (PROAMATINE) tablet 5 mg  5 mg Oral TID WC Karen Conroy MD   5 mg at 05/12/19 1721    sodium chloride flush 0.9 % injection 10 mL  10 mL Intravenous PRN Jas Cheney MD   10 mL at 05/11/19 0504    heparin flush 100 UNIT/ML injection 300 Units  3 mL Intravenous 2 times per day Jas Cheney MD   300 Units at 05/12/19 0824    heparin flush 100 UNIT/ML injection 300 Units  3 mL Intracatheter PRN Jas Cheney MD        dexmedetomidine (PRECEDEX) 400 mcg in sodium chloride 0.9 % 100 mL infusion  0.2 mcg/kg/hr Intravenous Continuous Jas Cheney MD 27.2 mL/hr at 05/13/19 0542 1.2 mcg/kg/hr at 05/13/19 0542    heparin (porcine) injection 5,000 Units  5,000 Units Subcutaneous TID Dequan Camacho MD   5,000 Units at 05/12/19 2103    vitamin D (ERGOCALCIFEROL) capsule 50,000 Units  50,000 Units Oral Weekly Terrance Castañeda MD   Stopped at 05/08/19 2015    metronidazole (FLAGYL) 500 mg in NaCl 100 mL IVPB premix  500 mg Intravenous Q8H Areli Lombardo MD   Stopped at 05/13/19 0100    norepinephrine (LEVOPHED) 16 mg in dextrose 5% 250 mL infusion  2 mcg/min Intravenous Continuous Areli Lombardo MD   Stopped at 05/10/19 2046    fentaNYL 5 mcg/mL in D5W 250 mL infusion  25 mcg/hr Intravenous Continuous Areli Lombardo MD 40 mL/hr at 05/13/19 0502 200 mcg/hr at 05/13/19 0502    sodium chloride flush 0.9 % injection 10 mL  10 mL Intravenous 2 times per day Terri Nunez MD   10 mL at 05/12/19 2104    sodium chloride flush 0.9 % injection 10 mL  10 mL Intravenous PRN Audelia Nugent MD   10 mL at 05/11/19 0504    magnesium hydroxide (MILK OF MAGNESIA) 400 MG/5ML suspension 30 mL  30 mL Oral Daily PRN Audelia Nugent MD        [facility] injection 4 mg  4 mg Intravenous Q6H PRN Audelia Nugent MD        famotidine (PEPCID) injection 20 mg  20 mg Intravenous Daily Audelia Nugent MD   20 mg at 05/12/19 4760    vancomycin (VANCOCIN) oral solution 500 mg  500 mg Oral 4x Daily Dequan Camacho MD   500 mg at 05/12/19 2106    mineral oil-hydrophilic petrolatum (HYDROPHOR) ointment   Topical TID Jaky Villa MD        And    mineral oil-hydrophilic petrolatum (HYDROPHOR) ointment   Topical TID PRN Jaky Villa MD               Problem list:  Active Problems:    Septic shock ([address]    Colitis  Resolved Problems:    * No resolved hospital problems. *      Assessment:    1. Septic shock due to Clostridium difficile enterocolitis     2. Acute kidney injury secondary to shock and hypovolemia due to diarrhea     3. Acute hypoxic respiratory failure due to septic shock     4. Clostridium difficile enterocolitis     5. Shock liver     6. Thrombocytopenia likely due to sepsis        Plan: All of the labs and treatment plan have been reviewed    1. Diuretics started per nephrology    2. Continue antibiotics     3. Wean as tolerated    4.  Nutritional support      Titi Barrientos MD FACP  7:40 AM  5/13/2019

## 2019-05-13 NOTE — PLAN OF CARE
Problem: Restraint Use - Nonviolent/Non-Self-Destructive Behavior:  Goal: Absence of restraint indications  Description  Absence of restraint indications  5/13/2019 1638 by Sotero Suárez  Outcome: Completed     Problem: Restraint Use - Nonviolent/Non-Self-Destructive Behavior:  Goal: Absence of restraint-related injury  Description  Absence of restraint-related injury  5/13/2019 1638 by Sotero Pore  Outcome: Completed

## 2019-05-13 NOTE — PROGRESS NOTES
Subjective:    Awake but still intubated  No problems overnight. Denies chest pain or dyspnea. Denies abdominal pain. In no apparent distress     Objective:    BP (!) 105/52   Pulse 65   Temp 99.9 °F (37.7 °C) (Core)   Resp 22   Ht 5' (1.524 m)   Wt 225 lb 5 oz (102.2 kg)   SpO2 91%   BMI 44.00 kg/m²   Skin: Warm and dry  Neck: Supple, no JVD  Heart:  RRR, no murmurs, gallops, or rubs. Lungs:  CTA bilaterally, no wheeze, rales or rhonchi  Abd: bowel sounds present, nontender, nondistended, no masses  Extrem:  No clubbing, cyanosis, or edema, pulses intact    I/O last 3 completed shifts: In: 1533 [I.V.:2739; NG/GT:996]  Out: 5100 [Urine:4925; Stool:175]    Results      Component Value Units   Hemoglobin and hematocrit, blood [[id number]] (Abnormal) Collected: 05/12/19 2040   Updated: 05/12/19 2116    Specimen Source: Blood     Hemoglobin 10. 0Low  g/dL    Hematocrit 30.6Low  %   Narrative:      05/12/2019  21:10   POCT Glucose [[id number]] (Abnormal) Collected: 05/12/19 2037   Updated: 05/12/19 2045     Meter Glucose 134High  mg/dL   Basic Metabolic Panel [[id number]] (Abnormal) Collected: 05/12/19 1725   Updated: 05/12/19 1811    Specimen Source: Blood     Sodium 139 mmol/L    Potassium 3.2Low  mmol/L    Chloride 101 mmol/L    CO2 26 mmol/L    Anion Gap 12 mmol/L    Glucose 140High  mg/dL    BUN 11 mg/dL    CREATININE 0.7 mg/dL    GFR Non-African American >60 mL/min/1.73    Comment: Chronic Kidney Disease: less than 60 ml/min/1.73 sq. m.         Kidney Failure: less than 15 ml/min/1.73 sq.m. Results valid for patients 18 years and older.         GFR African American >60    Calcium 6.6Low  mg/dL   Phosphorus [[id number]] (Abnormal) Collected: 05/12/19 1725   Updated: 05/12/19 1811    Specimen Source: Blood     Phosphorus 2.0Low  mg/dL   CULTURE BLOOD #1 [[id number]] Collected: 05/07/19 1445   Updated: 05/12/19 1635    Specimen Type: Blood     Blood Culture, Routine 5 Days- no growth   Narrative:   Source: BLOOD       Site: Blood&Blood             CT Chest WO Contrast [[id number]] Resulted: 19 1231   Updated: 19 1233    Narrative:     Patient MRN:  [id number]  : 1960  Age: 59 years  Gender: Female    Order Date:  2019 10:45 AM    EXAM: CT CHEST WO CONTRAST    COMPARISON: No prior CT. INDICATION:  check for right sided pathology      TECHNIQUE:  Low-dose CT acquisition technique included one of the  following options; 1. Automated exposure control, 2. Adjustment of mA  and/or kV according to the patient's size or 3. Use of iterative  reconstruction. FINDINGS:    Endotracheal tube is present with distal tip 1 cm above the jono. Nasogastric tube courses below the level diaphragm and is located in  the stomach. There are patchy airspace opacities bilaterally with  notable upper lobe and perihilar distribution. There is peribronchial  thickening bilaterally. More confluent opacities are seen at right  lung base with air bronchograms. There are moderate size bilateral  pleural effusions. No evidence of pneumothorax. The heart is normal in  size. There are few prominent paratracheal lymph nodes nodes and  prominent lymph node that level of the AP window measures up to 12 x 9  mm. View of the upper abdomen shows small ascites. There is thickened  appearance of wall of visualized loops of large bowel. Neuro spine  stimulator leads demonstrated. Impression:       1. Bilateral alveolar opacities in upper lobe in perihilar  distribution suggestive of interstitial pulmonary edema or pneumonia. 2. More confluent opacities are seen at right lung base related to  pneumonia and atelectasis. 3. Moderate-sized bilateral pleural effusions. 4. Prominent and mildly enlarged mediastinal lymph nodes. 5. Endotracheal tube is 1 cm above jono.    CULTURE BLOOD #1 [[id number]] Collected: 19 1105   Updated: 19 1113    Specimen Type: Blood    CULTURE BLOOD #2 [[id number]] Collected: 19 1100   Updated: 19 1113    Specimen Source: Blood    Urine culture [[id number]] Collected: 19   Updated: 19 0949    Specimen Type: Body Fluid    Specimen Source: Urine, clean catch    XR CHEST PORTABLE [[id number]] Resulted: 19 3055   Updated: 19 0836    Narrative:     Patient MRN:  [id number]  : 1960  Age: 59 years  Gender: Female    Order Date:  2019 6:00 AM    EXAM: XR CHEST PORTABLE    COMPARISON: May 11, 2019    INDICATION:  intubation   intubation     FINDINGS:    Endotracheal tube is present. Jean Carlos Slider is not optimally visualized. Patient is rotated. There are interstitial and hazy opacities  bilaterally appearing similar since prior. No obvious pneumothorax. Nasogastric tube courses below the level diaphragm. Impression:       1. Limited visualization of nasogastric tube. 2. Redemonstration of interstitial and hazy opacities throughout both  lungs suggestive of interstitial pulmonary edema or pneumonia. Short-term follow-up could be helpful for further evaluation. Basic Metabolic Panel w/ Reflex to MG [[id number]] (Abnormal) Collected: 19   Updated: 19    Specimen Source: Blood     Sodium 142 mmol/L    Potassium reflex Magnesium 4.0 mmol/L    Chloride 105 mmol/L    CO2 25 mmol/L    Anion Gap 12 mmol/L    Glucose 146High  mg/dL    BUN 13 mg/dL    CREATININE 0.7 mg/dL    GFR Non-African American >60 mL/min/1.73    Comment: Chronic Kidney Disease: less than 60 ml/min/1.73 sq. m.         Kidney Failure: less than 15 ml/min/1.73 sq.m. Results valid for patients 18 years and older.         GFR African American >60    Calcium 6.6Low  mg/dL   Magnesium [[id number]] (Abnormal) Collected: 19   Updated: 19    Specimen Source: Blood     Magnesium 1.4Low  mg/dL   Phosphorus [[id number]] (Abnormal) Collected: 19   Updated: 19    Specimen Source: Blood     Phosphorus 1.2Low  mg/dL   Hepatic function panel [[id number]] (Abnormal) Collected: 05/12/19 0615   Updated: 05/12/19 0718    Specimen Source: Blood     Total Protein 4.9Low  g/dL    Alb 2.7Low  g/dL    Alkaline Phosphatase 37 U/L    ALT 97High  U/L    AST 20 U/L    Total Bilirubin 1.3High  mg/dL    Bilirubin, Direct 0.6High  mg/dL    Bilirubin, Indirect 0.7 mg/dL   CBC Auto Differential [[id number]] (Abnormal) Collected: 05/12/19 0615   Updated: 05/12/19 0363    Specimen Source: Blood     WBC 9.2 E9/L    RBC 2.70Low  E12/L    Hemoglobin 9.3Low  g/dL    Hematocrit 27.8Low  %    . 0High  fL    MCH 34.4 pg    MCHC 33.5 %    RDW 13.7 fL    Platelets 592BWM  W1/F    MPV 11.8 fL    Neutrophils % 78.3 %    Immature Granulocytes % 2.0 %    Lymphocytes % 10. 0Low  %    Monocytes % 7.2 %    Eosinophils % 2.2 %    Basophils % 0.3 %    Neutrophils # 7.22 E9/L    Immature Granulocytes # 0.18 E9/L    Lymphocytes # 0. 92Low  E9/L    Monocytes # 0.66 E9/L    Eosinophils # 0.20 E9/L    Basophils # 0.03 E9/L   Protime-INR [[id number]] (Abnormal) Collected: 05/12/19 0615   Updated: 05/12/19 0636    Specimen Source: Blood     Protime 19. 8High  sec    INR 1.7   Blood Gas, Arterial [[id number]] (Abnormal) Resulted: 05/12/19 0549   Updated: 05/12/19 0552    Specimen Source: Blood     Date Analyzed [id number]    Time Analyzed 0549    Source: Blood Arterial    pH, Blood Gas 7.482High     PCO2 34.8Low  mmHg    PO2 78.8 mmHg    HCO3 25.5 mmol/L    B.E. 2.1 mmol/L    O2 Sat 96.2 %    PO2/FIO2 1.58 mmHg/%    AaDO2 226.1 mmHg    RI(T) 2.87    O2Hb 95.4 %    COHb 0.6 %    MetHb 0.2 %    HHb 3.8 %    tHb (est) 10.2Low  g/dL    Mode AC    FIO2 50.0 %    Rr Mechanical 14.0 b/min    Vt Mechanical 400.0 mL    Peep/Cpap 5.0 cmH2O    Date Of Collection --    Time Collected --    Pt Temp 37.0 C     ID [id number]    Lab [id number]    Critical(s) Notified .  No Critical Values   Culture Blood #1 [[id number]] Collected: 05/07/19 0035   Updated: 05/12/19 0235    Specimen Type: Blood     Blood Culture, Routine 5 Days- no growth   Narrative:     Source: BLOOD       Site: Blood&Blood             Culture Blood #2 [[id number]] Collected: 05/07/19 0050   Updated: 05/12/19 0235    Specimen Source: Blood     Culture, Blood 2 5 Days- no growth   Narrative:     Source: BLOOD       Site:              POCT Glucose [[id number]] (Abnormal) Collected: 05/11/19 2349   Updated: 05/11/19 2354     Meter Glucose 142High  mg/dL         CT Chest WO Contrast   Final Result      1. Bilateral alveolar opacities in upper lobe in perihilar   distribution suggestive of interstitial pulmonary edema or pneumonia. 2. More confluent opacities are seen at right lung base related to   pneumonia and atelectasis. 3. Moderate-sized bilateral pleural effusions. 4. Prominent and mildly enlarged mediastinal lymph nodes. 5. Endotracheal tube is 1 cm above jono. XR CHEST PORTABLE   Final Result      1. Limited visualization of nasogastric tube. 2. Redemonstration of interstitial and hazy opacities throughout both   lungs suggestive of interstitial pulmonary edema or pneumonia. Short-term follow-up could be helpful for further evaluation. XR CHEST PORTABLE   Final Result      Interstitial and hazy opacities throughout both lungs which appear to   have increased throughout left lung since yesterday's exam. Opacities   appear similar on the right. Continued follow-up could be helpful for   further evaluation. XR CHEST PORTABLE   Final Result   CHF with marginal improvement and decreasing edema. XR CHEST PORTABLE   Final Result   Significant worsening of right-sided infiltrate and atelectasis and   small right effusion               XR ABDOMEN (KUB) (SINGLE AP VIEW)   Final Result   No acute process               XR CHEST PORTABLE   Final Result      1. Satisfactory placement of nasogastric tube. 2. Persistent interstitial pulmonary edema, pneumonia, or atelectasis   bilaterally.  Continued follow-up could be helpful for further   evaluation. XR Chest Abdomen Ng Placement   Final Result      Nasogastric tubing appears to be appropriately configured. Right common femoral vein catheter appears to be peripherally   configured. XR CHEST PORTABLE   Final Result      1. Abnormal location of nasogastric tube which appears to be coiled   within the mid esophagus. 2. Increased interstitial and hazy opacities at left hilar location   and left lung base which could suggest increasing pneumonia or   atelectasis. Short-term follow-up could be helpful for further   evaluation. ALERT:  THIS IS AN ABNORMAL REPORT. CT Head WO Contrast   Final Result   1. No acute intracranial hemorrhage identified. 2. Additional nonacute/incidental findings as described above. This report has been electronically signed by David Tubbs MD.      [address] [facility] Additional Contrast? None   Final Result   1. Moderate diffuse mucosal thickening from the rectum to the mid sigmoid colon    with mild adjacent stranding, mild mucosal thickening throughout the descending    colon with adjacent stranding, and suggestion of mucosal thickening throughout    the distal transverse colon as well as from the proximal most transverse colon    to the cecum concerning for infectious/inflammatory proctocolitis. 2. Moderate hiatal hernia. 3. Moderate bilateral dependent atelectases versus consolidations with air    bronchograms. 4. No nephrolithiasis or obstructive uropathy. No perinephric stranding. 5. Normal appendix. 6. Additional nonacute/incidental findings as described above. This report has been electronically signed by David Tubbs MD.      XR CHEST PORTABLE   Final Result      Interstitial opacities in perihilar and infrahilar locations could   suggest peribronchial thickening/inflammation. Continued follow-up   could be helpful for further evaluation.       XR CHEST PORTABLE (Results Pending)       Prior to Admission medications    Medication Sig Start Date End Date Taking? Authorizing Provider   morphine (MS CONTIN) 30 MG extended release tablet Take 1 tablet by mouth 2 times daily for 30 days THIS PRESCRIPTION IS A 30 DAY SUPPLY. ( ICD: 10  G 89.4). Earliest Fill Date: 1/6/18 1/6/18 5/7/19 Yes Dwayne Griggs MD   pregabalin (LYRICA) 200 MG capsule Take 1 capsule by mouth every 8 hours . Patient taking differently: Take 100 mg by mouth every 8 hours. 12/6/17 5/7/19 Yes OSMANI May CNP   orphenadrine (NORFLEX) 100 MG extended release tablet Take 1 tablet by mouth 2 times daily 3/14/17 5/7/19 Yes OSMANI Anand CNP   ALBUTEROL SULFATE IN Inhale 2 puffs into the lungs 2 times daily   Yes Historical Provider, MD   guaiFENesin (MUCINEX) 600 MG SR tablet Take 1,200 mg by mouth 2 times daily   Yes Historical Provider, MD   Cholecalciferol (VITAMIN D3) 2000 UNITS CAPS Take 1 capsule by mouth daily   Yes Historical Provider, MD   Ferrous Gluconate (IRON) 240 (27 FE) MG TABS Take 2 tablets by mouth daily   Yes Historical Provider, MD   b complex vitamins capsule Take 1 capsule by mouth daily   Yes Historical Provider, MD   Coenzyme Q10 (CO Q 10) 100 MG CAPS Take 1 capsule by mouth daily   Yes Historical Provider, MD   sucralfate (CARAFATE) 1 GM tablet Take 1 g by mouth 4 times daily. Yes Historical Provider, MD   meclizine (ANTIVERT) 25 MG tablet Take 25 mg by mouth 2 times daily. Yes Historical Provider, MD   atenolol (TENORMIN) 25 MG tablet Take 25 mg by mouth 2 times daily. Yes Historical Provider, MD   thyroid (ARMOUR THYROID) 30 MG tablet Take 30 mg by mouth daily. Yes Historical Provider, MD   loratadine-pseudoephedrine (CLARITIN-D 24 HOUR)  MG per tablet Take 1 tablet by mouth daily. Yes Historical Provider, MD   Calcium Carbonate Antacid (TUMS PO) Take 1 tablet by mouth as needed.      Yes Historical Provider, MD   zolpidem (AMBIEN) 10 MG mcg/kg/hr Intravenous Continuous Rocco Carreon MD 27.2 mL/hr at 05/12/19 2135 1.2 mcg/kg/hr at 05/12/19 2135    heparin (porcine) injection 5,000 Units  5,000 Units Subcutaneous TID Dequan Camacho MD   5,000 Units at 05/12/19 2103    vitamin D (ERGOCALCIFEROL) capsule 50,000 Units  50,000 Units Oral Weekly Derrick Wolf MD   Stopped at 05/08/19 2015    metronidazole (FLAGYL) 500 mg in NaCl 100 mL IVPB premix  500 mg Intravenous Q8H Dago Jimenez MD   Stopped at 05/12/19 1632    norepinephrine (LEVOPHED) 16 mg in dextrose 5% 250 mL infusion  2 mcg/min Intravenous Continuous Dago Jimenez MD   Stopped at 05/10/19 2046    fentaNYL 5 mcg/mL in D5W 250 mL infusion  25 mcg/hr Intravenous Continuous Dago Jimenez MD 40 mL/hr at 05/12/19 2204 200 mcg/hr at 05/12/19 2204    sodium chloride flush 0.9 % injection 10 mL  10 mL Intravenous 2 times per day Beverley Rayo MD   10 mL at 05/12/19 2104    sodium chloride flush 0.9 % injection 10 mL  10 mL Intravenous PRN Beverley Rayo MD   10 mL at 05/11/19 0504    magnesium hydroxide (MILK OF MAGNESIA) 400 MG/5ML suspension 30 mL  30 mL Oral Daily PRN Beverley Rayo MD        ondansetron [facility]) injection 4 mg  4 mg Intravenous Q6H PRN Beverley Rayo MD        famotidine (PEPCID) injection 20 mg  20 mg Intravenous Daily Beverley Rayo MD   20 mg at 05/12/19 7098    vancomycin (VANCOCIN) oral solution 500 mg  500 mg Oral 4x Daily Dequan Camacho MD   500 mg at 05/12/19 2106    mineral oil-hydrophilic petrolatum (HYDROPHOR) ointment   Topical TID Justin Brown MD        And    mineral oil-hydrophilic petrolatum (HYDROPHOR) ointment   Topical TID PRN Justin Brown MD               Problem list:    Active Problems:    Septic shock ([address]    Colitis  Resolved Problems:    * No resolved hospital problems. *      Assessment:    1. Septic shock due to Clostridium difficile enterocolitis     2.  Acute kidney injury secondary to shock and hypovolemia due to diarrhea     3. Acute hypoxic respiratory failure due to septic shock     4. Clostridium difficile enterocolitis     5. Shock liver     6. Thrombocytopenia likely due to sepsis        Plan:    1. Diuretics started per nephrology    2. Continue antibiotics     3. Wean as tolerated    4.  Nutritional support      Boubacar Irizarry D.O., Ricardo Chapin  11:40 PM  5/12/2019

## 2019-05-13 NOTE — PROGRESS NOTES
Department of Internal Medicine  Nephrology Resident  Progress Note        Events reviewed. SUBJECTIVE: We are following Mrs. Haji for acute kidney injury.  She remains intubated    PHYSICAL EXAM:      VITALS:  /64   Pulse 81   Temp 99.3 °F (37.4 °C) (Core)   Resp 17   Ht 5' (1.524 m)   Wt 214 lb 8.1 oz (97.3 kg)   SpO2 92%   BMI 41.89 kg/m²     Intake/Output Summary (Last 24 hours) at 5/8/2019 1349  Last data filed at 5/8/2019 1200  Gross per 24 hour   Intake 5855 ml   Output 1685 ml   Net 4170 ml       Constitutional:  Intubated, awake  HEENT:  MESSI, endotracheal tube in place  Respiratory:  Coarse breath sounds bilaterally, frothy secretions from ET  Cardiovascular/Edema:  Regular rate and rhythm, no murmurs appreciated  Gastrointestinal:  Soft, non-tender, bowel sounds present  Neurologic: sedated, withdraws to pain  Skin:  warm, + edema  Other:  Erythematous lesion on lower extrtemities    DATA:    CBC with Differential:    Lab Results   Component Value Date    WBC 9.3 05/13/2019    RBC 2.95 05/13/2019    HGB 10.2 05/13/2019    HCT 30.6 05/13/2019     05/13/2019    .7 05/13/2019    MCH 34.6 05/13/2019    MCHC 33.3 05/13/2019    RDW 14.0 05/13/2019    NRBC 0.9 05/10/2019    LYMPHOPCT 12.0 05/13/2019    PROMYELOPCT 0.9 05/08/2019    MONOPCT 6.8 05/13/2019    BASOPCT 0.5 05/13/2019    MONOSABS 0.63 05/13/2019    LYMPHSABS 1.11 05/13/2019    EOSABS 0.27 05/13/2019    BASOSABS 0.05 05/13/2019     CMP:    Lab Results   Component Value Date     05/13/2019    K 3.1 05/13/2019    K 4.0 05/12/2019    CL 94 05/13/2019    CO2 39 05/13/2019    BUN 11 05/13/2019    CREATININE 0.8 05/13/2019    GFRAA >60 05/13/2019    LABGLOM >60 05/13/2019    GLUCOSE 152 05/13/2019    PROT 5.3 05/13/2019    LABALBU 2.5 05/13/2019    LABALBU 4.4 08/09/2011    CALCIUM 7.0 05/13/2019    BILITOT 0.9 05/13/2019    ALKPHOS 42 05/13/2019    AST 20 05/13/2019    ALT 82 05/13/2019     BMP:    Lab Results airspace disease likely suggestive follow-up multifocal   infiltrate/pneumonia and/or atelectasis, please see CT chest report   5/12/2019. BRIEF SUMMARY OF INITIAL CONSULT:  Briefly, Mrs. Emmett Saucedo is a 78-year-old female with h/o of hypothyroidism, lumbar radiculopathy s/p laminectomy, HLD, asthma, was brought to the ED for worsening lethargy. She was recently treated for lower extremity cellulitis with clindamycin and then levofloxacin around 2-3 weeks ago. She developed profuse diarrhea shortly thereafter. Family reported some vomiting initially and poor oral intake. Over the past 3 days, she became severely weak and dehydrated. At the ED, she was in shock requiring vasopressor despite aggressive fluid resuscitation and was intubated for airway protection. Labs were notable for creatinine of 4.2 mg/dL, BUN of 73 mg/dL, lactic acid of 3.4, sodium of 129 mmol/L, liver enzymes were also markedly elevated, reasons for this consult. Of note, she has no known history of kidney disease. She was on furosemide at home, but has not been taken since the onset of diarrhea. Problems resolved:    Hyponatremia, multifactorial, due to hypovolemia and decreased GFR, resolved  Hypoglycemia secondary to #6, resolved  Coagulopathy, high PT/INR, resolved  S/p Hemodynamic shock, hypovolemic and septic shock, melena likely improving with decreasing dose of pressors  STEFANIA, stage 3, volume responsive pre-renal STEFANIA  (diarrhea, poor oral intake, shock), FeNa 0.5% , FeUrea 12%. Resolved. IMPRESSION/RECOMMENDATIONS:      Pulmonary edema with high proBNP, with echo with preserved EF 60%, HFpEF in the setting of large volume resuscitation. Achieving excellent diuresis with Bumex drip, chest x-ray improved although with persistent pulmonary edema.      Respiratory failure, status post intubation; chest x-ray stable  Alkalemia (pH: 9.049) with metabolic alkalosis (contractional alkalosis) and respiratory alkalosis  Hypophosphatemia,

## 2019-05-13 NOTE — PROGRESS NOTES
[facility]  Internal Medicine Residency Program  MICU progress note    Patient:  Karyle Revere 59 y.o. female MRN: [id number]     Date of Service: 5/13/2019    Hospital Day: 7      Chief complaint: AMS  Subjective   Patient seen and examined this morning . Patient currently intubated , off sedation. Patient alert, awake follows command off sedation   -CXR: improve haziness on b/l lung in right upper lobe    Interval event  -extubation today after weaning parameters,currently on NC at 6 L    24 hr event:  -Patient had continue loose stool, associated with electrolyte abnormality will replace as needed.  -patient currently on oral vancomycin and flagyl   -GS : no active intervention for surgery, resume tube feeding, tolerating  - Hb stable, plt improving  -patient febrile overnight, central line removed, tip culture send and Plan for IR guided PICC line placement.     Physical Exam   · Vitals: BP 95/69   Pulse 141   Temp 101.3 °F (38.5 °C) (Core)   Resp 25   Ht 5' (1.524 m)   Wt 214 lb 8.1 oz (97.3 kg)   SpO2 99%   BMI 41.89 kg/m²   ABP (Arterial line BP): 91/81  ABP mean (Arterial line mean): 86 mmHg    · General Appearance:Intubated off sedation Briefly responding off sedation   · Skin: cold and pale blue in the finger tips, multiple rashes over lower extremities   · Head: normocephalic and atraumatic  · Eyes: pupils equal, round, and reactive to light, extraocular eye movements intact, conjunctivae normal  · ENT: tympanic membrane, external ear and ear canal normal bilaterally, nose without deformity, nasal mucosa and turbinates normal without polyps  · Neck: supple and non-tender without mass, no thyromegaly or thyroid nodules, no cervical lymphadenopathy  · Pulmonary/Chest: clear to auscultation bilaterally- no wheezes, rales or rhonchi, normal air movement, no respiratory distress  · Cardiovascular: normal rate, regular rhythm, normal S1 and S2, no murmurs, rubs, clicks, or gallops, distal pulses intact, no carotid bruits  · Abdomen: soft, right sided tenderness, Non distended. No rebound tenderness   · Extremities: no cyanosis, clubbing or edema  · Musculoskeletal: normal range of motion, no joint swelling, deformity or tenderness  · Neurologic: reflexes normal and symmetric, no cranial nerve deficit, gait, coordination and speech normal     Lines     site day    Art line   None    TLC R Fem D/c 5/13   PICC None    Hemoaccess None        NC at 6 L/min  ABG:     Lab Results   Component Value Date    PH 7.481 05/13/2019    PCO2 40.0 05/13/2019    PO2 65.1 05/13/2019    HCO3 29.2 05/13/2019    BE 5.3 05/13/2019    THB 11.2 05/13/2019    O2SAT 93.3 05/13/2019     Labs and Imaging Studies   Basic Labs  CBC:   Lab Results   Component Value Date    WBC 9.3 05/13/2019    RBC 2.95 05/13/2019    HGB 10.2 05/13/2019    HCT 30.6 05/13/2019    .7 05/13/2019    RDW 14.0 05/13/2019     05/13/2019     CMP:  Lab Results   Component Value Date     05/13/2019    K 3.1 05/13/2019    K 4.0 05/12/2019    CL 94 05/13/2019    CO2 39 05/13/2019    BUN 11 05/13/2019    PROT 5.3 05/13/2019     PT/INR:  No results found for: PTINR    Imaging Studies:     Ct Abdomen Pelvis Wo Contrast Additional Contrast? None    Result Date: 5/7/2019  EXAM:  CT Abdomen and Pelvis Without Contrast EXAM DATE/TIME:  5/7/2019 2:30 AM CLINICAL HISTORY:  59years old, female; Pain and signs and symptoms; Other: Diarrhea; Abdominal pain; Generalized TECHNIQUE:  Imaging protocol: Axial computed tomography images of the abdomen and pelvis without contrast. Coronal and sagittal reformatted images were created and reviewed. Radiation optimization: All CT scans at this facility use at least one of these dose optimization techniques: automated exposure control; mA and/or kV adjustment per patient size (includes targeted exams where dose is matched to clinical indication); or iterative reconstruction.  COMPARISON:  No relevant prior studies available. FINDINGS:  Tubes, catheters and devices: Subcutaneous stimulator at the anterolateral aspect of the left lower quadrant abdomen with an associated electrode extending into the spinal canal at the level of L2-3 and extending superiorly with tip at the level of T6. Lungs: Moderate bilateral dependent atelectasis versus consolidations with air bronchograms. Mild to moderate subsegmental atelectasis in the visualized lungs bilaterally. ABDOMEN:  Liver: Hepatomegaly to 16.5 cm in length at the midclavicular line and diffuse hepatic steatosis. Gallbladder and bile ducts: The gallbladder is surgically absent with cholecystectomy clips in the gallbladder fossa. No biliary ductal dilatation. Pancreas: Moderate pancreatic atrophy. No ductal dilatation. Spleen: Grossly unremarkable. No splenomegaly. Adrenals: Unremarkable. No mass. Kidneys and ureters: Grossly unremarkable. No hydronephrosis. No nephrolithiasis. No significant perinephric stranding. Stomach and bowel: Moderate diffuse mucosal thickening from the rectum to the mid sigmoid colon with mild adjacent stranding, mild mucosal thickening throughout the descending colon with adjacent stranding, and suggestion of mucosal thickening throughout the distal transverse colon as well as from the proximal most transverse colon to the cecum concerning for infectious/inflammatory proctocolitis. The unopacified loops of small bowel appear grossly unremarkable without evidence of obstruction. Moderate hiatal hernia. Appendix: Normal contrast-filled appendix inferior to the cecum. No periappendiceal free air or abscess seen. PELVIS:  Bladder: The urinary bladder is decompressed but appears grossly unremarkable. No bladder calculi. Reproductive: Grossly unremarkable ovaries and uterus. ABDOMEN and PELVIS:  Intraperitoneal space: No significant fluid collection. No free air. Prominent intra-abdominal fat.   Bones/joints: Diffuse age related osteopenia mild cerebral atrophy and subtle small vessel ischemic changes. Incidental physiologic calcifications in the bilateral basal ganglia. Ventricles: Unremarkable for age. Sella: The pituitary gland appears grossly unremarkable. Bones/joints: Incidental hyperostosis frontalis interna. No acute fracture. Sinuses: Unremarkable. No acute sinusitis. No air-fluid levels. Mastoid air cells: Unremarkable. No mastoid effusions. Orbits: Unremarkable. Soft tissues: Unremarkable. Vasculature: Mild bilateral intracranial internal carotid artery atherosclerotic calcifications. 1. No acute intracranial hemorrhage identified. 2. Additional nonacute/incidental findings as described above. This report has been electronically signed by Kaden Damon MD.    Xr Chest Portable    Result Date: 2019  Patient MRN:  [id number] : 1960 Age: 59 years Gender: Female Order Date:  2019 12:45 AM EXAM: XR CHEST PORTABLE COMPARISON: 2004 INDICATION:  chest pain chest pain FINDINGS: Interstitial opacities are seen in bilateral perihilar and infrahilar locations. No obvious pleural effusion. The heart is normal size. No pneumothorax. Spinal stimulator demonstrated. Interstitial opacities in perihilar and infrahilar locations could suggest peribronchial thickening/inflammation. Continued follow-up could be helpful for further evaluation. EKG: normal sinus rhythm, Qt prolonged    Resident's Assessment and Plan     Mag Jimenez is a 59 y.o. female    Neurology   Acute encephalopathy, improved  2/2 septic shock vs drug overdose  -Currently Intubated and  sedation   -hx of chronic opioids for pain control   -Monitor mental status   -extubated to NC, will monitor respiratory status    Cardiovascular  Septic shock , improved  2/2 possible C diff colitis   -Off pressors, levophed,  -Pan culture sent: no growth, C. Diff EIA : C.  Diff toxin A and B detected  -repeat pan culture , blood culture : no growth, urine CONS  -continie IV flagyl Q8h and PO vancomycin 500 mg 4 times daily   -ID consult : input appreciated    Sinus tachycardia  Likely 2/2 febrile episode, fluid loss vs hypoxia  -will give fluid bolus 250 ml  - check ABG    Pulmonary   Acute hypoxic respiratory failure   2/2 sepsis ( d/t C.  Diff colitis) vs pulmonary edema 2/2 fluid overload  -S/p intubation on mechanical ventilation for airway protection  -Initial ABG 3.037/12/55.6/43.9 c/w metabolic alkalosis  -initial Pro calcitonin :99.25  -Daily ABG and CXR  -CXR today:increase haziness on b/l lung improving with diuresis   - monitor BMP    GI  C diff colitis   -Hx of 2-3 weeks antibiotics use and diarrhea after   -Leukocytosis improving WBC 18.9>9.2  -Ct abdomen showed  infectious/inflammatory proctocolitis  -C diff EIA: c. Diff toxin A and B  -continue IV flagyl Q8h and PO vancomycin 500 mg 4 times daily   -continue contact isolation  -GS consult : no active intervention and okay to resume fedding  - continue oral feeding    Shock liver   Transaminitis, improving  - ALT/AST 3440/5412>>1018/587>157/31  -Ct abdomen showed hepatomegaly with hepatic steatosis  -INR 8.4, monitor INR 1.9 today  -serum drug screen: positive for opiates  -Monitor LFT   -s/p Give FFP and vitamin K     Renal   STEFANIA, prerenal, resolved   Likely 2/2 dehydration from septic shock and diarrhea   -Baseline Cr. 0.9, initial Cr. 4.2>>2.6>0.8  -IVF:d/eliseo  - bumex drip stop in setting of contraction alkalosis   -Nephrology consult   -Monitor renal function, urine output and BMP     Hypomagnesimia   -likely 2/2 to diarrhea and diuretic use  -monitor magnesium daily  - replace mg  -repeat BMP and Mg    Hypokalemia  - 2/2 GI loss and diuretic use  - replace k    Anemia ,Macrocytic vs blood loss  Hb: 11.5, MCV: 105.1  Hb: 9.7>9.3 today, likely dilutional vs acute blood loss  -trend h and h q 12 hr  -Folate and vitamin B 12: wnl  -monitor CBC  -target Hb >7    Thrombocytopenia, improved  -Plt: 73 today  -likely 2/2 infection   -HIT score 3, low probability  -oncology consulted: recommend transfuse if Plat<50   -will monitor CBC    PT/OT evaluation:  DVT prophylaxis/ GI prophylaxis: Pepcid / heparin  Disposition: MICU    Rony Norman MD, PGY-1  Internal Medicine resident   Attending physician: Dr. Susan Oswald  Addendum ICU Attending Bernicescooby Dewitt was seen, examined and discussed with the multi-disciplinary ICU team during rounds. A addendum note may be separate to the residents note. If not then, I have personally seen and examined the patient and the key elements of the encounter were performed by me (> 85 % time). The medications & laboratory data was discussed and adjusted where necessary. The radiographic images were reviewed either as a group or with radiologist.  Any changes are document or changed if felt dis-concordant with the exam or history. The above findings were corroborated, plans confirmed and changes made if needed. Family was updated at the bedside as available. Key issues of the case were discussed among consultants. Critical Care time is documented if appropriate.       Fidelia Bearden DO, MPH  Professor of Medicine

## 2019-05-13 NOTE — PROGRESS NOTES
Inpatient Hematology/Oncology Progress Note    Subjective:  Awake but still intubated. In no apparent distress. Objective:  /73   Pulse 76   Temp 99.1 °F (37.3 °C) (Core)   Resp 20   Ht 5' (1.524 m)   Wt 214 lb 8.1 oz (97.3 kg)   SpO2 94%   BMI 41.89 kg/m²    GENERAL: intubated, in no apparent distress  HEENT: PERRLA; EOMI. Oropharynx clear. NECK: Supple. No palpable cervical or supraclavicular lymphadenopathy. LUNGS: Decreased air entry bilaterally. CARDIOVASCULAR: Regular rate. No murmurs, rubs or gallops. ABDOMEN: pos BS, soft. EXTREMITIES: Without clubbing, cyanosis, or edema. NEUROLOGIC: No focal deficits. Diagnostics:  Lab Results   Component Value Date    WBC 9.3 05/13/2019    HGB 10.2 (L) 05/13/2019    HCT 30.6 (L) 05/13/2019    .7 (H) 05/13/2019     05/13/2019     Impression/Plan:  62-year old lady with a PMH significant for HTN, Hyperlipidemia, asthma and thyroid disorder, recent skin infection treated with Clinda and levaquin, who had presented with diarrhea and lethargy, was in septic shock, was intubated, was found to have liver failure and STEFANIA, she was found to have Cdiff colitis, is on flagyl and Vancomycin. Upon presentation, her CBCD was remarkable for leukocytosis wbc 18.8, 84.3% N, hgb 15.2, hct 45.7, .6, plts 329, on 5/8 plts were 233, clumping reported, 5/9 plts were 126K,  5/10 90K    today 05/13/2019. No bleeding. thrombocytopenia was likely secondary to sepsis and antibiotics, low probability for HIT. Leukocytosis resolved. Continue to monitor her CBCD. Continue with prophylactic dose of Heparin as long as plts >50K. Tansfuse with plts if bleeding, or if plts count 10K or less. Will continue to follow.     05/13/2019  Fiona James MD

## 2019-05-13 NOTE — PROGRESS NOTES
[facility] Quality Flow/Interdisciplinary Rounds Progress Note        Quality Flow Rounds held on May 13, 2019    Disciplines Attending:  Bedside Nurse, Charge nurse, CRISTIANA, OT, PT, , and . Roxie Rucker was admitted on 5/7/2019 12:26 AM    Anticipated Discharge Date:  Expected Discharge Date: 05/14/19    Disposition:    Lorenzo Score:  Lorenzo Scale Score: 11    Readmission Risk              Risk of Unplanned Readmission:        18           Discussed patient goal for the day, patient clinical progression, and barriers to discharge.   The following Goal(s) of the Day/Commitment(s) have been identified:  Wean vent and sedation, possible extubation        Vangie Kenney  May 13, 2019

## 2019-05-13 NOTE — PLAN OF CARE
Attempts to self extubate when restraints released for patient care or turning. Continued for airway safety.       Problem: Restraint Use - Nonviolent/Non-Self-Destructive Behavior:  Goal: Absence of restraint-related injury  Description  Absence of restraint-related injury  Outcome: Met This Shift     Problem: Restraint Use - Nonviolent/Non-Self-Destructive Behavior:  Goal: Absence of restraint indications  Description  Absence of restraint indications  Outcome: Not Met This Shift

## 2019-05-13 NOTE — PLAN OF CARE
Attempts to self extubate with both hands when bilateral soft wrist restraints released for patient care. Continued for airway safety.      Problem: Restraint Use - Nonviolent/Non-Self-Destructive Behavior:  Goal: Absence of restraint-related injury  Description  Absence of restraint-related injury  5/13/2019 0631 by Lleo Mason RN  Outcome: Met This Shift     Problem: Restraint Use - Nonviolent/Non-Self-Destructive Behavior:  Goal: Absence of restraint indications  Description  Absence of restraint indications  5/13/2019 0631 by Lelo Mason RN  Outcome: Not Met This Shift  5/12/2019 2142 by Lelo Mason RN  Outcome: Not Met This Shift

## 2019-05-13 NOTE — FLOWSHEET NOTE
Pt reaches for medical devices despite verbal redirection. Will maintain bilateral soft wrist restraints.

## 2019-05-14 ENCOUNTER — APPOINTMENT (OUTPATIENT)
Dept: GENERAL RADIOLOGY | Age: 59
DRG: 870 | End: 2019-05-14
Payer: COMMERCIAL

## 2019-05-14 ENCOUNTER — APPOINTMENT (OUTPATIENT)
Dept: INTERVENTIONAL RADIOLOGY/VASCULAR | Age: 59
DRG: 870 | End: 2019-05-14
Payer: COMMERCIAL

## 2019-05-14 ENCOUNTER — APPOINTMENT (OUTPATIENT)
Dept: CT IMAGING | Age: 59
DRG: 870 | End: 2019-05-14
Payer: COMMERCIAL

## 2019-05-14 LAB
ALBUMIN SERPL-MCNC: 3.1 G/DL (ref 3.5–5.2)
ALP BLD-CCNC: 46 U/L (ref 35–104)
ALT SERPL-CCNC: 82 U/L (ref 0–32)
AMMONIA: 42 UMOL/L (ref 11–51)
ANION GAP SERPL CALCULATED.3IONS-SCNC: 13 MMOL/L (ref 7–16)
ANION GAP SERPL CALCULATED.3IONS-SCNC: 13 MMOL/L (ref 7–16)
AST SERPL-CCNC: 32 U/L (ref 0–31)
B.E.: 0.9 MMOL/L (ref -3–3)
B.E.: 3.2 MMOL/L (ref -3–3)
BASOPHILS ABSOLUTE: 0.04 E9/L (ref 0–0.2)
BASOPHILS RELATIVE PERCENT: 0.3 % (ref 0–2)
BILIRUB SERPL-MCNC: 1.2 MG/DL (ref 0–1.2)
BILIRUBIN DIRECT: 0.4 MG/DL (ref 0–0.3)
BILIRUBIN, INDIRECT: 0.8 MG/DL (ref 0–1)
BUN BLDV-MCNC: 12 MG/DL (ref 8–23)
BUN BLDV-MCNC: 14 MG/DL (ref 8–23)
C DIFFICILE TOXIN, EIA: NORMAL
CALCIUM SERPL-MCNC: 7.1 MG/DL (ref 8.6–10.2)
CALCIUM SERPL-MCNC: 7.4 MG/DL (ref 8.6–10.2)
CHLORIDE BLD-SCNC: 95 MMOL/L (ref 98–107)
CHLORIDE BLD-SCNC: 97 MMOL/L (ref 98–107)
CO2: 28 MMOL/L (ref 22–29)
CO2: 30 MMOL/L (ref 22–29)
COHB: 0.5 % (ref 0–1.5)
COHB: 0.6 % (ref 0–1.5)
CREAT SERPL-MCNC: 0.7 MG/DL (ref 0.5–1)
CREAT SERPL-MCNC: 0.8 MG/DL (ref 0.5–1)
CRITICAL: ABNORMAL
CRITICAL: ABNORMAL
DATE ANALYZED: ABNORMAL
DATE ANALYZED: ABNORMAL
DATE OF COLLECTION: ABNORMAL
DATE OF COLLECTION: ABNORMAL
EOSINOPHILS ABSOLUTE: 0.03 E9/L (ref 0.05–0.5)
EOSINOPHILS RELATIVE PERCENT: 0.2 % (ref 0–6)
GFR AFRICAN AMERICAN: >60
GFR AFRICAN AMERICAN: >60
GFR NON-AFRICAN AMERICAN: >60 ML/MIN/1.73
GFR NON-AFRICAN AMERICAN: >60 ML/MIN/1.73
GLUCOSE BLD-MCNC: 124 MG/DL (ref 74–99)
GLUCOSE BLD-MCNC: 129 MG/DL (ref 74–99)
HCO3: 23.4 MMOL/L (ref 22–26)
HCO3: 26.1 MMOL/L (ref 22–26)
HCT VFR BLD CALC: 30.8 % (ref 34–48)
HCT VFR BLD CALC: 36.9 % (ref 34–48)
HEMOGLOBIN: 10.5 G/DL (ref 11.5–15.5)
HEMOGLOBIN: 12.3 G/DL (ref 11.5–15.5)
HHB: 4.6 % (ref 0–5)
HHB: 6.6 % (ref 0–5)
IMMATURE GRANULOCYTES #: 0.2 E9/L
IMMATURE GRANULOCYTES %: 1.5 % (ref 0–5)
INR BLD: 1.5
LAB: ABNORMAL
LAB: ABNORMAL
LYMPHOCYTES ABSOLUTE: 1.29 E9/L (ref 1.5–4)
LYMPHOCYTES RELATIVE PERCENT: 9.9 % (ref 20–42)
Lab: ABNORMAL
Lab: ABNORMAL
MAGNESIUM: 2 MG/DL (ref 1.6–2.6)
MCH RBC QN AUTO: 33.7 PG (ref 26–35)
MCHC RBC AUTO-ENTMCNC: 33.3 % (ref 32–34.5)
MCV RBC AUTO: 101.1 FL (ref 80–99.9)
METER GLUCOSE: 116 MG/DL (ref 74–99)
METHB: 0 % (ref 0–1.5)
METHB: 0.1 % (ref 0–1.5)
MODE: ABNORMAL
MODE: ABNORMAL
MONOCYTES ABSOLUTE: 0.84 E9/L (ref 0.1–0.95)
MONOCYTES RELATIVE PERCENT: 6.4 % (ref 2–12)
NEUTROPHILS ABSOLUTE: 10.65 E9/L (ref 1.8–7.3)
NEUTROPHILS RELATIVE PERCENT: 81.7 % (ref 43–80)
O2 SATURATION: 93.4 % (ref 92–98.5)
O2 SATURATION: 95.4 % (ref 92–98.5)
O2HB: 92.8 % (ref 94–97)
O2HB: 94.8 % (ref 94–97)
OPERATOR ID: 1768
OPERATOR ID: ABNORMAL
ORGANISM: ABNORMAL
PATIENT TEMP: 37 C
PATIENT TEMP: 37 C
PCO2: 30.4 MMHG (ref 35–45)
PCO2: 33.8 MMHG (ref 35–45)
PDW BLD-RTO: 13.6 FL (ref 11.5–15)
PH BLOOD GAS: 7.5 (ref 7.35–7.45)
PH BLOOD GAS: 7.5 (ref 7.35–7.45)
PHOSPHORUS: 2.9 MG/DL (ref 2.5–4.5)
PLATELET # BLD: 305 E9/L (ref 130–450)
PMV BLD AUTO: 11.8 FL (ref 7–12)
PO2: 64.6 MMHG (ref 60–100)
PO2: 74.2 MMHG (ref 60–100)
POTASSIUM REFLEX MAGNESIUM: 3.3 MMOL/L (ref 3.5–5)
POTASSIUM SERPL-SCNC: 2.8 MMOL/L (ref 3.5–5)
PRO-BNP: 1410 PG/ML (ref 0–125)
PROCALCITONIN: 0.93 NG/ML (ref 0–0.08)
PROTHROMBIN TIME: 17 SEC (ref 9.3–12.4)
RBC # BLD: 3.65 E12/L (ref 3.5–5.5)
SODIUM BLD-SCNC: 138 MMOL/L (ref 132–146)
SODIUM BLD-SCNC: 138 MMOL/L (ref 132–146)
SOURCE, BLOOD GAS: ABNORMAL
SOURCE, BLOOD GAS: ABNORMAL
THB: 11 G/DL (ref 11.5–16.5)
THB: 12.1 G/DL (ref 11.5–16.5)
TIME ANALYZED: 1128
TIME ANALYZED: 2353
TOTAL PROTEIN: 6.4 G/DL (ref 6.4–8.3)
URINE CULTURE, ROUTINE: ABNORMAL
URINE CULTURE, ROUTINE: ABNORMAL
WBC # BLD: 13.1 E9/L (ref 4.5–11.5)

## 2019-05-14 PROCEDURE — 87798 DETECT AGENT NOS DNA AMP: CPT

## 2019-05-14 PROCEDURE — 2580000003 HC RX 258: Performed by: INTERNAL MEDICINE

## 2019-05-14 PROCEDURE — 83880 ASSAY OF NATRIURETIC PEPTIDE: CPT

## 2019-05-14 PROCEDURE — 99233 SBSQ HOSP IP/OBS HIGH 50: CPT | Performed by: INTERNAL MEDICINE

## 2019-05-14 PROCEDURE — 82962 GLUCOSE BLOOD TEST: CPT

## 2019-05-14 PROCEDURE — 83735 ASSAY OF MAGNESIUM: CPT

## 2019-05-14 PROCEDURE — 6360000002 HC RX W HCPCS: Performed by: INTERNAL MEDICINE

## 2019-05-14 PROCEDURE — 36573 INSJ PICC RS&I 5 YR+: CPT | Performed by: RADIOLOGY

## 2019-05-14 PROCEDURE — 94668 MNPJ CHEST WALL SBSQ: CPT

## 2019-05-14 PROCEDURE — 36005 INJECTION EXT VENOGRAPHY: CPT | Performed by: RADIOLOGY

## 2019-05-14 PROCEDURE — 2500000003 HC RX 250 WO HCPCS: Performed by: INTERNAL MEDICINE

## 2019-05-14 PROCEDURE — 6360000004 HC RX CONTRAST MEDICATION: Performed by: RADIOLOGY

## 2019-05-14 PROCEDURE — 80048 BASIC METABOLIC PNL TOTAL CA: CPT

## 2019-05-14 PROCEDURE — 6370000000 HC RX 637 (ALT 250 FOR IP): Performed by: INTERNAL MEDICINE

## 2019-05-14 PROCEDURE — 70450 CT HEAD/BRAIN W/O DYE: CPT

## 2019-05-14 PROCEDURE — 2709999900 IR INJ VENOGRAM EXTREMITY BILATERAL

## 2019-05-14 PROCEDURE — 87486 CHLMYD PNEUM DNA AMP PROBE: CPT

## 2019-05-14 PROCEDURE — 2500000003 HC RX 250 WO HCPCS: Performed by: RADIOLOGY

## 2019-05-14 PROCEDURE — 82140 ASSAY OF AMMONIA: CPT

## 2019-05-14 PROCEDURE — 71045 X-RAY EXAM CHEST 1 VIEW: CPT

## 2019-05-14 PROCEDURE — 82805 BLOOD GASES W/O2 SATURATION: CPT

## 2019-05-14 PROCEDURE — 87633 RESP VIRUS 12-25 TARGETS: CPT

## 2019-05-14 PROCEDURE — 6360000002 HC RX W HCPCS: Performed by: RADIOLOGY

## 2019-05-14 PROCEDURE — 84100 ASSAY OF PHOSPHORUS: CPT

## 2019-05-14 PROCEDURE — 85025 COMPLETE CBC W/AUTO DIFF WBC: CPT

## 2019-05-14 PROCEDURE — 84145 PROCALCITONIN (PCT): CPT

## 2019-05-14 PROCEDURE — 87581 M.PNEUMON DNA AMP PROBE: CPT

## 2019-05-14 PROCEDURE — 87206 SMEAR FLUORESCENT/ACID STAI: CPT

## 2019-05-14 PROCEDURE — 87070 CULTURE OTHR SPECIMN AEROBIC: CPT

## 2019-05-14 PROCEDURE — 2700000000 HC OXYGEN THERAPY PER DAY

## 2019-05-14 PROCEDURE — 85014 HEMATOCRIT: CPT

## 2019-05-14 PROCEDURE — 85018 HEMOGLOBIN: CPT

## 2019-05-14 PROCEDURE — 75820 VEIN X-RAY ARM/LEG: CPT | Performed by: RADIOLOGY

## 2019-05-14 PROCEDURE — 87324 CLOSTRIDIUM AG IA: CPT

## 2019-05-14 PROCEDURE — 36415 COLL VENOUS BLD VENIPUNCTURE: CPT

## 2019-05-14 PROCEDURE — 85610 PROTHROMBIN TIME: CPT

## 2019-05-14 PROCEDURE — 2000000000 HC ICU R&B

## 2019-05-14 PROCEDURE — 80076 HEPATIC FUNCTION PANEL: CPT

## 2019-05-14 PROCEDURE — 02HV33Z INSERTION OF INFUSION DEVICE INTO SUPERIOR VENA CAVA, PERCUTANEOUS APPROACH: ICD-10-PCS | Performed by: RADIOLOGY

## 2019-05-14 RX ORDER — FUROSEMIDE 10 MG/ML
40 INJECTION INTRAMUSCULAR; INTRAVENOUS ONCE
Status: DISCONTINUED | OUTPATIENT
Start: 2019-05-14 | End: 2019-05-17 | Stop reason: ALTCHOICE

## 2019-05-14 RX ORDER — LIDOCAINE HYDROCHLORIDE 20 MG/ML
20 INJECTION, SOLUTION INFILTRATION; PERINEURAL ONCE
Status: COMPLETED | OUTPATIENT
Start: 2019-05-14 | End: 2019-05-14

## 2019-05-14 RX ORDER — LIDOCAINE HYDROCHLORIDE 20 MG/ML
11 INJECTION, SOLUTION INFILTRATION; PERINEURAL ONCE
Status: DISCONTINUED | OUTPATIENT
Start: 2019-05-14 | End: 2019-05-14

## 2019-05-14 RX ORDER — POTASSIUM CHLORIDE 29.8 MG/ML
40 INJECTION INTRAVENOUS ONCE
Status: COMPLETED | OUTPATIENT
Start: 2019-05-15 | End: 2019-05-15

## 2019-05-14 RX ORDER — POTASSIUM CHLORIDE 29.8 MG/ML
40 INJECTION INTRAVENOUS ONCE
Status: COMPLETED | OUTPATIENT
Start: 2019-05-14 | End: 2019-05-15

## 2019-05-14 RX ORDER — LIDOCAINE HYDROCHLORIDE 20 MG/ML
15 INJECTION, SOLUTION INFILTRATION; PERINEURAL ONCE
Status: COMPLETED | OUTPATIENT
Start: 2019-05-14 | End: 2019-05-14

## 2019-05-14 RX ORDER — PREGABALIN 100 MG/1
200 CAPSULE ORAL EVERY 8 HOURS SCHEDULED
Status: DISCONTINUED | OUTPATIENT
Start: 2019-05-14 | End: 2019-05-24 | Stop reason: HOSPADM

## 2019-05-14 RX ORDER — LEVOTHYROXINE AND LIOTHYRONINE 19; 4.5 UG/1; UG/1
30 TABLET ORAL DAILY
Status: DISCONTINUED | OUTPATIENT
Start: 2019-05-14 | End: 2019-05-16 | Stop reason: SDUPTHER

## 2019-05-14 RX ORDER — HEPARIN SODIUM 10000 [USP'U]/ML
5000 INJECTION, SOLUTION INTRAVENOUS; SUBCUTANEOUS ONCE
Status: COMPLETED | OUTPATIENT
Start: 2019-05-14 | End: 2019-05-14

## 2019-05-14 RX ORDER — POTASSIUM CHLORIDE 7.45 MG/ML
10 INJECTION INTRAVENOUS
Status: DISPENSED | OUTPATIENT
Start: 2019-05-14 | End: 2019-05-14

## 2019-05-14 RX ORDER — LIDOCAINE HYDROCHLORIDE 20 MG/ML
18 INJECTION, SOLUTION INFILTRATION; PERINEURAL ONCE
Status: DISCONTINUED | OUTPATIENT
Start: 2019-05-14 | End: 2019-05-14

## 2019-05-14 RX ADMIN — Medication 10 ML: at 08:11

## 2019-05-14 RX ADMIN — IOVERSOL 5 ML: 678 INJECTION INTRA-ARTERIAL; INTRAVENOUS at 18:30

## 2019-05-14 RX ADMIN — POTASSIUM CHLORIDE 40 MEQ: 29.8 INJECTION, SOLUTION INTRAVENOUS at 20:43

## 2019-05-14 RX ADMIN — Medication 5000 UNITS: at 18:10

## 2019-05-14 RX ADMIN — PETROLATUM: 42 OINTMENT TOPICAL at 04:35

## 2019-05-14 RX ADMIN — Medication 10 ML: at 04:41

## 2019-05-14 RX ADMIN — HEPARIN SODIUM 5000 UNITS: 10000 INJECTION INTRAVENOUS; SUBCUTANEOUS at 20:53

## 2019-05-14 RX ADMIN — CHLORHEXIDINE GLUCONATE 0.12% ORAL RINSE 15 ML: 1.2 LIQUID ORAL at 08:11

## 2019-05-14 RX ADMIN — POTASSIUM CHLORIDE 10 MEQ: 7.46 INJECTION, SOLUTION INTRAVENOUS at 08:10

## 2019-05-14 RX ADMIN — HEPARIN SODIUM 5000 UNITS: 10000 INJECTION INTRAVENOUS; SUBCUTANEOUS at 08:12

## 2019-05-14 RX ADMIN — Medication 10 ML: at 07:12

## 2019-05-14 RX ADMIN — LIDOCAINE HYDROCHLORIDE 15 ML: 20 INJECTION, SOLUTION INFILTRATION; PERINEURAL at 17:35

## 2019-05-14 RX ADMIN — METRONIDAZOLE 500 MG: 500 INJECTION, SOLUTION INTRAVENOUS at 19:16

## 2019-05-14 RX ADMIN — Medication 10 ML: at 04:35

## 2019-05-14 RX ADMIN — Medication 10 ML: at 20:58

## 2019-05-14 RX ADMIN — SODIUM CHLORIDE, PRESERVATIVE FREE 300 UNITS: 5 INJECTION INTRAVENOUS at 20:59

## 2019-05-14 RX ADMIN — CHLORHEXIDINE GLUCONATE 0.12% ORAL RINSE 15 ML: 1.2 LIQUID ORAL at 20:55

## 2019-05-14 RX ADMIN — PETROLATUM: 42 OINTMENT TOPICAL at 22:43

## 2019-05-14 RX ADMIN — IOVERSOL 20 ML: 678 INJECTION INTRA-ARTERIAL; INTRAVENOUS at 17:20

## 2019-05-14 RX ADMIN — POTASSIUM CHLORIDE 10 MEQ: 7.46 INJECTION, SOLUTION INTRAVENOUS at 07:12

## 2019-05-14 RX ADMIN — PETROLATUM: 42 OINTMENT TOPICAL at 08:11

## 2019-05-14 RX ADMIN — Medication 1 CAPSULE: at 08:10

## 2019-05-14 RX ADMIN — LIDOCAINE HYDROCHLORIDE 20 ML: 20 INJECTION, SOLUTION INFILTRATION; PERINEURAL at 18:25

## 2019-05-14 RX ADMIN — POTASSIUM CHLORIDE 10 MEQ: 7.46 INJECTION, SOLUTION INTRAVENOUS at 09:25

## 2019-05-14 RX ADMIN — PETROLATUM: 42 OINTMENT TOPICAL at 02:04

## 2019-05-14 RX ADMIN — FAMOTIDINE 20 MG: 10 INJECTION, SOLUTION INTRAVENOUS at 08:11

## 2019-05-14 RX ADMIN — Medication 500 MG: at 08:10

## 2019-05-14 RX ADMIN — PETROLATUM: 42 OINTMENT TOPICAL at 23:49

## 2019-05-14 RX ADMIN — METRONIDAZOLE 500 MG: 500 INJECTION, SOLUTION INTRAVENOUS at 08:11

## 2019-05-14 ASSESSMENT — PAIN SCALES - WONG BAKER
WONGBAKER_NUMERICALRESPONSE: 0

## 2019-05-14 ASSESSMENT — PAIN SCALES - GENERAL
PAINLEVEL_OUTOF10: 0
PAINLEVEL_OUTOF10: 0

## 2019-05-14 NOTE — PROGRESS NOTES
[facility]  Internal Medicine Residency Program  MICU progress note    Patient:  Gregory Rodriguez 59 y.o. female MRN: [id number]     Date of Service: 5/14/2019    Hospital Day: 8      Chief complaint: AMS  Subjective   Patient seen and examined this morning . Patient currently extubated. Patient alert, awake,tachycardic, currently on NC at 6 L/min not follows command off sedation. -CXR: right lower lobe pleural effusion       24 hr event:  -Patient had continue loose stool, associated with electrolyte abnormality will replace as needed.  -patient currently on oral vancomycin and flagyl   -GS : no active intervention for surgery, resume tube feeding, tolerating  -leucocytosis  up trending 13k today  - Hb stable, plt improving  - Plan for IR guided PICC line placement. Physical Exam   · Vitals: BP (!) 155/88   Pulse 120   Temp 99.1 °F (37.3 °C)   Resp 30   Ht 5' (1.524 m)   Wt 202 lb 9.6 oz (91.9 kg)   SpO2 98%   BMI 39.57 kg/m²   ABP (Arterial line BP): 91/81  ABP mean (Arterial line mean): 86 mmHg    · General Appearance: Patient alert, awake, not following command , tachycardic  · Head: normocephalic and atraumatic  · Eyes: pupils equal, round, and reactive to light, extraocular eye movements intact, conjunctivae normal  · ENT: tympanic membrane, external ear and ear canal normal bilaterally, nose without deformity, nasal mucosa and turbinates normal without polyps  · Neck: supple and non-tender without mass, no thyromegaly or thyroid nodules, no cervical lymphadenopathy  · Pulmonary/Chest: decreased airentry on right lower lobe , with crackle on b/l lung  · Cardiovascular: increase rate, regular rhythm, normal S1 and S2, no murmurs, rubs, clicks, or gallops, distal pulses intact, no carotid bruits  · Abdomen: soft, right sided tenderness, Non distended.    No rebound tenderness   · Extremities: no cyanosis, clubbing or edema  · Musculoskeletal: normal range of motion, no joint swelling, deformity or tenderness  · Neurologic: reflexes normal and symmetric, no cranial nerve deficit, gait, coordination and speech normal     Lines     site day    Art line   None    TLC R Fem D/c 5/13   PICC None    Hemoaccess None        NC at 6 L/min  ABG:     Lab Results   Component Value Date    PH 7.505 05/14/2019    PCO2 33.8 05/14/2019    PO2 64.6 05/14/2019    HCO3 26.1 05/14/2019    BE 3.2 05/14/2019    THB 12.1 05/14/2019    O2SAT 93.4 05/14/2019     Labs and Imaging Studies   Basic Labs  CBC:   Lab Results   Component Value Date    WBC 13.1 05/14/2019    RBC 3.65 05/14/2019    HGB 12.3 05/14/2019    HCT 36.9 05/14/2019    .1 05/14/2019    RDW 13.6 05/14/2019     05/14/2019     CMP:  Lab Results   Component Value Date     05/14/2019    K 3.3 05/14/2019    CL 95 05/14/2019    CO2 30 05/14/2019    BUN 12 05/14/2019    PROT 6.4 05/14/2019     PT/INR:  No results found for: PTINR    Imaging Studies:     Ct Abdomen Pelvis Wo Contrast Additional Contrast? None    Result Date: 5/7/2019  EXAM:  CT Abdomen and Pelvis Without Contrast EXAM DATE/TIME:  5/7/2019 2:30 AM CLINICAL HISTORY:  59years old, female; Pain and signs and symptoms; Other: Diarrhea; Abdominal pain; Generalized TECHNIQUE:  Imaging protocol: Axial computed tomography images of the abdomen and pelvis without contrast. Coronal and sagittal reformatted images were created and reviewed. Radiation optimization: All CT scans at this facility use at least one of these dose optimization techniques: automated exposure control; mA and/or kV adjustment per patient size (includes targeted exams where dose is matched to clinical indication); or iterative reconstruction. COMPARISON:  No relevant prior studies available.  FINDINGS:  Tubes, catheters and devices: Subcutaneous stimulator at the anterolateral aspect of the left lower quadrant abdomen with an associated electrode extending into the spinal canal at the level of L2-3 and extending superiorly with tip at the level of T6. Lungs: Moderate bilateral dependent atelectasis versus consolidations with air bronchograms. Mild to moderate subsegmental atelectasis in the visualized lungs bilaterally. ABDOMEN:  Liver: Hepatomegaly to 16.5 cm in length at the midclavicular line and diffuse hepatic steatosis. Gallbladder and bile ducts: The gallbladder is surgically absent with cholecystectomy clips in the gallbladder fossa. No biliary ductal dilatation. Pancreas: Moderate pancreatic atrophy. No ductal dilatation. Spleen: Grossly unremarkable. No splenomegaly. Adrenals: Unremarkable. No mass. Kidneys and ureters: Grossly unremarkable. No hydronephrosis. No nephrolithiasis. No significant perinephric stranding. Stomach and bowel: Moderate diffuse mucosal thickening from the rectum to the mid sigmoid colon with mild adjacent stranding, mild mucosal thickening throughout the descending colon with adjacent stranding, and suggestion of mucosal thickening throughout the distal transverse colon as well as from the proximal most transverse colon to the cecum concerning for infectious/inflammatory proctocolitis. The unopacified loops of small bowel appear grossly unremarkable without evidence of obstruction. Moderate hiatal hernia. Appendix: Normal contrast-filled appendix inferior to the cecum. No periappendiceal free air or abscess seen. PELVIS:  Bladder: The urinary bladder is decompressed but appears grossly unremarkable. No bladder calculi. Reproductive: Grossly unremarkable ovaries and uterus. ABDOMEN and PELVIS:  Intraperitoneal space: No significant fluid collection. No free air. Prominent intra-abdominal fat. Bones/joints: Diffuse age related osteopenia and moderate degenerative changes. Moderate levoscoliosis of the lower lumbar spine. No acute fracture. No dislocation. Soft tissues: Huge amount of subcutaneous fat consistent with morbid obesity. Vasculature:  The abdominal aorta appears grossly unremarkable. Incidental bilateral inferior pelvic phleboliths. Lymph nodes: Unremarkable. No enlarged lymph nodes. 1. Moderate diffuse mucosal thickening from the rectum to the mid sigmoid colon with mild adjacent stranding, mild mucosal thickening throughout the descending colon with adjacent stranding, and suggestion of mucosal thickening throughout the distal transverse colon as well as from the proximal most transverse colon to the cecum concerning for infectious/inflammatory proctocolitis. 2. Moderate hiatal hernia. 3. Moderate bilateral dependent atelectases versus consolidations with air bronchograms. 4. No nephrolithiasis or obstructive uropathy. No perinephric stranding. 5. Normal appendix. 6. Additional nonacute/incidental findings as described above. This report has been electronically signed by Elidia Milan MD.    Ct Head Wo Contrast    Result Date: 5/7/2019  EXAM:  CT Head Without Contrast EXAM DATE/TIME:  5/7/2019 12:45 AM CLINICAL HISTORY:  59years old, female; Signs and symptoms; Altered mental status/memory loss; Confusion or disorientation; Additional info: Decreased alertness TECHNIQUE:  Imaging protocol: Axial computed tomography images of the head/brain without contrast. Coronal and sagittal reformatted images were created and reviewed. Radiation optimization: All CT scans at this facility use at least one of these dose optimization techniques: automated exposure control; mA and/or kV adjustment per patient size (includes targeted exams where dose is matched to clinical indication); or iterative reconstruction. COMPARISON:  No relevant prior studies available. FINDINGS:  Brain: No acute intracranial hemorrhage identified. Diffuse age-related mild cerebral atrophy and subtle small vessel ischemic changes. Incidental physiologic calcifications in the bilateral basal ganglia. Ventricles: Unremarkable for age. Sella: The pituitary gland appears grossly unremarkable. Bones/joints: Incidental hyperostosis frontalis interna. No acute fracture. Sinuses: Unremarkable. No acute sinusitis. No air-fluid levels. Mastoid air cells: Unremarkable. No mastoid effusions. Orbits: Unremarkable. Soft tissues: Unremarkable. Vasculature: Mild bilateral intracranial internal carotid artery atherosclerotic calcifications. 1. No acute intracranial hemorrhage identified. 2. Additional nonacute/incidental findings as described above. This report has been electronically signed by Karlos Amaya MD.    Xr Chest Portable    Result Date: 2019  Patient MRN:  [id number] : 1960 Age: 59 years Gender: Female Order Date:  2019 12:45 AM EXAM: XR CHEST PORTABLE COMPARISON: 2004 INDICATION:  chest pain chest pain FINDINGS: Interstitial opacities are seen in bilateral perihilar and infrahilar locations. No obvious pleural effusion. The heart is normal size. No pneumothorax. Spinal stimulator demonstrated. Interstitial opacities in perihilar and infrahilar locations could suggest peribronchial thickening/inflammation. Continued follow-up could be helpful for further evaluation. EKG: normal sinus rhythm, Qt prolonged    Resident's Assessment and Plan     Julio Cesar Garcia is a 59 y.o. female    Neurology   Acute encephalopathy  2/2 acute hypoxic respiratory failure vs sepsis   -Currently extubated   -hx of chronic opioids for pain control   -Ct head: no acute hemorrhage and mass effect  -Monitor mental status   -extubated to NC, will monitor respiratory status    Cardiovascular  Septic shock , improved  2/2 possible C diff colitis   -Off pressors, levophed,  -Pan culture sent: no growth, C. Diff EIA : C.  Diff toxin A and B detected  -repeat pan culture , blood culture : no growth, urine CONS  -continie IV flagyl Q8h and PO vancomycin 500 mg 4 times daily   -ID consult : input appreciated    Sinus tachycardia  Likely 2/2 febrile episode, fluid loss vs hypoxia  -will give fluid bolus 250 ml  -check ABG    Pulmonary   Acute hypoxic respiratory failure   2/2 sepsis ( d/t C.  Diff colitis) vs pulmonary edema 2/2 fluid overload  -S/p extubation on NC  -Initial ABG 8.140/05/35.6/30.8 c/w metabolic alkalosis  -initial Pro calcitonin :99.25>0.93  -Daily ABG and CXR  -CXR today:Right lower lobe infiltrate vs effusion  -will give 1 dose lasix 1 dose  - possible thoracocentesis if worsening effusion  - monitor BMP    GI  C diff colitis   -Hx of 2-3 weeks antibiotics use and diarrhea after   -Leukocytosis improving WBC 18.9>9.2>13  -Ct abdomen showed  infectious/inflammatory proctocolitis  -C diff EIA: c. Diff toxin A and B  -continue IV flagyl Q8h and PO vancomycin 500 mg 4 times daily   -continue contact isolation  -GS consult : no active intervention and okay to resume fedding  - continue oral feeding    Shock liver   Transaminitis, improving  - ALT/AST 3440/5412>>1018/587>157/31  -Ct abdomen showed hepatomegaly with hepatic steatosis  -INR 8.4, monitor INR 1.9 today  -serum drug screen: positive for opiates  -Monitor LFT   -s/p Give FFP and vitamin K     Renal   STEFANIA, prerenal, resolved   Likely 2/2 dehydration from septic shock and diarrhea   -Baseline Cr. 0.9, initial Cr. 4.2>>2.6>0.8  -IVF:d/eliseo  - bumex drip stop in setting of contraction alkalosis   -Nephrology consult   -Monitor renal function, urine output and BMP     Hypokalemia  - 2/2 GI loss and diuretic use  - replace k    Anemia ,Macrocytic vs blood loss  Hb: 11.5, MCV: 105.1  Hb: 9.7>9.3 today, likely dilutional vs acute blood loss  -trend h and h q 12 hr  -Folate and vitamin B 12: wnl  -monitor CBC  -target Hb >7    Thrombocytopenia, improved  -Plt: 73 today  -likely 2/2 infection   -HIT score 3, low probability  -oncology consulted: recommend transfuse if Plat<50   -will monitor CBC    PT/OT evaluation:  DVT prophylaxis/ GI prophylaxis: Pepcid / heparin  Disposition: CAROLINA Rea MD, PGY-1  Internal Medicine resident Attending physician: Dr. Esther Apley  Addendum ICU Attending Statement     Roxie Rucker was seen, examined and discussed with the multi-disciplinary ICU team during rounds. A addendum note may be separate to the residents note. If not then, I have personally seen and examined the patient and the key elements of the encounter were performed by me (> 85 % time). The medications & laboratory data was discussed and adjusted where necessary. The radiographic images were reviewed either as a group or with radiologist.  Any changes are document or changed if felt dis-concordant with the exam or history. The above findings were corroborated, plans confirmed and changes made if needed. Family was updated at the bedside as available. Key issues of the case were discussed among consultants. Critical Care time is documented if appropriate.       Zeynep Sung DO, MPH  Professor of Medicine

## 2019-05-14 NOTE — FLOWSHEET NOTE
Nurse called to come down to IR because patient was having a seizure, described as eyes twitching and eyes rolled back. Upon arrival, VSS, no seizure like activity, will continue to monitor. Resident notified, no new orders at this time.

## 2019-05-14 NOTE — PROGRESS NOTES
1430-Patient here from unit. Report received from [facility]. Patient awake but disoriented. Does not follow commands. [place]. Looks off to the right side. On unit monitor. Family with patient. 1440- procedure explained to family and consent signed. 1500- Waiting on availability of procedure room and doctor. 80- Dr Gennaro Salcedo talks to  and examines patient. 1700-Patient positioned supine on angio table with monitoring devices. 1717- Venogram done and images obtained. 0- Dr Gennaro Salcedo reviews venogram.   1725- RUE prepped and draped. 1735-Time out done. 1745- Patient appears to having a seizure. Face twitching and contoured for about 20 seconds. . Dr Gennaro Salcedo wants ICU RN to come down. Ama Gonzalez RN here and notifies medical resident of seizure. 1800- Unable to insert PICC right arm. Now LUE prepped and draped. 1820-With ultrasound guidance, 5 F lumen dual power PICC placed by Dr Gennaro Salcedo. Trimmed to 42 cm. Sutured in place. Blood return noted. Location confirmed with fluoroscopy and x-ray. Lines flushed, prn adapters attached. 1830- Site cleansed, Bio-Patch and occlusive dressing applied. Assisted to bed, Stefanie FERNÁNDEZ here and report given. 1835- Back to room per transport, monitor and RN.  here and sent up to room.

## 2019-05-14 NOTE — PLAN OF CARE
Problem: Gas Exchange - Impaired:  Goal: Levels of oxygenation will improve  Description  Levels of oxygenation will improve  Outcome: Met This Shift     Problem: Risk for Impaired Skin Integrity  Goal: Tissue integrity - skin and mucous membranes  Description  Structural intactness and normal physiological function of skin and  mucous membranes. Outcome: Ongoing   Plan of care discussed with patient / family.

## 2019-05-14 NOTE — INTERVAL H&P NOTE
H&P Update    H and P dated 5/7/2019 was reviewed. The following additions/updates are made:    Exam:  General: No apparent distress. Neuro: Expressionless. Does not follow commands. Eyes open. Pupils approx. 3 mm and reactive. RUE and LUE antigravity but drift down to bed. CV: Tachycardic  Resp: Tachypneic    A/P: Sepsis. PICC or tunneled CVC placement.     Electronically signed by Pamela Wong MD on 5/14/2019 at 5:28 PM

## 2019-05-14 NOTE — PROGRESS NOTES
Pulmonary [facility]                             Pulmonary Consult/Progress Note :    Chief complaint: AMS/resp failure   Subjective   Extubated  Doing great   S/p Bumex drip     Physical Exam   · Vitals: /69   Pulse 106   Temp 99.3 °F (37.4 °C) (Core)   Resp 25   Ht 5' (1.524 m)   Wt 214 lb 8.1 oz (97.3 kg)   SpO2 99%   BMI 41.89 kg/m²   ABP (Arterial line BP): 91/81  ABP mean (Arterial line mean): 86 mmHg    · General Appearance:Intubated and sedated. Briefly responding off sedation   · Skin: cold and pale blue in the finger tips, multiple rashes over lower extremities   · Head: normocephalic and atraumatic  · Eyes: pupils equal, round, and reactive to light, extraocular eye movements intact, conjunctivae normal  · ENT: tympanic membrane, external ear and ear canal normal bilaterally, nose without deformity, nasal mucosa and turbinates normal without polyps  · Neck: supple and non-tender without mass, no thyromegaly or thyroid nodules, no cervical lymphadenopathy  · Pulmonary/Chest: clear to auscultation bilaterally- no wheezes, rales or rhonchi, normal air movement, no respiratory distress  · Cardiovascular: normal rate, regular rhythm, normal S1 and S2, no murmurs, rubs, clicks, or gallops, distal pulses intact, no carotid bruits  · Abdomen: soft, right sided tenderness, Non distended.    No rebound tenderness   · Extremities: no cyanosis, clubbing or edema  · Musculoskeletal: normal range of motion, no joint swelling, deformity or tenderness  · Neurologic: reflexes normal and symmetric, no cranial nerve deficit, gait, coordination and speech normal     Lines     site day    Art line   None    TLC R Fem    PICC None    Hemoaccess None    ABG:     Lab Results   Component Value Date    PH 7.500 05/13/2019    PCO2 34.5 05/13/2019    PO2 88.3 05/13/2019    HCO3 26.3 05/13/2019    BE 3.3 05/13/2019    THB 12.5 05/13/2019    O2SAT 97.0 05/13/2019     Labs and Imaging Studies   Basic Labs  CBC:   Lab Results   Component Value Date    WBC 9.3 05/13/2019    RBC 2.95 05/13/2019    HGB 12.5 05/13/2019    HCT 37.7 05/13/2019    .7 05/13/2019    RDW 14.0 05/13/2019     05/13/2019     CMP:  Lab Results   Component Value Date     05/13/2019    K 3.4 05/13/2019    CL 94 05/13/2019    CO2 32 05/13/2019    BUN 11 05/13/2019    PROT 5.3 05/13/2019     PT/INR:  No results found for: PTINR    Imaging Studies:     Ct Abdomen Pelvis Wo Contrast Additional Contrast? None     Assessment and Plan       Acute encephalopathy, improving  Septic shock , improved  Pulmonary   Acute hypoxic respiratory failure   C diff colitis   -  acute pulmonary edema/Fluid overload   extubated  S/p  Bumex drip  Also has effusion may need tap if   PSV   Diuresis   CT scan compatibl with fluid overlaod    Will follow when outside ICU   Nona Murphy M.D.   5/13/2019  11:55 PM

## 2019-05-14 NOTE — PROGRESS NOTES
C/C:  Severe C diff infection      Discussed with pt's    Pt was extubated  No resp distress   Not responsive today       Afebrile       Current Facility-Administered Medications   Medication Dose Route Frequency Provider Last Rate Last Dose    pregabalin (LYRICA) capsule 200 mg  200 mg Oral 3 times per day Florian Benavides MD        thyroid (ARMOUR) tablet 30 mg  30 mg Oral Daily Florian Benavides MD        vancomycin (VANCOCIN) oral solution 500 mg  500 mg Oral 4 times per day Florian Benavides MD        furosemide (LASIX) injection 40 mg  40 mg Intravenous Once Shahbaz Bruno MD        lactobacillus (CULTURELLE) capsule 1 capsule  1 capsule Oral BID Olu Greene MD   1 capsule at 05/14/19 0810    morphine (PF) injection 2 mg  2 mg Intravenous Q4H PRN Mj Barker MD   2 mg at 05/13/19 1827    albuterol (ACCUNEB) nebulizer solution 0.63 mg  0.63 mg Nebulization Q6H PRN Te Hart MD   0.63 mg at 05/13/19 2134    acetaminophen (TYLENOL) 160 MG/5ML solution 650 mg  650 mg Oral Q6H PRN Eusebia Tapia MD   650 mg at 05/13/19 1509    potassium phosphate 15 mmol in dextrose 5 % 250 mL IVPB  15 mmol Intravenous Once Te Hart MD 62.5 mL/hr at 05/11/19 1100      chlorhexidine (PERIDEX) 0.12 % solution 15 mL  15 mL Mouth/Throat BID Kathleen Hutchinson MD   15 mL at 05/14/19 0811    sodium chloride flush 0.9 % injection 10 mL  10 mL Intravenous PRN Te Hart MD   10 mL at 05/11/19 0504    heparin flush 100 UNIT/ML injection 300 Units  3 mL Intravenous 2 times per day Te Hart MD   300 Units at 05/13/19 0924    heparin flush 100 UNIT/ML injection 300 Units  3 mL Intracatheter PRN Te Hart MD        heparin (porcine) injection 5,000 Units  5,000 Units Subcutaneous TID Dequan Camacho MD   5,000 Units at 05/14/19 8550    vitamin D (ERGOCALCIFEROL) capsule 50,000 Units  50,000 Units Oral Weekly Eusebia Tapia MD   Stopped at 05/08/19 2015    metronidazole (FLAGYL) 500 mg in NaCl 100 mL IVPB premix  500 mg Intravenous Q8H Angie Madera  mL/hr at 05/14/19 0811 500 mg at 05/14/19 0811    sodium chloride flush 0.9 % injection 10 mL  10 mL Intravenous 2 times per day Heidi Lima MD   10 mL at 05/14/19 0811    sodium chloride flush 0.9 % injection 10 mL  10 mL Intravenous PRN Heidi Lima MD   10 mL at 05/14/19 6604    magnesium hydroxide (MILK OF MAGNESIA) 400 MG/5ML suspension 30 mL  30 mL Oral Daily PRN Heidi Lima MD        ondansetron [facility]) injection 4 mg  4 mg Intravenous Q6H PRN Heidi Lima MD        famotidine (PEPCID) injection 20 mg  20 mg Intravenous Daily Heidi Lima MD   20 mg at 05/14/19 3669    mineral oil-hydrophilic petrolatum (HYDROPHOR) ointment   Topical TID Berna Loving MD        And    mineral oil-hydrophilic petrolatum (HYDROPHOR) ointment   Topical TID PRN Berna Loving MD               REVIEW OF SYSTEMS:       CONSTITUTIONAL:  no fever   RESPIRATORY : denies SOB   GASTROINTESTINAL:  Diarrhea   GENITOURINARY:  Good urine out put     INTEGUMENT:no rash   MUSCULOSKELETAL:  Weakness   NEUROLOGICAL:  not responsive     Objective:    BP (!) 144/83   Pulse 103   Temp 99.1 °F (37.3 °C) (Core)   Resp 30   Ht 5' (1.524 m)   Wt 202 lb 9.6 oz (91.9 kg)   SpO2 98%   BMI 39.57 kg/m²       General Appearance:    Extubated, not responsive    Head:    Normocephalic, atraumatic   Eyes:    No pallor, no icterus,   Ears:    No obvious deformity or drainage.    Nose:   No nasal drainage   Throat:   Mucosa dry, no oral thrush   Neck:   Supple, no lymphadenopathy   Lungs:     Scattered rhonchi   Heart:    Regular rate and rhythm   Abdomen:     Soft, bowel sounds present , loose stool     Extremities:   + edema, cold to touch   Pulses:   Dorsalis pedis palpable    Skin:   no rashes or lesions      CBC with Differential:      Lab Results   Component Value Date    WBC 13.1 05/14/2019    RBC 3.65 05/14/2019    HGB 12.3 05/14/2019    HCT 36.9 05/14/2019     05/14/2019    .1 05/14/2019    MCH 33.7 05/14/2019    MCHC 33.3 05/14/2019    RDW 13.6 05/14/2019    NRBC 0.9 05/10/2019    LYMPHOPCT 9.9 05/14/2019    PROMYELOPCT 0.9 05/08/2019    MONOPCT 6.4 05/14/2019    BASOPCT 0.3 05/14/2019    MONOSABS 0.84 05/14/2019    LYMPHSABS 1.29 05/14/2019    EOSABS 0.03 05/14/2019    BASOSABS 0.04 05/14/2019       CMP:    Lab Results   Component Value Date     05/14/2019    K 3.3 05/14/2019    CL 95 05/14/2019    CO2 30 05/14/2019    BUN 12 05/14/2019    CREATININE 0.8 05/14/2019    GFRAA >60 05/14/2019    LABGLOM >60 05/14/2019    GLUCOSE 124 05/14/2019    PROT 6.4 05/14/2019    LABALBU 3.1 05/14/2019    LABALBU 4.4 08/09/2011    CALCIUM 7.1 05/14/2019    BILITOT 1.2 05/14/2019    ALKPHOS 46 05/14/2019    AST 32 05/14/2019    ALT 82 05/14/2019       Hepatic Function Panel:    Lab Results   Component Value Date    ALKPHOS 46 05/14/2019    ALT 82 05/14/2019    AST 32 05/14/2019    PROT 6.4 05/14/2019    BILITOT 1.2 05/14/2019    BILIDIR 0.4 05/14/2019    IBILI 0.8 05/14/2019    LABALBU 3.1 05/14/2019    LABALBU 4.4 08/09/2011       MICROBIOLOGY:     Blood culture - neg to date     Urine cx - Candida         Radiology :     Chest x ray -    Status post line and tube removal since yesterday. Known pneumonia  bilaterally with atelectasis and the known right-sided massive pleural  effusion. Left pleural effusion may be settling posteriorly in the  sulcal area. When feasible, next follow-up examination may be obtained  in the PA and lateral projections for more definitive study.       CT scan of abdomen and pelvis -     Diffuse mucosal thickening rectum to mid colon          IMPRESSION:      1. Septic shock -Improved   2. Sever C   C diff infection    2. MOFS - slowly improving   3. Leukocytosis - 13 K   4. Respiratory failure   5. Candiduria         RECOMMENDATIONS:      1. Oral vancomycin 500 mg po q 6 hrs , Flagyl 500 mg IV q 8 hrs   2. Contact isolation   3.  Would not treat

## 2019-05-14 NOTE — PROGRESS NOTES
Department of Internal Medicine  Nephrology Resident  Progress Note        Events reviewed. SUBJECTIVE: We are following Mrs. Haji for acute kidney injury. She she was extubated yesterday.     PHYSICAL EXAM:      VITALS:  BP (!) 122/94   Pulse 117   Temp 99 °F (37.2 °C) (Core)   Resp (!) 43   Ht 5' (1.524 m)   Wt 202 lb 9.6 oz (91.9 kg)   SpO2 100%   BMI 39.57 kg/m²     Intake/Output Summary (Last 24 hours) at 5/8/2019 1349  Last data filed at 5/8/2019 1200  Gross per 24 hour   Intake 5855 ml   Output 1685 ml   Net 4170 ml       Constitutional:  Extubated, awake  HEENT:  MESSI, endotracheal tube in place  Respiratory:  Coarse breath sounds bilaterally, frothy secretions from ET  Cardiovascular/Edema:  Regular rate and rhythm, no murmurs appreciated  Gastrointestinal:  Soft, non-tender, bowel sounds present  Neurologic: sedated, withdraws to pain  Skin:  warm, + edema  Other:  Erythematous lesion on lower extrtemities    DATA:    CBC with Differential:    Lab Results   Component Value Date    WBC 13.1 05/14/2019    RBC 3.65 05/14/2019    HGB 12.3 05/14/2019    HCT 36.9 05/14/2019     05/14/2019    .1 05/14/2019    MCH 33.7 05/14/2019    MCHC 33.3 05/14/2019    RDW 13.6 05/14/2019    NRBC 0.9 05/10/2019    LYMPHOPCT 9.9 05/14/2019    PROMYELOPCT 0.9 05/08/2019    MONOPCT 6.4 05/14/2019    BASOPCT 0.3 05/14/2019    MONOSABS 0.84 05/14/2019    LYMPHSABS 1.29 05/14/2019    EOSABS 0.03 05/14/2019    BASOSABS 0.04 05/14/2019     CMP:    Lab Results   Component Value Date     05/14/2019    K 3.3 05/14/2019    CL 95 05/14/2019    CO2 30 05/14/2019    BUN 12 05/14/2019    CREATININE 0.8 05/14/2019    GFRAA >60 05/14/2019    LABGLOM >60 05/14/2019    GLUCOSE 124 05/14/2019    PROT 6.4 05/14/2019    LABALBU 3.1 05/14/2019    LABALBU 4.4 08/09/2011    CALCIUM 7.1 05/14/2019    BILITOT 1.2 05/14/2019    ALKPHOS 46 05/14/2019    AST 32 05/14/2019    ALT 82 05/14/2019     BMP:    Lab Results Component Value Date     05/14/2019    K 3.3 05/14/2019    CL 95 05/14/2019    CO2 30 05/14/2019    BUN 12 05/14/2019    LABALBU 3.1 05/14/2019    LABALBU 4.4 08/09/2011    CREATININE 0.8 05/14/2019    CALCIUM 7.1 05/14/2019    GFRAA >60 05/14/2019    LABGLOM >60 05/14/2019    GLUCOSE 124 05/14/2019     Calcium:    Lab Results   Component Value Date    CALCIUM 7.1 05/14/2019     Ionized Calcium:  No results found for: IONCA  Magnesium:    Lab Results   Component Value Date    MG 2.0 05/14/2019     Phosphorus:    Lab Results   Component Value Date    PHOS 2.9 05/14/2019         Urine studies:  Urine sodium: 22  Urine chloride: <20  Urine creatinine: 100  Urine urea: 251  Urine potassium: 48.2    Fraction excretion of sodium: 0.5%  Fraction excretion of urea: 12.6%    Vitamin D 25 level: 13 (low)      Radiology Review:      CT head 5/7/19   1. No acute intracranial hemorrhage identified. 2. Additional nonacute/incidental findings as described above.        This report has been electronically signed by Lindsey Islas MD.     CT abdomen 5/7/19   1. Moderate diffuse mucosal thickening from the rectum to the mid sigmoid colon    with mild adjacent stranding, mild mucosal thickening throughout the descending    colon with adjacent stranding, and suggestion of mucosal thickening throughout    the distal transverse colon as well as from the proximal most transverse colon    to the cecum concerning for infectious/inflammatory proctocolitis. 2. Moderate hiatal hernia. 3. Moderate bilateral dependent atelectases versus consolidations with air    bronchograms. 4. No nephrolithiasis or obstructive uropathy. No perinephric stranding. 5. Normal appendix. 6. Additional nonacute/incidental findings as described above.        This report has been electronically signed by Lindsey Islas MD.        Chest x-ray May 13, 2019    1. Stable, enlarged cardiomediastinal silhouette with underlying   pulmonary edema.    2. improving  Hypocalcemia, probably multifactorial, including vitamin D deficiency and hypomagnesemia. Continue ergocalciferol  C. difficile infection, on vancomycin and metronidazole.   Severe hypoalbuminemia, multifactorial  Nutrition, on hold, to a start diet    Plan:    Replace K  Continue to monitor renal function  Repeat ionized calcium  Lasix 40 mg IV ×1  Monitor I/Os from NG/GT tube          Angel Castillo M.D PGY 3  Internal Medicine Resident  5/14/2019    Attending physician: Dr. Hiren Guzman

## 2019-05-14 NOTE — PROGRESS NOTES
[facility] Quality Flow/Interdisciplinary Rounds Progress Note        Quality Flow Rounds held on May 14, 2019    Disciplines Attending:  Bedside Nurse, Charge nurse, CRISTIANA, OT, PT, , and . James Gonzales was admitted on 5/7/2019 12:26 AM    Anticipated Discharge Date:  Expected Discharge Date: 05/21/19    Disposition:    Lorenzo Score:  Lorenzo Scale Score: 11    Readmission Risk              Risk of Unplanned Readmission:        17           Discussed patient goal for the day, patient clinical progression, and barriers to discharge.   The following Goal(s) of the Day/Commitment(s) have been identified:  Check transfer, monitor vital signs, d/c vance if possible      Ravi Franks  May 14, 2019

## 2019-05-14 NOTE — PROGRESS NOTES
Subjective: The patient awake but not interactive   picc placement today        Objective:    BP (!) 175/85   Pulse 125   Temp 99.1 °F (37.3 °C)   Resp (!) 32   Ht 5' (1.524 m)   Wt 202 lb 9.6 oz (91.9 kg)   SpO2 99%   BMI 39.57 kg/m²     In: 978 [P. O.:60; I.V.:918]  Out: 1410     HEENT: intubated NCAT,  PERRLA, No JVD  Heart:  RRR, no murmurs, gallops, or rubs. Lungs:  CTA bilaterally, no wheeze, rales or rhonchi  Abd: bowel sounds hypoactive, distended but soft abdomen   Extrem:  No clubbing, cyanosis, or edema     Recent Labs     05/12/19  0615  05/13/19  0430 05/13/19  1856 05/14/19  0425   WBC 9.2  --  9.3  --  13.1*   HGB 9.3*   < > 10.2* 12.5 12.3   HCT 27.8*   < > 30.6* 37.7 36.9   *  --  141  --  305    < > = values in this interval not displayed.        Recent Labs     05/13/19  1227 05/13/19  1915 05/14/19  0425    142 138   K 3.1* 3.4* 3.3*   CL 94* 94* 95*   CO2 39* 32* 30*   BUN 11 11 12   CREATININE 0.8 0.8 0.8   CALCIUM 7.0* 7.4* 7.1*       Assessment:    Patient Active Problem List   Diagnosis    Post lumbar laminectomy syndrome    Herniated disc L4-L5    Lumbar sprain     Status post lumbar spinal cord stimulator 2007    Spinal cord stimulator dysfunction/Charging of SCS    Lumbar radiculopathy    Chronic pain syndrome    Septic shock (HCC)    Colitis       Plan:    Admit to micu for eval of sepsis / shock from GI source -   Severe C diff infection   extubated 5/13   Ct head for decreased responsiveness not remarkable for acute event   Fluids until tolerating po intake   abx therapy with po vanc and flagyl per ID   Supportive care   supplement lytes   Renal and id following   Continue  icu care       DVT and GERD prophylaxis  All consultants notes reviewed    Josselyn Jung MD  5:41 PM  5/14/2019

## 2019-05-15 ENCOUNTER — APPOINTMENT (OUTPATIENT)
Dept: NEUROLOGY | Age: 59
DRG: 870 | End: 2019-05-15
Payer: COMMERCIAL

## 2019-05-15 ENCOUNTER — APPOINTMENT (OUTPATIENT)
Dept: MRI IMAGING | Age: 59
DRG: 870 | End: 2019-05-15
Payer: COMMERCIAL

## 2019-05-15 ENCOUNTER — APPOINTMENT (OUTPATIENT)
Dept: GENERAL RADIOLOGY | Age: 59
DRG: 870 | End: 2019-05-15
Payer: COMMERCIAL

## 2019-05-15 LAB
ABO/RH: NORMAL
ALBUMIN SERPL-MCNC: 2.8 G/DL (ref 3.5–5.2)
ALBUMIN SERPL-MCNC: 2.9 G/DL (ref 3.5–5.2)
ALP BLD-CCNC: 44 U/L (ref 35–104)
ALP BLD-CCNC: 46 U/L (ref 35–104)
ALT SERPL-CCNC: 63 U/L (ref 0–32)
ALT SERPL-CCNC: 64 U/L (ref 0–32)
ANION GAP SERPL CALCULATED.3IONS-SCNC: 14 MMOL/L (ref 7–16)
ANION GAP SERPL CALCULATED.3IONS-SCNC: 17 MMOL/L (ref 7–16)
ANTIBODY SCREEN: NORMAL
AST SERPL-CCNC: 23 U/L (ref 0–31)
AST SERPL-CCNC: 23 U/L (ref 0–31)
B.E.: 2.9 MMOL/L (ref -3–3)
BASOPHILS ABSOLUTE: 0.02 E9/L (ref 0–0.2)
BASOPHILS RELATIVE PERCENT: 0.2 % (ref 0–2)
BILIRUB SERPL-MCNC: 0.9 MG/DL (ref 0–1.2)
BILIRUB SERPL-MCNC: 0.9 MG/DL (ref 0–1.2)
BILIRUBIN DIRECT: 0.4 MG/DL (ref 0–0.3)
BILIRUBIN, INDIRECT: 0.5 MG/DL (ref 0–1)
BUN BLDV-MCNC: 17 MG/DL (ref 8–23)
BUN BLDV-MCNC: 18 MG/DL (ref 8–23)
C DIFFICILE TOXIN, EIA: NORMAL
CALCIUM SERPL-MCNC: 7.7 MG/DL (ref 8.6–10.2)
CALCIUM SERPL-MCNC: 7.9 MG/DL (ref 8.6–10.2)
CHLORIDE BLD-SCNC: 102 MMOL/L (ref 98–107)
CHLORIDE BLD-SCNC: 106 MMOL/L (ref 98–107)
CO2: 24 MMOL/L (ref 22–29)
CO2: 26 MMOL/L (ref 22–29)
COHB: 1.1 % (ref 0–1.5)
CREAT SERPL-MCNC: 0.6 MG/DL (ref 0.5–1)
CREAT SERPL-MCNC: 0.7 MG/DL (ref 0.5–1)
CRITICAL: ABNORMAL
CULTURE, RESPIRATORY: NORMAL
DATE ANALYZED: ABNORMAL
DATE OF COLLECTION: ABNORMAL
EOSINOPHILS ABSOLUTE: 0.01 E9/L (ref 0.05–0.5)
EOSINOPHILS RELATIVE PERCENT: 0.1 % (ref 0–6)
FILM ARRAY ADENOVIRUS: NORMAL
FILM ARRAY BORDETELLA PERTUSSIS: NORMAL
FILM ARRAY CHLAMYDOPHILIA PNEUMONIAE: NORMAL
FILM ARRAY CORONAVIRUS 229E: NORMAL
FILM ARRAY CORONAVIRUS HKU1: NORMAL
FILM ARRAY CORONAVIRUS NL63: NORMAL
FILM ARRAY CORONAVIRUS OC43: NORMAL
FILM ARRAY INFLUENZA A VIRUS 09H1: NORMAL
FILM ARRAY INFLUENZA A VIRUS H1: NORMAL
FILM ARRAY INFLUENZA A VIRUS H3: NORMAL
FILM ARRAY INFLUENZA A VIRUS: NORMAL
FILM ARRAY INFLUENZA B: NORMAL
FILM ARRAY METAPNEUMOVIRUS: NORMAL
FILM ARRAY MYCOPLASMA PNEUMONIAE: NORMAL
FILM ARRAY PARAINFLUENZA VIRUS 1: NORMAL
FILM ARRAY PARAINFLUENZA VIRUS 2: NORMAL
FILM ARRAY PARAINFLUENZA VIRUS 3: NORMAL
FILM ARRAY PARAINFLUENZA VIRUS 4: NORMAL
FILM ARRAY RESPIRATORY SYNCITIAL VIRUS: NORMAL
FILM ARRAY RHINOVIRUS/ENTEROVIRUS: NORMAL
GFR AFRICAN AMERICAN: >60
GFR AFRICAN AMERICAN: >60
GFR NON-AFRICAN AMERICAN: >60 ML/MIN/1.73
GFR NON-AFRICAN AMERICAN: >60 ML/MIN/1.73
GLUCOSE BLD-MCNC: 113 MG/DL (ref 74–99)
GLUCOSE BLD-MCNC: 117 MG/DL (ref 74–99)
HCO3: 25.8 MMOL/L (ref 22–26)
HCT VFR BLD CALC: 21.8 % (ref 34–48)
HCT VFR BLD CALC: 28.6 % (ref 34–48)
HCT VFR BLD CALC: 29.1 % (ref 34–48)
HEMOGLOBIN: 7.1 G/DL (ref 11.5–15.5)
HEMOGLOBIN: 9.4 G/DL (ref 11.5–15.5)
HEMOGLOBIN: 9.7 G/DL (ref 11.5–15.5)
HHB: 4.8 % (ref 0–5)
IMMATURE GRANULOCYTES #: 0.09 E9/L
IMMATURE GRANULOCYTES %: 0.9 % (ref 0–5)
INR BLD: 1.4
LAB: ABNORMAL
LACTIC ACID: 1.4 MMOL/L (ref 0.5–2.2)
LYMPHOCYTES ABSOLUTE: 1.09 E9/L (ref 1.5–4)
LYMPHOCYTES RELATIVE PERCENT: 11.4 % (ref 20–42)
Lab: ABNORMAL
MAGNESIUM: 1.6 MG/DL (ref 1.6–2.6)
MCH RBC QN AUTO: 33.9 PG (ref 26–35)
MCHC RBC AUTO-ENTMCNC: 33.3 % (ref 32–34.5)
MCV RBC AUTO: 101.7 FL (ref 80–99.9)
METER GLUCOSE: 116 MG/DL (ref 74–99)
METER GLUCOSE: 140 MG/DL (ref 74–99)
METHB: 0.1 % (ref 0–1.5)
MODE: ABNORMAL
MONOCYTES ABSOLUTE: 0.86 E9/L (ref 0.1–0.95)
MONOCYTES RELATIVE PERCENT: 9 % (ref 2–12)
NEUTROPHILS ABSOLUTE: 7.51 E9/L (ref 1.8–7.3)
NEUTROPHILS RELATIVE PERCENT: 78.4 % (ref 43–80)
O2 SATURATION: 95.1 % (ref 92–98.5)
O2HB: 94 % (ref 94–97)
OPERATOR ID: 882
PATIENT TEMP: 37 C
PCO2: 33.5 MMHG (ref 35–45)
PDW BLD-RTO: 14 FL (ref 11.5–15)
PH BLOOD GAS: 7.5 (ref 7.35–7.45)
PHOSPHORUS: 1.8 MG/DL (ref 2.5–4.5)
PLATELET # BLD: 392 E9/L (ref 130–450)
PMV BLD AUTO: 11.4 FL (ref 7–12)
PO2: 76.4 MMHG (ref 60–100)
POTASSIUM REFLEX MAGNESIUM: 3.3 MMOL/L (ref 3.5–5)
POTASSIUM SERPL-SCNC: 3.4 MMOL/L (ref 3.5–5)
PROTHROMBIN TIME: 15.9 SEC (ref 9.3–12.4)
RBC # BLD: 2.86 E12/L (ref 3.5–5.5)
SMEAR, RESPIRATORY: NORMAL
SODIUM BLD-SCNC: 143 MMOL/L (ref 132–146)
SODIUM BLD-SCNC: 146 MMOL/L (ref 132–146)
SOURCE, BLOOD GAS: ABNORMAL
T4 FREE: 1.44 NG/DL (ref 0.93–1.7)
THB: 10.7 G/DL (ref 11.5–16.5)
TIME ANALYZED: 1309
TOTAL PROTEIN: 5.7 G/DL (ref 6.4–8.3)
TOTAL PROTEIN: 5.8 G/DL (ref 6.4–8.3)
TSH SERPL DL<=0.05 MIU/L-ACNC: 2.13 UIU/ML (ref 0.27–4.2)
WBC # BLD: 9.6 E9/L (ref 4.5–11.5)

## 2019-05-15 PROCEDURE — 6370000000 HC RX 637 (ALT 250 FOR IP): Performed by: INTERNAL MEDICINE

## 2019-05-15 PROCEDURE — 2580000003 HC RX 258: Performed by: INTERNAL MEDICINE

## 2019-05-15 PROCEDURE — 86923 COMPATIBILITY TEST ELECTRIC: CPT

## 2019-05-15 PROCEDURE — 80048 BASIC METABOLIC PNL TOTAL CA: CPT

## 2019-05-15 PROCEDURE — 6360000002 HC RX W HCPCS: Performed by: INTERNAL MEDICINE

## 2019-05-15 PROCEDURE — 2500000003 HC RX 250 WO HCPCS: Performed by: INTERNAL MEDICINE

## 2019-05-15 PROCEDURE — 2000000000 HC ICU R&B

## 2019-05-15 PROCEDURE — 70551 MRI BRAIN STEM W/O DYE: CPT

## 2019-05-15 PROCEDURE — 94669 MECHANICAL CHEST WALL OSCILL: CPT

## 2019-05-15 PROCEDURE — 99291 CRITICAL CARE FIRST HOUR: CPT | Performed by: PSYCHIATRY & NEUROLOGY

## 2019-05-15 PROCEDURE — 84443 ASSAY THYROID STIM HORMONE: CPT

## 2019-05-15 PROCEDURE — 99233 SBSQ HOSP IP/OBS HIGH 50: CPT | Performed by: INTERNAL MEDICINE

## 2019-05-15 PROCEDURE — 85014 HEMATOCRIT: CPT

## 2019-05-15 PROCEDURE — 36415 COLL VENOUS BLD VENIPUNCTURE: CPT

## 2019-05-15 PROCEDURE — 36592 COLLECT BLOOD FROM PICC: CPT

## 2019-05-15 PROCEDURE — 86850 RBC ANTIBODY SCREEN: CPT

## 2019-05-15 PROCEDURE — 6360000002 HC RX W HCPCS

## 2019-05-15 PROCEDURE — 86901 BLOOD TYPING SEROLOGIC RH(D): CPT

## 2019-05-15 PROCEDURE — 6370000000 HC RX 637 (ALT 250 FOR IP): Performed by: STUDENT IN AN ORGANIZED HEALTH CARE EDUCATION/TRAINING PROGRAM

## 2019-05-15 PROCEDURE — 84100 ASSAY OF PHOSPHORUS: CPT

## 2019-05-15 PROCEDURE — 71045 X-RAY EXAM CHEST 1 VIEW: CPT

## 2019-05-15 PROCEDURE — 84439 ASSAY OF FREE THYROXINE: CPT

## 2019-05-15 PROCEDURE — 6360000002 HC RX W HCPCS: Performed by: PSYCHIATRY & NEUROLOGY

## 2019-05-15 PROCEDURE — 2700000000 HC OXYGEN THERAPY PER DAY

## 2019-05-15 PROCEDURE — 82962 GLUCOSE BLOOD TEST: CPT

## 2019-05-15 PROCEDURE — 85018 HEMOGLOBIN: CPT

## 2019-05-15 PROCEDURE — 95816 EEG AWAKE AND DROWSY: CPT

## 2019-05-15 PROCEDURE — 95816 EEG AWAKE AND DROWSY: CPT | Performed by: PSYCHIATRY & NEUROLOGY

## 2019-05-15 PROCEDURE — 85610 PROTHROMBIN TIME: CPT

## 2019-05-15 PROCEDURE — 87324 CLOSTRIDIUM AG IA: CPT

## 2019-05-15 PROCEDURE — 86900 BLOOD TYPING SEROLOGIC ABO: CPT

## 2019-05-15 PROCEDURE — 82805 BLOOD GASES W/O2 SATURATION: CPT

## 2019-05-15 PROCEDURE — 80053 COMPREHEN METABOLIC PANEL: CPT

## 2019-05-15 PROCEDURE — 83735 ASSAY OF MAGNESIUM: CPT

## 2019-05-15 PROCEDURE — 80076 HEPATIC FUNCTION PANEL: CPT

## 2019-05-15 PROCEDURE — 85025 COMPLETE CBC W/AUTO DIFF WBC: CPT

## 2019-05-15 PROCEDURE — 83605 ASSAY OF LACTIC ACID: CPT

## 2019-05-15 RX ORDER — LEVETIRACETAM 5 MG/ML
1000 INJECTION INTRAVASCULAR ONCE
Status: COMPLETED | OUTPATIENT
Start: 2019-05-15 | End: 2019-05-15

## 2019-05-15 RX ORDER — METOPROLOL TARTRATE 5 MG/5ML
2.5 INJECTION INTRAVENOUS EVERY 6 HOURS
Status: DISCONTINUED | OUTPATIENT
Start: 2019-05-15 | End: 2019-05-21

## 2019-05-15 RX ORDER — LEVETIRACETAM 5 MG/ML
INJECTION INTRAVASCULAR
Status: COMPLETED
Start: 2019-05-15 | End: 2019-05-15

## 2019-05-15 RX ORDER — LEVETIRACETAM 5 MG/ML
500 INJECTION INTRAVASCULAR 2 TIMES DAILY
Status: DISCONTINUED | OUTPATIENT
Start: 2019-05-16 | End: 2019-05-17

## 2019-05-15 RX ORDER — METOPROLOL TARTRATE 5 MG/5ML
2.5 INJECTION INTRAVENOUS EVERY 4 HOURS
Status: DISCONTINUED | OUTPATIENT
Start: 2019-05-15 | End: 2019-05-15

## 2019-05-15 RX ORDER — DEXTROSE AND SODIUM CHLORIDE 5; .9 G/100ML; G/100ML
INJECTION, SOLUTION INTRAVENOUS CONTINUOUS
Status: DISCONTINUED | OUTPATIENT
Start: 2019-05-15 | End: 2019-05-16

## 2019-05-15 RX ORDER — MAGNESIUM SULFATE 1 G/100ML
1 INJECTION INTRAVENOUS ONCE
Status: COMPLETED | OUTPATIENT
Start: 2019-05-15 | End: 2019-05-15

## 2019-05-15 RX ORDER — POTASSIUM CHLORIDE 29.8 MG/ML
20 INJECTION INTRAVENOUS ONCE
Status: COMPLETED | OUTPATIENT
Start: 2019-05-15 | End: 2019-05-15

## 2019-05-15 RX ORDER — LEVOTHYROXINE SODIUM ANHYDROUS 100 UG/5ML
15 INJECTION, POWDER, LYOPHILIZED, FOR SOLUTION INTRAVENOUS ONCE
Status: COMPLETED | OUTPATIENT
Start: 2019-05-15 | End: 2019-05-15

## 2019-05-15 RX ORDER — FUROSEMIDE 10 MG/ML
40 INJECTION INTRAMUSCULAR; INTRAVENOUS ONCE
Status: COMPLETED | OUTPATIENT
Start: 2019-05-15 | End: 2019-05-15

## 2019-05-15 RX ORDER — 0.9 % SODIUM CHLORIDE 0.9 %
250 INTRAVENOUS SOLUTION INTRAVENOUS ONCE
Status: COMPLETED | OUTPATIENT
Start: 2019-05-15 | End: 2019-05-16

## 2019-05-15 RX ADMIN — PETROLATUM: 42 OINTMENT TOPICAL at 20:01

## 2019-05-15 RX ADMIN — HEPARIN SODIUM 5000 UNITS: 10000 INJECTION INTRAVENOUS; SUBCUTANEOUS at 08:08

## 2019-05-15 RX ADMIN — DEXTROSE AND SODIUM CHLORIDE: 5; 900 INJECTION, SOLUTION INTRAVENOUS at 16:19

## 2019-05-15 RX ADMIN — Medication 10 ML: at 08:07

## 2019-05-15 RX ADMIN — VANCOMYCIN HYDROCHLORIDE 1000 MG: 1 INJECTION, POWDER, LYOPHILIZED, FOR SOLUTION INTRAVENOUS at 16:08

## 2019-05-15 RX ADMIN — METRONIDAZOLE 500 MG: 500 INJECTION, SOLUTION INTRAVENOUS at 11:58

## 2019-05-15 RX ADMIN — CHLORHEXIDINE GLUCONATE 0.12% ORAL RINSE 15 ML: 1.2 LIQUID ORAL at 08:08

## 2019-05-15 RX ADMIN — METOPROLOL TARTRATE 2.5 MG: 1 INJECTION, SOLUTION INTRAVENOUS at 23:51

## 2019-05-15 RX ADMIN — SODIUM CHLORIDE, PRESERVATIVE FREE 300 UNITS: 5 INJECTION INTRAVENOUS at 08:14

## 2019-05-15 RX ADMIN — PREGABALIN 200 MG: 100 CAPSULE ORAL at 22:07

## 2019-05-15 RX ADMIN — HEPARIN SODIUM 5000 UNITS: 10000 INJECTION INTRAVENOUS; SUBCUTANEOUS at 19:46

## 2019-05-15 RX ADMIN — METRONIDAZOLE 500 MG: 500 INJECTION, SOLUTION INTRAVENOUS at 18:38

## 2019-05-15 RX ADMIN — PETROLATUM: 42 OINTMENT TOPICAL at 08:07

## 2019-05-15 RX ADMIN — FAMOTIDINE 20 MG: 10 INJECTION, SOLUTION INTRAVENOUS at 08:07

## 2019-05-15 RX ADMIN — Medication 500 MG: at 23:52

## 2019-05-15 RX ADMIN — FUROSEMIDE 40 MG: 10 INJECTION, SOLUTION INTRAMUSCULAR; INTRAVENOUS at 08:17

## 2019-05-15 RX ADMIN — PETROLATUM: 42 OINTMENT TOPICAL at 03:45

## 2019-05-15 RX ADMIN — PETROLATUM: 42 OINTMENT TOPICAL at 05:51

## 2019-05-15 RX ADMIN — POTASSIUM CHLORIDE 40 MEQ: 29.8 INJECTION, SOLUTION INTRAVENOUS at 01:06

## 2019-05-15 RX ADMIN — SODIUM CHLORIDE, PRESERVATIVE FREE 300 UNITS: 5 INJECTION INTRAVENOUS at 19:48

## 2019-05-15 RX ADMIN — LEVETIRACETAM 500 MG: 5 INJECTION INTRAVENOUS at 19:53

## 2019-05-15 RX ADMIN — POTASSIUM CHLORIDE 20 MEQ: 29.8 INJECTION, SOLUTION INTRAVENOUS at 06:58

## 2019-05-15 RX ADMIN — ONDANSETRON HYDROCHLORIDE 4 MG: 2 SOLUTION INTRAMUSCULAR; INTRAVENOUS at 20:08

## 2019-05-15 RX ADMIN — METOPROLOL TARTRATE 2.5 MG: 1 INJECTION, SOLUTION INTRAVENOUS at 17:15

## 2019-05-15 RX ADMIN — METRONIDAZOLE 500 MG: 500 INJECTION, SOLUTION INTRAVENOUS at 03:05

## 2019-05-15 RX ADMIN — CHLORHEXIDINE GLUCONATE 0.12% ORAL RINSE 15 ML: 1.2 LIQUID ORAL at 20:11

## 2019-05-15 RX ADMIN — MAGNESIUM SULFATE HEPTAHYDRATE 1 G: 1 INJECTION, SOLUTION INTRAVENOUS at 13:08

## 2019-05-15 RX ADMIN — Medication 10 ML: at 03:04

## 2019-05-15 RX ADMIN — Medication 10 ML: at 20:12

## 2019-05-15 RX ADMIN — BENZOCAINE, BUTAMBEN, AND TETRACAINE HYDROCHLORIDE 1 SPRAY: .028; .004; .004 AEROSOL, SPRAY TOPICAL at 16:10

## 2019-05-15 RX ADMIN — POTASSIUM PHOSPHATE, MONOBASIC AND POTASSIUM PHOSPHATE, DIBASIC 20 MMOL: 224; 236 INJECTION, SOLUTION, CONCENTRATE INTRAVENOUS at 08:03

## 2019-05-15 RX ADMIN — LEVOTHYROXINE SODIUM ANHYDROUS 15 MCG: 100 INJECTION, POWDER, LYOPHILIZED, FOR SOLUTION INTRAVENOUS at 16:07

## 2019-05-15 RX ADMIN — PETROLATUM: 42 OINTMENT TOPICAL at 01:06

## 2019-05-15 RX ADMIN — LEVETIRACETAM 1000 MG: 5 INJECTION INTRAVENOUS at 18:38

## 2019-05-15 RX ADMIN — ONDANSETRON HYDROCHLORIDE 4 MG: 2 SOLUTION INTRAMUSCULAR; INTRAVENOUS at 12:50

## 2019-05-15 RX ADMIN — Medication 500 MG: at 18:18

## 2019-05-15 ASSESSMENT — PAIN SCALES - WONG BAKER
WONGBAKER_NUMERICALRESPONSE: 0

## 2019-05-15 ASSESSMENT — PAIN SCALES - GENERAL
PAINLEVEL_OUTOF10: 0
PAINLEVEL_OUTOF10: 0

## 2019-05-15 ASSESSMENT — PAIN SCALES - PAIN ASSESSMENT IN ADVANCED DEMENTIA (PAINAD)
BODYLANGUAGE: 0
BREATHING: 0
CONSOLABILITY: 0
TOTALSCORE: 0
NEGVOCALIZATION: 0
CONSOLABILITY: 0
BODYLANGUAGE: 0
TOTALSCORE: 0
NEGVOCALIZATION: 0
FACIALEXPRESSION: 0
TOTALSCORE: 0
NEGVOCALIZATION: 0
FACIALEXPRESSION: 0
CONSOLABILITY: 0
BODYLANGUAGE: 0
BODYLANGUAGE: 0
CONSOLABILITY: 0
TOTALSCORE: 0
FACIALEXPRESSION: 0
BREATHING: 0
BREATHING: 0
FACIALEXPRESSION: 0
NEGVOCALIZATION: 0
BREATHING: 0

## 2019-05-15 NOTE — PROCEDURES
Mri requires information to check patients pain stimulator to check for mri compatibility.  has the remote in the car, will retreive it, no implant card, says it is [employer], MRI will have to contact [employer] to see if we can get the make and model number to check for compatibility. MRI on hold at this time. Alysha Davenport RN notified and checking with the /patient to see if they use the stimulator, on and able to be charged.

## 2019-05-15 NOTE — PROCEDURES
[facility] has a pain stimulator, model [id number], implanted with new leads in 2016. She has an mri conditional device. There needs to be guidelines to follow to allow the patient to be scanned safely.  has the remote, the remote will be required to ensure the battery is charged and to allow it to be put into MRI mode. Number given to [facility] for Sanjuanita Hernandez, the Rep for the area to assist in checking the stimulator can be charged. Will await information that patient has the remote and it is charged. It is unknown if patient has a head or full body mri eligible stimulator. -- but we can find out once the patient can turn the device to the mri mode on the remote.

## 2019-05-15 NOTE — PROGRESS NOTES
- Call placed to MRI to schedule. Awaiting phone call back. - GS at bedside to insert NG tube, xray for placement ordered.

## 2019-05-15 NOTE — PROGRESS NOTES
Notified medical resident about coffee ground secretions from NG tube. Given orders to administer PO vanc dose via NG tube despite secretions at this time.

## 2019-05-15 NOTE — PROGRESS NOTES
Follow up phone call completed. Nurse Saundra Stinson) states, site \"is fine\" and line \"works great\", no questions at this time.

## 2019-05-15 NOTE — PROGRESS NOTES
C/C:  Severe C diff infection      Discussed with pt's    Pt is awake, but not responsive   Low grade fever         Current Facility-Administered Medications   Medication Dose Route Frequency Provider Last Rate Last Dose    dextrose 5 % and 0.9 % sodium chloride infusion   Intravenous Continuous Mj Barker MD        pregabalin (LYRICA) capsule 200 mg  200 mg Oral 3 times per day Olga Calixto MD        thyroid (ARMOUR) tablet 30 mg  30 mg Oral Daily Mj Barker MD        vancomycin (VANCOCIN) oral solution 500 mg  500 mg Oral 4 times per day Olga Calixto MD        furosemide (LASIX) injection 40 mg  40 mg Intravenous Once Danielle Sands MD        lactobacillus (CULTURELLE) capsule 1 capsule  1 capsule Oral BID Olu Greene MD   1 capsule at 05/14/19 0810    morphine (PF) injection 2 mg  2 mg Intravenous Q4H PRN Mj Barker MD   2 mg at 05/13/19 1827    albuterol (ACCUNEB) nebulizer solution 0.63 mg  0.63 mg Nebulization Q6H PRN Ranulfo Shah MD   0.63 mg at 05/13/19 2134    acetaminophen (TYLENOL) 160 MG/5ML solution 650 mg  650 mg Oral Q6H PRN Melissa Barnhart MD   650 mg at 05/13/19 1509    chlorhexidine (PERIDEX) 0.12 % solution 15 mL  15 mL Mouth/Throat BID Kathleen Hutchinson MD   15 mL at 05/15/19 0808    sodium chloride flush 0.9 % injection 10 mL  10 mL Intravenous PRN Ranulfo Shah MD   10 mL at 05/11/19 0504    heparin flush 100 UNIT/ML injection 300 Units  3 mL Intravenous 2 times per day Ranulfo Shah MD   300 Units at 05/15/19 0814    heparin flush 100 UNIT/ML injection 300 Units  3 mL Intracatheter PRN Ranulfo Shah MD        heparin (porcine) injection 5,000 Units  5,000 Units Subcutaneous TID Kavon Moseley MD   Stopped at 05/15/19 1319    vitamin D (ERGOCALCIFEROL) capsule 50,000 Units  50,000 Units Oral Weekly Melissa Barnhart MD   Stopped at 05/08/19 2015    metronidazole (FLAGYL) 500 mg in NaCl 100 mL IVPB premix  500 mg Intravenous Q8H [facility] Yves Simmons MD   Stopped at 05/15/19 1309    sodium chloride flush 0.9 % injection 10 mL  10 mL Intravenous 2 times per day Terri Nunez MD   10 mL at 05/15/19 0807    sodium chloride flush 0.9 % injection 10 mL  10 mL Intravenous PRN Terri Nunez MD   10 mL at 05/15/19 0304    magnesium hydroxide (MILK OF MAGNESIA) 400 MG/5ML suspension 30 mL  30 mL Oral Daily PRN Terri Nunez MD        ondansetron [facility]) injection 4 mg  4 mg Intravenous Q6H PRN Terri Nunez MD   4 mg at 05/15/19 1250    mineral oil-hydrophilic petrolatum (HYDROPHOR) ointment   Topical TID Prescott Dandy, MD        And    mineral oil-hydrophilic petrolatum (HYDROPHOR) ointment   Topical TID PRN Prescott Dandy, MD               REVIEW OF SYSTEMS:       CONSTITUTIONAL:  no grade temp    RESPIRATORY : no resp distress  GASTROINTESTINAL:  Diarrhea   GENITOURINARY:  Good urine out put     INTEGUMENT:no rash   MUSCULOSKELETAL:  Weakness   NEUROLOGICAL:  not responsive     Objective:    BP (!) 141/84   Pulse 123   Temp 99.2 °F (37.3 °C) (Bladder)   Resp (!) 37   Ht 5' (1.524 m)   Wt 200 lb 2.8 oz (90.8 kg)   SpO2 95%   BMI 39.09 kg/m²       General Appearance:    Extubated, not responsive    Head:    Normocephalic, atraumatic   Eyes:    No pallor, no icterus,   Ears:    No obvious deformity or drainage.    Nose:   No nasal drainage   Throat:   Mucosa dry, no oral thrush   Neck:   Supple, no lymphadenopathy   Lungs:     Scattered rhonchi   Heart:    Regular rate and rhythm   Abdomen:     Soft, bowel sounds present , loose stool     Extremities:   + edema, cold to touch   Pulses:   Dorsalis pedis palpable    Skin:   no rashes or lesions      CBC with Differential:      Lab Results   Component Value Date    WBC 9.6 05/15/2019    RBC 2.86 05/15/2019    HGB 9.4 05/15/2019    HCT 28.6 05/15/2019     05/15/2019    .7 05/15/2019    MCH 33.9 05/15/2019    MCHC 33.3 05/15/2019    RDW 14.0 05/15/2019    NRBC 0.9 05/10/2019    LYMPHOPCT 11.4 05/15/2019 PROMYELOPCT 0.9 05/08/2019    MONOPCT 9.0 05/15/2019    BASOPCT 0.2 05/15/2019    MONOSABS 0.86 05/15/2019    LYMPHSABS 1.09 05/15/2019    EOSABS 0.01 05/15/2019    BASOSABS 0.02 05/15/2019       CMP:    Lab Results   Component Value Date     05/15/2019    K 3.3 05/15/2019     05/15/2019    CO2 24 05/15/2019    BUN 17 05/15/2019    CREATININE 0.6 05/15/2019    GFRAA >60 05/15/2019    LABGLOM >60 05/15/2019    GLUCOSE 113 05/15/2019    PROT 5.7 05/15/2019    LABALBU 2.9 05/15/2019    LABALBU 4.4 08/09/2011    CALCIUM 7.7 05/15/2019    BILITOT 0.9 05/15/2019    ALKPHOS 44 05/15/2019    AST 23 05/15/2019    ALT 64 05/15/2019       Hepatic Function Panel:    Lab Results   Component Value Date    ALKPHOS 44 05/15/2019    ALT 64 05/15/2019    AST 23 05/15/2019    PROT 5.7 05/15/2019    BILITOT 0.9 05/15/2019    BILIDIR 0.4 05/15/2019    IBILI 0.5 05/15/2019    LABALBU 2.9 05/15/2019    LABALBU 4.4 08/09/2011       MICROBIOLOGY:     Blood culture - neg to date  Tip cx - Streptococcus / CONS    Urine cx - Candida         Radiology :     Chest x ray -    Bibasilar infiltrates      CT scan of abdomen and pelvis -     Diffuse mucosal thickening rectum to mid colon       IMPRESSION:      1. Septic shock -Improved   2. Sever C   C diff infection    2. MOFS - slowly improving   3. Leukocytosis - improved   4. Respiratory failure   5. Candiduria         RECOMMENDATIONS:      1. Oral vancomycin 500 mg po q 6 hrs- on hold - pt is not able to oral   2. Continue  Flagyl 500 mg IV q 8 hrs   3. Contact isolation as pt still has diarrhea   4. Would not treat sputum cx    5.  Vancomycin IV 1 gram X 1         2:33 PM      5/15/2019

## 2019-05-15 NOTE — PROGRESS NOTES
[facility]  MICU Progress Note     Chief complaint: AMS  Patient's presented to ER on: 5/7/2019   Is patient intubated? N (extubated on May 13th)     Assessment & Hi     58 Y/O F was admitted on May 7th with septic shock and altered mental status, was found to have C. Diff colitis, intubated, extubated on May 12th. Today she had coffee ground emesis and tarry stool. Surgery updated     1. C. Diff colitis  2. Septic shock   · On oral vancomycin and IV flagyl   · Central line was removed, tip Cx + G+, ID following not advised to start IV vancomycin   · Off pressors     3. GI bleed   · On famotidine IV   · Reported allergy to lansoprasol  · H H stable so far   · Lactic 1.4, Hgb 9.4 around baseline   · BP okay, will monitor, she is not on anticoagulation   · NG tube placement failed     4. Acute metabolic vs structural encephalopathy   · Likely due to her septic shock, now after extubation she is worse, reported possible seizure like activity after she had the PICC line yesterday,   · EEG today   · MRI brain ordered   · Neurology consulted     5. Acute hypoxic respiratory failure   6. Acute pulmonary edema   7. Left side pleural effusion (possible)   · On nasal canula   · ABG showing respiratory alkalosis due to tachypnea   · Was on bumex drip, swtiched to daily lasix IV, didn't receive yesterday due to lack of IV line, resumed today   · Procalcitonin improved from 99 to 0.9    8. Hypothyroidism   · Give one dose of levothyroxine 15 mg IV   · TSH, free t4  9. Neuropathy   · C/w pregabaline, not getting it due to failure of placing NGT       · DVT prophylaxis: Lovenox   · GI prophylaxis if required: Famotidine   · CODE: Full  · Diet: NPO   · Isolation: C. Diff , discontinued today   · Consults: Neurology, nephrology, critical care     Subjective    Patient was seen and examined, still not looking better mentation wise, not talking, not following commands. Had episode of coffee ground emesis. 2 times per day    famotidine (PEPCID) injection  20 mg Intravenous Daily    mineral oil-hydrophilic petrolatum   Topical TID     PRN Meds: morphine, albuterol, acetaminophen, sodium chloride flush, heparin flush, sodium chloride flush, magnesium hydroxide, ondansetron, mineral oil-hydrophilic petrolatum **AND** mineral oil-hydrophilic petrolatum    Cultures    Blood cultures   Blood Culture, Routine   Date Value Ref Range Status   05/12/2019 24 Hours- no growth  Preliminary     Respiratory cultures No results found for: RESPCULTURE No results found for: LABGRAM  Urine   Urine Culture, Routine   Date Value Ref Range Status   05/12/2019 (A)  Final    THIS IS A CORRECTED REPORT  PLEASE DISREGARD PREVIOUS REPORT OF \"COAGULASE NEGATIVE STAPH\"     05/12/2019 >100,000 CFU/ml  Final     Legionella No results found for: LABLEGI  C Diff PCR No results found for: CDIFPCR  Wound culture/abscess: No results for input(s): WNDABS in the last 72 hours. Tip culture:  Recent Labs     05/13/19  1856   CXCATHTIP Growth present, evaluating for:  Mixed Gram positive organisms  *  >15 colonies  Identification and sensitivity to follow    >15 colonies       Imaging results      CXR  Result Date: 5/15/2019  1. Bibasilar airspace disease, right greater than left, nonspecific finding, findings can be seen infiltrate/pneumonia/atelectasis, with right pleural effusion. 2. Vascular calcifications thoracic aorta. 3. Suspected underlying pulmonary edema. 4. Interval placement left-sided PICC line. Harriet Fraser MD, [address]  Internal Medicine Resident         ICU Attending Dr. Martines [facility]  Addendum ICU Attending Statement     Dale Woods was seen, examined and discussed with the multi-disciplinary ICU team during rounds. A addendum note may be separate to the residents note. If not then, I have personally seen and examined the patient and the key elements of the encounter were performed by me (> 85 % time).   The medications & laboratory data was discussed and adjusted where necessary. The radiographic images were reviewed either as a group or with radiologist.  Any changes are document or changed if felt dis-concordant with the exam or history. The above findings were corroborated, plans confirmed and changes made if needed. Family was updated at the bedside as available. Key issues of the case were discussed among consultants. Critical Care time is documented if appropriate.       Alice Villa DO, MPH  Professor of Medicine

## 2019-05-15 NOTE — PLAN OF CARE
Problem: Gas Exchange - Impaired:  Goal: Levels of oxygenation will improve  Description  Levels of oxygenation will improve  Outcome: Met This Shift     Problem: Falls - Risk of:  Goal: Will remain free from falls  Description  Will remain free from falls  Outcome: Met This Shift   Plan of care discussed with patient / family.

## 2019-05-15 NOTE — CONSULTS
tablet Take 25 mg by mouth 2 times daily. Yes Historical Provider, MD   thyroid (ARMOUR THYROID) 30 MG tablet Take 30 mg by mouth daily. Yes Historical Provider, MD   loratadine-pseudoephedrine (CLARITIN-D 24 HOUR)  MG per tablet Take 1 tablet by mouth daily. Yes Historical Provider, MD   Calcium Carbonate Antacid (TUMS PO) Take 1 tablet by mouth as needed. Yes Historical Provider, MD   zolpidem (AMBIEN) 10 MG tablet Take 1 tablet by mouth nightly as needed for Sleep for up to 90 days 3 MONTH SUPPLY. DO NOT FILL UNTIL 1-6-17. . 1/6/18 4/6/18  Suraj Sandoval MD   orphenadrine (NORFLEX) 100 MG extended release tablet Take 1 tablet by mouth 2 times daily 12/6/17 3/6/18  OSMANI Garcia CNP   Glucosamine-Chondroit-Vit C-Mn (GLUCOSAMINE CHONDR 1500 COMPLX PO) Take by mouth    Historical Provider, MD   orphenadrine (NORFLEX) 100 MG extended release tablet Take 1 tablet by mouth 2 times daily 6/12/17 7/12/17  OSMANI Negron CNP   Handicap Placard MISC by Does not apply route 2 year duration. 8/16/16   OSMANI Negron CNP   Probiotic Product (PROBIOTIC DAILY PO) Take 1 capsule by mouth daily    Historical Provider, MD       Allergies:  Aciphex [rabeprazole sodium]; Aleve [naproxen sodium]; Amitriptyline-perphenazine [perphenazine-amitriptyline]; Amoxil [amoxicillin]; Aspirin; Bentyl [dicyclomine hcl]; Celebrex [celecoxib]; Cephalexin; Codeine; Compazine [prochlorperazine maleate]; Cyclosporine; Demerol; Ditropan [oxybutynin chloride]; Doxycycline; Effexor [venlafaxine hydrochloride]; Elavil [amitriptyline hcl]; Entex la; Fentanyl; Flexeril [cyclobenzaprine hcl]; Keflex [cephalexin]; Ketorolac tromethamine; Lansoprazole; Lipitor [atorvastatin]; Loprox [ciclopirox]; Medrol [methylprednisolone]; Midazolam hcl; Motrin [ibuprofen micronized]; Perphenazine; Phenergan [promethazine hcl]; Prevacid [lansoprazole]; Prochlorperazine edisylate; Protonix [pantoprazole sodium];  Reglan PLAN:   EEG  Pending MRI of the brain  Please start Pregabalin 200 mg TID as soon as possible (through NG or Dobbhoff if she cannot swallow)  No additional sz meds for now      All questions were answered to the patient's/family's satisfaction when they are available. I personally reviewed the history, exam findings, labs, imagings and other diagnostic tests/conveyed these findings and my assessment of these aforementioned items and also treatment plan, risks/benefits of treatment recommended and prognosis/goals of treatment to patient/family/staff. I spent 70 minutes of critical time personally with the patient/family/staff/resident(s) in examination, chart and imaging review, discussing natural history and prognosis, differential diagnosis, risks and benefits of treatment and instructions of which more than 50% of the time was spent for counseling and coordinating care.     Electronically signed by Elizabeth Beck MD on 5/15/2019 at 9:45 AM

## 2019-05-15 NOTE — PROGRESS NOTES
Inpatient Hematology/Oncology Progress Note    Subjective: Had emesis. Loose BM. No fever. Objective  BP (!) 141/84   Pulse 123   Temp 99.2 °F (37.3 °C) (Bladder)   Resp (!) 37   Ht 5' (1.524 m)   Wt 200 lb 2.8 oz (90.8 kg)   SpO2 95%   BMI 39.09 kg/m²   HEENT:NCAT,  PERRLA, No JVD  Heart:  RRR, no murmurs, gallops, or rubs. Lungs:  CTA bilaterally, no wheeze, rales or rhonchi  Abd: bowel sounds hypoactive, distended but soft abdomen   Extrem:  No clubbing, cyanosis, or edema    Diagnostics:  Lab Results   Component Value Date    WBC 9.6 05/15/2019    HGB 9.7 (L) 05/15/2019    HCT 29.1 (L) 05/15/2019    .7 (H) 05/15/2019     05/15/2019     Lab Results   Component Value Date     05/15/2019    K 3.3 (L) 05/15/2019     05/15/2019    CO2 24 05/15/2019    BUN 17 05/15/2019    CREATININE 0.6 05/15/2019    GLUCOSE 113 (H) 05/15/2019    CALCIUM 7.7 (L) 05/15/2019    PROT 5.7 (L) 05/15/2019    LABALBU 2.9 (L) 05/15/2019    BILITOT 0.9 05/15/2019    ALKPHOS 44 05/15/2019    AST 23 05/15/2019    ALT 64 (H) 05/15/2019    LABGLOM >60 05/15/2019    GFRAA >60 05/15/2019     Impression/Plan:  62-year old lady with a PMH significant for HTN, Hyperlipidemia, asthma and thyroid disorder, recent skin infection treated with Clinda and levaquin, who had presented with diarrhea and lethargy, was in septic shock, was intubated, was found to have liver failure and STEFANIA, she was found to have Cdiff colitis, is on flagyl and Vancomycin. ID team following. Her thrombocytopenia was likely secondary to sepsis and antibiotics.  today 05/15/2019  Continue to monitor her CBCD. Continue ICU care.   Will continue to follow.     05/15/2019  Kenzie Waters MD

## 2019-05-15 NOTE — PROGRESS NOTES
Department of Internal Medicine  Nephrology Resident  Progress Note    Events reviewed. SUBJECTIVE: We are following Mrs. Haji for acute kidney injury.  Awake but nonverbal.    PHYSICAL EXAM:      VITALS:  BP (!) 141/84   Pulse 123   Temp 99.2 °F (37.3 °C) (Bladder)   Resp (!) 37   Ht 5' (1.524 m)   Wt 200 lb 2.8 oz (90.8 kg)   SpO2 95%   BMI 39.09 kg/m²     Intake/Output Summary (Last 24 hours) at 5/8/2019 1349  Last data filed at 5/8/2019 1200  Gross per 24 hour   Intake 5855 ml   Output 1685 ml   Net 4170 ml       Constitutional:  Awake non verbal in no distress  HEENT:  MESSI   Respiratory:  Coarse breath sounds bilaterally, frothy secretions from ET  Cardiovascular/Edema:  Regular rate and rhythm, no murmurs appreciated  Gastrointestinal:  Soft, non-tender, bowel sounds present  Neurologic:  Awake, non-focal but nonverbal  Skin:  warm, + edema  Other:  Erythematous lesion on lower extrtemities    DATA:    CBC with Differential:    Lab Results   Component Value Date    WBC 9.6 05/15/2019    RBC 2.86 05/15/2019    HGB 9.7 05/15/2019    HCT 29.1 05/15/2019     05/15/2019    .7 05/15/2019    MCH 33.9 05/15/2019    MCHC 33.3 05/15/2019    RDW 14.0 05/15/2019    NRBC 0.9 05/10/2019    LYMPHOPCT 11.4 05/15/2019    PROMYELOPCT 0.9 05/08/2019    MONOPCT 9.0 05/15/2019    BASOPCT 0.2 05/15/2019    MONOSABS 0.86 05/15/2019    LYMPHSABS 1.09 05/15/2019    EOSABS 0.01 05/15/2019    BASOSABS 0.02 05/15/2019     CMP:    Lab Results   Component Value Date     05/15/2019    K 3.3 05/15/2019     05/15/2019    CO2 24 05/15/2019    BUN 17 05/15/2019    CREATININE 0.6 05/15/2019    GFRAA >60 05/15/2019    LABGLOM >60 05/15/2019    GLUCOSE 113 05/15/2019    PROT 5.7 05/15/2019    LABALBU 2.9 05/15/2019    LABALBU 4.4 08/09/2011    CALCIUM 7.7 05/15/2019    BILITOT 0.9 05/15/2019    ALKPHOS 44 05/15/2019    AST 23 05/15/2019    ALT 64 05/15/2019     BMP:    Lab Results   Component Value Date  05/15/2019    K 3.3 05/15/2019     05/15/2019    CO2 24 05/15/2019    BUN 17 05/15/2019    LABALBU 2.9 05/15/2019    LABALBU 4.4 08/09/2011    CREATININE 0.6 05/15/2019    CALCIUM 7.7 05/15/2019    GFRAA >60 05/15/2019    LABGLOM >60 05/15/2019    GLUCOSE 113 05/15/2019     Calcium:    Lab Results   Component Value Date    CALCIUM 7.7 05/15/2019     Ionized Calcium:  No results found for: IONCA  Magnesium:    Lab Results   Component Value Date    MG 1.6 05/15/2019     Phosphorus:    Lab Results   Component Value Date    PHOS 1.8 05/15/2019         Urine studies:  Urine sodium: 22  Urine chloride: <20  Urine creatinine: 100  Urine urea: 251  Urine potassium: 48.2    Fraction excretion of sodium: 0.5%  Fraction excretion of urea: 12.6%    Vitamin D 25 level: 13 (low)      Radiology Review:       Chest x-ray May 15    1. Bibasilar airspace disease, right greater than left, nonspecific   finding, findings can be seen infiltrate/pneumonia/atelectasis, with   right pleural effusion. 2. Vascular calcifications thoracic aorta. 3. Suspected underlying pulmonary edema. 4. Interval placement left-sided PICC line. BRIEF SUMMARY OF INITIAL CONSULT:  Briefly, Mrs. Telma Moore is a 78-year-old female with h/o of hypothyroidism, lumbar radiculopathy s/p laminectomy, HLD, asthma, was brought to the ED for worsening lethargy. She was recently treated for lower extremity cellulitis with clindamycin and then levofloxacin around 2-3 weeks ago. She developed profuse diarrhea shortly thereafter. Family reported some vomiting initially and poor oral intake. Over the past 3 days, she became severely weak and dehydrated. At the ED, she was in shock requiring vasopressor despite aggressive fluid resuscitation and was intubated for airway protection. Labs were notable for creatinine of 4.2 mg/dL, BUN of 73 mg/dL, lactic acid of 3.4, sodium of 129 mmol/L, liver enzymes were also markedly elevated, reasons for this consult. Of note, she has no known history of kidney disease. She was on furosemide at home, but has not been taken since the onset of diarrhea. Problems resolved:    · Hyponatremia, multifactorial, due to hypovolemia and decreased GFR, resolved  · Hypoglycemia secondary to #6, resolved  · Coagulopathy, high PT/INR, resolved  · S/p Hemodynamic shock, hypovolemic and septic shock, melena likely improving with decreasing dose of pressors  · STEFANIA, stage 3, volume responsive pre-renal STEFANIA  (diarrhea, poor oral intake, shock), FeNa 0.5% , FeUrea 12%. Resolved. · Hypomagnesemia  · S/p Respiratory failure, status post extubation; chest x-ray worsening pleural effusion  · s/pShock liver         IMPRESSION/RECOMMENDATIONS:      1. Pulmonary edema with high proBNP, with echo with preserved EF 60%, HFpEF in the setting of large volume resuscitation. No diuretic administered yesterday due to lack of IV access. 2. Hypocalcemia, probably multifactorial, including vitamin D deficiency and hypomagnesemia. Continue ergocalciferol    3. Hypokalemia, multifactorial, diuretics, diarrhea  4. Hypophosphatemia, secondary to vitamin D deficiency and poor oral intake  5. C. difficile infection, on vancomycin and metronidazole.   6. Severe hypoalbuminemia, multifactorial    Plan:    · Replace K  · Continue to monitor renal function  · Repeat ionized calcium  · Replace magnesium  · Replace phosphorus  · Lasix 40 mg IV Jorge L Roland M.D     5/15/2019

## 2019-05-15 NOTE — PROCEDURES
EEG Report  Esequiel House is a 59 y.o. female      Appointment Date 5/15/2019   Appointment Time 2:00 pm   Facility Location [facility] EEG Number [id number]   Type of Study Portable routine Floor 4409     Technical Specifications  Technician [id number] awake   Sleep deprived? No   Hyperventilation tested? No   Photic stim tested? No   EEG recording Standard 10-20 electrode placement    Duration of recording 30 mins   EEG complete?  Yes       Clinical History   ams    Medications    Current Facility-Administered Medications:     dextrose 5 % and 0.9 % sodium chloride infusion, , Intravenous, Continuous, Mj Barker MD    vancomycin 1000 mg IVPB in 250 mL D5W addavial, 1,000 mg, Intravenous, Once, Olu Greene MD    levothyroxine (SYNTHROID) injection 15 mcg, 15 mcg, Intravenous, Once, Rach Summers MD    pregabalin (LYRICA) capsule 200 mg, 200 mg, Oral, 3 times per day, Rach Summers MD    thyroid (ARMOUR) tablet 30 mg, 30 mg, Oral, Daily, Mj Barker MD    vancomycin (VANCOCIN) oral solution 500 mg, 500 mg, Oral, 4 times per day, Rach Summers MD    furosemide (LASIX) injection 40 mg, 40 mg, Intravenous, Once, Indigo Ibrahim MD    lactobacillus (CULTURELLE) capsule 1 capsule, 1 capsule, Oral, BID, Olu Greene MD, 1 capsule at 05/14/19 0810    morphine (PF) injection 2 mg, 2 mg, Intravenous, Q4H PRN, Rach Summers MD, 2 mg at 05/13/19 1827    albuterol (ACCUNEB) nebulizer solution 0.63 mg, 0.63 mg, Nebulization, Q6H PRN, Dalton Brizuela MD, 0.63 mg at 05/13/19 2134    acetaminophen (TYLENOL) 160 MG/5ML solution 650 mg, 650 mg, Oral, Q6H PRN, Ricardo Trevizo MD, 650 mg at 05/13/19 1509    chlorhexidine (PERIDEX) 0.12 % solution 15 mL, 15 mL, Mouth/Throat, BID, Kathleen Hutchinson MD, 15 mL at 05/15/19 0808    sodium chloride flush 0.9 % injection 10 mL, 10 mL, Intravenous, PRN, Dalton Brizuela MD, 10 mL at 05/11/19 0504    heparin flush 100 UNIT/ML injection 300 Units, 3 mL, Intravenous, 2 times per day, Jas Cheney MD, 300 Units at 05/15/19 0814    heparin flush 100 UNIT/ML injection 300 Units, 3 mL, Intracatheter, PRN, Jas Cheney MD    heparin (porcine) injection 5,000 Units, 5,000 Units, Subcutaneous, TID, Dequan Camacho MD, Stopped at 05/15/19 1319    vitamin D (ERGOCALCIFEROL) capsule 50,000 Units, 50,000 Units, Oral, Weekly, Terrance Castañeda MD, Stopped at 05/08/19 2015    metronidazole (FLAGYL) 500 mg in NaCl 100 mL IVPB premix, 500 mg, Intravenous, Q8H, Areli Lombardo MD, Stopped at 05/15/19 1309    sodium chloride flush 0.9 % injection 10 mL, 10 mL, Intravenous, 2 times per day, Terri Nunez MD, 10 mL at 05/15/19 0807    sodium chloride flush 0.9 % injection 10 mL, 10 mL, Intravenous, PRN, Terri Nunez MD, 10 mL at 05/15/19 0304    magnesium hydroxide (MILK OF MAGNESIA) 400 MG/5ML suspension 30 mL, 30 mL, Oral, Daily PRN, Terri Nunez MD    [facility]ron [facility]) injection 4 mg, 4 mg, Intravenous, Q6H PRN, Terri Nunez MD, 4 mg at 05/15/19 1250    mineral oil-hydrophilic petrolatum (HYDROPHOR) ointment, , Topical, TID **AND** mineral oil-hydrophilic petrolatum (HYDROPHOR) ointment, , Topical, TID PRN, Prescott Dandy, MD        Physician Interpretation    General EEG Report        Epileptiform Discharge   PLEDS arising from left hemisphere throughout the record      Non-Epileptiform Abnormality  Continuous slow, mixture of different frequency activities      Ictal  NONE    General Impression  This EEG shows a highly epileptogenic focus in the left hemisphere. Also it shows a moderate diffuse encephalopathy.

## 2019-05-15 NOTE — PROGRESS NOTES
[facility] Quality Flow/Interdisciplinary Rounds Progress Note        Quality Flow Rounds held on May 15, 2019    Disciplines Attending:  Bedside Nurse, , Nursing Unit Leadership and Physical Therapy    Roxie Rucker was admitted on 5/7/2019 12:26 AM    Anticipated Discharge Date:  Expected Discharge Date: 05/21/19    Disposition:    Lorenzo Score:  Lorenzo Scale Score: 9    Readmission Risk              Risk of Unplanned Readmission:        18           Discussed patient goal for the day, patient clinical progression, and barriers to discharge. The following Goal(s) of the Day/Commitment(s) have been identified:  Needs speech for swallow eval, PT/OT to see.         Bouchra Monson  May 15, 2019

## 2019-05-16 ENCOUNTER — APPOINTMENT (OUTPATIENT)
Dept: NEUROLOGY | Age: 59
DRG: 870 | End: 2019-05-16
Payer: COMMERCIAL

## 2019-05-16 ENCOUNTER — APPOINTMENT (OUTPATIENT)
Dept: ULTRASOUND IMAGING | Age: 59
DRG: 870 | End: 2019-05-16
Payer: COMMERCIAL

## 2019-05-16 ENCOUNTER — APPOINTMENT (OUTPATIENT)
Dept: GENERAL RADIOLOGY | Age: 59
DRG: 870 | End: 2019-05-16
Payer: COMMERCIAL

## 2019-05-16 LAB
ALBUMIN SERPL-MCNC: 2.5 G/DL (ref 3.5–5.2)
ALP BLD-CCNC: 38 U/L (ref 35–104)
ALT SERPL-CCNC: 50 U/L (ref 0–32)
ANION GAP SERPL CALCULATED.3IONS-SCNC: 10 MMOL/L (ref 7–16)
ANION GAP SERPL CALCULATED.3IONS-SCNC: 13 MMOL/L (ref 7–16)
APPEARANCE CSF: CLEAR
AST SERPL-CCNC: 21 U/L (ref 0–31)
B.E.: 2.3 MMOL/L (ref -3–3)
B.E.: 3 MMOL/L (ref -3–3)
BASO CSF: 0 %
BASOPHILS ABSOLUTE: 0.02 E9/L (ref 0–0.2)
BASOPHILS RELATIVE PERCENT: 0.3 % (ref 0–2)
BILIRUB SERPL-MCNC: 0.8 MG/DL (ref 0–1.2)
BILIRUBIN DIRECT: 0.4 MG/DL (ref 0–0.3)
BILIRUBIN, INDIRECT: 0.4 MG/DL (ref 0–1)
BLOOD BANK DISPENSE STATUS: NORMAL
BLOOD BANK PRODUCT CODE: NORMAL
BPU ID: NORMAL
BUN BLDV-MCNC: 13 MG/DL (ref 8–23)
BUN BLDV-MCNC: 14 MG/DL (ref 8–23)
CALCIUM IONIZED: 1.11 MMOL/L (ref 1.15–1.33)
CALCIUM SERPL-MCNC: 7.5 MG/DL (ref 8.6–10.2)
CALCIUM SERPL-MCNC: 8.2 MG/DL (ref 8.6–10.2)
CHLORIDE BLD-SCNC: 107 MMOL/L (ref 98–107)
CHLORIDE BLD-SCNC: 112 MMOL/L (ref 98–107)
CO2: 25 MMOL/L (ref 22–29)
CO2: 30 MMOL/L (ref 22–29)
COHB: 0.5 % (ref 0–1.5)
COHB: 1.1 % (ref 0–1.5)
COLOR CSF: COLORLESS
CREAT SERPL-MCNC: 0.6 MG/DL (ref 0.5–1)
CREAT SERPL-MCNC: 0.7 MG/DL (ref 0.5–1)
CRITICAL: ABNORMAL
CRITICAL: ABNORMAL
CRYPTOCOCCAL ANTIGEN CSF: NEGATIVE
DATE ANALYZED: ABNORMAL
DATE ANALYZED: ABNORMAL
DATE OF COLLECTION: ABNORMAL
DATE OF COLLECTION: ABNORMAL
DESCRIPTION BLOOD BANK: NORMAL
EOS CSF: 0 % (ref 0–2)
EOSINOPHILS ABSOLUTE: 0.07 E9/L (ref 0.05–0.5)
EOSINOPHILS RELATIVE PERCENT: 1.1 % (ref 0–6)
GFR AFRICAN AMERICAN: >60
GFR AFRICAN AMERICAN: >60
GFR NON-AFRICAN AMERICAN: >60 ML/MIN/1.73
GFR NON-AFRICAN AMERICAN: >60 ML/MIN/1.73
GLUCOSE BLD-MCNC: 139 MG/DL (ref 74–99)
GLUCOSE BLD-MCNC: 318 MG/DL (ref 74–99)
GLUCOSE, CSF: 59 MG/DL (ref 40–70)
HCO3: 25.8 MMOL/L (ref 22–26)
HCO3: 26.7 MMOL/L (ref 22–26)
HCT VFR BLD CALC: 29.2 % (ref 34–48)
HCT VFR BLD CALC: 31.7 % (ref 34–48)
HEMOCCULT STL QL: ABNORMAL
HEMOGLOBIN: 10.3 G/DL (ref 11.5–15.5)
HEMOGLOBIN: 9.4 G/DL (ref 11.5–15.5)
HHB: 2.9 % (ref 0–5)
HHB: 5.9 % (ref 0–5)
IMMATURE GRANULOCYTES #: 0.05 E9/L
IMMATURE GRANULOCYTES %: 0.8 % (ref 0–5)
INR BLD: 1.5
LAB: ABNORMAL
LAB: ABNORMAL
LYMPHOCYTES ABSOLUTE: 0.94 E9/L (ref 1.5–4)
LYMPHOCYTES RELATIVE PERCENT: 15.3 % (ref 20–42)
LYMPHS CSF: 0 % (ref 30–90)
Lab: ABNORMAL
Lab: ABNORMAL
MAGNESIUM: 1.5 MG/DL (ref 1.6–2.6)
MCH RBC QN AUTO: 32.8 PG (ref 26–35)
MCHC RBC AUTO-ENTMCNC: 32.2 % (ref 32–34.5)
MCV RBC AUTO: 101.7 FL (ref 80–99.9)
METER GLUCOSE: 119 MG/DL (ref 74–99)
METHB: 0.3 % (ref 0–1.5)
METHB: 0.4 % (ref 0–1.5)
MODE: ABNORMAL
MODE: ABNORMAL
MONOCYTE, CSF: 67 % (ref 10–70)
MONOCYTES ABSOLUTE: 0.58 E9/L (ref 0.1–0.95)
MONOCYTES RELATIVE PERCENT: 9.4 % (ref 2–12)
NEUTROPHILS ABSOLUTE: 4.48 E9/L (ref 1.8–7.3)
NEUTROPHILS RELATIVE PERCENT: 73.1 % (ref 43–80)
NEUTROPHILS, CSF: 33 % (ref 0–10)
O2 SATURATION: 94 % (ref 92–98.5)
O2 SATURATION: 97.1 % (ref 92–98.5)
O2HB: 92.7 % (ref 94–97)
O2HB: 96.2 % (ref 94–97)
OPERATOR ID: 3114
OPERATOR ID: ABNORMAL
PATIENT TEMP: 37 C
PATIENT TEMP: 37 C
PCO2: 33.2 MMHG (ref 35–45)
PCO2: 40.9 MMHG (ref 35–45)
PDW BLD-RTO: 16.2 FL (ref 11.5–15)
PH BLOOD GAS: 7.43 (ref 7.35–7.45)
PH BLOOD GAS: 7.51 (ref 7.35–7.45)
PHOSPHORUS: 1.9 MG/DL (ref 2.5–4.5)
PLATELET # BLD: 372 E9/L (ref 130–450)
PMV BLD AUTO: 11.1 FL (ref 7–12)
PO2: 66.6 MMHG (ref 60–100)
PO2: 97.1 MMHG (ref 60–100)
POTASSIUM REFLEX MAGNESIUM: 2.6 MMOL/L (ref 3.5–5)
POTASSIUM SERPL-SCNC: 3.1 MMOL/L (ref 3.5–5)
PROTEIN CSF: 34 MG/DL (ref 15–40)
PROTHROMBIN TIME: 16.8 SEC (ref 9.3–12.4)
RBC # BLD: 2.87 E12/L (ref 3.5–5.5)
RBC CSF: <2000 /UL
SODIUM BLD-SCNC: 147 MMOL/L (ref 132–146)
SODIUM BLD-SCNC: 150 MMOL/L (ref 132–146)
SOURCE, BLOOD GAS: ABNORMAL
SOURCE, BLOOD GAS: ABNORMAL
THB: 10.9 G/DL (ref 11.5–16.5)
THB: 10.9 G/DL (ref 11.5–16.5)
TIME ANALYZED: 2307
TIME ANALYZED: 452
TOTAL PROTEIN: 5.1 G/DL (ref 6.4–8.3)
TUBE NUMBER CSF: ABNORMAL
WBC # BLD: 6.1 E9/L (ref 4.5–11.5)
WBC CSF: 3 /UL (ref 0–2)

## 2019-05-16 PROCEDURE — 95819 EEG AWAKE AND ASLEEP: CPT

## 2019-05-16 PROCEDURE — 2500000003 HC RX 250 WO HCPCS: Performed by: INTERNAL MEDICINE

## 2019-05-16 PROCEDURE — 88108 CYTOPATH CONCENTRATE TECH: CPT

## 2019-05-16 PROCEDURE — 82945 GLUCOSE OTHER FLUID: CPT

## 2019-05-16 PROCEDURE — 80048 BASIC METABOLIC PNL TOTAL CA: CPT

## 2019-05-16 PROCEDURE — 6360000002 HC RX W HCPCS: Performed by: INTERNAL MEDICINE

## 2019-05-16 PROCEDURE — 2580000003 HC RX 258: Performed by: INTERNAL MEDICINE

## 2019-05-16 PROCEDURE — 83735 ASSAY OF MAGNESIUM: CPT

## 2019-05-16 PROCEDURE — 6360000002 HC RX W HCPCS: Performed by: PSYCHIATRY & NEUROLOGY

## 2019-05-16 PROCEDURE — 82962 GLUCOSE BLOOD TEST: CPT

## 2019-05-16 PROCEDURE — B01B1ZZ FLUOROSCOPY OF SPINAL CORD USING LOW OSMOLAR CONTRAST: ICD-10-PCS | Performed by: RADIOLOGY

## 2019-05-16 PROCEDURE — 36415 COLL VENOUS BLD VENIPUNCTURE: CPT

## 2019-05-16 PROCEDURE — 6370000000 HC RX 637 (ALT 250 FOR IP): Performed by: INTERNAL MEDICINE

## 2019-05-16 PROCEDURE — 2700000000 HC OXYGEN THERAPY PER DAY

## 2019-05-16 PROCEDURE — 95816 EEG AWAKE AND DROWSY: CPT | Performed by: PSYCHIATRY & NEUROLOGY

## 2019-05-16 PROCEDURE — 85018 HEMOGLOBIN: CPT

## 2019-05-16 PROCEDURE — 77003 FLUOROGUIDE FOR SPINE INJECT: CPT

## 2019-05-16 PROCEDURE — 87529 HSV DNA AMP PROBE: CPT

## 2019-05-16 PROCEDURE — 84100 ASSAY OF PHOSPHORUS: CPT

## 2019-05-16 PROCEDURE — 85014 HEMATOCRIT: CPT

## 2019-05-16 PROCEDURE — 92610 EVALUATE SWALLOWING FUNCTION: CPT

## 2019-05-16 PROCEDURE — 99233 SBSQ HOSP IP/OBS HIGH 50: CPT | Performed by: NURSE PRACTITIONER

## 2019-05-16 PROCEDURE — 62270 DX LMBR SPI PNXR: CPT

## 2019-05-16 PROCEDURE — 80076 HEPATIC FUNCTION PANEL: CPT

## 2019-05-16 PROCEDURE — 86618 LYME DISEASE ANTIBODY: CPT

## 2019-05-16 PROCEDURE — 84157 ASSAY OF PROTEIN OTHER: CPT

## 2019-05-16 PROCEDURE — 36430 TRANSFUSION BLD/BLD COMPNT: CPT

## 2019-05-16 PROCEDURE — 86592 SYPHILIS TEST NON-TREP QUAL: CPT

## 2019-05-16 PROCEDURE — C9113 INJ PANTOPRAZOLE SODIUM, VIA: HCPCS | Performed by: INTERNAL MEDICINE

## 2019-05-16 PROCEDURE — 89051 BODY FLUID CELL COUNT: CPT

## 2019-05-16 PROCEDURE — 85025 COMPLETE CBC W/AUTO DIFF WBC: CPT

## 2019-05-16 PROCEDURE — 87798 DETECT AGENT NOS DNA AMP: CPT

## 2019-05-16 PROCEDURE — 87102 FUNGUS ISOLATION CULTURE: CPT

## 2019-05-16 PROCEDURE — 94668 MNPJ CHEST WALL SBSQ: CPT

## 2019-05-16 PROCEDURE — 82805 BLOOD GASES W/O2 SATURATION: CPT

## 2019-05-16 PROCEDURE — 99232 SBSQ HOSP IP/OBS MODERATE 35: CPT | Performed by: SURGERY

## 2019-05-16 PROCEDURE — 99233 SBSQ HOSP IP/OBS HIGH 50: CPT | Performed by: INTERNAL MEDICINE

## 2019-05-16 PROCEDURE — 87205 SMEAR GRAM STAIN: CPT

## 2019-05-16 PROCEDURE — 71045 X-RAY EXAM CHEST 1 VIEW: CPT

## 2019-05-16 PROCEDURE — 87070 CULTURE OTHR SPECIMN AEROBIC: CPT

## 2019-05-16 PROCEDURE — 2000000000 HC ICU R&B

## 2019-05-16 PROCEDURE — 85610 PROTHROMBIN TIME: CPT

## 2019-05-16 PROCEDURE — P9016 RBC LEUKOCYTES REDUCED: HCPCS

## 2019-05-16 PROCEDURE — 82330 ASSAY OF CALCIUM: CPT

## 2019-05-16 PROCEDURE — 87206 SMEAR FLUORESCENT/ACID STAI: CPT

## 2019-05-16 PROCEDURE — 86403 PARTICLE AGGLUT ANTBDY SCRN: CPT

## 2019-05-16 PROCEDURE — 009U3ZX DRAINAGE OF SPINAL CANAL, PERCUTANEOUS APPROACH, DIAGNOSTIC: ICD-10-PCS | Performed by: RADIOLOGY

## 2019-05-16 RX ORDER — 0.9 % SODIUM CHLORIDE 0.9 %
10 VIAL (ML) INJECTION 2 TIMES DAILY
Status: DISCONTINUED | OUTPATIENT
Start: 2019-05-16 | End: 2019-05-16

## 2019-05-16 RX ORDER — FUROSEMIDE 10 MG/ML
40 INJECTION INTRAMUSCULAR; INTRAVENOUS ONCE
Status: COMPLETED | OUTPATIENT
Start: 2019-05-16 | End: 2019-05-16

## 2019-05-16 RX ORDER — POTASSIUM CHLORIDE 29.8 MG/ML
40 INJECTION INTRAVENOUS ONCE
Status: COMPLETED | OUTPATIENT
Start: 2019-05-16 | End: 2019-05-16

## 2019-05-16 RX ORDER — POTASSIUM CHLORIDE 29.8 MG/ML
20 INJECTION INTRAVENOUS
Status: DISCONTINUED | OUTPATIENT
Start: 2019-05-16 | End: 2019-05-16

## 2019-05-16 RX ORDER — PANTOPRAZOLE SODIUM 40 MG/10ML
40 INJECTION, POWDER, LYOPHILIZED, FOR SOLUTION INTRAVENOUS 2 TIMES DAILY
Status: DISCONTINUED | OUTPATIENT
Start: 2019-05-16 | End: 2019-05-16

## 2019-05-16 RX ORDER — LEVOTHYROXINE SODIUM ANHYDROUS 100 UG/5ML
25 INJECTION, POWDER, LYOPHILIZED, FOR SOLUTION INTRAVENOUS DAILY
Status: DISCONTINUED | OUTPATIENT
Start: 2019-05-16 | End: 2019-05-21

## 2019-05-16 RX ORDER — MAGNESIUM SULFATE IN WATER 40 MG/ML
2 INJECTION, SOLUTION INTRAVENOUS ONCE
Status: COMPLETED | OUTPATIENT
Start: 2019-05-16 | End: 2019-05-16

## 2019-05-16 RX ADMIN — SODIUM CHLORIDE 250 ML: 9 INJECTION, SOLUTION INTRAVENOUS at 00:20

## 2019-05-16 RX ADMIN — LEVETIRACETAM 500 MG: 5 INJECTION INTRAVENOUS at 09:25

## 2019-05-16 RX ADMIN — METRONIDAZOLE 500 MG: 500 INJECTION, SOLUTION INTRAVENOUS at 18:46

## 2019-05-16 RX ADMIN — PETROLATUM: 42 OINTMENT TOPICAL at 09:40

## 2019-05-16 RX ADMIN — POTASSIUM CHLORIDE: 2 INJECTION, SOLUTION, CONCENTRATE INTRAVENOUS at 10:48

## 2019-05-16 RX ADMIN — LEVETIRACETAM 500 MG: 5 INJECTION INTRAVENOUS at 20:14

## 2019-05-16 RX ADMIN — PANTOPRAZOLE SODIUM 40 MG: 40 INJECTION, POWDER, FOR SOLUTION INTRAVENOUS at 01:01

## 2019-05-16 RX ADMIN — FAMOTIDINE 20 MG: 10 INJECTION INTRAVENOUS at 20:15

## 2019-05-16 RX ADMIN — DEXTROSE AND SODIUM CHLORIDE: 5; 900 INJECTION, SOLUTION INTRAVENOUS at 09:26

## 2019-05-16 RX ADMIN — METOPROLOL TARTRATE 2.5 MG: 1 INJECTION, SOLUTION INTRAVENOUS at 12:05

## 2019-05-16 RX ADMIN — AZTREONAM 2 G: 2 INJECTION, POWDER, LYOPHILIZED, FOR SOLUTION INTRAMUSCULAR; INTRAVENOUS at 21:07

## 2019-05-16 RX ADMIN — METOPROLOL TARTRATE 2.5 MG: 1 INJECTION, SOLUTION INTRAVENOUS at 05:53

## 2019-05-16 RX ADMIN — FAMOTIDINE 20 MG: 10 INJECTION INTRAVENOUS at 10:48

## 2019-05-16 RX ADMIN — CHLORHEXIDINE GLUCONATE 0.12% ORAL RINSE 15 ML: 1.2 LIQUID ORAL at 09:26

## 2019-05-16 RX ADMIN — METRONIDAZOLE 500 MG: 500 INJECTION, SOLUTION INTRAVENOUS at 13:19

## 2019-05-16 RX ADMIN — PREGABALIN 200 MG: 100 CAPSULE ORAL at 14:36

## 2019-05-16 RX ADMIN — Medication 10 ML: at 20:15

## 2019-05-16 RX ADMIN — POTASSIUM CHLORIDE: 2 INJECTION, SOLUTION, CONCENTRATE INTRAVENOUS at 22:50

## 2019-05-16 RX ADMIN — PANTOPRAZOLE SODIUM 40 MG: 40 INJECTION, POWDER, FOR SOLUTION INTRAVENOUS at 09:27

## 2019-05-16 RX ADMIN — Medication 500 MG: at 18:46

## 2019-05-16 RX ADMIN — SODIUM CHLORIDE, PRESERVATIVE FREE 300 UNITS: 5 INJECTION INTRAVENOUS at 20:16

## 2019-05-16 RX ADMIN — PREGABALIN 200 MG: 100 CAPSULE ORAL at 05:53

## 2019-05-16 RX ADMIN — FUROSEMIDE 40 MG: 10 INJECTION, SOLUTION INTRAMUSCULAR; INTRAVENOUS at 10:48

## 2019-05-16 RX ADMIN — PETROLATUM: 42 OINTMENT TOPICAL at 14:22

## 2019-05-16 RX ADMIN — LEVOTHYROXINE SODIUM ANHYDROUS 25 MCG: 100 INJECTION, POWDER, LYOPHILIZED, FOR SOLUTION INTRAVENOUS at 09:25

## 2019-05-16 RX ADMIN — PETROLATUM: 42 OINTMENT TOPICAL at 20:15

## 2019-05-16 RX ADMIN — Medication 1 CAPSULE: at 20:15

## 2019-05-16 RX ADMIN — POTASSIUM CHLORIDE 40 MEQ: 29.8 INJECTION, SOLUTION INTRAVENOUS at 17:07

## 2019-05-16 RX ADMIN — CHLORHEXIDINE GLUCONATE 0.12% ORAL RINSE 15 ML: 1.2 LIQUID ORAL at 20:16

## 2019-05-16 RX ADMIN — Medication 10 ML: at 09:27

## 2019-05-16 RX ADMIN — CALCIUM GLUCONATE 1 G: 98 INJECTION, SOLUTION INTRAVENOUS at 10:48

## 2019-05-16 RX ADMIN — MAGNESIUM SULFATE HEPTAHYDRATE 2 G: 40 INJECTION, SOLUTION INTRAVENOUS at 08:32

## 2019-05-16 RX ADMIN — Medication 500 MG: at 05:53

## 2019-05-16 RX ADMIN — POTASSIUM CHLORIDE 40 MEQ: 29.8 INJECTION, SOLUTION INTRAVENOUS at 08:32

## 2019-05-16 RX ADMIN — AZTREONAM 2 G: 2 INJECTION, POWDER, LYOPHILIZED, FOR SOLUTION INTRAMUSCULAR; INTRAVENOUS at 13:31

## 2019-05-16 RX ADMIN — Medication 10 ML: at 01:02

## 2019-05-16 RX ADMIN — PREGABALIN 200 MG: 100 CAPSULE ORAL at 20:15

## 2019-05-16 RX ADMIN — METOPROLOL TARTRATE 2.5 MG: 1 INJECTION, SOLUTION INTRAVENOUS at 19:40

## 2019-05-16 ASSESSMENT — PAIN SCALES - PAIN ASSESSMENT IN ADVANCED DEMENTIA (PAINAD)
TOTALSCORE: 0
FACIALEXPRESSION: 0
TOTALSCORE: 0
CONSOLABILITY: 0
CONSOLABILITY: 0
BREATHING: 0
CONSOLABILITY: 0
NEGVOCALIZATION: 0
CONSOLABILITY: 0
BREATHING: 0
NEGVOCALIZATION: 0
BODYLANGUAGE: 0
BREATHING: 0
BODYLANGUAGE: 0
FACIALEXPRESSION: 0
NEGVOCALIZATION: 0
BREATHING: 0
BODYLANGUAGE: 0
FACIALEXPRESSION: 0
BODYLANGUAGE: 0
TOTALSCORE: 0
FACIALEXPRESSION: 0
NEGVOCALIZATION: 0
TOTALSCORE: 0

## 2019-05-16 ASSESSMENT — PAIN SCALES - WONG BAKER
WONGBAKER_NUMERICALRESPONSE: 0

## 2019-05-16 ASSESSMENT — PAIN SCALES - GENERAL
PAINLEVEL_OUTOF10: 0

## 2019-05-16 NOTE — CARE COORDINATION
SW Discharge planning:    SW met with patient and patients . Patients  stated that he is not ready to make any decisions about transition of care. Both LTACS are following patient. SW will continue to follow.  Rafael Pulliam

## 2019-05-16 NOTE — PROGRESS NOTES
GENERAL SURGERY  ATTENDING PROGRESS NOTE        CC: n/v, UGIB    Active Problems:    Septic shock ([address]    Colitis    Seizure-like activity (HCC)  Resolved Problems:    * No resolved hospital problems. *      S:  5/16/19 - developed coffee-ground emesis last night; recently we were following for C diff colitis -which is improving; NGT inserted yesterday - still some dark brown drainage; transfused one unit RBCs yesterday for Hgb = 7.1    O:   Vitals:    05/16/19 0400 05/16/19 0500 05/16/19 0600 05/16/19 0758   BP: 131/76 (!) 144/75 132/80    Pulse: 98 100 86    Resp: (!) 38 27 (!) 33    Temp: 99.7 °F (37.6 °C)      TempSrc: Core      SpO2: 97% 98% 98% 96%   Weight:   202 lb 13.2 oz (92 kg)    Height:           I/O last 3 completed shifts:   In: 1378.4 [I.V.:678.4; Blood:700]  Out: 65 [Urine:1930; Emesis/NG output:300]    Gen - ill-appearing  Neuro - obtunded  HEENT - NC/AT, NGT   Lungs - non labored, on NC O2  Heart - sinus tachycardia    Abdomen - large pannus, mildly distended, nontender    A/P:    --UGIB - probable stress gastritis    -Continue NGT until more awake and alert   -PPI    -monitor H&H serially   -if bleeding worsens then will need EGD   -if H&H stable over next 24 hours and NGT < 500 mL/24 hrs then start tube feeds   -reviewed with ICU nursing      Darian Salomon MD, FACS

## 2019-05-16 NOTE — INTERVAL H&P NOTE
H&P UPDATE NOTE    Patient's History and Physical Examination were reviewed from current hospital admission records. Kamille Calderon appears likely to able to tolerate the requested Lumbar Puncture procedure by the consultant. Risk and benefits were discussed with spouse including ultimate complications, possibly death and consent was obtained.     KEMAL Walsh II, MD.

## 2019-05-16 NOTE — PROGRESS NOTES
Department of Internal Medicine  Nephrology Resident  Progress Note    Events reviewed. SUBJECTIVE: We are following Mrs. Haji for acute kidney injury.  Awake but nonverbal.    PHYSICAL EXAM:      VITALS:  /86   Pulse 112   Temp 99.4 °F (37.4 °C) (Bladder)   Resp 29   Ht 5' (1.524 m)   Wt 202 lb 13.2 oz (92 kg)   SpO2 98%   BMI 39.61 kg/m²     Intake/Output Summary (Last 24 hours) at 5/8/2019 1349  Last data filed at 5/8/2019 1200  Gross per 24 hour   Intake 5855 ml   Output 1685 ml   Net 4170 ml       Constitutional:  Awake non verbal in no distress  HEENT:  MESSI   Respiratory:  Coarse breath sounds bilaterally, frothy secretions from ET  Cardiovascular/Edema:  Regular rate and rhythm, no murmurs appreciated  Gastrointestinal:  Soft, non-tender, bowel sounds present  Neurologic:  Awake, non-focal but nonverbal  Skin:  warm, + edema  Other:  Erythematous lesion on lower extrtemities    DATA:    CBC with Differential:    Lab Results   Component Value Date    WBC 6.1 05/16/2019    RBC 2.87 05/16/2019    HGB 9.4 05/16/2019    HCT 29.2 05/16/2019     05/16/2019    .7 05/16/2019    MCH 32.8 05/16/2019    MCHC 32.2 05/16/2019    RDW 16.2 05/16/2019    NRBC 0.9 05/10/2019    LYMPHOPCT 15.3 05/16/2019    PROMYELOPCT 0.9 05/08/2019    MONOPCT 9.4 05/16/2019    BASOPCT 0.3 05/16/2019    MONOSABS 0.58 05/16/2019    LYMPHSABS 0.94 05/16/2019    EOSABS 0.07 05/16/2019    BASOSABS 0.02 05/16/2019     CMP:    Lab Results   Component Value Date     05/16/2019    K 2.6 05/16/2019     05/16/2019    CO2 25 05/16/2019    BUN 14 05/16/2019    CREATININE 0.6 05/16/2019    GFRAA >60 05/16/2019    LABGLOM >60 05/16/2019    GLUCOSE 318 05/16/2019    PROT 5.1 05/16/2019    LABALBU 2.5 05/16/2019    LABALBU 4.4 08/09/2011    CALCIUM 7.5 05/16/2019    BILITOT 0.8 05/16/2019    ALKPHOS 38 05/16/2019    AST 21 05/16/2019    ALT 50 05/16/2019     BMP:    Lab Results   Component Value Date     05/16/2019    K 2.6 05/16/2019     05/16/2019    CO2 25 05/16/2019    BUN 14 05/16/2019    LABALBU 2.5 05/16/2019    LABALBU 4.4 08/09/2011    CREATININE 0.6 05/16/2019    CALCIUM 7.5 05/16/2019    GFRAA >60 05/16/2019    LABGLOM >60 05/16/2019    GLUCOSE 318 05/16/2019     Calcium:    Lab Results   Component Value Date    CALCIUM 7.5 05/16/2019     Ionized Calcium:  No results found for: IONCA  Magnesium:    Lab Results   Component Value Date    MG 1.5 05/16/2019     Phosphorus:    Lab Results   Component Value Date    PHOS 1.9 05/16/2019         Urine studies:  Urine sodium: 22  Urine chloride: <20  Urine creatinine: 100  Urine urea: 251  Urine potassium: 48.2    Fraction excretion of sodium: 0.5%  Fraction excretion of urea: 12.6%    Vitamin D 25 level: 13 (low)      Radiology Review:       Chest Xray May 16, 2019   1. Multifocal airspace disease most likely suspicious for a multifocal   infiltrate/pneumonia with underlying pulmonary edema and a right   pleural effusion. Interval placement NG tube without identification of   the distal tibia. BRIEF SUMMARY OF INITIAL CONSULT:  Briefly, Mrs. Molly Baez is a 25-year-old female with h/o of hypothyroidism, lumbar radiculopathy s/p laminectomy, HLD, asthma, was brought to the ED for worsening lethargy. She was recently treated for lower extremity cellulitis with clindamycin and then levofloxacin around 2-3 weeks ago. She developed profuse diarrhea shortly thereafter. Family reported some vomiting initially and poor oral intake. Over the past 3 days, she became severely weak and dehydrated. At the ED, she was in shock requiring vasopressor despite aggressive fluid resuscitation and was intubated for airway protection. Labs were notable for creatinine of 4.2 mg/dL, BUN of 73 mg/dL, lactic acid of 3.4, sodium of 129 mmol/L, liver enzymes were also markedly elevated, reasons for this consult. Of note, she has no known history of kidney disease.  She was on furosemide at home, but has not been taken since the onset of diarrhea. Problems resolved:    · Hyponatremia, multifactorial, due to hypovolemia and decreased GFR, resolved  · Hypoglycemia secondary to #6, resolved  · Coagulopathy, high PT/INR, resolved  · S/p Hemodynamic shock, hypovolemic and septic shock, melena likely improving with decreasing dose of pressors  · STEFANIA, stage 3, volume responsive pre-renal STEFANIA  (diarrhea, poor oral intake, shock), FeNa 0.5% , FeUrea 12%. Resolved. · Hypomagnesemia  · S/p Respiratory failure, status post extubation; chest x-ray worsening pleural effusion  · s/pShock liver         IMPRESSION/RECOMMENDATIONS:      1. Pulmonary edema with high proBNP, with echo with preserved EF 60%, HFpEF in the setting of large volume resuscitation. No diuretic administered yesterday due to lack of IV access. Edema improving on chest x-ray. 2. Hypernatremia, likely 2/2 diuresis. 3. Hyperchloremia, probably multifactorial, 2/2 diuresis and diarrhea. 4. Hypocalcemia, probably multifactorial, including vitamin D deficiency and hypomagnesemia. Continue ergocalciferol. 5. Hypokalemia, multifactorial, diuretics, diarrhea. 6. Hypophosphatemia, secondary to vitamin D deficiency and poor oral intake    7. Hypomagnesemia, probably multifactorial, 2/2 poor oral intake, diuretics. 8. C. difficile infection, on vancomycin and metronidazole. Started on aztreonam today. 9. Severe hypoalbuminemia, multifactorial    Plan:    · Stop D5W + 1/2NS.   · Start D5W + KCl 20mEq/L at 125cc/hr  · Replace K  · Replace calcium  · Replace magnesium  · Replace phosphorus  · Continue Lasix 40mg QD  · Continue to monitor renal function        Tish Bridges M.D     5/16/2019

## 2019-05-16 NOTE — PROGRESS NOTES
P Quality Flow/Interdisciplinary Rounds Progress Note        Quality Flow Rounds held on May 16, 2019    Disciplines Attending:  Dr Ramy Castro was admitted on 5/7/2019 12:26 AM    Anticipated Discharge Date:  Expected Discharge Date: 05/21/19    Disposition:    Lorenzo Score:  Lorenzo Scale Score: 13    Readmission Risk              Risk of Unplanned Readmission:        19           Discussed patient goal for the day, patient clinical progression, and barriers to discharge.   The following Goal(s) of the Day/Commitment(s) have been identified:  Monitor Hg LP today continue treatment      Emmett Bustamante  May 16, 2019

## 2019-05-16 NOTE — BRIEF OP NOTE
Brief Postoperative Note    Bony Alamo  YOB: 1960  [id number]    Pre-operative Diagnosis: Sepsis    Post-operative Diagnosis: Same    Procedure: Left brachial vein PICC insertion    Anesthesia: Local    Surgeons/Assistants: Carmella Milton MD; Usha Pinzon RT    Estimated Blood Loss: less than 50     Complications: None    Specimens: Was Not Obtained    Findings: None    -- Raine Phillip.  Octaviano Martinez MD, PhD  Interventional and Neurointerventional Radiology
measured. The stylet was reinserted and the needle was removed. There were no complications. A total of approximately 13 mL of  Clear colorless  cerebral spinal fluid was removed.     Electronically signed by KEMAL Ferguson   DD: 5/16/19  12:58 PM

## 2019-05-16 NOTE — PROGRESS NOTES
Physical Therapy    PT consult to evaluate/treat received and appreciated. Pt chart reviewed and evaluation attempted. Pt is currently having bedside testing done. Will check back as able. Thank you.         Nakul Comer, PT, DPT   [id number]

## 2019-05-16 NOTE — PROGRESS NOTES
C/C:  Severe C diff infection      Discussed with pt's    Pt is more awake   Low grade fever         Current Facility-Administered Medications   Medication Dose Route Frequency Provider Last Rate Last Dose    levothyroxine (SYNTHROID) injection 25 mcg  25 mcg Intravenous Daily Lanny Delong MD   25 mcg at 05/16/19 0925    potassium chloride 40 mEq in 100 mL IVPB  40 mEq Intravenous Once Los Blank MD   40 mEq at 05/16/19 0832    famotidine (PEPCID) injection 20 mg  20 mg Intravenous BID Mj Barker MD   20 mg at 05/16/19 1048    potassium chloride 20 mEq in dextrose 5 % 1,000 mL infusion   Intravenous Continuous [facility]  mL/hr at 05/16/19 1048      calcium gluconate 1 g in dextrose 5 % 100 mL IVPB  1 g Intravenous Once Brenda Wolff  mL/hr at 05/16/19 1048 1 g at 05/16/19 1048    metoprolol (LOPRESSOR) injection 2.5 mg  2.5 mg Intravenous Q6H Mj Barker MD   2.5 mg at 05/16/19 0553    levetiracetam (KEPPRA) 500 mg/100 mL IVPB  500 mg Intravenous BID Broderick Dhillon MD   500 mg at 05/16/19 0925    0.9 % sodium chloride bolus  250 mL Intravenous Once Lanny Delong MD 20 mL/hr at 05/16/19 0020 250 mL at 05/16/19 0020    pregabalin (LYRICA) capsule 200 mg  200 mg Oral 3 times per day Cece Lara MD   200 mg at 05/16/19 0553    vancomycin (VANCOCIN) oral solution 500 mg  500 mg Oral 4 times per day Cece Lara MD   500 mg at 05/16/19 0553    furosemide (LASIX) injection 40 mg  40 mg Intravenous Once Carlos Rosales MD        lactobacillus (CULTURELLE) capsule 1 capsule  1 capsule Oral BID Olu Greene MD   1 capsule at 05/14/19 0810    morphine (PF) injection 2 mg  2 mg Intravenous Q4H PRN Mj Barker MD   2 mg at 05/13/19 1827    albuterol (ACCUNEB) nebulizer solution 0.63 mg  0.63 mg Nebulization Q6H PRN Darlyn Cohen MD   0.63 mg at 05/13/19 2134    acetaminophen (TYLENOL) 160 MG/5ML solution 650 mg  650 mg Oral Q6H PRN Mendel Donna, MD   650 mg at 05/13/19 1509    chlorhexidine (PERIDEX) 0.12 % solution 15 mL  15 mL Mouth/Throat BID Kathleen Walker MD   15 mL at 05/16/19 0926    sodium chloride flush 0.9 % injection 10 mL  10 mL Intravenous PRN Tiffany Tovar MD   10 mL at 05/11/19 0504    heparin flush 100 UNIT/ML injection 300 Units  3 mL Intravenous 2 times per day Tiffany Tovar MD   300 Units at 05/15/19 1948    heparin flush 100 UNIT/ML injection 300 Units  3 mL Intracatheter PRN Tiffany Tovar MD        [Held by provider] heparin (porcine) injection 5,000 Units  5,000 Units Subcutaneous TID Dequan Camacho MD   5,000 Units at 05/15/19 1946    vitamin D (ERGOCALCIFEROL) capsule 50,000 Units  50,000 Units Oral Weekly Mandie Crowley MD   Stopped at 05/08/19 2015    metronidazole (FLAGYL) 500 mg in NaCl 100 mL IVPB premix  500 mg Intravenous Q8H Renea Hodge MD   Stopped at 05/15/19 1945    sodium chloride flush 0.9 % injection 10 mL  10 mL Intravenous 2 times per day Veronica Hogue MD   10 mL at 05/16/19 0927    sodium chloride flush 0.9 % injection 10 mL  10 mL Intravenous PRN Veronica Hogue MD   10 mL at 05/15/19 0304    magnesium hydroxide (MILK OF MAGNESIA) 400 MG/5ML suspension 30 mL  30 mL Oral Daily PRN Veronica Hogue MD        ondansetron [facility]) injection 4 mg  4 mg Intravenous Q6H PRN Veronica Hogue MD   4 mg at 05/15/19 2008    mineral oil-hydrophilic petrolatum (HYDROPHOR) ointment   Topical TID Higinio Kulkarni MD        And    mineral oil-hydrophilic petrolatum (HYDROPHOR) ointment   Topical TID PRN Higinio Kulkarni MD               REVIEW OF SYSTEMS:       CONSTITUTIONAL:  no grade temp    RESPIRATORY : no resp distress  GASTROINTESTINAL:  Diarrhea   GENITOURINARY:  Good urine out put     INTEGUMENT:no rash   MUSCULOSKELETAL:  Weakness   NEUROLOGICAL:  not responsive     Objective:    /80   Pulse 86   Temp 99.7 °F (37.6 °C) (Core)   Resp (!) 33   Ht 5' (1.524 m)   Wt 202 lb 13.2 oz (92 kg)   SpO2 96%   BMI 39.61 kg/m²       General Appearance:    Extubated, not responsive    Head:    Normocephalic, atraumatic   Eyes:    No pallor, no icterus,   Ears:    No obvious deformity or drainage.    Nose:   No nasal drainage   Throat:   Mucosa dry, no oral thrush   Neck:   Supple, no lymphadenopathy   Lungs:     Scattered rhonchi   Heart:    Regular rate and rhythm   Abdomen:     Soft, bowel sounds present , loose stool     Extremities:   + edema, erythema + ,warm    Pulses:   Dorsalis pedis palpable    Skin:   Erythema       CBC with Differential:      Lab Results   Component Value Date    WBC 6.1 05/16/2019    RBC 2.87 05/16/2019    HGB 9.4 05/16/2019    HCT 29.2 05/16/2019     05/16/2019    .7 05/16/2019    MCH 32.8 05/16/2019    MCHC 32.2 05/16/2019    RDW 16.2 05/16/2019    NRBC 0.9 05/10/2019    LYMPHOPCT 15.3 05/16/2019    PROMYELOPCT 0.9 05/08/2019    MONOPCT 9.4 05/16/2019    BASOPCT 0.3 05/16/2019    MONOSABS 0.58 05/16/2019    LYMPHSABS 0.94 05/16/2019    EOSABS 0.07 05/16/2019    BASOSABS 0.02 05/16/2019       CMP:    Lab Results   Component Value Date     05/16/2019    K 2.6 05/16/2019     05/16/2019    CO2 25 05/16/2019    BUN 14 05/16/2019    CREATININE 0.6 05/16/2019    GFRAA >60 05/16/2019    LABGLOM >60 05/16/2019    GLUCOSE 318 05/16/2019    PROT 5.1 05/16/2019    LABALBU 2.5 05/16/2019    LABALBU 4.4 08/09/2011    CALCIUM 7.5 05/16/2019    BILITOT 0.8 05/16/2019    ALKPHOS 38 05/16/2019    AST 21 05/16/2019    ALT 50 05/16/2019       Hepatic Function Panel:    Lab Results   Component Value Date    ALKPHOS 38 05/16/2019    ALT 50 05/16/2019    AST 21 05/16/2019    PROT 5.1 05/16/2019    BILITOT 0.8 05/16/2019    BILIDIR 0.4 05/16/2019    IBILI 0.4 05/16/2019    LABALBU 2.5 05/16/2019    LABALBU 4.4 08/09/2011       MICROBIOLOGY:     Blood culture - neg to date  Tip cx - Streptococcus / CONS    Urine cx - Candida         Radiology :     Chest x ray - bilateral infiltrates, right > left    CT scan of abdomen and pelvis -     Diffuse mucosal thickening rectum to mid colon       IMPRESSION:      1. Septic shock -Improved   2. Sever C   C diff infection    3. Pneumonia - aspiration   4. Leukocytosis - improved   5. Respiratory failure   6. Candiduria         RECOMMENDATIONS:      1. Continue  Flagyl 500 mg IV q 8 hrs ( dose held by ICU team ) / lactobacillus   2. Vancomycin 250 mg po q 6 hrs when pt is able to tolerate orally , and then , stop flagyl   3. Aztreonam 2 grams IV q 8 hrs    4.  Contact isolation         10:56 AM      5/16/2019

## 2019-05-16 NOTE — PROGRESS NOTES
Paged Dr. Kate Roberts through perfect serve regarding Pt's output of Coffee ground solution from her NG tube, Dr. Donaldo Allen was on call, was told to contact surgical resident on call jaimie, Spoke with Dr. Krissy Orosco, who is aware and will be coming to see the Pt.

## 2019-05-16 NOTE — PROGRESS NOTES
Emerald Delacruz is a 59 y.o. female     Neurology following for Acute Encephalopathy     at bedside    Hx of HTN, HLD, spinal cord stimulator for chronic lower back pain, asthma, GERD     Presented with ams was previously having diarrhea a few day prior to her admission  ---septic shock with possible setting of C.diff and multi organ system failure   ---acute liver and kidney failure, intubated and on abx and pressors, extubated on  5/13  ---5/14/19  IR to place PICC line was disoriented and not following commands   ---face started to twitch and contort for 20 seconds  ---per  she was fine 2 nights ago   ---now with coffee ground emesis and + hemoccult of stool   ---no surgical intervention at this time      questions were answered at bedside regarding LP   ---advised ruling out bacterial or viral causes due to her confusion  ---he told me 3 years ago patient was worked up for not being able to hold her head up for a couple months ---that testing was negative     She received Keppra 1 g IV x 1 and continues with Keppra 500 mg BID now     ROS limited as patient is nonverbal for me during my exam    Objective:     /80   Pulse 86   Temp 99.7 °F (37.6 °C) (Core)   Resp (!) 33   Ht 5' (1.524 m)   Wt 202 lb 13.2 oz (92 kg)   SpO2 96%   BMI 39.61 kg/m²     General: Patient lying bed in NAD. Appears well nourished and well developed. Head:  Normocephalic and atraumatic  Eyes: Sclera white, conjunctiva pink  Neck:  Trachea midline. Neck supple and normal ROM  Lungs: Clear to auscultation bilaterally, respirations unlabored  Heart: Regular rate and rhythm--tachy  Abdomen: Soft with hyperactive bowel sounds in all quadrants   Extremities: No edema to BLE, +2 pulses bilaterally  Skin: No lesions or rashes     Mental Status: Alert to name only. Followed some commands.     Speech/Language:non verbal     Cranial Nerves: limited due to patient nonverbal   I: smell NA   II: visual acuity  NA II: visual fields Full to confrontation   II: pupils BOB   III,VII: ptosis None   III,IV,VI: extraocular muscles  EOMI without nystagmus   V: mastication    V: facial light touch sensation  Normal   V,VII: corneal reflex  Present    VII: facial muscle function - upper     VII: facial muscle function - lower Normal   VIII: hearing Normal   IX: soft palate elevation     IX,X: gag reflex Present   XI: trapezius strength  4/5   XI: sternocleidomastoid strength 4/5   XI: neck extension strength  4/5   XII: tongue strength  Normal     Motor:  3/5 strength throughout  No abnormal movements or tremors  Normal muscle tone and bulk   Unable to hold arms and legs up     Sensory:  LT and PP intact with patient nodding head yes     Coordination:   Unable to perform     DTR:   Right Brachioradialis reflex 2+  Left Brachioradialis reflex 2+  Right Biceps reflex 2+  Left Biceps reflex 2+  Right Triceps reflex 2+  Left Triceps reflex 2+  Right Quadriceps reflex 2+  Left Quadriceps reflex 2+  Right Achilles reflex 1+  Left Achilles reflex 1+    No Babinskis  No Haji's    No pathological reflexes    Laboratory/Radiology:     CBC with Differential:    Lab Results   Component Value Date    WBC 6.1 05/16/2019    RBC 2.87 05/16/2019    HGB 9.4 05/16/2019    HCT 29.2 05/16/2019     05/16/2019    .7 05/16/2019    MCH 32.8 05/16/2019    MCHC 32.2 05/16/2019    RDW 16.2 05/16/2019    NRBC 0.9 05/10/2019    LYMPHOPCT 15.3 05/16/2019    PROMYELOPCT 0.9 05/08/2019    MONOPCT 9.4 05/16/2019    BASOPCT 0.3 05/16/2019    MONOSABS 0.58 05/16/2019    LYMPHSABS 0.94 05/16/2019    EOSABS 0.07 05/16/2019    BASOSABS 0.02 05/16/2019     CMP:    Lab Results   Component Value Date     05/16/2019    K 2.6 05/16/2019     05/16/2019    CO2 25 05/16/2019    BUN 14 05/16/2019    CREATININE 0.6 05/16/2019    GFRAA >60 05/16/2019    LABGLOM >60 05/16/2019    GLUCOSE 318 05/16/2019    PROT 5.1 05/16/2019    LABALBU 2.5 05/16/2019 LABALBU 4.4 08/09/2011    CALCIUM 7.5 05/16/2019    BILITOT 0.8 05/16/2019    ALKPHOS 38 05/16/2019    AST 21 05/16/2019    ALT 50 05/16/2019     Hepatic Function Panel:    Lab Results   Component Value Date    ALKPHOS 38 05/16/2019    ALT 50 05/16/2019    AST 21 05/16/2019    PROT 5.1 05/16/2019    BILITOT 0.8 05/16/2019    BILIDIR 0.4 05/16/2019    IBILI 0.4 05/16/2019    LABALBU 2.5 05/16/2019    LABALBU 4.4 08/09/2011     ABG:    Lab Results   Component Value Date    PH 7.508 05/16/2019    PCO2 33.2 05/16/2019    PO2 66.6 05/16/2019    HCO3 25.8 05/16/2019    BE 3.0 05/16/2019    O2SAT 94.0 05/16/2019     FLP:    Lab Results   Component Value Date    TRIG 404 06/21/2012    HDL 44.0 06/21/2012    LDLCALC -  06/21/2012     CTH:   No acute intracranial hemorrhage or mass effect. CT is insensitive for  acute infarct. If there is concern for acute infarct, MRI with  diffusion-weighted imaging is more sensitive and would be the  recommended study. Right ethmoid sinus mucosal thickening, new since prior study. MRI brain: Diffuse atrophy likely age related  Findings compatible with small vessel ischemic changes. US carotids:   Summary   Ejection fraction is visually estimated at 60%.   No regional wall motion abnormalities seen.   Mild left ventricular concentric hypertrophy noted.   Normal right ventricle structure and function.   Left atrial volume index of 50 ml per meters squared BSA.   Physiologic and/or trace mitral regurgitation is present.   Mild tricuspid regurgitation.   Pulmonary hypertension is mild. RVSP is 46 mmHg. EEG: periodic lateralized epileptiform, highly epileptogenic activity coming from left hemisphere    All labs and imaging studies reviewed independently today.     Assessment:     Focal seizure in the setting of structural brain lesion vs in setting of metabolic derangement vs withdrawal from pregabalin  ---EEG + for epileptogenic activity from left hemisphere  ---MRI was negative for acute findings  ---rule out viral or bacterial processes     Neuro assessment pt non verbal but able to follow some commands  ---shakes head yes or no to questions when asked     Plan:     EEG pending today's read  ---daily EEGs x 30 minutes   Continue Keppra 500 mg BID  LP pending (bacterial and viral profiles)   ---CSF results pending  ---VDRL, AFB stain, Fungus Cx, CSF Cx, Protein, Glucose, CSF cell count with diff x 2,   Cryptococcal antigen, Cytology, HSV, Lyme, VZV, HSV 1 and 2, PCR  Continue Pregabalin 200 mg TID   Neurology will continue to follow         OSMANI Ivy, CNP  12:22 PM  5/16/2019

## 2019-05-16 NOTE — PROGRESS NOTES
GENERAL SURGERY  DAILY PROGRESS NOTE  5/16/2019    Subjective:  Called to see patient regarding reported coffee ground NG output and dropping Hb, down from 10.5 two days ago to 7.1 tonight. Yesterday afternoon patient had nausea and vomiting and a nasogastric tube was inserted. Since insertion around 5pm, it has had about 400cc of brown gastric contents. She does not answer questioning about pain, nausea, or vomiting. No tarry or blood bowel movements per nursing. Objective:  /68   Pulse 106   Temp 99.5 °F (37.5 °C) (Core)   Resp (!) 39   Ht 5' (1.524 m)   Wt 200 lb 2.8 oz (90.8 kg)   SpO2 95%   BMI 39.09 kg/m²     General appearance: awake, does not answer questioning, purposeful  Lungs: nonlabored breathing on nasal cannula  Heart: borderline tachycardia  Abdomen: soft, no apparent tenderness, mildly distended and tympanic. NG output brown    Recent Labs     05/15/19  2000 05/15/19  1315 05/15/19  0307  05/14/19  0425  05/13/19  0430   WBC  --   --  9.6  --  13.1*  --  9.3   HGB 7.1* 9.4* 9.7*   < > 12.3   < > 10.2*   HCT 21.8* 28.6* 29.1*   < > 36.9   < > 30.6*   MCV  --   --  101.7*  --  101.1*  --  103.7*   PLT  --   --  392  --  305  --  141    < > = values in this interval not displayed.      Assessment/Plan:  59 y.o. female with resolving sepsis from C diff colitis, now with seizures, possible upper GI bleed    Tachycardia actually improved, no hypotension  Trend Hb and response to 1 unit RBC transfusion  Keep NPO, NG to suction  PPI BID  If she has significant bloody NG output or if Hb continues to drop will consider EGD    D/w Dr. Ale Fallon    Electronically signed by Kasey Pradhan MD on 5/16/2019 at 1:55 AM

## 2019-05-16 NOTE — PROGRESS NOTES
[address] [facility]   Department of Internal Medicine   Internal Medicine Residency  MICU Progress Note    Patient:  Maye Holman 59 y.o. female   MRN: [id number]       Date of Service: 5/16/2019    Allergy: Aciphex [rabeprazole sodium]; Aleve [naproxen sodium]; Amitriptyline-perphenazine [perphenazine-amitriptyline]; Amoxil [amoxicillin]; Aspirin; Bentyl [dicyclomine hcl]; Celebrex [celecoxib]; Cephalexin; Codeine; Compazine [prochlorperazine maleate]; Cyclosporine; Demerol; Ditropan [oxybutynin chloride]; Doxycycline; Effexor [venlafaxine hydrochloride]; Elavil [amitriptyline hcl]; Entex la; Fentanyl; Flexeril [cyclobenzaprine hcl]; Keflex [cephalexin]; Ketorolac tromethamine; Lansoprazole; Lipitor [atorvastatin]; Loprox [ciclopirox]; Medrol [methylprednisolone]; Midazolam hcl; Motrin [ibuprofen micronized]; Perphenazine; Phenergan [promethazine hcl]; Prevacid [lansoprazole]; Prochlorperazine edisylate; Protonix [pantoprazole sodium]; Reglan [metoclopramide hcl]; Septra [bactrim]; Talwin nx [pentazocine-naloxone]; Tape [adhesive tape]; Tetracyclines & related; Toradol [ketorolac tromethamine]; Ultracet [tramadol-acetaminophen]; Vancenase [beclomethasone dipropionate]; Versed [midazolam]; Vicodin [hydrocodone-acetaminophen]; Vioxx; Amoxil [amoxicillin]; and Baclofen    Subjective   The patient was seen and examined at bedside this morning.  24h change: mentation is better    Increased WOB    Possible coffee ground vomiting       Objective     Vitals:    05/16/19 0400 05/16/19 0500 05/16/19 0600 05/16/19 0758   BP: 131/76 (!) 144/75 132/80    Pulse: 98 100 86    Resp: (!) 38 27 (!) 33    Temp: 99.7 °F (37.6 °C)      TempSrc: Core      SpO2: 97% 98% 98% 96%   Weight:   202 lb 13.2 oz (92 kg)    Height:           Physical Exam:  · I & O - 24hr:No intake/output data recorded. GENERAL: Alert, cooperative, slight distress. HEENT: Normocephalic, atraumatic.  PERRLA, conjunctiva/corneas clear, EOM's 05/15/19  2000 05/16/19  0440   WBC 13.1*  --  9.6  --   --  6.1   RBC 3.65  --  2.86*  --   --  2.87*   HGB 12.3   < > 9.7* 9.4* 7.1* 9.4*   HCT 36.9   < > 29.1* 28.6* 21.8* 29.2*   .1*  --  101.7*  --   --  101.7*   MCH 33.7  --  33.9  --   --  32.8   MCHC 33.3  --  33.3  --   --  32.2   RDW 13.6  --  14.0  --   --  16.2*     --  392  --   --  372   MPV 11.8  --  11.4  --   --  11.1    < > = values in this interval not displayed. BMP:    Recent Labs     05/15/19  0307 05/15/19  1315 05/16/19  0440    146 150*   K 3.3* 3.4* 2.6*    106 112*   CO2 24 26 25   BUN 17 18 14   CREATININE 0.6 0.7 0.6   GLUCOSE 113* 117* 318*   CALCIUM 7.7* 7.9* 7.5*   PROT 5.7* 5.8* 5.1*   LABALBU 2.9* 2.8* 2.5*   BILITOT 0.9 0.9 0.8   ALKPHOS 44 46 38   AST 23 23 21   ALT 64* 63* 50*       LIVER PROFILE:   Recent Labs     05/14/19  0425 05/15/19  0307 05/15/19  1315 05/16/19  0440   AST 32* 23 23 21   ALT 82* 64* 63* 50*   BILIDIR 0.4* 0.4*  --  0.4*   BILITOT 1.2 0.9 0.9 0.8   ALKPHOS 46 44 46 38       PT/INR:   Recent Labs     05/14/19  0425 05/15/19  0307 05/16/19  0440   PROTIME 17.0* 15.9* 16.8*   INR 1.5 1.4 1.5       APTT:   No results for input(s): APTT in the last 72 hours.     Fasting Lipid Panel:    Lab Results   Component Value Date    CHOL 205 06/21/2012    TRIG 404 06/21/2012    HDL 44.0 06/21/2012       Cardiac Enzymes:    Lab Results   Component Value Date    TROPONINI <0.01 05/07/2019    TROPONINI <0.01 05/07/2019    TROPONINI <0.01 05/07/2019       Notable Cultures:      Blood cultures   Blood Culture, Routine   Date Value Ref Range Status   05/12/2019 24 Hours- no growth  Preliminary     Respiratory cultures No results found for: RESPCULTURE No results found for: LABGRAM  Urine   Urine Culture, Routine   Date Value Ref Range Status   05/12/2019 (A)  Final    THIS IS A CORRECTED REPORT  PLEASE DISREGARD PREVIOUS REPORT OF \"COAGULASE NEGATIVE STAPH\"     05/12/2019 >100,000 CFU/ml  Final Multifocal airspace disease likely suggestive follow-up multifocal   infiltrate/pneumonia and/or atelectasis, please see CT chest report   5/12/2019. CT Chest WO Contrast   Final Result      1. Bilateral alveolar opacities in upper lobe in perihilar   distribution suggestive of interstitial pulmonary edema or pneumonia. 2. More confluent opacities are seen at right lung base related to   pneumonia and atelectasis. 3. Moderate-sized bilateral pleural effusions. 4. Prominent and mildly enlarged mediastinal lymph nodes. 5. Endotracheal tube is 1 cm above jono. XR CHEST PORTABLE   Final Result      1. Limited visualization of nasogastric tube. 2. Redemonstration of interstitial and hazy opacities throughout both   lungs suggestive of interstitial pulmonary edema or pneumonia. Short-term follow-up could be helpful for further evaluation. XR CHEST PORTABLE   Final Result      Interstitial and hazy opacities throughout both lungs which appear to   have increased throughout left lung since yesterday's exam. Opacities   appear similar on the right. Continued follow-up could be helpful for   further evaluation. XR CHEST PORTABLE   Final Result   CHF with marginal improvement and decreasing edema. XR CHEST PORTABLE   Final Result   Significant worsening of right-sided infiltrate and atelectasis and   small right effusion               XR ABDOMEN (KUB) (SINGLE AP VIEW)   Final Result   No acute process               XR CHEST PORTABLE   Final Result      1. Satisfactory placement of nasogastric tube. 2. Persistent interstitial pulmonary edema, pneumonia, or atelectasis   bilaterally. Continued follow-up could be helpful for further   evaluation. XR Chest Abdomen Ng Placement   Final Result      Nasogastric tubing appears to be appropriately configured. Right common femoral vein catheter appears to be peripherally   configured.       XR CHEST PORTABLE Final Result      1. Abnormal location of nasogastric tube which appears to be coiled   within the mid esophagus. 2. Increased interstitial and hazy opacities at left hilar location   and left lung base which could suggest increasing pneumonia or   atelectasis. Short-term follow-up could be helpful for further   evaluation. ALERT:  THIS IS AN ABNORMAL REPORT. CT Head WO Contrast   Final Result   1. No acute intracranial hemorrhage identified. 2. Additional nonacute/incidental findings as described above. This report has been electronically signed by Martina Peng MD.      [address] [facility] Additional Contrast? None   Final Result   1. Moderate diffuse mucosal thickening from the rectum to the mid sigmoid colon    with mild adjacent stranding, mild mucosal thickening throughout the descending    colon with adjacent stranding, and suggestion of mucosal thickening throughout    the distal transverse colon as well as from the proximal most transverse colon    to the cecum concerning for infectious/inflammatory proctocolitis. 2. Moderate hiatal hernia. 3. Moderate bilateral dependent atelectases versus consolidations with air    bronchograms. 4. No nephrolithiasis or obstructive uropathy. No perinephric stranding. 5. Normal appendix. 6. Additional nonacute/incidental findings as described above. This report has been electronically signed by Martina Peng MD.      XR CHEST PORTABLE   Final Result      Interstitial opacities in perihilar and infrahilar locations could   suggest peribronchial thickening/inflammation. Continued follow-up   could be helpful for further evaluation.       IR INJ VENOGRAM EXTREMITY    (Results Pending)   IR VENOGRAM UPPER EXTREMITY RIGHT    (Results Pending)   IR PICC WO SQ PORT/PUMP > 5 YEARS    (Results Pending)   IR ULTRASOUND GUIDANCE VASCULAR ACCESS    (Results Pending)   IR ULTRASOUND GUIDANCE VASCULAR ACCESS    (Results Pending)   XR CHEST PORTABLE    (Results Pending)              Resident's Assessment and PLan     Active Problems:    Septic shock ([address]    Colitis    Seizure-like activity (HCC)  Resolved Problems:    * No resolved hospital problems. *        Neurology   Acute encephalopathy  2/2 acute hypoxic respiratory failure vs sepsis   -Currently extubated  -hx of chronic opioids for pain control   -Ct head: no acute hemorrhage and mass effect  -Monitor mental status, was worsening yesterday   -extubated to NC, will monitor respiratory status  - underwent LP which was WNL (initial values) will monitor esults of LP    Seizures  EEG: highly epileptogenic focus in the left hemisphere   Neuro following   On keppra      Cardiovascular  Septic shock , improved  2/2 possible C diff colitis   -Off pressors, levophed,  -Pan culture sent: no growth, C. Diff EIA : C. Diff toxin A and B detected  -repeat pan culture , blood culture : no growth, urine CONS  -continie IV flagyl Q8h and PO vancomycin 500 mg 4 times daily   -ID consult : input appreciated, added aztreonam      Sinus tachycardia  Likely 2/2 febrile episode, fluid loss vs hypoxia  -will give fluid bolus 250 ml  -check ABG     Pulmonary   Acute hypoxic respiratory failure   Acute Pulmonary edema   Possible left sided pleural effusion   2/2 sepsis (d/t C.  Diff colitis) vs pulmonary edema 2/2 fluid overload  -S/p extubation on NC  -Initial ABG 7.273/76/67.9/51.7 c/w metabolic alkalosis  -initial Pro calcitonin :99.25>0.93  -Daily ABG and CXR  -CXR today:Multifocal airspace disease most likely suspicious for a multifocal infiltrate/pneumonia with underlying pulmonary edema and a right pleural effusion  - monitor BMP     GI  C diff colitis   -Hx of 2-3 weeks antibiotics use and diarrhea after   -Leukocytosis improving WBC 18.9>9.2>13  -Ct abdomen showed  infectious/inflammatory proctocolitis  -C diff EIA: c. Diff toxin A and B  -continue IV flagyl Q8h and PO vancomycin 500 mg 4 times daily -continue contact isolation  -GS consult : no active intervention and okay to resume fedding  - continue oral feeding     Shock liver   Transaminitis, improving  -Ct abdomen showed hepatomegaly with hepatic steatosis  -INR 8.4, monitor INR 1.9 today  -serum drug screen: positive for opiates  -Monitor LFT     UGIB  Surgery following   Trending H/H   On IV famotidine   PPIs contraindicated because of C. Diff   Transfuse if hemoglobin <7      Renal   STEFANIA, prerenal, resolved   Likely 2/2 dehydration from septic shock and diarrhea   -Baseline Cr. 0.9, initial Cr. 4.2>>2.6>0.7  -IVF:d/eliseo  - bumex drip stop in setting of contraction alkalosis   -Nephrology consult   -Monitor renal function, urine output and BMP        Electrolyte imbalance  Hypokalemia  - 2/2 GI loss and diuretic use  - replace k  Hypernatremia   2/2 diuresis, stop fluids   Hypocalcemia   Multifactorial, continue vit D  Hypomagnesemia   Multifactorial, replace mag        Anemia ,Macrocytic vs blood loss  Hb: 11.5, MCV: 105.1  Hb: 9.7>9.3 today, likely dilutional vs acute blood loss  -trend h and h q 12 hr  -Folate and vitamin B 12: wnl  -monitor CBC  -target Hb >7     Thrombocytopenia, improved  -Plt: 73 today  -likely 2/2 infection   -HIT score 3, low probability  -oncology consulted: recommend transfuse if Plat<50   -will monitor CBC     PT/OT evaluation:  DVT prophylaxis/ GI prophylaxis: Pepcid / heparin  Disposition: MICU      Rufina Juarez MD     Attending Dr. Griffin Nicely     Addendum ICU Attending Josh Nguyen was seen, examined and discussed with the multi-disciplinary ICU team during rounds. A addendum note may be separate to the residents note. If not then, I have personally seen and examined the patient and the key elements of the encounter were performed by me (> 85 % time). The medications & laboratory data was discussed and adjusted where necessary.  The radiographic images were reviewed either as a group or with radiologist.

## 2019-05-16 NOTE — PROCEDURES
EEG Report  Jhonatan Scales is a 59 y.o. female      Appointment Date 5/16/2019   Appointment Time 9:00 am   Facility Location [facility] EEG Number [id number]   Type of Study Routine Floor 4409-A     Technical Specifications  Technician [facility] of consciousness Awake/Asleep   Sleep deprived? No   Hyperventilation tested? No   Photic stim tested? Yes   EEG recording Standard 10-20 electrode placement    Duration of recording 30.43 minutes   EEG complete?  Yes       Clinical History   sz    Medications    Current Facility-Administered Medications:     levothyroxine (SYNTHROID) injection 25 mcg, 25 mcg, Intravenous, Daily, Pritesh Doshi MD, 25 mcg at 05/16/19 0925    potassium chloride 40 mEq in 100 mL IVPB, 40 mEq, Intravenous, Once, Libra De La Vega MD, 40 mEq at 05/16/19 0832    famotidine (PEPCID) injection 20 mg, 20 mg, Intravenous, BID, Refugio Simons MD, 20 mg at 05/16/19 1048    potassium chloride 20 mEq in dextrose 5 % 1,000 mL infusion, , Intravenous, Continuous, Jf Archer MD, Last Rate: 125 mL/hr at 05/16/19 1048    meropenem (MERREM) 1 g in sodium chloride 0.9 % 100 mL IVPB (mini-bag), 1 g, Intravenous, Q8H, Olu Greene MD    metoprolol (LOPRESSOR) injection 2.5 mg, 2.5 mg, Intravenous, Q6H, Mj Barker MD, 2.5 mg at 05/16/19 0553    levetiracetam (KEPPRA) 500 mg/100 mL IVPB, 500 mg, Intravenous, BID, Nelson Correa MD, 500 mg at 05/16/19 0925    0.9 % sodium chloride bolus, 250 mL, Intravenous, Once, Pritesh Doshi MD, Last Rate: 20 mL/hr at 05/16/19 0020, 250 mL at 05/16/19 0020    pregabalin (LYRICA) capsule 200 mg, 200 mg, Oral, 3 times per day, Refugio Simons MD, 200 mg at 05/16/19 0553    vancomycin (VANCOCIN) oral solution 500 mg, 500 mg, Oral, 4 times per day, Refugio Simons MD, 500 mg at 05/16/19 0553    furosemide (LASIX) injection 40 mg, 40 mg, Intravenous, Once, Jason Jamison MD    lactobacillus (CULTURELLE) capsule 1 capsule, 1 capsule, Oral, BID, Janell Burt MD, 1 capsule at 05/14/19 0810    morphine (PF) injection 2 mg, 2 mg, Intravenous, Q4H PRN, Eri Bob MD, 2 mg at 05/13/19 1827    albuterol (ACCUNEB) nebulizer solution 0.63 mg, 0.63 mg, Nebulization, Q6H PRN, Angeli Landeros MD, 0.63 mg at 05/13/19 2134    acetaminophen (TYLENOL) 160 MG/5ML solution 650 mg, 650 mg, Oral, Q6H PRN, Wilder Ramos MD, 650 mg at 05/13/19 1509    chlorhexidine (PERIDEX) 0.12 % solution 15 mL, 15 mL, Mouth/Throat, BID, Kathleen Hutchinson MD, 15 mL at 05/16/19 0926    sodium chloride flush 0.9 % injection 10 mL, 10 mL, Intravenous, PRN, Angeli Landeros MD, 10 mL at 05/11/19 0504    heparin flush 100 UNIT/ML injection 300 Units, 3 mL, Intravenous, 2 times per day, Angeli Landeros MD, 300 Units at 05/15/19 1948    heparin flush 100 UNIT/ML injection 300 Units, 3 mL, Intracatheter, PRN, Angeli Landeros MD    [Held by provider] heparin (porcine) injection 5,000 Units, 5,000 Units, Subcutaneous, TID, Dequan Camacho MD, 5,000 Units at 05/15/19 1946    vitamin D (ERGOCALCIFEROL) capsule 50,000 Units, 50,000 Units, Oral, Weekly, Wilder Ramos MD, Stopped at 05/08/19 2015    metronidazole (FLAGYL) 500 mg in NaCl 100 mL IVPB premix, 500 mg, Intravenous, Q8H, Manuel Ellis MD, Stopped at 05/15/19 1945    sodium chloride flush 0.9 % injection 10 mL, 10 mL, Intravenous, 2 times per day, Richar Coleman MD, 10 mL at 05/16/19 3954    sodium chloride flush 0.9 % injection 10 mL, 10 mL, Intravenous, PRN, Richar Coleman MD, 10 mL at 05/15/19 0304    magnesium hydroxide (MILK OF MAGNESIA) 400 MG/5ML suspension 30 mL, 30 mL, Oral, Daily PRN, Richar Coleman MD    ondansetron [facility]) injection 4 mg, 4 mg, Intravenous, Q6H PRN, Richar MD Jared, 4 mg at 05/15/19 2008    mineral oil-hydrophilic petrolatum (HYDROPHOR) ointment, , Topical, TID **AND** mineral oil-hydrophilic petrolatum (HYDROPHOR) ointment, , Topical, TID PRN, Irving Conde MD        Physician Interpretation    General EEG Report        Epileptiform Discharge   PLEDS, arising from left hemisphere throughout the record          Non-Epileptiform Abnormality  Continuous low, generalized      Ictal  None    General Impression  This EEG shows a highly epileptogenic focus in the left hemisphere. Also it shows a moderate diffuse encephalopathy. Compared to records from yesterday, no significant change is seen.

## 2019-05-17 ENCOUNTER — APPOINTMENT (OUTPATIENT)
Dept: NEUROLOGY | Age: 59
DRG: 870 | End: 2019-05-17
Payer: COMMERCIAL

## 2019-05-17 ENCOUNTER — APPOINTMENT (OUTPATIENT)
Dept: GENERAL RADIOLOGY | Age: 59
DRG: 870 | End: 2019-05-17
Payer: COMMERCIAL

## 2019-05-17 ENCOUNTER — APPOINTMENT (OUTPATIENT)
Dept: ULTRASOUND IMAGING | Age: 59
DRG: 870 | End: 2019-05-17
Payer: COMMERCIAL

## 2019-05-17 LAB
ALBUMIN SERPL-MCNC: 2.7 G/DL (ref 3.5–5.2)
ALP BLD-CCNC: 36 U/L (ref 35–104)
ALT SERPL-CCNC: 39 U/L (ref 0–32)
ANION GAP SERPL CALCULATED.3IONS-SCNC: 10 MMOL/L (ref 7–16)
ANION GAP SERPL CALCULATED.3IONS-SCNC: 6 MMOL/L (ref 7–16)
ANION GAP SERPL CALCULATED.3IONS-SCNC: 8 MMOL/L (ref 7–16)
AST SERPL-CCNC: 21 U/L (ref 0–31)
BASOPHILS ABSOLUTE: 0.06 E9/L (ref 0–0.2)
BASOPHILS RELATIVE PERCENT: 0.9 % (ref 0–2)
BILIRUB SERPL-MCNC: 0.7 MG/DL (ref 0–1.2)
BILIRUBIN DIRECT: 0.3 MG/DL (ref 0–0.3)
BILIRUBIN, INDIRECT: 0.4 MG/DL (ref 0–1)
BLOOD CULTURE, ROUTINE: NORMAL
BUN BLDV-MCNC: 11 MG/DL (ref 8–23)
BUN BLDV-MCNC: 9 MG/DL (ref 8–23)
BUN BLDV-MCNC: 9 MG/DL (ref 8–23)
CALCIUM SERPL-MCNC: 7 MG/DL (ref 8.6–10.2)
CALCIUM SERPL-MCNC: 7.7 MG/DL (ref 8.6–10.2)
CALCIUM SERPL-MCNC: 7.9 MG/DL (ref 8.6–10.2)
CHLORIDE BLD-SCNC: 101 MMOL/L (ref 98–107)
CHLORIDE BLD-SCNC: 106 MMOL/L (ref 98–107)
CHLORIDE BLD-SCNC: 95 MMOL/L (ref 98–107)
CO2: 26 MMOL/L (ref 22–29)
CO2: 27 MMOL/L (ref 22–29)
CO2: 32 MMOL/L (ref 22–29)
CREAT SERPL-MCNC: 0.5 MG/DL (ref 0.5–1)
CREAT SERPL-MCNC: 0.6 MG/DL (ref 0.5–1)
CREAT SERPL-MCNC: 0.6 MG/DL (ref 0.5–1)
CULTURE CATHETER TIP: ABNORMAL
CULTURE, BLOOD 2: NORMAL
CULTURE, RESPIRATORY: NORMAL
EOSINOPHILS ABSOLUTE: 0.32 E9/L (ref 0.05–0.5)
EOSINOPHILS RELATIVE PERCENT: 4.9 % (ref 0–6)
GFR AFRICAN AMERICAN: >60
GFR NON-AFRICAN AMERICAN: >60 ML/MIN/1.73
GLUCOSE BLD-MCNC: 115 MG/DL (ref 74–99)
GLUCOSE BLD-MCNC: 120 MG/DL (ref 74–99)
GLUCOSE BLD-MCNC: 460 MG/DL (ref 74–99)
HCT VFR BLD CALC: 29.4 % (ref 34–48)
HCT VFR BLD CALC: 30.6 % (ref 34–48)
HCT VFR BLD CALC: 31 % (ref 34–48)
HCT VFR BLD CALC: 31.3 % (ref 34–48)
HEMOGLOBIN: 10 G/DL (ref 11.5–15.5)
HEMOGLOBIN: 9.4 G/DL (ref 11.5–15.5)
HEMOGLOBIN: 9.7 G/DL (ref 11.5–15.5)
HEMOGLOBIN: 9.9 G/DL (ref 11.5–15.5)
IMMATURE GRANULOCYTES #: 0.07 E9/L
IMMATURE GRANULOCYTES %: 1.1 % (ref 0–5)
INR BLD: 1.3
LYMPHOCYTES ABSOLUTE: 1.25 E9/L (ref 1.5–4)
LYMPHOCYTES RELATIVE PERCENT: 19.1 % (ref 20–42)
MAGNESIUM: 1.5 MG/DL (ref 1.6–2.6)
MCH RBC QN AUTO: 33.2 PG (ref 26–35)
MCHC RBC AUTO-ENTMCNC: 32 % (ref 32–34.5)
MCV RBC AUTO: 103.9 FL (ref 80–99.9)
METER GLUCOSE: 130 MG/DL (ref 74–99)
METER GLUCOSE: 136 MG/DL (ref 74–99)
METER GLUCOSE: 279 MG/DL (ref 74–99)
MONOCYTES ABSOLUTE: 0.56 E9/L (ref 0.1–0.95)
MONOCYTES RELATIVE PERCENT: 8.6 % (ref 2–12)
NEUTROPHILS ABSOLUTE: 4.27 E9/L (ref 1.8–7.3)
NEUTROPHILS RELATIVE PERCENT: 65.4 % (ref 43–80)
ORGANISM: ABNORMAL
ORGANISM: ABNORMAL
PDW BLD-RTO: 16.1 FL (ref 11.5–15)
PHOSPHORUS: 2 MG/DL (ref 2.5–4.5)
PLATELET # BLD: 362 E9/L (ref 130–450)
PMV BLD AUTO: 11 FL (ref 7–12)
POTASSIUM REFLEX MAGNESIUM: 3.5 MMOL/L (ref 3.5–5)
POTASSIUM SERPL-SCNC: 3.7 MMOL/L (ref 3.5–5)
POTASSIUM SERPL-SCNC: 5.3 MMOL/L (ref 3.5–5)
PROTHROMBIN TIME: 15 SEC (ref 9.3–12.4)
RBC # BLD: 2.83 E12/L (ref 3.5–5.5)
SMEAR, RESPIRATORY: NORMAL
SODIUM BLD-SCNC: 129 MMOL/L (ref 132–146)
SODIUM BLD-SCNC: 139 MMOL/L (ref 132–146)
SODIUM BLD-SCNC: 143 MMOL/L (ref 132–146)
TOTAL PROTEIN: 5.3 G/DL (ref 6.4–8.3)
WBC # BLD: 6.5 E9/L (ref 4.5–11.5)

## 2019-05-17 PROCEDURE — 6370000000 HC RX 637 (ALT 250 FOR IP): Performed by: INTERNAL MEDICINE

## 2019-05-17 PROCEDURE — 82962 GLUCOSE BLOOD TEST: CPT

## 2019-05-17 PROCEDURE — 2500000003 HC RX 250 WO HCPCS: Performed by: INTERNAL MEDICINE

## 2019-05-17 PROCEDURE — 97166 OT EVAL MOD COMPLEX 45 MIN: CPT

## 2019-05-17 PROCEDURE — 95816 EEG AWAKE AND DROWSY: CPT

## 2019-05-17 PROCEDURE — 97530 THERAPEUTIC ACTIVITIES: CPT

## 2019-05-17 PROCEDURE — 85014 HEMATOCRIT: CPT

## 2019-05-17 PROCEDURE — 36415 COLL VENOUS BLD VENIPUNCTURE: CPT

## 2019-05-17 PROCEDURE — 80048 BASIC METABOLIC PNL TOTAL CA: CPT

## 2019-05-17 PROCEDURE — 71045 X-RAY EXAM CHEST 1 VIEW: CPT

## 2019-05-17 PROCEDURE — 80076 HEPATIC FUNCTION PANEL: CPT

## 2019-05-17 PROCEDURE — 6360000002 HC RX W HCPCS: Performed by: INTERNAL MEDICINE

## 2019-05-17 PROCEDURE — 94669 MECHANICAL CHEST WALL OSCILL: CPT

## 2019-05-17 PROCEDURE — 6360000002 HC RX W HCPCS: Performed by: STUDENT IN AN ORGANIZED HEALTH CARE EDUCATION/TRAINING PROGRAM

## 2019-05-17 PROCEDURE — 2000000000 HC ICU R&B

## 2019-05-17 PROCEDURE — 85610 PROTHROMBIN TIME: CPT

## 2019-05-17 PROCEDURE — 99233 SBSQ HOSP IP/OBS HIGH 50: CPT | Performed by: INTERNAL MEDICINE

## 2019-05-17 PROCEDURE — 2580000003 HC RX 258: Performed by: INTERNAL MEDICINE

## 2019-05-17 PROCEDURE — 97110 THERAPEUTIC EXERCISES: CPT

## 2019-05-17 PROCEDURE — 6360000002 HC RX W HCPCS: Performed by: NURSE PRACTITIONER

## 2019-05-17 PROCEDURE — 85025 COMPLETE CBC W/AUTO DIFF WBC: CPT

## 2019-05-17 PROCEDURE — 99233 SBSQ HOSP IP/OBS HIGH 50: CPT | Performed by: NURSE PRACTITIONER

## 2019-05-17 PROCEDURE — 99232 SBSQ HOSP IP/OBS MODERATE 35: CPT | Performed by: SURGERY

## 2019-05-17 PROCEDURE — 83735 ASSAY OF MAGNESIUM: CPT

## 2019-05-17 PROCEDURE — 84100 ASSAY OF PHOSPHORUS: CPT

## 2019-05-17 PROCEDURE — 97162 PT EVAL MOD COMPLEX 30 MIN: CPT

## 2019-05-17 PROCEDURE — 6360000002 HC RX W HCPCS: Performed by: PSYCHIATRY & NEUROLOGY

## 2019-05-17 PROCEDURE — 31720 CLEARANCE OF AIRWAYS: CPT

## 2019-05-17 PROCEDURE — 95816 EEG AWAKE AND DROWSY: CPT | Performed by: PSYCHIATRY & NEUROLOGY

## 2019-05-17 PROCEDURE — 93970 EXTREMITY STUDY: CPT

## 2019-05-17 PROCEDURE — 85018 HEMOGLOBIN: CPT

## 2019-05-17 PROCEDURE — 2700000000 HC OXYGEN THERAPY PER DAY

## 2019-05-17 RX ORDER — MAGNESIUM SULFATE IN WATER 40 MG/ML
2 INJECTION, SOLUTION INTRAVENOUS ONCE
Status: COMPLETED | OUTPATIENT
Start: 2019-05-17 | End: 2019-05-17

## 2019-05-17 RX ORDER — FUROSEMIDE 10 MG/ML
40 INJECTION INTRAMUSCULAR; INTRAVENOUS ONCE
Status: COMPLETED | OUTPATIENT
Start: 2019-05-17 | End: 2019-05-17

## 2019-05-17 RX ORDER — LEVETIRACETAM 5 MG/ML
1000 INJECTION INTRAVASCULAR 2 TIMES DAILY
Status: DISCONTINUED | OUTPATIENT
Start: 2019-05-17 | End: 2019-05-21 | Stop reason: SDUPTHER

## 2019-05-17 RX ADMIN — Medication 500 MG: at 23:00

## 2019-05-17 RX ADMIN — PETROLATUM: 42 OINTMENT TOPICAL at 08:59

## 2019-05-17 RX ADMIN — AZTREONAM 2 G: 2 INJECTION, POWDER, LYOPHILIZED, FOR SOLUTION INTRAMUSCULAR; INTRAVENOUS at 19:57

## 2019-05-17 RX ADMIN — PETROLATUM: 42 OINTMENT TOPICAL at 19:59

## 2019-05-17 RX ADMIN — LEVETIRACETAM 1000 MG: 5 INJECTION INTRAVENOUS at 19:58

## 2019-05-17 RX ADMIN — AZTREONAM 2 G: 2 INJECTION, POWDER, LYOPHILIZED, FOR SOLUTION INTRAMUSCULAR; INTRAVENOUS at 14:04

## 2019-05-17 RX ADMIN — PREGABALIN 200 MG: 100 CAPSULE ORAL at 21:08

## 2019-05-17 RX ADMIN — MAGNESIUM SULFATE HEPTAHYDRATE 2 G: 40 INJECTION, SOLUTION INTRAVENOUS at 09:27

## 2019-05-17 RX ADMIN — METOPROLOL TARTRATE 2.5 MG: 1 INJECTION, SOLUTION INTRAVENOUS at 05:23

## 2019-05-17 RX ADMIN — POTASSIUM CHLORIDE: 2 INJECTION, SOLUTION, CONCENTRATE INTRAVENOUS at 07:10

## 2019-05-17 RX ADMIN — PREGABALIN 200 MG: 100 CAPSULE ORAL at 05:22

## 2019-05-17 RX ADMIN — METOPROLOL TARTRATE 2.5 MG: 1 INJECTION, SOLUTION INTRAVENOUS at 17:38

## 2019-05-17 RX ADMIN — Medication 10 ML: at 08:54

## 2019-05-17 RX ADMIN — FAMOTIDINE 20 MG: 10 INJECTION INTRAVENOUS at 19:57

## 2019-05-17 RX ADMIN — Medication 1 CAPSULE: at 19:58

## 2019-05-17 RX ADMIN — AZTREONAM 2 G: 2 INJECTION, POWDER, LYOPHILIZED, FOR SOLUTION INTRAMUSCULAR; INTRAVENOUS at 05:22

## 2019-05-17 RX ADMIN — POTASSIUM PHOSPHATE, MONOBASIC AND POTASSIUM PHOSPHATE, DIBASIC 15 MMOL: 224; 236 INJECTION, SOLUTION, CONCENTRATE INTRAVENOUS at 11:26

## 2019-05-17 RX ADMIN — LEVETIRACETAM 500 MG: 5 INJECTION INTRAVENOUS at 08:54

## 2019-05-17 RX ADMIN — METRONIDAZOLE 500 MG: 500 INJECTION, SOLUTION INTRAVENOUS at 03:30

## 2019-05-17 RX ADMIN — Medication 500 MG: at 00:37

## 2019-05-17 RX ADMIN — Medication 500 MG: at 11:22

## 2019-05-17 RX ADMIN — PREGABALIN 200 MG: 100 CAPSULE ORAL at 14:04

## 2019-05-17 RX ADMIN — Medication 500 MG: at 17:38

## 2019-05-17 RX ADMIN — METOPROLOL TARTRATE 2.5 MG: 1 INJECTION, SOLUTION INTRAVENOUS at 00:36

## 2019-05-17 RX ADMIN — Medication 1 CAPSULE: at 08:54

## 2019-05-17 RX ADMIN — METOPROLOL TARTRATE 2.5 MG: 1 INJECTION, SOLUTION INTRAVENOUS at 11:21

## 2019-05-17 RX ADMIN — FUROSEMIDE 40 MG: 10 INJECTION, SOLUTION INTRAMUSCULAR; INTRAVENOUS at 10:35

## 2019-05-17 RX ADMIN — LEVOTHYROXINE SODIUM ANHYDROUS 25 MCG: 100 INJECTION, POWDER, LYOPHILIZED, FOR SOLUTION INTRAVENOUS at 10:35

## 2019-05-17 RX ADMIN — CHLORHEXIDINE GLUCONATE 0.12% ORAL RINSE 15 ML: 1.2 LIQUID ORAL at 19:57

## 2019-05-17 RX ADMIN — METRONIDAZOLE 500 MG: 500 INJECTION, SOLUTION INTRAVENOUS at 10:41

## 2019-05-17 RX ADMIN — Medication 500 MG: at 05:23

## 2019-05-17 RX ADMIN — PETROLATUM: 42 OINTMENT TOPICAL at 14:04

## 2019-05-17 RX ADMIN — SODIUM CHLORIDE, PRESERVATIVE FREE 300 UNITS: 5 INJECTION INTRAVENOUS at 08:55

## 2019-05-17 RX ADMIN — FAMOTIDINE 20 MG: 10 INJECTION INTRAVENOUS at 08:54

## 2019-05-17 ASSESSMENT — PAIN SCALES - GENERAL
PAINLEVEL_OUTOF10: 0

## 2019-05-17 NOTE — PROGRESS NOTES
[address] [facility]   Department of Internal Medicine   Internal Medicine Residency  MICU Progress Note    Patient:  Gregory Rodriguez 59 y.o. female   MRN: [id number]       Date of Service: 5/17/2019    Allergy: Aciphex [rabeprazole sodium]; Aleve [naproxen sodium]; Amitriptyline-perphenazine [perphenazine-amitriptyline]; Amoxil [amoxicillin]; Aspirin; Bentyl [dicyclomine hcl]; Celebrex [celecoxib]; Cephalexin; Codeine; Compazine [prochlorperazine maleate]; Cyclosporine; Demerol; Ditropan [oxybutynin chloride]; Doxycycline; Effexor [venlafaxine hydrochloride]; Elavil [amitriptyline hcl]; Entex la; Fentanyl; Flexeril [cyclobenzaprine hcl]; Keflex [cephalexin]; Ketorolac tromethamine; Lansoprazole; Lipitor [atorvastatin]; Loprox [ciclopirox]; Medrol [methylprednisolone]; Midazolam hcl; Motrin [ibuprofen micronized]; Perphenazine; Phenergan [promethazine hcl]; Prevacid [lansoprazole]; Prochlorperazine edisylate; Protonix [pantoprazole sodium]; Reglan [metoclopramide hcl]; Septra [bactrim]; Talwin nx [pentazocine-naloxone]; Tape [adhesive tape]; Tetracyclines & related; Toradol [ketorolac tromethamine]; Ultracet [tramadol-acetaminophen]; Vancenase [beclomethasone dipropionate]; Versed [midazolam]; Vicodin [hydrocodone-acetaminophen]; Vioxx; Amoxil [amoxicillin]; and Baclofen    Subjective   The patient was seen and examined at bedside this morning.  24h change: no changes    Alert and oriented    Tolerating NC well    No seizures    H/H stable         Objective     Vitals:    05/17/19 1000 05/17/19 1100 05/17/19 1200 05/17/19 1300   BP: 121/80 132/89 126/76 122/84   Pulse: 108 108 100 107   Resp: 28 21 24 24   Temp:       TempSrc:       SpO2: 97% 98% 97% 98%   Weight:       Height:           Physical Exam:  I & O - 24hr:I/O this shift:  In: -   · Out: 935 [Urine:935]       GENERAL: Alert, cooperative, no acute distress. HEENT: Normocephalic, atraumatic.  PERRLA, conjunctiva/corneas clear, EOM's intact, no pallor or icterus. NECK: Supple, symmetrical, trachea midline, no cervical LAD. No carotid bruit or JVD  CHEST: No tenderness or deformity, full & symmetric excursion  LUNG: transmitted sounds,  respirations unlabored. No rales/wheezing/rubs  HEART: RRR, S1 and S2 normal, no murmur, rub or gallop. DP pulses 2/4  ABDOMEN: SNTND, no masses, no organomegaly, no guarding, rebound or rigidity. GENITOURINARY: Urinary cath present   EXTREMITIES:  Extremities normal, atraumatic, no cyanosis or edema. Distal pulses equal bilaterally  SKIN: Skin color, texture, turgor normal, no rashes or lesions  MUSCULOSKELETAL: No joint swelling, no muscle tenderness. Normal ROM in extremities. LYMPH NODES: no lymph node enlargement appreciated  NEUROLOGIC: Alert & Oriented; 4/5 symmetric strength in UEs and LEs;  Sensation intact      Medications     Continuous Infusions:   IV infusion builder 125 mL/hr at 05/17/19 0710     Scheduled Meds:   potassium phosphate IVPB  15 mmol Intravenous Once    levothyroxine  25 mcg Intravenous Daily    famotidine (PEPCID) injection  20 mg Intravenous BID    aztreonam  2 g Intravenous Q8H    metoprolol  2.5 mg Intravenous Q6H    levetiracetam  500 mg Intravenous BID    pregabalin  200 mg Oral 3 times per day    vancomycin  500 mg Oral 4 times per day    lactobacillus  1 capsule Oral BID    chlorhexidine  15 mL Mouth/Throat BID    heparin flush  3 mL Intravenous 2 times per day    [Held by provider] heparin (porcine)  5,000 Units Subcutaneous TID    vitamin D  50,000 Units Oral Weekly    metroNIDAZOLE  500 mg Intravenous Q8H    sodium chloride flush  10 mL Intravenous 2 times per day    mineral oil-hydrophilic petrolatum   Topical TID     PRN Meds: morphine, albuterol, acetaminophen, sodium chloride flush, heparin flush, sodium chloride flush, magnesium hydroxide, ondansetron, mineral oil-hydrophilic petrolatum **AND** mineral oil-hydrophilic petrolatum  Nutrition:       Labs and Imaging Studies     CBC:   Recent Labs     05/15/19  0307  05/16/19  0440  05/17/19  0030 05/17/19  0545 05/17/19  1312   WBC 9.6  --  6.1  --   --  6.5  --    RBC 2.86*  --  2.87*  --   --  2.83*  --    HGB 9.7*   < > 9.4*   < > 9.9* 9.4* 10.0*   HCT 29.1*   < > 29.2*   < > 31.0* 29.4* 31.3*   .7*  --  101.7*  --   --  103.9*  --    MCH 33.9  --  32.8  --   --  33.2  --    MCHC 33.3  --  32.2  --   --  32.0  --    RDW 14.0  --  16.2*  --   --  16.1*  --      --  372  --   --  362  --    MPV 11.4  --  11.1  --   --  11.0  --     < > = values in this interval not displayed. BMP:    Recent Labs     05/15/19  1315 05/16/19 0440 05/16/19  1552 05/17/19  0545    150* 147* 143   K 3.4* 2.6* 3.1* 3.5    112* 107 106   CO2 26 25 30* 27   BUN 18 14 13 11   CREATININE 0.7 0.6 0.7 0.6   GLUCOSE 117* 318* 139* 120*   CALCIUM 7.9* 7.5* 8.2* 7.9*   PROT 5.8* 5.1*  --  5.3*   LABALBU 2.8* 2.5*  --  2.7*   BILITOT 0.9 0.8  --  0.7   ALKPHOS 46 38  --  36   AST 23 21  --  21   ALT 63* 50*  --  39*       LIVER PROFILE:   Recent Labs     05/15/19  0307 05/15/19  1315 05/16/19  0440 05/17/19  0545   AST 23 23 21 21   ALT 64* 63* 50* 39*   BILIDIR 0.4*  --  0.4* 0.3   BILITOT 0.9 0.9 0.8 0.7   ALKPHOS 44 46 38 36       PT/INR:   Recent Labs     05/15/19  0307 05/16/19  0440 05/17/19  0545   PROTIME 15.9* 16.8* 15.0*   INR 1.4 1.5 1.3       APTT:   No results for input(s): APTT in the last 72 hours.     Fasting Lipid Panel:    Lab Results   Component Value Date    CHOL 205 06/21/2012    TRIG 404 06/21/2012    HDL 44.0 06/21/2012       Cardiac Enzymes:    Lab Results   Component Value Date    TROPONINI <0.01 05/07/2019    TROPONINI <0.01 05/07/2019    TROPONINI <0.01 05/07/2019       Notable Cultures:      Blood cultures   Blood Culture, Routine   Date Value Ref Range Status   05/12/2019 5 Days- no growth  Final     Respiratory cultures No results found for: RESPCULTURE   Gram Stain Result   Date Value Ref Range Status   05/16/2019   Final    Gram stain performed from cytospun specimen  Polymorphonuclear leukocytes not seen  Epithelial cells not seen  Few Mononuclear leukocytes  No organisms seen. Urine   Urine Culture, Routine   Date Value Ref Range Status   05/12/2019 (A)  Final    THIS IS A CORRECTED REPORT  PLEASE DISREGARD PREVIOUS REPORT OF \"COAGULASE NEGATIVE STAPH\"     05/12/2019 >100,000 CFU/ml  Final     Legionella No results found for: LABLEGI  C Diff PCR No results found for: CDIFPCR  Wound culture/abscess: No results for input(s): WNDABS in the last 72 hours. Tip culture:No results for input(s): CXCATHTIP in the last 72 hours. Imaging Studies:  XR CHEST PORTABLE   Final Result   ALERT:  THIS IS AN ABNORMAL REPORT   1. Abnormal airspace disease in the bilateral upper lungs and   bilateral lung bases. Findings are nonspecific and could reflect an   infiltrate/pneumonia. An infiltrative mass or malignancy is not   excluded particularly in the right and the left upper lung/suprahilar   regions, further evaluation with CT scan the chest is recommended to   exclude any potential underlying malignancy. 2. Stable, enlarged cardiac mediastinal silhouette with thoracic   aortic vascular calcifications. FL LUMBAR PUNCTURE DIAG   Final Result   Successful Uncomplicated Fluoroscopic-Guided Lumbar Puncture. This procedure was performed by Nancie Mathew PA-C under the indirect   supervision of Venice Kinsey MD   who was not present for the   procedure. .      The exam has been dictated and signed by Nancie Mathew PA-C. Pia Shah MD , Radiologist, have reviewed the images and   report and concur with these findings. FL GUIDED FOR SPINE INJECT   Final Result   Successful Uncomplicated Fluoroscopic-Guided Lumbar Puncture.        This procedure was performed by Nancie Mathew PA-C under the indirect   supervision of Venice Kinsey MD   who was not present for the the right upper   extremity. 2. Right upper extremity venogram demonstrating patency of the right   central veins. 3. Uncomplicated placement of a dual-lumen peripherally inserted   central catheter (PICC) via the left brachial vein with trim length of   42 cm. IR ULTRASOUND GUIDANCE VASCULAR ACCESS   Final Result      1. Ultrasound-guided peripheral venous access in the right upper   extremity. 2. Right upper extremity venogram demonstrating patency of the right   central veins. 3. Uncomplicated placement of a dual-lumen peripherally inserted   central catheter (PICC) via the left brachial vein with trim length of   42 cm. IR ULTRASOUND GUIDANCE VASCULAR ACCESS   Final Result      1. Ultrasound-guided peripheral venous access in the right upper   extremity. 2. Right upper extremity venogram demonstrating patency of the right   central veins. 3. Uncomplicated placement of a dual-lumen peripherally inserted   central catheter (PICC) via the left brachial vein with trim length of   42 cm. CT Head WO Contrast   Final Result      No acute intracranial hemorrhage or mass effect. CT is insensitive for   acute infarct. If there is concern for acute infarct, MRI with   diffusion-weighted imaging is more sensitive and would be the   recommended study. Right ethmoid sinus mucosal thickening, new since prior study. XR CHEST PORTABLE   Final Result      XR CHEST PORTABLE   Final Result   1. Stable, enlarged cardiomediastinal silhouette with underlying   pulmonary edema. 2. Multifocal airspace disease likely suggestive follow-up multifocal   infiltrate/pneumonia and/or atelectasis, please see CT chest report   5/12/2019. CT Chest WO Contrast   Final Result      1. Bilateral alveolar opacities in upper lobe in perihilar   distribution suggestive of interstitial pulmonary edema or pneumonia.       2. More confluent opacities are seen at right lung base related to   pneumonia and atelectasis. 3. Moderate-sized bilateral pleural effusions. 4. Prominent and mildly enlarged mediastinal lymph nodes. 5. Endotracheal tube is 1 cm above jono. XR CHEST PORTABLE   Final Result      1. Limited visualization of nasogastric tube. 2. Redemonstration of interstitial and hazy opacities throughout both   lungs suggestive of interstitial pulmonary edema or pneumonia. Short-term follow-up could be helpful for further evaluation. XR CHEST PORTABLE   Final Result      Interstitial and hazy opacities throughout both lungs which appear to   have increased throughout left lung since yesterday's exam. Opacities   appear similar on the right. Continued follow-up could be helpful for   further evaluation. XR CHEST PORTABLE   Final Result   CHF with marginal improvement and decreasing edema. XR CHEST PORTABLE   Final Result   Significant worsening of right-sided infiltrate and atelectasis and   small right effusion               XR ABDOMEN (KUB) (SINGLE AP VIEW)   Final Result   No acute process               XR CHEST PORTABLE   Final Result      1. Satisfactory placement of nasogastric tube. 2. Persistent interstitial pulmonary edema, pneumonia, or atelectasis   bilaterally. Continued follow-up could be helpful for further   evaluation. XR Chest Abdomen Ng Placement   Final Result      Nasogastric tubing appears to be appropriately configured. Right common femoral vein catheter appears to be peripherally   configured. XR CHEST PORTABLE   Final Result      1. Abnormal location of nasogastric tube which appears to be coiled   within the mid esophagus. 2. Increased interstitial and hazy opacities at left hilar location   and left lung base which could suggest increasing pneumonia or   atelectasis. Short-term follow-up could be helpful for further   evaluation. ALERT:  THIS IS AN ABNORMAL REPORT.       CT Head WO -Baseline Cr. 0.9, Cr. 4.2>>2.6>0.6  -IVF:d/eliseo  -bumex drip stop in setting of contraction alkalosis   -Nephrology consult   -Monitor renal function, urine output and BMP         Electrolyte imbalance, improving   Hypokalemia  - 2/2 GI loss and diuretic use  - replace k  Hypernatremia   2/2 diuresis, stop fluids   Hypocalcemia  Corrected calcium 8.9   Multifactorial, continue vit D  Hypomagnesemia   Multifactorial, replace mag         Anemia ,Macrocytic vs blood loss  Hb: 10 today, likely dilutional vs acute blood loss  -trend h and h q 12 hr  -Folate and vitamin B 12: wnl  -monitor CBC  -target Hb >7  GS following      Thrombocytopenia, resolved  -Plt: 73 today  -likely 2/2 infection   -HIT score 3, low probability  -oncology consulted: recommend transfuse if Plat<50   -will monitor Chris Calderon MD     Resident -[id number]    Attending Physician: Dr. Abbey Evans     Addendum ICU Attending Bridgette Oquendo was seen, examined and discussed with the multi-disciplinary ICU team during rounds. A addendum note may be separate to the residents note. If not then, I have personally seen and examined the patient and the key elements of the encounter were performed by me (> 85 % time). The medications & laboratory data was discussed and adjusted where necessary. The radiographic images were reviewed either as a group or with radiologist.  Any changes are document or changed if felt dis-concordant with the exam or history. The above findings were corroborated, plans confirmed and changes made if needed. Family was updated at the bedside as available. Key issues of the case were discussed among consultants. Critical Care time is documented if appropriate.       Tan Motta DO, MPH  Professor of Medicine

## 2019-05-17 NOTE — PROGRESS NOTES
injection 2.5 mg  2.5 mg Intravenous Q6H Mj Barker MD   2.5 mg at 05/17/19 1121    levetiracetam (KEPPRA) 500 mg/100 mL IVPB  500 mg Intravenous BID Joanne Cantrell MD   500 mg at 05/17/19 0854    pregabalin (LYRICA) capsule 200 mg  200 mg Oral 3 times per day Alyssa Neil MD   200 mg at 05/17/19 0522    vancomycin (VANCOCIN) oral solution 500 mg  500 mg Oral 4 times per day Alyssa Neil MD   500 mg at 05/17/19 1122    lactobacillus (CULTURELLE) capsule 1 capsule  1 capsule Oral BID Olu Greene MD   1 capsule at 05/17/19 0854    morphine (PF) injection 2 mg  2 mg Intravenous Q4H PRN Mj Barker MD   2 mg at 05/13/19 1827    albuterol (ACCUNEB) nebulizer solution 0.63 mg  0.63 mg Nebulization Q6H PRN Edouard Hamm MD   0.63 mg at 05/13/19 2134    acetaminophen (TYLENOL) 160 MG/5ML solution 650 mg  650 mg Oral Q6H PRN Tery Councilman, MD   650 mg at 05/13/19 1509    chlorhexidine (PERIDEX) 0.12 % solution 15 mL  15 mL Mouth/Throat BID Kathleen Hutchinson MD   15 mL at 05/16/19 2016    sodium chloride flush 0.9 % injection 10 mL  10 mL Intravenous PRN Edouard Hamm MD   10 mL at 05/11/19 0504    heparin flush 100 UNIT/ML injection 300 Units  3 mL Intravenous 2 times per day Edouard Hamm MD   300 Units at 05/17/19 0855    heparin flush 100 UNIT/ML injection 300 Units  3 mL Intracatheter PRN Edouard Hamm MD        [Held by provider] heparin (porcine) injection 5,000 Units  5,000 Units Subcutaneous TID Dequan Camacho MD   5,000 Units at 05/15/19 1946    vitamin D (ERGOCALCIFEROL) capsule 50,000 Units  50,000 Units Oral Weekly Tery Councilman, MD   Stopped at 05/08/19 2015    metronidazole (FLAGYL) 500 mg in NaCl 100 mL IVPB premix  500 mg Intravenous Leena Vazquez MD   Stopped at 05/17/19 1228    sodium chloride flush 0.9 % injection 10 mL  10 mL Intravenous 2 times per day Mario Pal MD   10 mL at 05/17/19 0854    sodium chloride flush 0.9 % injection 10 mL  10 mL Intravenous PRN Agustina Wesley MD   10 mL at 05/15/19 0304    magnesium hydroxide (MILK OF MAGNESIA) 400 MG/5ML suspension 30 mL  30 mL Oral Daily PRLEANDRA Wesley MD        ondansetron [facility] injection 4 mg  4 mg Intravenous Q6H PRN Agustina Wesley MD   4 mg at 05/15/19 2008    mineral oil-hydrophilic petrolatum (HYDROPHOR) ointment   Topical TID Erasmo Jon MD        And    mineral oil-hydrophilic petrolatum (HYDROPHOR) ointment   Topical TID PRN Erasmo Jon MD           Objective:     /84   Pulse 107   Temp 99.5 °F (37.5 °C) (Core)   Resp 24   Ht 5' (1.524 m)   Wt 201 lb 8 oz (91.4 kg)   SpO2 98%   BMI 39.35 kg/m²     General: awake, in no acute distress; appears stated age; lying in bed  Head:  Normocephalic and atraumatic--NGT in place  ENT: dry, cracked lips and oral mucosa  Eyes: b/l scleral injection but no drainage  Lungs: diminished throughout---resps nonlabored on NC O2  Heart: tachycardic rate and rhythm  Extremities: 1+ edema to hands and ankles; no cyanosis  Pulses: 2+ throughout  Skin: No lesions or rashes     Mental Status: alert, oriented to person only--does not remember her 's name and can only name one of her children; follows most simple commands well; nods yes and no     Cranial Nerves:  I: smell NA   II: visual acuity  NA   II: visual fields B/l threat   II: pupils BOB   III,VII: ptosis None   III,IV,VI: extraocular muscles  EOMI without nystagmus   V: mastication Normal   V: facial light touch sensation  Normal   V,VII: corneal reflex     VII: facial muscle function - upper     VII: facial muscle function - lower Symmetric   VIII: hearing Normal   IX: soft palate elevation     IX,X: gag reflex    XI: trapezius strength  4/5   XI: sternocleidomastoid strength 4/5   XI: neck extension strength  4/5   XII: tongue strength  Normal  Difficulty sticking tongue out due to dryness of oral mucosa     Motor:  3/5 BUE--weak  b/l  Unable to lift either leg but can move both to command  Obese bulk and normal tone  No abnormal movements or tremors    Sensory:  Patient nods yes to LT in all limbs    DTR:   Right Brachioradialis reflex 2+  Left Brachioradialis reflex 2+  Right Biceps reflex 2+  Left Biceps reflex 2+  Right Triceps reflex 2+  Left Triceps reflex 2+  Right Quadriceps reflex 2+  Left Quadriceps reflex 2+  Right Achilles reflex 1+  Left Achilles reflex 1+    No Babinskis  No Haji's    No pathological reflexes    Laboratory/Radiology:     CBC with Differential:    Lab Results   Component Value Date    WBC 6.5 05/17/2019    RBC 2.83 05/17/2019    HGB 9.4 05/17/2019    HCT 29.4 05/17/2019     05/17/2019    .9 05/17/2019    MCH 33.2 05/17/2019    MCHC 32.0 05/17/2019    RDW 16.1 05/17/2019    NRBC 0.9 05/10/2019    LYMPHOPCT 19.1 05/17/2019    MONOPCT 8.6 05/17/2019    BASOPCT 0.9 05/17/2019    MONOSABS 0.56 05/17/2019    LYMPHSABS 1.25 05/17/2019    EOSABS 0.32 05/17/2019    BASOSABS 0.06 05/17/2019     CMP:    Lab Results   Component Value Date     05/17/2019    K 3.5 05/17/2019     05/17/2019    CO2 27 05/17/2019    BUN 11 05/17/2019    CREATININE 0.6 05/17/2019    GFRAA >60 05/17/2019    LABGLOM >60 05/17/2019    GLUCOSE 120 05/17/2019    PROT 5.3 05/17/2019    LABALBU 2.7 05/17/2019    CALCIUM 7.9 05/17/2019    BILITOT 0.7 05/17/2019    ALKPHOS 36 05/17/2019    AST 21 05/17/2019    ALT 39 05/17/2019     Hepatic Function Panel:    Lab Results   Component Value Date    ALKPHOS 36 05/17/2019    ALT 39 05/17/2019    AST 21 05/17/2019    PROT 5.3 05/17/2019    BILITOT 0.7 05/17/2019    BILIDIR 0.3 05/17/2019    IBILI 0.4 05/17/2019    LABALBU 2.7 05/17/2019      Ref. Range 5/16/2019 12:38   Appearance, CSF Latest Ref Range: Clear  Clear   CSF Culture Unknown Growth not presen. ..    Eosinophils, CSF Latest Ref Range: 0 - 2 % 0   Glucose, CSF Latest Ref Range: 40 - 70 mg/dL 59   Protein, CSF Latest Ref Range: 15 - 40 mg/dL 34   RBC, CSF Latest Units: /uL <2000   WBC, CSF Latest Ref Range: 0 - 2 /uL 3 (H)   Neutrophils, CSF Latest Ref Range: 0 - 10 % 33 (H)   Lymphs, CSF Latest Ref Range: 30 - 90 % 0 (L)   Monocytes, CSF Latest Ref Range: 10 - 70 % 67   Baso, CSF Latest Units: % 0   Color, CSF Unknown Colorless   Tube Number + CELL CT + DIFF-CSF Unknown Tube 3     Final bacterial and viral CSF studies pending    MRI brain: no acute events    EEG 5/16: highly epileptogenic focus in the left hemisphere. Also it shows a moderate diffuse encephalopathy. Compared to records from yesterday, no significant change is seen. R/P EEG 5/17 pending    All labs and imaging studies reviewed independently today.     Assessment:     The patient suffered a R facial focal motor seizure    Etiology possibly due to metabolic disarray vs withdrawal from Lyrica   MRI with no structural cause--EEG with unchanged PLEDS on L--r/p pending   No clinical seizures on Keppra and Lyrica 200 mg TID    AMS most likely secondary to mixed metabolic and toxic encephalopathies from her sepsis and multi-system organ failure, however PLEDS in L hemisphere may be contributing   No evidence of CNS infection on initial LP---final studies pending   Remains encephalopathic but exam non-focal   If PLEDs persist on new EEG, may need increase in AEDs   ID and renal following    Plan:     F/U repeat EEG   If PLEDs persist, may consider Keppra increase    F/u final CSF studies    Continue Pregabalin 200 mg TID     Continue supportive care    Will follow    OSMANI Haro, CNP  1:15 PM  5/17/2019

## 2019-05-17 NOTE — PROGRESS NOTES
[facility] Quality Flow/Interdisciplinary Rounds Progress Note      Quality Flow Rounds held on May 17, 2019    Disciplines Attending: Dr. Ran Mortensen, Nursing, Medical Residents, Pharmacy    Sierra Chase was admitted on 5/7/2019 12:26 AM    Anticipated Discharge Date:  Expected Discharge Date: 05/21/19    Disposition: MICU    Lorenzo Score:  Lorenzo Scale Score: 9    Readmission Risk              Risk of Unplanned Readmission:        19           Discussed patient goal for the day, patient clinical progression, and barriers to discharge.   The following Goal(s) of the Day/Commitment(s) have been identified: possible bronch today, needs EEG, continue current treatment      Michael Crowell  May 17, 2019

## 2019-05-17 NOTE — PROGRESS NOTES
GENERAL SURGERY  ATTENDING PROGRESS NOTE        CC: n/v, UGIB    Active Problems:    Septic shock ([address]    Colitis    Seizure-like activity (HCC)  Resolved Problems:    * No resolved hospital problems. *      S:  5/16/19 - developed coffee-ground emesis last night; recently we were following for C diff colitis -which is improving; NGT inserted yesterday - still some dark brown drainage; transfused one unit RBCs yesterday for Hgb = 7.1  5/17/19 - more alert today; NGt draining bilious/non coffee ground contents    O:   Vitals:    05/17/19 0800 05/17/19 0900 05/17/19 1000 05/17/19 1100   BP: 119/70 121/74 121/80 132/89   Pulse: 101 103 108 108   Resp: 23 19 28 21   Temp: 99.5 °F (37.5 °C)      TempSrc: Core      SpO2: 98% 99% 97% 98%   Weight:       Height:           I/O last 3 completed shifts:   In: 9447 [I.V.:3286; NG/GT:210]  Out: 2300 [Urine:2300]    Gen - ill-appearing  Neuro - Awake, alert, attentive following commands  HEENT - NC/AT, NGT   Lungs - non labored, on NC O2  Heart - sinus tachycardia    Abdomen - large pannus, mildly distended, nontender    A/P:    --UGIB - probable stress gastritis - improving   -Continue NGT for feeds per MICU team   -PPI    -monitor H&H serially   -if bleeding worsens then will need EGD - please call if needed       Etelvina Wetzel MD, FACS

## 2019-05-17 NOTE — PLAN OF CARE
Spoke with Dr. Campbell Neat    R/P EEG with persistent L sided PLEDS    Will increase Keppra to 1G BID.  Rest of plan per progress note    Amos Gallardo APRN-CNP  4:31 PM

## 2019-05-17 NOTE — PROGRESS NOTES
BUN 11 05/17/2019    LABALBU 2.7 05/17/2019    LABALBU 4.4 08/09/2011    CREATININE 0.6 05/17/2019    CALCIUM 7.9 05/17/2019    GFRAA >60 05/17/2019    LABGLOM >60 05/17/2019    GLUCOSE 120 05/17/2019     Calcium:    Lab Results   Component Value Date    CALCIUM 7.9 05/17/2019     Ionized Calcium:  No results found for: IONCA  Magnesium:    Lab Results   Component Value Date    MG 1.5 05/17/2019     Phosphorus:    Lab Results   Component Value Date    PHOS 2.0 05/17/2019         Urine studies:  Urine sodium: 22  Urine chloride: <20  Urine creatinine: 100  Urine urea: 251  Urine potassium: 48.2    Fraction excretion of sodium: 0.5%  Fraction excretion of urea: 12.6%    Vitamin D 25 level: 13 (low)      Radiology Review:       Chest Xray May 16, 2019   1. Multifocal airspace disease most likely suspicious for a multifocal   infiltrate/pneumonia with underlying pulmonary edema and a right   pleural effusion. Interval placement NG tube without identification of   the distal tibia. BRIEF SUMMARY OF INITIAL CONSULT:  Briefly, Mrs. Karis Hanley is a 59-year-old female with h/o of hypothyroidism, lumbar radiculopathy s/p laminectomy, HLD, asthma, was brought to the ED for worsening lethargy. She was recently treated for lower extremity cellulitis with clindamycin and then levofloxacin around 2-3 weeks ago. She developed profuse diarrhea shortly thereafter. Family reported some vomiting initially and poor oral intake. Over the past 3 days, she became severely weak and dehydrated. At the ED, she was in shock requiring vasopressor despite aggressive fluid resuscitation and was intubated for airway protection. Labs were notable for creatinine of 4.2 mg/dL, BUN of 73 mg/dL, lactic acid of 3.4, sodium of 129 mmol/L, liver enzymes were also markedly elevated, reasons for this consult. Of note, she has no known history of kidney disease.  She was on furosemide at home, but has not been taken since the onset of

## 2019-05-17 NOTE — PROCEDURES
EEG Report  Dale Woods is a 59 y.o. female      Appointment Date 5/17/2019 Appointment Time  2:30 pm   Facility Location [facility] EEG Number [id number]   Type of Study Routine Floor 4409-A     Technical Specifications  Technician [facility] of consciousness Awake/Drowsy   Sleep deprived? No   Hyperventilation tested? No   Photic stim tested? Yes   EEG recording Standard 10-20 electrode placement    Duration of recording 30.31 minutes   EEG complete?  Yes       Clinical History   AMS    Medications    Current Facility-Administered Medications:     levothyroxine (SYNTHROID) injection 25 mcg, 25 mcg, Intravenous, Daily, Elizabeth Iverson MD, 25 mcg at 05/17/19 1035    famotidine (PEPCID) injection 20 mg, 20 mg, Intravenous, BID, Harriet Fraser MD, 20 mg at 05/17/19 0854    potassium chloride 20 mEq in dextrose 5 % 1,000 mL infusion, , Intravenous, Continuous, Jf Archer MD, Last Rate: 100 mL/hr at 05/17/19 1525    aztreonam (AZACTAM) 2 g IVPB mini-bag, 2 g, Intravenous, Q8H, Olu Greene MD, Stopped at 05/17/19 1449    metoprolol (LOPRESSOR) injection 2.5 mg, 2.5 mg, Intravenous, Q6H, Mj Barker MD, 2.5 mg at 05/17/19 1121    levetiracetam (KEPPRA) 500 mg/100 mL IVPB, 500 mg, Intravenous, BID, Abby Hogue MD, 500 mg at 05/17/19 0854    pregabalin (LYRICA) capsule 200 mg, 200 mg, Oral, 3 times per day, Harriet Fraser MD, 200 mg at 05/17/19 1404    vancomycin (VANCOCIN) oral solution 500 mg, 500 mg, Oral, 4 times per day, Harriet Fraser MD, 500 mg at 05/17/19 1122    lactobacillus (CULTURELLE) capsule 1 capsule, 1 capsule, Oral, BID, Olu Greene MD, 1 capsule at 05/17/19 0854    morphine (PF) injection 2 mg, 2 mg, Intravenous, Q4H PRN, Harriet Fraser MD, 2 mg at 05/13/19 1827    albuterol (ACCUNEB) nebulizer solution 0.63 mg, 0.63 mg, Nebulization, Q6H PRN, Antonio Ragland MD, 0.63 mg at 05/13/19 2134    acetaminophen (TYLENOL) 160 MG/5ML solution 650 mg, 650 mg, Oral, Q6H PRN, Tommie Mayfield Rich Callejas MD, 650 mg at 05/13/19 1509    chlorhexidine (PERIDEX) 0.12 % solution 15 mL, 15 mL, Mouth/Throat, BID, Kathleen Hutchinson MD, 15 mL at 05/16/19 2016    sodium chloride flush 0.9 % injection 10 mL, 10 mL, Intravenous, PRN, Heath Mendoza MD, 10 mL at 05/11/19 0504    heparin flush 100 UNIT/ML injection 300 Units, 3 mL, Intravenous, 2 times per day, Heath Mendoza MD, 300 Units at 05/17/19 0855    heparin flush 100 UNIT/ML injection 300 Units, 3 mL, Intracatheter, PRN, Heath Mendoza MD    [Held by provider] heparin (porcine) injection 5,000 Units, 5,000 Units, Subcutaneous, TID, Dequan Camacho MD, 5,000 Units at 05/15/19 1946    vitamin D (ERGOCALCIFEROL) capsule 50,000 Units, 50,000 Units, Oral, Weekly, Mery Calloway MD, Stopped at 05/08/19 2015    sodium chloride flush 0.9 % injection 10 mL, 10 mL, Intravenous, 2 times per day, Rosalina Segundo MD, 10 mL at 05/17/19 0854    sodium chloride flush 0.9 % injection 10 mL, 10 mL, Intravenous, PRN, Rosalina Segundo MD, 10 mL at 05/15/19 0304    magnesium hydroxide (MILK OF MAGNESIA) 400 MG/5ML suspension 30 mL, 30 mL, Oral, Daily PRN, Rosalina Segundo MD    ondansetron [facility]) injection 4 mg, 4 mg, Intravenous, Q6H PRN, Rosalina Segundo MD, 4 mg at 05/15/19 2008    mineral oil-hydrophilic petrolatum (HYDROPHOR) ointment, , Topical, TID **AND** mineral oil-hydrophilic petrolatum (HYDROPHOR) ointment, , Topical, TID PRN, Margaux Hernandez MD        Physician Interpretation    General EEG Report      Epileptiform Discharge   PLEDS, arising from left hemisphere throughout the record        Non-Epileptiform Abnormality    Continuous slowing    Ictal  None    General Impression  This EEG shows a highly epileptogenic focus in the left hemisphere. Also it shows a moderate diffuse encephalopathy. Compared to records from yesterday, a worsening in epileptogencity is seen.

## 2019-05-17 NOTE — PROGRESS NOTES
Nutrition Assessment    Type and Reason for Visit: Reassess    Nutrition Summary: Pt declining from a nutritional standpoint s/p extubation now w/ noted GIB/NPO status. Average energy intake <50% since admit x 10 days. Pt at further risk d/t dysphagia/failed swallow eval & B/L SDTI. Consider initiation of nutrition support EN vs PN? Nutrition Recommendations: Continue NPO    Please consult when nutrition support recs are needed  Noted hypophosphatemia & hypomagnesemia at this time      Malnutrition Assessment:  · Malnutrition Status: At risk for malnutrition  · Findings of the 6 clinical characteristics of malnutrition (Minimum of 2 out of 6 clinical characteristics is required to make the diagnosis of moderate or severe Protein Calorie Malnutrition based on AND/ASPEN Guidelines):  1. Energy Intake-Less than or equal to 50% of estimated energy requirement, Greater than or equal to 7 days    2. Weight Loss-Unable to assess wt changes d/t fluid   3. Fat Loss-No significant subcutaneous fat loss  4. Muscle Loss-No significant muscle mass loss    5. Fluid Accumulation-No significant fluid accumulation    6.   Strength-Not measured    Nutrition Risk Level: High    Nutrient Needs:  · Estimated Daily Total Kcal: 4352-6664(MSJ REE 1445 x 1.3 SF)  · Estimated Daily Protein (g): (1.8-2.2 g/kg )  · Estimated Daily Total Fluid (ml/day): per critical care management     Nutrition Diagnosis:   · Problem: Inadequate oral intake  · Etiology: related to Alteration in GI function(GIB)     Signs and symptoms:  as evidenced by NPO status due to medical condition    Objective Information:  · Nutrition-Focused Physical Findings: Pt alert s/p extubation, nonverbal, fluid bal WNL, +1/+2 edema, weakness, Cdiff, hypoactive BS, noted UGIB, N/V, NGT LIS      · Wound Type: Multiple, Deep Tissue Injury(abrasions )     · Current Nutrition Therapies:  · Oral Diet Orders: NPO     · Anthropometric Measures:  · Ht: 5' (152.4 cm) · Current Body Wt: 210 lb (95.3 kg)(5/7 actual )  · Admission Body Wt: 209 lb (94.8 kg)(5/7 first measured )  · Usual Body Wt: UTO no EMR hx on file   · % Weight Change: Noted wt fluctuations since admit likely d/t fluid shifts.  unable to assess true changes   · Ideal Body Wt: 100 lb (45.4 kg), % [place] Body 209%(using adm wt)  · BMI Classification: BMI > or equal to 40.0 Obese Class III(using adm wt 41)    Nutrition Interventions:   Continued Inpatient Monitoring, Coordination of Care, Education not appropriate at this time(Pt status d/w RN- will page surgery regarding nutrition plan )    Nutrition Evaluation:   · Evaluation: Goals set   · Goals: Nutrition Progression     · Monitoring: Nutrition Progression, Skin Integrity, Wound Healing, Diarrhea, I&O, Mental Status/Confusion, Weight, Pertinent Labs, Monitor Bowel Function, Nausea or Vomiting      Electronically signed by Fiorella Figueredo RD, HUMBERTO on 5/17/19 at 10:22 AM    Contact Number: Ext [phone]

## 2019-05-17 NOTE — PLAN OF CARE
Problem: Pain:  Goal: Control of acute pain  Description  Control of acute pain  Outcome: Met This Shift     Problem: Falls - Risk of:  Goal: Will remain free from falls  Description  Will remain free from falls  Outcome: Met This Shift     Problem: Gas Exchange - Impaired:  Goal: Levels of oxygenation will improve  Description  Levels of oxygenation will improve  Outcome: Met This Shift     Problem: Skin Integrity:  Goal: Absence of new skin breakdown  Description  Absence of new skin breakdown  Outcome: Met This Shift

## 2019-05-17 NOTE — PROGRESS NOTES
education  Pt educated on safety during functional mobility, hand placement during bed mobility, sitting balance, sitting posture, LE TE. Pt oriented to date, time, time of day, place, and situation as well as provided with visual and auditory stimuli in order to improve cognition and combat effects of ICU delirium. Pt's  educated on PT role, PT POC, and providing stimuli. Patient response to education:   Pt verbalized understanding Pt demonstrated skill Pt requires further education in this area   Minimally  Minimally  Yes      Comments:  Pt received supine with  present and agreeable to PT evaluation with OT collaboration. Pt cleared for participation by RN prior to session. Vitals monitored during session. Pt minimally verbal and lethargic. Positive for CAM ICU. Assist of trunk and BLE during supine>sit. BLE AROM and MMT at EOB. RLE noted to be weaker than LLE. Pt sat EOB 12-15 min. Pt reports dizziness at EOB. BP assessed 149/106. Vitals stable at EOB. Instructed on AROM and sitting balance improvement while at EOB. Pt with posterior lean in sitting. Verbal cues for eye opening, forward gaze, and sitting posture. Pt fatigued. Returned to supine. Scooted up in bed. Pt placed in the chair position with pillows utilized to facilitate upright posture, joint and skin integrity, and interaction with environment. Pt and  educated on performing ankle pumps and LAQs while in chair position. Pt left with call button in reach, lines attached, and needs met. RN updated. Discussed pt case at interdisciplinary rounds in AM.     Pts/ family goals   1. Improve cognition and mobility     Patient and or family understand(s) diagnosis, prognosis, and plan of care. Pt ? Family yes     PLAN  PT care will be provided in accordance with the objectives noted above. Whenever appropriate, clear delegation orders will be provided for nursing staff.   Exercises and functional mobility practice will be used as well as appropriate assistive devices or modalities to obtain goals. Patient and family education will also be administered as needed. Frequency of treatments: 2-5x/week x 7-10 days.     Time in  0945  Time out  [address], PT, DPT  [id number]

## 2019-05-17 NOTE — PROGRESS NOTES
Subjective: The patient awake  More interactive   working with pt today    at bedside      Objective:    /73   Pulse 100   Temp 99.5 °F (37.5 °C) (Core)   Resp 28   Ht 5' (1.524 m)   Wt 201 lb 8 oz (91.4 kg)   SpO2 96%   BMI 39.35 kg/m²     In: 2471 [I.V.:2291; NG/GT:180]  Out: 900     HEENT:NCAT,  PERRLA, No JVD  Heart:  RRR, no murmurs, gallops, or rubs. Lungs:  CTA bilaterally, no wheeze, rales or rhonchi  Abd: bowel sounds hypoactive, distended but soft abdomen   Extrem:  No clubbing, cyanosis, or edema     Recent Labs     05/15/19  0307  05/16/19  0440 05/16/19  1552 05/17/19  0030 05/17/19  0545   WBC 9.6  --  6.1  --   --  6.5   HGB 9.7*   < > 9.4* 10.3* 9.9* 9.4*   HCT 29.1*   < > 29.2* 31.7* 31.0* 29.4*     --  372  --   --  362    < > = values in this interval not displayed.        Recent Labs     05/16/19  0440 05/16/19  1552 05/17/19  0545   * 147* 143   K 2.6* 3.1* 3.5   * 107 106   CO2 25 30* 27   BUN 14 13 11   CREATININE 0.6 0.7 0.6   CALCIUM 7.5* 8.2* 7.9*       Assessment:    Patient Active Problem List   Diagnosis    Post lumbar laminectomy syndrome    Herniated disc L4-L5    Lumbar sprain     Status post lumbar spinal cord stimulator 2007    Spinal cord stimulator dysfunction/Charging of SCS    Lumbar radiculopathy    Chronic pain syndrome    Septic shock ([address]    Colitis    Seizure-like activity ([address]       Plan:    Admit to [facility]u for eval of sepsis / shock from GI source -   Severe C diff infection   extubated 5/13   Mri / CT head unremarkable for acute event   neurology following - resume Home meds if able to tolerate po intake     abx therapy with po vanc and iv flagyl per ID   Supportive care   supplement lytes for hypokalemia   Renal and id following   Continue  icu care     working with pt today   [id number] Char Yang to transfer out of icu when ok with others     DVT and GERD prophylaxis  All consultants notes reviewed    Jaky Villa MD  10:18

## 2019-05-17 NOTE — PROGRESS NOTES
C/C:  Severe C diff infection  , shock     Pt is more awake and alert  Responds appropriately   Reports SOB   Denies abdomen pain   Afebrile today         Current Facility-Administered Medications   Medication Dose Route Frequency Provider Last Rate Last Dose    levothyroxine (SYNTHROID) injection 25 mcg  25 mcg Intravenous Daily Manan Robins MD   25 mcg at 05/16/19 0925    famotidine (PEPCID) injection 20 mg  20 mg Intravenous BID Mj Barker MD   20 mg at 05/16/19 2015    potassium chloride 20 mEq in dextrose 5 % 1,000 mL infusion   Intravenous Continuous Jf Archer  mL/hr at 05/16/19 2250      aztreonam (AZACTAM) 2 g IVPB mini-bag  2 g Intravenous Q8H Olu Greene  mL/hr at 05/17/19 0522 2 g at 05/17/19 0522    metoprolol (LOPRESSOR) injection 2.5 mg  2.5 mg Intravenous Q6H Mj Barker MD   2.5 mg at 05/17/19 0523    levetiracetam (KEPPRA) 500 mg/100 mL IVPB  500 mg Intravenous BID Misael Salazar MD   500 mg at 05/16/19 2014    pregabalin (LYRICA) capsule 200 mg  200 mg Oral 3 times per day Femi Vasquez MD   200 mg at 05/17/19 0522    vancomycin (VANCOCIN) oral solution 500 mg  500 mg Oral 4 times per day Femi Vasquez MD   500 mg at 05/17/19 0523    furosemide (LASIX) injection 40 mg  40 mg Intravenous Once Sadia Spence MD        lactobacillus (CULTURELLE) capsule 1 capsule  1 capsule Oral BID Olu Greene MD   1 capsule at 05/16/19 2015    morphine (PF) injection 2 mg  2 mg Intravenous Q4H PRN Mj Barker MD   2 mg at 05/13/19 1827    albuterol (ACCUNEB) nebulizer solution 0.63 mg  0.63 mg Nebulization Q6H PRN Danny Almonte MD   0.63 mg at 05/13/19 2134    acetaminophen (TYLENOL) 160 MG/5ML solution 650 mg  650 mg Oral Q6H PRN Hanh Laureano MD   650 mg at 05/13/19 1509    chlorhexidine (PERIDEX) 0.12 % solution 15 mL  15 mL Mouth/Throat BID Kathleen Hutchinson MD   15 mL at 05/16/19 2016    sodium chloride flush 0.9 % injection 10 mL  10 mL Intravenous PRN Sajana Mucosa dry, no oral thrush   Neck:   Supple, no lymphadenopathy   Lungs:     Bilateral coarse rhonchi    Heart:    Regular rate and rhythm   Abdomen:     Soft, bowel sounds present , loose stool     Extremities:   + edema, erythema + ,warm    Pulses:   Dorsalis pedis palpable    Skin:   Erythema       CBC with Differential:      Lab Results   Component Value Date    WBC 6.1 05/16/2019    RBC 2.87 05/16/2019    HGB 9.9 05/17/2019    HCT 31.0 05/17/2019     05/16/2019    .7 05/16/2019    MCH 32.8 05/16/2019    MCHC 32.2 05/16/2019    RDW 16.2 05/16/2019    NRBC 0.9 05/10/2019    LYMPHOPCT 15.3 05/16/2019    PROMYELOPCT 0.9 05/08/2019    MONOPCT 9.4 05/16/2019    BASOPCT 0.3 05/16/2019    MONOSABS 0.58 05/16/2019    LYMPHSABS 0.94 05/16/2019    EOSABS 0.07 05/16/2019    BASOSABS 0.02 05/16/2019       CMP:    Lab Results   Component Value Date     05/16/2019    K 3.1 05/16/2019    K 2.6 05/16/2019     05/16/2019    CO2 30 05/16/2019    BUN 13 05/16/2019    CREATININE 0.7 05/16/2019    GFRAA >60 05/16/2019    LABGLOM >60 05/16/2019    GLUCOSE 139 05/16/2019    PROT 5.1 05/16/2019    LABALBU 2.5 05/16/2019    LABALBU 4.4 08/09/2011    CALCIUM 8.2 05/16/2019    BILITOT 0.8 05/16/2019    ALKPHOS 38 05/16/2019    AST 21 05/16/2019    ALT 50 05/16/2019       Hepatic Function Panel:    Lab Results   Component Value Date    ALKPHOS 38 05/16/2019    ALT 50 05/16/2019    AST 21 05/16/2019    PROT 5.1 05/16/2019    BILITOT 0.8 05/16/2019    BILIDIR 0.4 05/16/2019    IBILI 0.4 05/16/2019    LABALBU 2.5 05/16/2019    LABALBU 4.4 08/09/2011       MICROBIOLOGY:     Blood culture - neg to date  Tip cx - Streptococcus / CONS    Urine cx - Candida         Radiology :     Chest x ray -  Bilateral infiltrates      CT scan of abdomen and pelvis -     Diffuse mucosal thickening rectum to mid colon       IMPRESSION:      1. Septic shock -Improved   2. Sever  C diff infection    3. Pneumonia - aspiration   4. Leukocytosis - improved   5. Respiratory failure  s/p extubation   6. Change in mental status - improving ,CSF - unremarkable          RECOMMENDATIONS:      1. Continue  Flagyl 500 mg IV q 8 hrs / vancomycin 500 mg po q 6 hrs   2. Aztreonam 2 grams IV q 8 hrs    3.  Contact isolation         6:22 AM      5/17/2019

## 2019-05-17 NOTE — PROGRESS NOTES
edema    Vitals:   BP at rest: 125/74 BP at end of session: NT   HR at rest: 109 HR at end of session: NT   Spo2 at rest:97% on 6L via HFNC Spo2 at end of session: NT                             Comments/Treatment Narrative: OK from RN to see patient. Evaluation completed with PT collaboration. Upon arrival patient supine with HOB slightly elevated and  in room. Supine to sit. Max cues for upright sitting balance with minimal follow through. UE ROM/MMT completed at EOB. Pt minimally verbal and required max encouragement to participate. Pt more consistently following simple motor commands with repetition. There ex at EOB with BUE at all levels to increase strength and flexibility and decrease edema. Pt appears fatigued, when asked if she wanted to return to bed, pt states yes. Sit to supine. Patient properly positioned using pillows and bed mechanics to semi chair to improve interaction with environment, overall functioning and decrease/prevent edema and contractures. Pt and family educated on delirium prevention techniques. After session, patient in position as stated above with all devices within reach, all lines and tubes intact, nursing notified. Pt required cues and education as noted above for safe facilitation and completion of tasks. During functional activites and ADLs pt educated on proper hand placement, safety technique, sequencing, and energy conservation techniques. Therapist provided skilled monitoring of HR, O2 saturation, blood pressure and patient's response during treatment session. Prior to and at the end of session, environmental modifications/line management completed for patients safety and efficiency of treatment session.      Eval Complexity:   · Medium Complexity  · History: Expanded review of medical records and additional review of physical, cognitive, or psychosocial history related to current functional performance  · Exam: 3+ performance deficits  · Assistance/Modification:

## 2019-05-18 ENCOUNTER — APPOINTMENT (OUTPATIENT)
Dept: GENERAL RADIOLOGY | Age: 59
DRG: 870 | End: 2019-05-18
Payer: COMMERCIAL

## 2019-05-18 PROBLEM — G93.41 ENCEPHALOPATHY IN SEPSIS: Status: ACTIVE | Noted: 2019-05-18

## 2019-05-18 LAB
AFB SMEAR: NORMAL
ALBUMIN SERPL-MCNC: 2.4 G/DL (ref 3.5–5.2)
ALP BLD-CCNC: 41 U/L (ref 35–104)
ALT SERPL-CCNC: 33 U/L (ref 0–32)
ANION GAP SERPL CALCULATED.3IONS-SCNC: 12 MMOL/L (ref 7–16)
AST SERPL-CCNC: 23 U/L (ref 0–31)
BASOPHILS ABSOLUTE: 0.06 E9/L (ref 0–0.2)
BASOPHILS RELATIVE PERCENT: 0.9 % (ref 0–2)
BILIRUB SERPL-MCNC: 0.6 MG/DL (ref 0–1.2)
BILIRUBIN DIRECT: <0.2 MG/DL (ref 0–0.3)
BILIRUBIN, INDIRECT: ABNORMAL MG/DL (ref 0–1)
BUN BLDV-MCNC: 8 MG/DL (ref 8–23)
CALCIUM SERPL-MCNC: 7.7 MG/DL (ref 8.6–10.2)
CHLORIDE BLD-SCNC: 98 MMOL/L (ref 98–107)
CO2: 27 MMOL/L (ref 22–29)
CREAT SERPL-MCNC: 0.5 MG/DL (ref 0.5–1)
EOSINOPHILS ABSOLUTE: 0.37 E9/L (ref 0.05–0.5)
EOSINOPHILS RELATIVE PERCENT: 5.4 % (ref 0–6)
GFR AFRICAN AMERICAN: >60
GFR NON-AFRICAN AMERICAN: >60 ML/MIN/1.73
GLUCOSE BLD-MCNC: 119 MG/DL (ref 74–99)
HCT VFR BLD CALC: 32.3 % (ref 34–48)
HEMOGLOBIN: 10.3 G/DL (ref 11.5–15.5)
IMMATURE GRANULOCYTES #: 0.06 E9/L
IMMATURE GRANULOCYTES %: 0.9 % (ref 0–5)
INR BLD: 1.3
LYMPHOCYTES ABSOLUTE: 1.29 E9/L (ref 1.5–4)
LYMPHOCYTES RELATIVE PERCENT: 18.9 % (ref 20–42)
MAGNESIUM: 1.5 MG/DL (ref 1.6–2.6)
MCH RBC QN AUTO: 33 PG (ref 26–35)
MCHC RBC AUTO-ENTMCNC: 31.9 % (ref 32–34.5)
MCV RBC AUTO: 103.5 FL (ref 80–99.9)
MONOCYTES ABSOLUTE: 0.54 E9/L (ref 0.1–0.95)
MONOCYTES RELATIVE PERCENT: 7.9 % (ref 2–12)
NEUTROPHILS ABSOLUTE: 4.5 E9/L (ref 1.8–7.3)
NEUTROPHILS RELATIVE PERCENT: 66 % (ref 43–80)
PDW BLD-RTO: 15.7 FL (ref 11.5–15)
PHOSPHORUS: 2.3 MG/DL (ref 2.5–4.5)
PLATELET # BLD: 358 E9/L (ref 130–450)
PMV BLD AUTO: 10.7 FL (ref 7–12)
POTASSIUM REFLEX MAGNESIUM: 3.7 MMOL/L (ref 3.5–5)
PROTHROMBIN TIME: 15 SEC (ref 9.3–12.4)
RBC # BLD: 3.12 E12/L (ref 3.5–5.5)
SODIUM BLD-SCNC: 137 MMOL/L (ref 132–146)
TOTAL PROTEIN: 5.5 G/DL (ref 6.4–8.3)
WBC # BLD: 6.8 E9/L (ref 4.5–11.5)

## 2019-05-18 PROCEDURE — 6370000000 HC RX 637 (ALT 250 FOR IP): Performed by: INTERNAL MEDICINE

## 2019-05-18 PROCEDURE — 80048 BASIC METABOLIC PNL TOTAL CA: CPT

## 2019-05-18 PROCEDURE — 94664 DEMO&/EVAL PT USE INHALER: CPT

## 2019-05-18 PROCEDURE — 84100 ASSAY OF PHOSPHORUS: CPT

## 2019-05-18 PROCEDURE — 2500000003 HC RX 250 WO HCPCS: Performed by: INTERNAL MEDICINE

## 2019-05-18 PROCEDURE — 94640 AIRWAY INHALATION TREATMENT: CPT

## 2019-05-18 PROCEDURE — 2580000003 HC RX 258: Performed by: INTERNAL MEDICINE

## 2019-05-18 PROCEDURE — 94669 MECHANICAL CHEST WALL OSCILL: CPT

## 2019-05-18 PROCEDURE — 6360000002 HC RX W HCPCS: Performed by: NURSE PRACTITIONER

## 2019-05-18 PROCEDURE — 83735 ASSAY OF MAGNESIUM: CPT

## 2019-05-18 PROCEDURE — 85610 PROTHROMBIN TIME: CPT

## 2019-05-18 PROCEDURE — 6360000002 HC RX W HCPCS: Performed by: INTERNAL MEDICINE

## 2019-05-18 PROCEDURE — 85025 COMPLETE CBC W/AUTO DIFF WBC: CPT

## 2019-05-18 PROCEDURE — 99233 SBSQ HOSP IP/OBS HIGH 50: CPT | Performed by: INTERNAL MEDICINE

## 2019-05-18 PROCEDURE — 2700000000 HC OXYGEN THERAPY PER DAY

## 2019-05-18 PROCEDURE — 80076 HEPATIC FUNCTION PANEL: CPT

## 2019-05-18 PROCEDURE — 36415 COLL VENOUS BLD VENIPUNCTURE: CPT

## 2019-05-18 PROCEDURE — 99233 SBSQ HOSP IP/OBS HIGH 50: CPT | Performed by: NURSE PRACTITIONER

## 2019-05-18 PROCEDURE — 2000000000 HC ICU R&B

## 2019-05-18 PROCEDURE — 71045 X-RAY EXAM CHEST 1 VIEW: CPT

## 2019-05-18 RX ORDER — SODIUM CHLORIDE FOR INHALATION 3 %
4 VIAL, NEBULIZER (ML) INHALATION 2 TIMES DAILY
Status: DISCONTINUED | OUTPATIENT
Start: 2019-05-18 | End: 2019-05-24 | Stop reason: HOSPADM

## 2019-05-18 RX ORDER — POTASSIUM CHLORIDE 7.45 MG/ML
10 INJECTION INTRAVENOUS ONCE
Status: COMPLETED | OUTPATIENT
Start: 2019-05-18 | End: 2019-05-18

## 2019-05-18 RX ORDER — MAGNESIUM SULFATE IN WATER 40 MG/ML
2 INJECTION, SOLUTION INTRAVENOUS ONCE
Status: DISCONTINUED | OUTPATIENT
Start: 2019-05-18 | End: 2019-05-20 | Stop reason: SDUPTHER

## 2019-05-18 RX ORDER — MAGNESIUM SULFATE IN WATER 40 MG/ML
2 INJECTION, SOLUTION INTRAVENOUS ONCE
Status: COMPLETED | OUTPATIENT
Start: 2019-05-18 | End: 2019-05-18

## 2019-05-18 RX ORDER — ALBUTEROL SULFATE 0.63 MG/3ML
0.63 SOLUTION RESPIRATORY (INHALATION) 4 TIMES DAILY
Status: DISCONTINUED | OUTPATIENT
Start: 2019-05-18 | End: 2019-05-24 | Stop reason: HOSPADM

## 2019-05-18 RX ORDER — SODIUM CHLORIDE FOR INHALATION 3 %
4 VIAL, NEBULIZER (ML) INHALATION PRN
Status: DISCONTINUED | OUTPATIENT
Start: 2019-05-18 | End: 2019-05-18

## 2019-05-18 RX ADMIN — PETROLATUM: 42 OINTMENT TOPICAL at 13:52

## 2019-05-18 RX ADMIN — PREGABALIN 200 MG: 100 CAPSULE ORAL at 05:46

## 2019-05-18 RX ADMIN — Medication 10 ML: at 20:34

## 2019-05-18 RX ADMIN — LEVOTHYROXINE SODIUM ANHYDROUS 25 MCG: 100 INJECTION, POWDER, LYOPHILIZED, FOR SOLUTION INTRAVENOUS at 11:06

## 2019-05-18 RX ADMIN — METOPROLOL TARTRATE 2.5 MG: 1 INJECTION, SOLUTION INTRAVENOUS at 05:47

## 2019-05-18 RX ADMIN — Medication 1 CAPSULE: at 09:31

## 2019-05-18 RX ADMIN — POTASSIUM CHLORIDE 10 MEQ: 7.46 INJECTION, SOLUTION INTRAVENOUS at 09:27

## 2019-05-18 RX ADMIN — CHLORHEXIDINE GLUCONATE 0.12% ORAL RINSE 15 ML: 1.2 LIQUID ORAL at 20:35

## 2019-05-18 RX ADMIN — AZTREONAM 2 G: 2 INJECTION, POWDER, LYOPHILIZED, FOR SOLUTION INTRAMUSCULAR; INTRAVENOUS at 17:38

## 2019-05-18 RX ADMIN — PETROLATUM: 42 OINTMENT TOPICAL at 09:33

## 2019-05-18 RX ADMIN — ALBUTEROL SULFATE 0.63 MG: 0.63 SOLUTION RESPIRATORY (INHALATION) at 20:24

## 2019-05-18 RX ADMIN — FAMOTIDINE 20 MG: 10 INJECTION INTRAVENOUS at 09:29

## 2019-05-18 RX ADMIN — METOPROLOL TARTRATE 2.5 MG: 1 INJECTION, SOLUTION INTRAVENOUS at 00:30

## 2019-05-18 RX ADMIN — PETROLATUM: 42 OINTMENT TOPICAL at 20:35

## 2019-05-18 RX ADMIN — SODIUM CHLORIDE SOLN NEBU 3% 4 ML: 3 NEBU SOLN at 20:25

## 2019-05-18 RX ADMIN — Medication 500 MG: at 17:41

## 2019-05-18 RX ADMIN — Medication 500 MG: at 05:47

## 2019-05-18 RX ADMIN — PREGABALIN 200 MG: 100 CAPSULE ORAL at 20:34

## 2019-05-18 RX ADMIN — POTASSIUM CHLORIDE: 2 INJECTION, SOLUTION, CONCENTRATE INTRAVENOUS at 17:38

## 2019-05-18 RX ADMIN — POTASSIUM CHLORIDE: 2 INJECTION, SOLUTION, CONCENTRATE INTRAVENOUS at 04:15

## 2019-05-18 RX ADMIN — LEVETIRACETAM 1000 MG: 5 INJECTION INTRAVENOUS at 20:34

## 2019-05-18 RX ADMIN — Medication 500 MG: at 11:03

## 2019-05-18 RX ADMIN — METOPROLOL TARTRATE 2.5 MG: 1 INJECTION, SOLUTION INTRAVENOUS at 17:41

## 2019-05-18 RX ADMIN — Medication 1 CAPSULE: at 20:34

## 2019-05-18 RX ADMIN — LEVETIRACETAM 1000 MG: 5 INJECTION INTRAVENOUS at 09:31

## 2019-05-18 RX ADMIN — Medication 10 ML: at 09:33

## 2019-05-18 RX ADMIN — FAMOTIDINE 20 MG: 10 INJECTION INTRAVENOUS at 20:35

## 2019-05-18 RX ADMIN — SODIUM CHLORIDE SOLN NEBU 3% 4 ML: 3 NEBU SOLN at 13:55

## 2019-05-18 RX ADMIN — PREGABALIN 200 MG: 100 CAPSULE ORAL at 13:51

## 2019-05-18 RX ADMIN — AZTREONAM 2 G: 2 INJECTION, POWDER, LYOPHILIZED, FOR SOLUTION INTRAMUSCULAR; INTRAVENOUS at 11:12

## 2019-05-18 RX ADMIN — ALBUTEROL SULFATE 0.63 MG: 0.63 SOLUTION RESPIRATORY (INHALATION) at 13:55

## 2019-05-18 RX ADMIN — POTASSIUM PHOSPHATE, MONOBASIC AND POTASSIUM PHOSPHATE, DIBASIC 15 MMOL: 224; 236 INJECTION, SOLUTION, CONCENTRATE INTRAVENOUS at 13:51

## 2019-05-18 RX ADMIN — MAGNESIUM SULFATE HEPTAHYDRATE 2 G: 40 INJECTION, SOLUTION INTRAVENOUS at 09:30

## 2019-05-18 RX ADMIN — ALBUTEROL SULFATE 0.63 MG: 0.63 SOLUTION RESPIRATORY (INHALATION) at 17:03

## 2019-05-18 RX ADMIN — METOPROLOL TARTRATE 2.5 MG: 1 INJECTION, SOLUTION INTRAVENOUS at 11:04

## 2019-05-18 ASSESSMENT — PAIN SCALES - GENERAL
PAINLEVEL_OUTOF10: 0

## 2019-05-18 NOTE — PROGRESS NOTES
[address] [facility]   Department of Internal Medicine   Internal Medicine Residency  MICU Progress Note    Patient:  Melissa Lot 59 y.o. female   MRN: [id number]       Date of Service: 5/18/2019    Allergy: Aciphex [rabeprazole sodium]; Aleve [naproxen sodium]; Amitriptyline-perphenazine [perphenazine-amitriptyline]; Amoxil [amoxicillin]; Aspirin; Bentyl [dicyclomine hcl]; Celebrex [celecoxib]; Cephalexin; Codeine; Compazine [prochlorperazine maleate]; Cyclosporine; Demerol; Ditropan [oxybutynin chloride]; Doxycycline; Effexor [venlafaxine hydrochloride]; Elavil [amitriptyline hcl]; Entex la; Fentanyl; Flexeril [cyclobenzaprine hcl]; Keflex [cephalexin]; Ketorolac tromethamine; Lansoprazole; Lipitor [atorvastatin]; Loprox [ciclopirox]; Medrol [methylprednisolone]; Midazolam hcl; Motrin [ibuprofen micronized]; Perphenazine; Phenergan [promethazine hcl]; Prevacid [lansoprazole]; Prochlorperazine edisylate; Protonix [pantoprazole sodium]; Reglan [metoclopramide hcl]; Septra [bactrim]; Talwin nx [pentazocine-naloxone]; Tape [adhesive tape]; Tetracyclines & related; Toradol [ketorolac tromethamine]; Ultracet [tramadol-acetaminophen]; Vancenase [beclomethasone dipropionate]; Versed [midazolam]; Vicodin [hydrocodone-acetaminophen]; Vioxx; Amoxil [amoxicillin]; and Baclofen    Subjective   The patient was seen and examined at bedside this morning.  24h change: alert and oriented    No complaints    Improving steadily    Mild hypomagnesemia   ROS: Denies Fever/chills/CP/SOB/N/V/D/C/Dysuria/Blood in stool or urine      Objective     Vitals:    05/18/19 0400 05/18/19 0500 05/18/19 0600 05/18/19 0700   BP: 126/74 (!) 144/80 126/82 133/85   Pulse: 92 95 116 93   Resp: 27 24 18 22   Temp: 99.1 °F (37.3 °C)      TempSrc:       SpO2: 93% 95% 95% 95%   Weight:   201 lb 14.4 oz (91.6 kg)    Height:           Physical Exam:  · I & O - 24hr:No intake/output data recorded.      GENERAL: Alert, cooperative, no acute distress. HEENT: Normocephalic, atraumatic. PERRLA, conjunctiva/corneas clear, EOM's intact, no pallor or icterus. NECK: Supple, symmetrical, trachea midline, no cervical LAD. No carotid bruit or JVD  CHEST: No tenderness or deformity, full & symmetric excursion  LUNG: transmitted sounds,  respirations unlabored. No rales/wheezing/rubs  HEART: RRR, S1 and S2 normal, no murmur, rub or gallop. DP pulses 2/4  ABDOMEN: SNTND, no masses, no organomegaly, no guarding, rebound or rigidity. GENITOURINARY: Urinary cath present   EXTREMITIES:  Extremities normal, atraumatic, no cyanosis or edema. Distal pulses equal bilaterally  SKIN: Skin color, texture, turgor normal, no rashes or lesions  MUSCULOSKELETAL: No joint swelling, no muscle tenderness. Normal ROM in extremities. LYMPH NODES: no lymph node enlargement appreciated  NEUROLOGIC: Alert & Oriented; 2/5 in LUE, and 2.5/5 in RUE, 4/5 symmetric strength in LEs;  Sensation intact          Medications     Continuous Infusions:   IV infusion builder 100 mL/hr at 05/18/19 0415     Scheduled Meds:   magnesium sulfate  2 g Intravenous Once    potassium chloride  10 mEq Intravenous Once    levetiracetam  1,000 mg Intravenous BID    levothyroxine  25 mcg Intravenous Daily    famotidine (PEPCID) injection  20 mg Intravenous BID    aztreonam  2 g Intravenous Q8H    metoprolol  2.5 mg Intravenous Q6H    pregabalin  200 mg Oral 3 times per day    vancomycin  500 mg Oral 4 times per day    lactobacillus  1 capsule Oral BID    chlorhexidine  15 mL Mouth/Throat BID    heparin flush  3 mL Intravenous 2 times per day    [Held by provider] heparin (porcine)  5,000 Units Subcutaneous TID    vitamin D  50,000 Units Oral Weekly    sodium chloride flush  10 mL Intravenous 2 times per day    mineral oil-hydrophilic petrolatum   Topical TID     PRN Meds: morphine, albuterol, acetaminophen, sodium chloride flush, heparin flush, sodium chloride flush, magnesium hydroxide, Notable Cultures:      Blood cultures   Blood Culture, Routine   Date Value Ref Range Status   05/12/2019 5 Days- no growth  Final     Respiratory cultures No results found for: RESPCULTURE   Gram Stain Result   Date Value Ref Range Status   05/16/2019   Final    Gram stain performed from cytospun specimen  Polymorphonuclear leukocytes not seen  Epithelial cells not seen  Few Mononuclear leukocytes  No organisms seen. Urine   Urine Culture, Routine   Date Value Ref Range Status   05/12/2019 (A)  Final    THIS IS A CORRECTED REPORT  PLEASE DISREGARD PREVIOUS REPORT OF \"COAGULASE NEGATIVE STAPH\"     05/12/2019 >100,000 CFU/ml  Final     Legionella No results found for: LABLEGI  C Diff PCR No results found for: CDIFPCR  Wound culture/abscess: No results for input(s): WNDABS in the last 72 hours. Tip culture:No results for input(s): CXCATHTIP in the last 72 hours. Imaging Studies:  XR CHEST PORTABLE   Final Result   Cardiomegaly   Tortuous aorta    There are patchy infiltrates seen throughout both the lung fields. Pneumonia could give this appearance. Underlying edema could also have   this appearance   The chest appears improved in the interval                  US DUP LOWER EXTREMITIES BILATERAL VENOUS   Final Result   No evidence for deep venous thrombosis               XR CHEST PORTABLE   Final Result   ALERT:  THIS IS AN ABNORMAL REPORT   1. Abnormal airspace disease in the bilateral upper lungs and   bilateral lung bases. Findings are nonspecific and could reflect an   infiltrate/pneumonia. An infiltrative mass or malignancy is not   excluded particularly in the right and the left upper lung/suprahilar   regions, further evaluation with CT scan the chest is recommended to   exclude any potential underlying malignancy. 2. Stable, enlarged cardiac mediastinal silhouette with thoracic   aortic vascular calcifications.       FL LUMBAR PUNCTURE DIAG   Final Result   Successful Uncomplicated Fluoroscopic-Guided Lumbar Puncture. This procedure was performed by Law Mcknight PA-C under the indirect   supervision of Nisa Greene MD   who was not present for the   procedure. .      The exam has been dictated and signed by Law Mcknight PA-C. Mireya Jensen MD , Radiologist, have reviewed the images and   report and concur with these findings. FL GUIDED FOR SPINE INJECT   Final Result   Successful Uncomplicated Fluoroscopic-Guided Lumbar Puncture. This procedure was performed by Law Mcknight PA-C under the indirect   supervision of Nisa Greene MD   who was not present for the   procedure. .      The exam has been dictated and signed by Law Mcknight PA-C. Mireya Jensen MD , Radiologist, have reviewed the images and   report and concur with these findings. XR CHEST PORTABLE   Final Result   1. Multifocal airspace disease most likely suspicious for a multifocal   infiltrate/pneumonia with underlying pulmonary edema and a right   pleural effusion. Interval placement NG tube without identification of   the distal tibia. MRI BRAIN WO CONTRAST   Final Result   Diffuse atrophy likely age related   Findings compatible with small vessel ischemic changes. XR Chest Abdomen Ng Placement   Final Result   NG tube tip in the stomach. Additional observations as outlined above. XR CHEST PORTABLE   Final Result   1. Bibasilar airspace disease, right greater than left, nonspecific   finding, findings can be seen infiltrate/pneumonia/atelectasis, with   right pleural effusion. 2. Vascular calcifications thoracic aorta. 3. Suspected underlying pulmonary edema. 4. Interval placement left-sided PICC line. IR INJ VENOGRAM EXTREMITY   Final Result      1. Ultrasound-guided peripheral venous access in the right upper   extremity. 2. Right upper extremity venogram demonstrating patency of the right   central veins.       3. Uncomplicated placement of a dual-lumen peripherally inserted   central catheter (PICC) via the left brachial vein with trim length of   42 cm. IR VENOGRAM UPPER EXTREMITY RIGHT   Final Result      1. Ultrasound-guided peripheral venous access in the right upper   extremity. 2. Right upper extremity venogram demonstrating patency of the right   central veins. 3. Uncomplicated placement of a dual-lumen peripherally inserted   central catheter (PICC) via the left brachial vein with trim length of   42 cm. IR PICC WO SQ PORT/PUMP > 5 YEARS   Final Result      1. Ultrasound-guided peripheral venous access in the right upper   extremity. 2. Right upper extremity venogram demonstrating patency of the right   central veins. 3. Uncomplicated placement of a dual-lumen peripherally inserted   central catheter (PICC) via the left brachial vein with trim length of   42 cm. IR ULTRASOUND GUIDANCE VASCULAR ACCESS   Final Result      1. Ultrasound-guided peripheral venous access in the right upper   extremity. 2. Right upper extremity venogram demonstrating patency of the right   central veins. 3. Uncomplicated placement of a dual-lumen peripherally inserted   central catheter (PICC) via the left brachial vein with trim length of   42 cm. IR ULTRASOUND GUIDANCE VASCULAR ACCESS   Final Result      1. Ultrasound-guided peripheral venous access in the right upper   extremity. 2. Right upper extremity venogram demonstrating patency of the right   central veins. 3. Uncomplicated placement of a dual-lumen peripherally inserted   central catheter (PICC) via the left brachial vein with trim length of   42 cm. CT Head WO Contrast   Final Result      No acute intracranial hemorrhage or mass effect. CT is insensitive for   acute infarct.  If there is concern for acute infarct, MRI with   diffusion-weighted imaging is more sensitive and would be the recommended study. Right ethmoid sinus mucosal thickening, new since prior study. XR CHEST PORTABLE   Final Result      XR CHEST PORTABLE   Final Result   1. Stable, enlarged cardiomediastinal silhouette with underlying   pulmonary edema. 2. Multifocal airspace disease likely suggestive follow-up multifocal   infiltrate/pneumonia and/or atelectasis, please see CT chest report   5/12/2019. CT Chest WO Contrast   Final Result      1. Bilateral alveolar opacities in upper lobe in perihilar   distribution suggestive of interstitial pulmonary edema or pneumonia. 2. More confluent opacities are seen at right lung base related to   pneumonia and atelectasis. 3. Moderate-sized bilateral pleural effusions. 4. Prominent and mildly enlarged mediastinal lymph nodes. 5. Endotracheal tube is 1 cm above jono. XR CHEST PORTABLE   Final Result      1. Limited visualization of nasogastric tube. 2. Redemonstration of interstitial and hazy opacities throughout both   lungs suggestive of interstitial pulmonary edema or pneumonia. Short-term follow-up could be helpful for further evaluation. XR CHEST PORTABLE   Final Result      Interstitial and hazy opacities throughout both lungs which appear to   have increased throughout left lung since yesterday's exam. Opacities   appear similar on the right. Continued follow-up could be helpful for   further evaluation. XR CHEST PORTABLE   Final Result   CHF with marginal improvement and decreasing edema. XR CHEST PORTABLE   Final Result   Significant worsening of right-sided infiltrate and atelectasis and   small right effusion               XR ABDOMEN (KUB) (SINGLE AP VIEW)   Final Result   No acute process               XR CHEST PORTABLE   Final Result      1. Satisfactory placement of nasogastric tube. 2. Persistent interstitial pulmonary edema, pneumonia, or atelectasis   bilaterally.  Continued follow-up could be helpful for further   evaluation. XR Chest Abdomen Ng Placement   Final Result      Nasogastric tubing appears to be appropriately configured. Right common femoral vein catheter appears to be peripherally   configured. XR CHEST PORTABLE   Final Result      1. Abnormal location of nasogastric tube which appears to be coiled   within the mid esophagus. 2. Increased interstitial and hazy opacities at left hilar location   and left lung base which could suggest increasing pneumonia or   atelectasis. Short-term follow-up could be helpful for further   evaluation. ALERT:  THIS IS AN ABNORMAL REPORT. CT Head WO Contrast   Final Result   1. No acute intracranial hemorrhage identified. 2. Additional nonacute/incidental findings as described above. This report has been electronically signed by Lizette Webb MD.      [facility] Additional Contrast? None   Final Result   1. Moderate diffuse mucosal thickening from the rectum to the mid sigmoid colon    with mild adjacent stranding, mild mucosal thickening throughout the descending    colon with adjacent stranding, and suggestion of mucosal thickening throughout    the distal transverse colon as well as from the proximal most transverse colon    to the cecum concerning for infectious/inflammatory proctocolitis. 2. Moderate hiatal hernia. 3. Moderate bilateral dependent atelectases versus consolidations with air    bronchograms. 4. No nephrolithiasis or obstructive uropathy. No perinephric stranding. 5. Normal appendix. 6. Additional nonacute/incidental findings as described above. This report has been electronically signed by Lizette Webb MD.      XR CHEST PORTABLE   Final Result      Interstitial opacities in perihilar and infrahilar locations could   suggest peribronchial thickening/inflammation. Continued follow-up   could be helpful for further evaluation.       XR CHEST PORTABLE    (Results Pending)            Resident's Assessment and PLan     Active Problems:    Septic shock ([address]    Colitis    Seizure-like activity (HCC)    Gastrointestinal hemorrhage associated with gastritis  Resolved Problems:    * No resolved hospital problems. *        Neurology   Acute encephalopathy, improving  2/2 acute hypoxic respiratory failure vs sepsis   -Currently extubated  -hx of chronic opioids for pain control   -Ct head: no acute hemorrhage and mass effect  -Monitor mental status, was worsening yesterday   -extubated to NC, will monitor respiratory status  - underwent LP which was WNL (initial values) will monitor results of LP        Seizures  EEG: highly epileptogenic focus in the left hemisphere   Repeated EEG also shows L sided PLEDS   Neuro following   On keppra increased to 1000 mg BID     Cardiovascular  Septic shock , improved  2/2 possible C diff colitis   -Off pressors, levophed,  -Pan culture sent: no growth, C. Diff EIA : C. Diff toxin A and B detected  -repeat pan culture , blood culture : no growth, urine CONS  -continie IV flagyl Q8h and PO vancomycin 500 mg 4 times daily   -ID consult : input appreciated, added aztreonam      Sinus tachycardia  Likely 2/2 febrile episode, fluid loss vs hypoxia  -will give fluid bolus 250 ml  -check ABG     Pulmonary   Acute hypoxic respiratory failure   Acute Pulmonary edema   Possible left sided pleural effusion   2/2 sepsis (d/t C.  Diff colitis) vs pulmonary edema 2/2 fluid overload  -S/p extubation on NC  -Initial ABG 9.899/26/21.2/45.4 c/w metabolic alkalosis  -initial Pro calcitonin :99.25>0.93  -Daily ABG and CXR  -CXR today:Multifocal airspace disease most likely suspicious for a multifocal infiltrate/pneumonia with underlying pulmonary edema and a right pleural effusion  - monitor BMP     GI  C diff colitis   -Hx of 2-3 weeks antibiotics use and diarrhea after   -Leukocytosis improving WBC 18.9>9.2>13  -Ct abdomen showed  infectious/inflammatory proctocolitis  -C diff EIA: c. Diff toxin A and B  -continue IV flagyl Q8h and PO vancomycin 500 mg 4 times daily   -continue contact isolation  -GS consult : no active intervention and okay to resume fedding  - continue oral feeding     Shock liver   Transaminitis, improving  -Ct abdomen showed hepatomegaly with hepatic steatosis  -INR 8.4, monitor INR 1.9 today  -serum drug screen: positive for opiates  -Monitor LFT      UGIB  Surgery following   Trending H/H   On IV famotidine   PPIs contraindicated because of C. Diff   Transfuse if hemoglobin <7      Renal   STEFANIA, prerenal, resolved   Likely 2/2 dehydration from septic shock and diarrhea   -Baseline Cr. 0.9, Cr. 4.2>>2.6>0.6  -IVF:d/eliseo  -bumex drip stop in setting of contraction alkalosis   -Nephrology consult   -Monitor renal function, urine output and BMP         Electrolyte imbalance, improving   Hypokalemia, resolved  - 2/2 GI loss and diuretic use  - replace k  Hypernatremia, resolved  2/2 diuresis, stop fluids   Hypocalcemia, resolved  Corrected calcium 8.9   Multifactorial, continue vit D  Hypomagnesemia   Multifactorial, replace mag         Anemia ,Macrocytic vs blood loss  Hb: 10 today, likely dilutional vs acute blood loss  -trend h and h q 12 hr  -Folate and vitamin B 12: wnl  -monitor CBC  -target Hb >7  GS following      Thrombocytopenia, resolved  -Plt: 73 today  -likely 2/2 infection   -HIT score 3, low probability  -oncology consulted: recommend transfuse if Plat<50   -will monitor CBC       Dispo: will monitor, likely transfer tomorrow Marlys Tracey MD      Resident -[id number]     Attending Physician: Dr. Ashkan Castelan         Addendum ICU Attending Nuria Belle was seen, examined and discussed with the multi-disciplinary ICU team during rounds. A addendum note may be separate to the residents note.  If not then, I have personally seen and examined the patient and the key elements of the encounter were performed by me (> 85 %

## 2019-05-18 NOTE — PROGRESS NOTES
P Quality Flow/Interdisciplinary Rounds Progress Note        Quality Flow Rounds held on May 18, 2019    Disciplines Attending:  Bedside Nurse and ICU team    Kamille Calderon was admitted on 5/7/2019 12:26 AM    Anticipated Discharge Date:  Expected Discharge Date: 05/21/19    Disposition:    Lorenzo Score:  Lorenzo Scale Score: 12    Readmission Risk              Risk of Unplanned Readmission:        19           Discussed patient goal for the day, patient clinical progression, and barriers to discharge. The following Goal(s) of the Day/Commitment(s) have been identified:  Continue current care. Add PEP/flutter, 3% nebulization prn.      Dorothy Cortez  May 18, 2019

## 2019-05-18 NOTE — PLAN OF CARE
Problem: Pain:  Goal: Control of acute pain  Description  Control of acute pain  5/18/2019 1545 by Sylvain Nickerson RN  Outcome: Met This Shift     Problem: Falls - Risk of:  Goal: Will remain free from falls  Description  Will remain free from falls  5/18/2019 1545 by Sylvain Nickerson RN  Outcome: Met This Shift     Problem: Falls - Risk of:  Goal: Absence of physical injury  Description  Absence of physical injury  Outcome: Met This Shift     Problem: Skin Integrity:  Goal: Absence of new skin breakdown  Description  Absence of new skin breakdown  5/18/2019 1545 by Sylvain Nickerson RN  Outcome: Met This Shift     Problem: Gas Exchange - Impaired:  Goal: Levels of oxygenation will improve  Description  Levels of oxygenation will improve  5/18/2019 1545 by Sylvain Nickerson RN  Outcome: Ongoing

## 2019-05-18 NOTE — PROGRESS NOTES
Subjective:    Awake and confused  No meaningful history     Objective:    /74   Pulse 93   Temp 99.5 °F (37.5 °C) (Core)   Resp 21   Ht 5' (1.524 m)   Wt 201 lb 14.4 oz (91.6 kg)   SpO2 94%   BMI 39.43 kg/m²     In: 1813 [I.V.:1396; NG/GT:417]  Out: 1340     HEENT:NCAT,  PERRLA, No JVD  Heart:  RRR, no murmurs, gallops, or rubs. Lungs:  CTA bilaterally, no wheeze, rales or rhonchi  Abd: bowel sounds hypoactive, distended but soft abdomen   Extrem:  No clubbing, cyanosis, or edema     Recent Labs     05/16/19  0440  05/17/19  0545 05/17/19  1312 05/17/19  2030 05/18/19  0430   WBC 6.1  --  6.5  --   --  6.8   HGB 9.4*   < > 9.4* 10.0* 9.7* 10.3*   HCT 29.2*   < > 29.4* 31.3* 30.6* 32.3*     --  362  --   --  358    < > = values in this interval not displayed.        Recent Labs     05/17/19  1551 05/17/19  1714 05/18/19  0430   * 139 137   K 5.3* 3.7 3.7   CL 95* 101 98   CO2 26 32* 27   BUN 9 9 8   CREATININE 0.5 0.6 0.5   CALCIUM 7.0* 7.7* 7.7*       Assessment:    Patient Active Problem List   Diagnosis    Post lumbar laminectomy syndrome    Herniated disc L4-L5    Lumbar sprain     Status post lumbar spinal cord stimulator 2007    Spinal cord stimulator dysfunction/Charging of SCS    Lumbar radiculopathy    Chronic pain syndrome    Septic [facility] ([facility] [address]    Colitis    Seizure-like activity (HCC)    Gastrointestinal hemorrhage associated with gastritis   UGIB  seizures    Plan:    Admit to micu for eval of sepsis / shock from GI source -   Severe C diff infection   extubated 5/13   Mri / CT head unremarkable for acute event   neurology following - resume Home meds if able to tolerate po intake     abx therapy with po vanc and iv flagyl per ID   Supportive care   supplement lytes for hypokalemia   Renal and id following   Continue  icu care     Neurology input noted  EEG findings noted  keppra was increased  Supportive care      Deepa Ibrahim MD  10:39 AM  5/18/2019

## 2019-05-18 NOTE — PROGRESS NOTES
C/C:  Severe C diff infection  , shock     Pt is awake and alert  Denies fever and chills  Report dry mouth   Reports cough   Afebrile         Current Facility-Administered Medications   Medication Dose Route Frequency Provider Last Rate Last Dose    sodium chloride (Inhalant) 3 % nebulizer solution 4 mL  4 mL Nebulization PRN Cuco Fonseca MD        levetiracetam (KEPPRA) 500 mg/100 mL IVPB  1,000 mg Intravenous BID Applejoceline Barnes APRN - CNP   1,000 mg at 05/18/19 0931    levothyroxine (SYNTHROID) injection 25 mcg  25 mcg Intravenous Daily Elizabeth Iverson MD   25 mcg at 05/18/19 1106    famotidine (PEPCID) injection 20 mg  20 mg Intravenous BID Harriet Fraser MD   20 mg at 05/18/19 0929    potassium chloride 20 mEq in dextrose 5 % 1,000 mL infusion   Intravenous Continuous Jf Archer  mL/hr at 05/18/19 0415      aztreonam (AZACTAM) 2 g IVPB mini-bag  2 g Intravenous Q8H Olu Greene MD   Stopped at 05/18/19 1305    metoprolol (LOPRESSOR) injection 2.5 mg  2.5 mg Intravenous Q6H Mj Barker MD   2.5 mg at 05/18/19 1104    pregabalin (LYRICA) capsule 200 mg  200 mg Oral 3 times per day Harriet Fraser MD   200 mg at 05/18/19 0546    vancomycin (VANCOCIN) oral solution 500 mg  500 mg Oral 4 times per day Harriet Fraser MD   500 mg at 05/18/19 1103    lactobacillus (CULTURELLE) capsule 1 capsule  1 capsule Oral BID Olu Greene MD   1 capsule at 05/18/19 0931    morphine (PF) injection 2 mg  2 mg Intravenous Q4H PRN Mj Barker MD   2 mg at 05/13/19 1827    albuterol (ACCUNEB) nebulizer solution 0.63 mg  0.63 mg Nebulization Q6H PRN Antonio Ragland MD   0.63 mg at 05/13/19 2134    acetaminophen (TYLENOL) 160 MG/5ML solution 650 mg  650 mg Oral Q6H PRN Cuco Fonseca MD   650 mg at 05/13/19 1509    chlorhexidine (PERIDEX) 0.12 % solution 15 mL  15 mL Mouth/Throat BID Kathleen Hutchinson MD   15 mL at 05/17/19 1957    sodium chloride flush 0.9 % injection 10 mL  10 mL Intravenous PRN Giorgio Martinez MD   10 mL at 05/11/19 0504    heparin flush 100 UNIT/ML injection 300 Units  3 mL Intravenous 2 times per day Giorgio Martinez MD   300 Units at 05/17/19 0855    heparin flush 100 UNIT/ML injection 300 Units  3 mL Intracatheter PRN MD Jessi Evangelistaart Marc [Held by provider] heparin (porcine) injection 5,000 Units  5,000 Units Subcutaneous TID Dequan Camacho MD   5,000 Units at 05/15/19 1946    vitamin D (ERGOCALCIFEROL) capsule 50,000 Units  50,000 Units Oral Weekly Aye Alvarez MD   Stopped at 05/08/19 2015    sodium chloride flush 0.9 % injection 10 mL  10 mL Intravenous 2 times per day Jere Henson MD   10 mL at 05/18/19 0933    sodium chloride flush 0.9 % injection 10 mL  10 mL Intravenous PRN Jere Henson MD   10 mL at 05/15/19 0304    magnesium hydroxide (MILK OF MAGNESIA) 400 MG/5ML suspension 30 mL  30 mL Oral Daily PRN Jere Henson MD        ondansetron [facility]) injection 4 mg  4 mg Intravenous Q6H PRN Jere Henson MD   4 mg at 05/15/19 2008    mineral oil-hydrophilic petrolatum (HYDROPHOR) ointment   Topical TID Jailyn Lennon MD        And    mineral oil-hydrophilic petrolatum (HYDROPHOR) ointment   Topical TID PRN Jailyn Lennon MD               REVIEW OF SYSTEMS:       CONSTITUTIONAL: Denies fever    RESPIRATORY : Reports cough   GASTROINTESTINAL:  Diarrhea , denies abdomen pain   GENITOURINARY:  No dysuria    INTEGUMENT:no rash   MUSCULOSKELETAL:  Weakness   NEUROLOGICAL:  more awake and alert  Objective:    /77   Pulse 102   Temp 99.1 °F (37.3 °C) (Core)   Resp 25   Ht 5' (1.524 m)   Wt 201 lb 14.4 oz (91.6 kg)   SpO2 92%   BMI 39.43 kg/m²       General Appearance:    More awake and alert    Head:    Normocephalic, atraumatic   Eyes:    No pallor, no icterus,   Ears:    No obvious deformity or drainage.    Nose:   No nasal drainage   Throat:   Mucosa dry, no oral thrush   Neck:   Supple, no lymphadenopathy   Lungs:     Bilateral coarse rhonchi    Heart:    Regular rate and rhythm   Abdomen:     Soft, bowel sounds present , loose stool     Extremities:   + edema, erythema + ,warm    Pulses:   Dorsalis pedis palpable    Skin:   Erythema - improving       CBC with Differential:      Lab Results   Component Value Date    WBC 6.8 05/18/2019    RBC 3.12 05/18/2019    HGB 10.3 05/18/2019    HCT 32.3 05/18/2019     05/18/2019    .5 05/18/2019    MCH 33.0 05/18/2019    MCHC 31.9 05/18/2019    RDW 15.7 05/18/2019    NRBC 0.9 05/10/2019    LYMPHOPCT 18.9 05/18/2019    PROMYELOPCT 0.9 05/08/2019    MONOPCT 7.9 05/18/2019    BASOPCT 0.9 05/18/2019    MONOSABS 0.54 05/18/2019    LYMPHSABS 1.29 05/18/2019    EOSABS 0.37 05/18/2019    BASOSABS 0.06 05/18/2019       CMP:    Lab Results   Component Value Date     05/18/2019    K 3.7 05/18/2019    CL 98 05/18/2019    CO2 27 05/18/2019    BUN 8 05/18/2019    CREATININE 0.5 05/18/2019    GFRAA >60 05/18/2019    LABGLOM >60 05/18/2019    GLUCOSE 119 05/18/2019    PROT 5.5 05/18/2019    LABALBU 2.4 05/18/2019    LABALBU 4.4 08/09/2011    CALCIUM 7.7 05/18/2019    BILITOT 0.6 05/18/2019    ALKPHOS 41 05/18/2019    AST 23 05/18/2019    ALT 33 05/18/2019       Hepatic Function Panel:    Lab Results   Component Value Date    ALKPHOS 41 05/18/2019    ALT 33 05/18/2019    AST 23 05/18/2019    PROT 5.5 05/18/2019    BILITOT 0.6 05/18/2019    BILIDIR <0.2 05/18/2019    IBILI see below 05/18/2019    LABALBU 2.4 05/18/2019    LABALBU 4.4 08/09/2011       MICROBIOLOGY:     Blood culture - neg to date  Tip cx - VRE , CONS    Urine cx - Candida         Radiology :     Chest x ray -  Bilateral infiltrates      IMPRESSION:      1. Septic shock -Improved   2. Sever  C diff infection    3. Pneumonia - aspiration   4. Leukocytosis - improved   5. Respiratory failure  s/p extubation   6. Change in mental status - improving ,CSF - unremarkable          RECOMMENDATIONS:      1. Vancomycin 500 mg po q 6 hrs   2. Aztreonam 2 grams IV q 8 hrs    3. Contact isolation         1:11 PM      5/18/2019

## 2019-05-18 NOTE — PROGRESS NOTES
Department of Internal Medicine  Nephrology Resident  Progress Note    Events reviewed. SUBJECTIVE: We are following Mrs. Haji for acute kidney injury. Awake but nonverbal.    PHYSICAL EXAM:      VITALS:  /77   Pulse 102   Temp 99.1 °F (37.3 °C) (Core)   Resp 25   Ht 5' (1.524 m)   Wt 201 lb 14.4 oz (91.6 kg)   SpO2 92%   BMI 39.43 kg/m²    Intake and Output last 24hrs: In: 1935 [I.V.:1396;  NG/GT:417]  Out: 9851 [Urine:1590]      Constitutional:  Awake non verbal in no distress  HEENT:  MESSI   Respiratory:  Coarse breath sounds bilaterally, frothy secretions from ET  Cardiovascular/Edema:  Regular rate and rhythm, no murmurs appreciated  Gastrointestinal:  Soft, non-tender, bowel sounds present  Neurologic:  Awake, non-focal but nonverbal  Skin:  warm, + edema  Other:  Erythematous lesion on lower extrtemities    DATA:    CBC with Differential:    Lab Results   Component Value Date    WBC 6.8 05/18/2019    RBC 3.12 05/18/2019    HGB 10.3 05/18/2019    HCT 32.3 05/18/2019     05/18/2019    .5 05/18/2019    MCH 33.0 05/18/2019    MCHC 31.9 05/18/2019    RDW 15.7 05/18/2019    NRBC 0.9 05/10/2019    LYMPHOPCT 18.9 05/18/2019    PROMYELOPCT 0.9 05/08/2019    MONOPCT 7.9 05/18/2019    BASOPCT 0.9 05/18/2019    MONOSABS 0.54 05/18/2019    LYMPHSABS 1.29 05/18/2019    EOSABS 0.37 05/18/2019    BASOSABS 0.06 05/18/2019     CMP:    Lab Results   Component Value Date     05/18/2019    K 3.7 05/18/2019    CL 98 05/18/2019    CO2 27 05/18/2019    BUN 8 05/18/2019    CREATININE 0.5 05/18/2019    GFRAA >60 05/18/2019    LABGLOM >60 05/18/2019    GLUCOSE 119 05/18/2019    PROT 5.5 05/18/2019    LABALBU 2.4 05/18/2019    LABALBU 4.4 08/09/2011    CALCIUM 7.7 05/18/2019    BILITOT 0.6 05/18/2019    ALKPHOS 41 05/18/2019    AST 23 05/18/2019    ALT 33 05/18/2019     BMP:    Lab Results   Component Value Date     05/18/2019    K 3.7 05/18/2019    CL 98 05/18/2019    CO2 27 05/18/2019 BUN 8 05/18/2019    LABALBU 2.4 05/18/2019    LABALBU 4.4 08/09/2011    CREATININE 0.5 05/18/2019    CALCIUM 7.7 05/18/2019    GFRAA >60 05/18/2019    LABGLOM >60 05/18/2019    GLUCOSE 119 05/18/2019     Calcium:    Lab Results   Component Value Date    CALCIUM 7.7 05/18/2019     Ionized Calcium:  No results found for: IONCA  Magnesium:    Lab Results   Component Value Date    MG 1.5 05/18/2019     Phosphorus:    Lab Results   Component Value Date    PHOS 2.3 05/18/2019         Urine studies:  Urine sodium: 22  Urine chloride: <20  Urine creatinine: 100  Urine urea: 251  Urine potassium: 48.2    Fraction excretion of sodium: 0.5%  Fraction excretion of urea: 12.6%    Vitamin D 25 level: 13 (low)      Radiology Review:       Chest Xray May 16, 2019   1. Multifocal airspace disease most likely suspicious for a multifocal   infiltrate/pneumonia with underlying pulmonary edema and a right   pleural effusion. Interval placement NG tube without identification of   the distal tibia.      Scheduled Meds:   levetiracetam  1,000 mg Intravenous BID    levothyroxine  25 mcg Intravenous Daily    famotidine (PEPCID) injection  20 mg Intravenous BID    aztreonam  2 g Intravenous Q8H    metoprolol  2.5 mg Intravenous Q6H    pregabalin  200 mg Oral 3 times per day    vancomycin  500 mg Oral 4 times per day    lactobacillus  1 capsule Oral BID    chlorhexidine  15 mL Mouth/Throat BID    heparin flush  3 mL Intravenous 2 times per day    [Held by provider] heparin (porcine)  5,000 Units Subcutaneous TID    vitamin D  50,000 Units Oral Weekly    sodium chloride flush  10 mL Intravenous 2 times per day    mineral oil-hydrophilic petrolatum   Topical TID     Continuous Infusions:   IV infusion builder 100 mL/hr at 05/18/19 0415     PRN Meds:.sodium chloride (Inhalant), morphine, albuterol, acetaminophen, sodium chloride flush, heparin flush, sodium chloride flush, magnesium hydroxide, ondansetron, mineral oil-hydrophilic petrolatum **AND** mineral oil-hydrophilic petrolatum      BRIEF SUMMARY OF INITIAL CONSULT:  Briefly, Mrs. Lizette Dias is a 57-year-old female with h/o of hypothyroidism, lumbar radiculopathy s/p laminectomy, HLD, asthma, was brought to the ED for worsening lethargy. She was recently treated for lower extremity cellulitis with clindamycin and then levofloxacin around 2-3 weeks ago. She developed profuse diarrhea shortly thereafter. Family reported some vomiting initially and poor oral intake. Over the past 3 days, she became severely weak and dehydrated. At the ED, she was in shock requiring vasopressor despite aggressive fluid resuscitation and was intubated for airway protection. Labs were notable for creatinine of 4.2 mg/dL, BUN of 73 mg/dL, lactic acid of 3.4, sodium of 129 mmol/L, liver enzymes were also markedly elevated, reasons for this consult. Of note, she has no known history of kidney disease. She was on furosemide at home, but has not been taken since the onset of diarrhea. Problems resolved:    · Hyponatremia, multifactorial, due to hypovolemia and decreased GFR, resolved  · Hypoglycemia secondary to #6, resolved  · Coagulopathy, high PT/INR, resolved  · S/p Hemodynamic shock, hypovolemic and septic shock, melena likely improving with decreasing dose of pressors  · STEFANIA, stage 3, volume responsive pre-renal STEFANIA  (diarrhea, poor oral intake, shock), FeNa 0.5% , FeUrea 12%. Resolved. · Hypomagnesemia  · S/p Respiratory failure, status post extubation; chest x-ray worsening pleural effusion  · s/pShock liver   · Hypernatremia, likely 2/2 diuresis. · Hyperchloremia, probably multifactorial, 2/2 diuresis and diarrhea. IMPRESSION/RECOMMENDATIONS:      1. Pulmonary edema with high proBNP, with echo with preserved EF 60%, HFpEF in the setting of large volume resuscitation. Lasix 40mg QD given yesterday. Edema stable on CXR.        2. Hypocalcemia, probably multifactorial, including vitamin D deficiency and hypomagnesemia. Continue ergocalciferol. 3. Hypokalemia, multifactorial, diuretics, diarrhea. Improving. 4. Hypophosphatemia, secondary to vitamin D deficiency and poor oral intake. Improving     5. Hypomagnesemia, probably multifactorial, 2/2 poor oral intake, diuretics. Stable.   6. C. difficile infection, on vancomycin and metronidazole and aztreonam.  7. Severe hypoalbuminemia, multifactorial    Plan:    · Decrease  D5W + KCl 20mEq/L to 50 cc/hr  · Replace K prn  · Replace magnesium  · Replace phosphorus prn  · Continue to monitor renal function      Carlitos Thompson M.D     5/18/2019

## 2019-05-18 NOTE — PROGRESS NOTES
Esequiel House is a 59 y.o. right handed female    Neurology following for an acute encephalopathy and focal seizure    Presented on 5/7 with an AMS in the setting of 2 weeks of diarrheal illness-- on antibiotic therapy   Found to be in septic shock--with c-diff and multi-system organ failure   Required intubation, abtx, and pressors--extubated 5/13   Had facial twitching/contorsion with R gaze for 20 sec on 5/14   Was on Lyrica 600 mg at home but not started until 5/14 here    Seen in MICU    She is still encephalopathic---will nod for me and follow some simple commands but not speaking. She will show me a thumbs up. Her  is at the bedside and tells me she said she wanted to leave this morning and was talking more but her mentation waxes and wanes     Repeat EEG yesterday with worsening PLEDs on L--Keppra increased to 1G BID and she has received two doses so far.      Hypocalcemia 7.7 and hypomagnesemia 1.5 today but otherwise medically stable    No chest pain or palpitations  No vertigo, lightheadedness or loss of consciousness  No itching or bruising appreciated    ROS otherwise negative     Current Facility-Administered Medications   Medication Dose Route Frequency Provider Last Rate Last Dose    sodium chloride (Inhalant) 3 % nebulizer solution 4 mL  4 mL Nebulization PRN Ricardo Trevizo MD        levetiracetam (KEPPRA) 500 mg/100 mL IVPB  1,000 mg Intravenous BID OSMANI Coles - CNP   1,000 mg at 05/18/19 0931    levothyroxine (SYNTHROID) injection 25 mcg  25 mcg Intravenous Daily Mona Matta MD   25 mcg at 05/18/19 1106    famotidine (PEPCID) injection 20 mg  20 mg Intravenous BID Mj Barker MD   20 mg at 05/18/19 0929    potassium chloride 20 mEq in dextrose 5 % 1,000 mL infusion   Intravenous Continuous Jf Archer  mL/hr at 05/18/19 0415      aztreonam (AZACTAM) 2 g IVPB mini-bag  2 g Intravenous Q8H Olu Greene  mL/hr at 05/18/19 1112 2 g at 05/18/19 1112  metoprolol (LOPRESSOR) injection 2.5 mg  2.5 mg Intravenous Q6H Mj Barker MD   2.5 mg at 05/18/19 1104    pregabalin (LYRICA) capsule 200 mg  200 mg Oral 3 times per day Brittany Valenzuela MD   200 mg at 05/18/19 0546    vancomycin (VANCOCIN) oral solution 500 mg  500 mg Oral 4 times per day Brittany Valenzuela MD   500 mg at 05/18/19 1103    lactobacillus (CULTURELLE) capsule 1 capsule  1 capsule Oral BID Olu Greene MD   1 capsule at 05/18/19 0931    morphine (PF) injection 2 mg  2 mg Intravenous Q4H PRN Mj Barker MD   2 mg at 05/13/19 1827    albuterol (ACCUNEB) nebulizer solution 0.63 mg  0.63 mg Nebulization Q6H PRN Marcy Watt MD   0.63 mg at 05/13/19 2134    acetaminophen (TYLENOL) 160 MG/5ML solution 650 mg  650 mg Oral Q6H PRN Frandy Carnes MD   650 mg at 05/13/19 1509    chlorhexidine (PERIDEX) 0.12 % solution 15 mL  15 mL Mouth/Throat BID Kathleen Hutchinson MD   15 mL at 05/17/19 1957    sodium chloride flush 0.9 % injection 10 mL  10 mL Intravenous PRN Marcy Watt MD   10 mL at 05/11/19 0504    heparin flush 100 UNIT/ML injection 300 Units  3 mL Intravenous 2 times per day Marcy Watt MD   300 Units at 05/17/19 0855    heparin flush 100 UNIT/ML injection 300 Units  3 mL Intracatheter PRN Marcy Watt MD        [Held by provider] heparin (porcine) injection 5,000 Units  5,000 Units Subcutaneous TID Dequan Camacho MD   5,000 Units at 05/15/19 1946    vitamin D (ERGOCALCIFEROL) capsule 50,000 Units  50,000 Units Oral Weekly Frandy Carnes MD   Stopped at 05/08/19 2015    sodium chloride flush 0.9 % injection 10 mL  10 mL Intravenous 2 times per day Melissa Russo MD   10 mL at 05/18/19 0933    sodium chloride flush 0.9 % injection 10 mL  10 mL Intravenous PRN Melissa Russo MD   10 mL at 05/15/19 0304    magnesium hydroxide (MILK OF MAGNESIA) 400 MG/5ML suspension 30 mL  30 mL Oral Daily PRN Melissa Russo MD        ondansetron [facility]) injection 4 mg  4 mg Intravenous Q6H PRN Vance Moore MD   4 mg at 05/15/19 2008    mineral oil-hydrophilic petrolatum (HYDROPHOR) ointment   Topical TID Guanaco Brooks MD        And    mineral oil-hydrophilic petrolatum (HYDROPHOR) ointment   Topical TID PRN Guanaco Brooks MD           Objective:     /86   Pulse 101   Temp 99.5 °F (37.5 °C) (Core)   Resp 18   Ht 5' (1.524 m)   Wt 201 lb 14.4 oz (91.6 kg)   SpO2 93%   BMI 39.43 kg/m²     General: awake, in no acute distress; appears stated age; lying in bed  Head:  Normocephalic and atraumatic--NGT in place  ENT: dry, cracked lips and oral mucosa again today  Eyes: b/l scleral injection but no drainage  Lungs: upper airway congestion and moist coughing--resps nonlabored  Heart: tachycardic rate and rhythm  Extremities: 1+ edema to hands and ankles; no cyanosis  Pulses: 2+ throughout  Skin: No lesions or rashes     Mental Status: alert, not speaking today--will not say her name or tell me 's name; will follow a few simple commands with much verbal direction and passive demonstration    Cranial Nerves:  I: smell NA   II: visual acuity  NA   II: visual fields B/l threat   II: pupils BOB   III,VII: ptosis None   III,IV,VI: extraocular muscles  EOMI without nystagmus   V: mastication Normal   V: facial light touch sensation     V,VII: corneal reflex     VII: facial muscle function - upper     VII: facial muscle function - lower Symmetric   VIII: hearing Normal   IX: soft palate elevation     IX,X: gag reflex    XI: trapezius strength  4/5   XI: sternocleidomastoid strength 4/5   XI: neck extension strength  4/5   XII: tongue strength  Normal       Motor:   Will hold arms antigravity very briefly and then they fall  Able to lift both legs somewhat to command  Obese bulk and normal tone  No abnormal movements or tremors    Sensory:  Patient nods yes to LT in all limbs    DTR:   No Haji's    No pathological reflexes    Laboratory/Radiology:     Lab Results   Component Value Date    WBC 6.8

## 2019-05-19 ENCOUNTER — APPOINTMENT (OUTPATIENT)
Dept: GENERAL RADIOLOGY | Age: 59
DRG: 870 | End: 2019-05-19
Payer: COMMERCIAL

## 2019-05-19 LAB
ALBUMIN SERPL-MCNC: 2.7 G/DL (ref 3.5–5.2)
ALP BLD-CCNC: 44 U/L (ref 35–104)
ALT SERPL-CCNC: 27 U/L (ref 0–32)
ANION GAP SERPL CALCULATED.3IONS-SCNC: 7 MMOL/L (ref 7–16)
AST SERPL-CCNC: 24 U/L (ref 0–31)
BASOPHILS ABSOLUTE: 0.04 E9/L (ref 0–0.2)
BASOPHILS RELATIVE PERCENT: 0.6 % (ref 0–2)
BILIRUB SERPL-MCNC: 0.6 MG/DL (ref 0–1.2)
BILIRUBIN DIRECT: 0.3 MG/DL (ref 0–0.3)
BILIRUBIN, INDIRECT: 0.3 MG/DL (ref 0–1)
BUN BLDV-MCNC: 5 MG/DL (ref 8–23)
CALCIUM SERPL-MCNC: 8.2 MG/DL (ref 8.6–10.2)
CHLORIDE BLD-SCNC: 97 MMOL/L (ref 98–107)
CO2: 28 MMOL/L (ref 22–29)
CREAT SERPL-MCNC: 0.4 MG/DL (ref 0.5–1)
EOSINOPHILS ABSOLUTE: 0.24 E9/L (ref 0.05–0.5)
EOSINOPHILS RELATIVE PERCENT: 3.8 % (ref 0–6)
GFR AFRICAN AMERICAN: >60
GFR NON-AFRICAN AMERICAN: >60 ML/MIN/1.73
GLUCOSE BLD-MCNC: 127 MG/DL (ref 74–99)
HCT VFR BLD CALC: 32.8 % (ref 34–48)
HEMOGLOBIN: 10.7 G/DL (ref 11.5–15.5)
IMMATURE GRANULOCYTES #: 0.08 E9/L
IMMATURE GRANULOCYTES %: 1.3 % (ref 0–5)
INR BLD: 1.3
LYMPHOCYTES ABSOLUTE: 1.32 E9/L (ref 1.5–4)
LYMPHOCYTES RELATIVE PERCENT: 21 % (ref 20–42)
MAGNESIUM: 1.5 MG/DL (ref 1.6–2.6)
MCH RBC QN AUTO: 33.2 PG (ref 26–35)
MCHC RBC AUTO-ENTMCNC: 32.6 % (ref 32–34.5)
MCV RBC AUTO: 101.9 FL (ref 80–99.9)
METER GLUCOSE: 160 MG/DL (ref 74–99)
METER GLUCOSE: 96 MG/DL (ref 74–99)
METER GLUCOSE: 98 MG/DL (ref 74–99)
MONOCYTES ABSOLUTE: 0.56 E9/L (ref 0.1–0.95)
MONOCYTES RELATIVE PERCENT: 8.9 % (ref 2–12)
NEUTROPHILS ABSOLUTE: 4.04 E9/L (ref 1.8–7.3)
NEUTROPHILS RELATIVE PERCENT: 64.4 % (ref 43–80)
PDW BLD-RTO: 15.1 FL (ref 11.5–15)
PHOSPHORUS: 2.3 MG/DL (ref 2.5–4.5)
PLATELET # BLD: 350 E9/L (ref 130–450)
PMV BLD AUTO: 10.8 FL (ref 7–12)
POTASSIUM REFLEX MAGNESIUM: 3.6 MMOL/L (ref 3.5–5)
PROTHROMBIN TIME: 14.9 SEC (ref 9.3–12.4)
RBC # BLD: 3.22 E12/L (ref 3.5–5.5)
SODIUM BLD-SCNC: 132 MMOL/L (ref 132–146)
TOTAL PROTEIN: 5.9 G/DL (ref 6.4–8.3)
VDRL CSF SCREEN: NON REACTIVE
WBC # BLD: 6.3 E9/L (ref 4.5–11.5)

## 2019-05-19 PROCEDURE — 6360000002 HC RX W HCPCS: Performed by: INTERNAL MEDICINE

## 2019-05-19 PROCEDURE — 2700000000 HC OXYGEN THERAPY PER DAY

## 2019-05-19 PROCEDURE — 94668 MNPJ CHEST WALL SBSQ: CPT

## 2019-05-19 PROCEDURE — 6370000000 HC RX 637 (ALT 250 FOR IP): Performed by: INTERNAL MEDICINE

## 2019-05-19 PROCEDURE — 6360000002 HC RX W HCPCS: Performed by: NURSE PRACTITIONER

## 2019-05-19 PROCEDURE — 36415 COLL VENOUS BLD VENIPUNCTURE: CPT

## 2019-05-19 PROCEDURE — 99233 SBSQ HOSP IP/OBS HIGH 50: CPT | Performed by: PHYSICIAN ASSISTANT

## 2019-05-19 PROCEDURE — 2580000003 HC RX 258: Performed by: INTERNAL MEDICINE

## 2019-05-19 PROCEDURE — 82962 GLUCOSE BLOOD TEST: CPT

## 2019-05-19 PROCEDURE — 95819 EEG AWAKE AND ASLEEP: CPT | Performed by: PSYCHIATRY & NEUROLOGY

## 2019-05-19 PROCEDURE — 2500000003 HC RX 250 WO HCPCS: Performed by: INTERNAL MEDICINE

## 2019-05-19 PROCEDURE — 99233 SBSQ HOSP IP/OBS HIGH 50: CPT | Performed by: INTERNAL MEDICINE

## 2019-05-19 PROCEDURE — 84100 ASSAY OF PHOSPHORUS: CPT

## 2019-05-19 PROCEDURE — 80048 BASIC METABOLIC PNL TOTAL CA: CPT

## 2019-05-19 PROCEDURE — 83735 ASSAY OF MAGNESIUM: CPT

## 2019-05-19 PROCEDURE — 80076 HEPATIC FUNCTION PANEL: CPT

## 2019-05-19 PROCEDURE — 94640 AIRWAY INHALATION TREATMENT: CPT

## 2019-05-19 PROCEDURE — 31720 CLEARANCE OF AIRWAYS: CPT

## 2019-05-19 PROCEDURE — 2000000000 HC ICU R&B

## 2019-05-19 PROCEDURE — 85610 PROTHROMBIN TIME: CPT

## 2019-05-19 PROCEDURE — 6360000002 HC RX W HCPCS: Performed by: PSYCHIATRY & NEUROLOGY

## 2019-05-19 PROCEDURE — 94669 MECHANICAL CHEST WALL OSCILL: CPT

## 2019-05-19 PROCEDURE — 71045 X-RAY EXAM CHEST 1 VIEW: CPT

## 2019-05-19 PROCEDURE — 2580000003 HC RX 258: Performed by: PSYCHIATRY & NEUROLOGY

## 2019-05-19 PROCEDURE — C9254 INJECTION, LACOSAMIDE: HCPCS | Performed by: PSYCHIATRY & NEUROLOGY

## 2019-05-19 PROCEDURE — 85025 COMPLETE CBC W/AUTO DIFF WBC: CPT

## 2019-05-19 RX ORDER — POTASSIUM CHLORIDE 7.45 MG/ML
10 INJECTION INTRAVENOUS
Status: COMPLETED | OUTPATIENT
Start: 2019-05-19 | End: 2019-05-19

## 2019-05-19 RX ADMIN — Medication 500 MG: at 05:15

## 2019-05-19 RX ADMIN — CHLORHEXIDINE GLUCONATE 0.12% ORAL RINSE 15 ML: 1.2 LIQUID ORAL at 08:20

## 2019-05-19 RX ADMIN — METOPROLOL TARTRATE 2.5 MG: 1 INJECTION, SOLUTION INTRAVENOUS at 05:17

## 2019-05-19 RX ADMIN — AZTREONAM 2 G: 2 INJECTION, POWDER, LYOPHILIZED, FOR SOLUTION INTRAMUSCULAR; INTRAVENOUS at 18:00

## 2019-05-19 RX ADMIN — SODIUM CHLORIDE SOLN NEBU 3% 4 ML: 3 NEBU SOLN at 08:45

## 2019-05-19 RX ADMIN — FAMOTIDINE 20 MG: 10 INJECTION INTRAVENOUS at 08:20

## 2019-05-19 RX ADMIN — MAGNESIUM SULFATE HEPTAHYDRATE 3 G: 500 INJECTION, SOLUTION INTRAMUSCULAR; INTRAVENOUS at 08:09

## 2019-05-19 RX ADMIN — Medication 1 CAPSULE: at 08:20

## 2019-05-19 RX ADMIN — Medication 500 MG: at 18:30

## 2019-05-19 RX ADMIN — METOPROLOL TARTRATE 2.5 MG: 1 INJECTION, SOLUTION INTRAVENOUS at 18:40

## 2019-05-19 RX ADMIN — DEXTROSE MONOHYDRATE 100 MG: 50 INJECTION, SOLUTION INTRAVENOUS at 22:47

## 2019-05-19 RX ADMIN — METOPROLOL TARTRATE 2.5 MG: 1 INJECTION, SOLUTION INTRAVENOUS at 00:00

## 2019-05-19 RX ADMIN — METOPROLOL TARTRATE 2.5 MG: 1 INJECTION, SOLUTION INTRAVENOUS at 12:10

## 2019-05-19 RX ADMIN — ALBUTEROL SULFATE 0.63 MG: 0.63 SOLUTION RESPIRATORY (INHALATION) at 08:45

## 2019-05-19 RX ADMIN — ALBUTEROL SULFATE 0.63 MG: 0.63 SOLUTION RESPIRATORY (INHALATION) at 21:20

## 2019-05-19 RX ADMIN — SODIUM CHLORIDE SOLN NEBU 3% 4 ML: 3 NEBU SOLN at 21:20

## 2019-05-19 RX ADMIN — PETROLATUM: 42 OINTMENT TOPICAL at 08:21

## 2019-05-19 RX ADMIN — PREGABALIN 200 MG: 100 CAPSULE ORAL at 05:14

## 2019-05-19 RX ADMIN — Medication 10 ML: at 08:25

## 2019-05-19 RX ADMIN — CHLORHEXIDINE GLUCONATE 0.12% ORAL RINSE 15 ML: 1.2 LIQUID ORAL at 21:45

## 2019-05-19 RX ADMIN — PETROLATUM: 42 OINTMENT TOPICAL at 14:33

## 2019-05-19 RX ADMIN — AZTREONAM 2 G: 2 INJECTION, POWDER, LYOPHILIZED, FOR SOLUTION INTRAMUSCULAR; INTRAVENOUS at 10:30

## 2019-05-19 RX ADMIN — LEVOTHYROXINE SODIUM ANHYDROUS 25 MCG: 100 INJECTION, POWDER, LYOPHILIZED, FOR SOLUTION INTRAVENOUS at 08:24

## 2019-05-19 RX ADMIN — POTASSIUM CHLORIDE 10 MEQ: 10 INJECTION, SOLUTION INTRAVENOUS at 08:23

## 2019-05-19 RX ADMIN — PETROLATUM: 42 OINTMENT TOPICAL at 22:49

## 2019-05-19 RX ADMIN — AZTREONAM 2 G: 2 INJECTION, POWDER, LYOPHILIZED, FOR SOLUTION INTRAMUSCULAR; INTRAVENOUS at 01:30

## 2019-05-19 RX ADMIN — POTASSIUM & SODIUM PHOSPHATES POWDER PACK 280-160-250 MG 250 MG: 280-160-250 PACK at 08:45

## 2019-05-19 RX ADMIN — Medication 1 CAPSULE: at 21:45

## 2019-05-19 RX ADMIN — ALBUTEROL SULFATE 0.63 MG: 0.63 SOLUTION RESPIRATORY (INHALATION) at 16:58

## 2019-05-19 RX ADMIN — POTASSIUM CHLORIDE: 2 INJECTION, SOLUTION, CONCENTRATE INTRAVENOUS at 10:14

## 2019-05-19 RX ADMIN — Medication 500 MG: at 12:14

## 2019-05-19 RX ADMIN — DEXTROSE MONOHYDRATE 200 MG: 50 INJECTION, SOLUTION INTRAVENOUS at 16:30

## 2019-05-19 RX ADMIN — LEVETIRACETAM 1000 MG: 5 INJECTION INTRAVENOUS at 21:45

## 2019-05-19 RX ADMIN — PREGABALIN 200 MG: 100 CAPSULE ORAL at 14:30

## 2019-05-19 RX ADMIN — FAMOTIDINE 20 MG: 10 INJECTION INTRAVENOUS at 21:45

## 2019-05-19 RX ADMIN — Medication 500 MG: at 23:40

## 2019-05-19 RX ADMIN — LEVETIRACETAM 1000 MG: 5 INJECTION INTRAVENOUS at 08:41

## 2019-05-19 RX ADMIN — ALBUTEROL SULFATE 0.63 MG: 0.63 SOLUTION RESPIRATORY (INHALATION) at 13:15

## 2019-05-19 RX ADMIN — PREGABALIN 200 MG: 100 CAPSULE ORAL at 21:45

## 2019-05-19 RX ADMIN — POTASSIUM CHLORIDE 10 MEQ: 10 INJECTION, SOLUTION INTRAVENOUS at 07:10

## 2019-05-19 ASSESSMENT — PAIN SCALES - GENERAL
PAINLEVEL_OUTOF10: 0

## 2019-05-19 NOTE — PROGRESS NOTES
BID    albuterol  0.63 mg Nebulization 4x daily    magnesium sulfate  2 g Intravenous Once    levetiracetam  1,000 mg Intravenous BID    levothyroxine  25 mcg Intravenous Daily    famotidine (PEPCID) injection  20 mg Intravenous BID    aztreonam  2 g Intravenous Q8H    metoprolol  2.5 mg Intravenous Q6H    pregabalin  200 mg Oral 3 times per day    vancomycin  500 mg Oral 4 times per day    lactobacillus  1 capsule Oral BID    chlorhexidine  15 mL Mouth/Throat BID    heparin flush  3 mL Intravenous 2 times per day    [Held by provider] heparin (porcine)  5,000 Units Subcutaneous TID    vitamin D  50,000 Units Oral Weekly    sodium chloride flush  10 mL Intravenous 2 times per day    mineral oil-hydrophilic petrolatum   Topical TID     PRN Meds: morphine, acetaminophen, sodium chloride flush, heparin flush, sodium chloride flush, magnesium hydroxide, ondansetron, mineral oil-hydrophilic petrolatum **AND** mineral oil-hydrophilic petrolatum  Nutrition:       Labs and Imaging Studies     CBC:   Recent Labs     05/16/19  0440  05/17/19  0545 05/17/19  1312 05/17/19  2030 05/18/19  0430   WBC 6.1  --  6.5  --   --  6.8   RBC 2.87*  --  2.83*  --   --  3.12*   HGB 9.4*   < > 9.4* 10.0* 9.7* 10.3*   HCT 29.2*   < > 29.4* 31.3* 30.6* 32.3*   .7*  --  103.9*  --   --  103.5*   MCH 32.8  --  33.2  --   --  33.0   MCHC 32.2  --  32.0  --   --  31.9*   RDW 16.2*  --  16.1*  --   --  15.7*     --  362  --   --  358   MPV 11.1  --  11.0  --   --  10.7    < > = values in this interval not displayed.        BMP:    Recent Labs     05/16/19  0440  05/17/19  0545 05/17/19  1551 05/17/19  1714 05/18/19  0430   *   < > 143 129* 139 137   K 2.6*   < > 3.5 5.3* 3.7 3.7   *   < > 106 95* 101 98   CO2 25   < > 27 26 32* 27   BUN 14   < > 11 9 9 8   CREATININE 0.6   < > 0.6 0.5 0.6 0.5   GLUCOSE 318*   < > 120* 460* 115* 119*   CALCIUM 7.5*   < > 7.9* 7.0* 7.7* 7.7*   PROT 5.1*  --  5.3*  --   -- 5.5*   LABALBU 2.5*  --  2.7*  --   --  2.4*   BILITOT 0.8  --  0.7  --   --  0.6   ALKPHOS 38  --  36  --   --  41   AST 21  --  21  --   --  23   ALT 50*  --  39*  --   --  33*    < > = values in this interval not displayed. LIVER PROFILE:   Recent Labs     05/16/19  0440 05/17/19  0545 05/18/19  0430   AST 21 21 23   ALT 50* 39* 33*   BILIDIR 0.4* 0.3 <0.2   BILITOT 0.8 0.7 0.6   ALKPHOS 38 36 41       PT/INR:   Recent Labs     05/16/19 0440 05/17/19  0545 05/18/19  0430   PROTIME 16.8* 15.0* 15.0*   INR 1.5 1.3 1.3       APTT:   No results for input(s): APTT in the last 72 hours. Fasting Lipid Panel:    Lab Results   Component Value Date    CHOL 205 06/21/2012    TRIG 404 06/21/2012    HDL 44.0 06/21/2012       Cardiac Enzymes:    Lab Results   Component Value Date    TROPONINI <0.01 05/07/2019    TROPONINI <0.01 05/07/2019    TROPONINI <0.01 05/07/2019       Notable Cultures:      Blood cultures   Blood Culture, Routine   Date Value Ref Range Status   05/12/2019 5 Days- no growth  Final     Respiratory cultures No results found for: RESPCULTURE   Gram Stain Result   Date Value Ref Range Status   05/16/2019   Final    Gram stain performed from cytospun specimen  Polymorphonuclear leukocytes not seen  Epithelial cells not seen  Few Mononuclear leukocytes  No organisms seen. Urine   Urine Culture, Routine   Date Value Ref Range Status   05/12/2019 (A)  Final    THIS IS A CORRECTED REPORT  PLEASE DISREGARD PREVIOUS REPORT OF \"COAGULASE NEGATIVE STAPH\"     05/12/2019 >100,000 CFU/ml  Final     Legionella No results found for: LABLEGI  C Diff PCR No results found for: CDIFPCR  Wound culture/abscess: No results for input(s): WNDABS in the last 72 hours. Tip culture:No results for input(s): CXCATHTIP in the last 72 hours. Imaging Studies:  XR CHEST PORTABLE   Final Result   Cardiomegaly   Tortuous aorta    There are patchy infiltrates seen throughout both the lung fields.    Pneumonia could give this appearance. Underlying edema could also have   this appearance   The chest appears improved in the interval                  US DUP LOWER EXTREMITIES BILATERAL VENOUS   Final Result   No evidence for deep venous thrombosis               XR CHEST PORTABLE   Final Result   ALERT:  THIS IS AN ABNORMAL REPORT   1. Abnormal airspace disease in the bilateral upper lungs and   bilateral lung bases. Findings are nonspecific and could reflect an   infiltrate/pneumonia. An infiltrative mass or malignancy is not   excluded particularly in the right and the left upper lung/suprahilar   regions, further evaluation with CT scan the chest is recommended to   exclude any potential underlying malignancy. 2. Stable, enlarged cardiac mediastinal silhouette with thoracic   aortic vascular calcifications. FL LUMBAR PUNCTURE DIAG   Final Result   Successful Uncomplicated Fluoroscopic-Guided Lumbar Puncture. This procedure was performed by Joanne Fontanez PA-C under the indirect   supervision of Farida Raman MD   who was not present for the   procedure. .      The exam has been dictated and signed by Joanne Fontanez PA-C. Socrates Jay MD , Radiologist, have reviewed the images and   report and concur with these findings. FL GUIDED FOR SPINE INJECT   Final Result   Successful Uncomplicated Fluoroscopic-Guided Lumbar Puncture. This procedure was performed by Joanne Fontanez PA-C under the indirect   supervision of Farida Raman MD   who was not present for the   procedure. .      The exam has been dictated and signed by Joanne Fontanez PA-C. Socrates Jay MD , Radiologist, have reviewed the images and   report and concur with these findings. XR CHEST PORTABLE   Final Result   1. Multifocal airspace disease most likely suspicious for a multifocal   infiltrate/pneumonia with underlying pulmonary edema and a right   pleural effusion.  Interval placement NG tube without identification of   the distal tibia. MRI BRAIN WO CONTRAST   Final Result   Diffuse atrophy likely age related   Findings compatible with small vessel ischemic changes. XR Chest Abdomen Ng Placement   Final Result   NG tube tip in the stomach. Additional observations as outlined above. XR CHEST PORTABLE   Final Result   1. Bibasilar airspace disease, right greater than left, nonspecific   finding, findings can be seen infiltrate/pneumonia/atelectasis, with   right pleural effusion. 2. Vascular calcifications thoracic aorta. 3. Suspected underlying pulmonary edema. 4. Interval placement left-sided PICC line. IR INJ VENOGRAM EXTREMITY   Final Result      1. Ultrasound-guided peripheral venous access in the right upper   extremity. 2. Right upper extremity venogram demonstrating patency of the right   central veins. 3. Uncomplicated placement of a dual-lumen peripherally inserted   central catheter (PICC) via the left brachial vein with trim length of   42 cm. IR VENOGRAM UPPER EXTREMITY RIGHT   Final Result      1. Ultrasound-guided peripheral venous access in the right upper   extremity. 2. Right upper extremity venogram demonstrating patency of the right   central veins. 3. Uncomplicated placement of a dual-lumen peripherally inserted   central catheter (PICC) via the left brachial vein with trim length of   42 cm. IR PICC WO SQ PORT/PUMP > 5 YEARS   Final Result      1. Ultrasound-guided peripheral venous access in the right upper   extremity. 2. Right upper extremity venogram demonstrating patency of the right   central veins. 3. Uncomplicated placement of a dual-lumen peripherally inserted   central catheter (PICC) via the left brachial vein with trim length of   42 cm. IR ULTRASOUND GUIDANCE VASCULAR ACCESS   Final Result      1. Ultrasound-guided peripheral venous access in the right upper   extremity.         2. Right upper extremity venogram demonstrating patency of the right   central veins. 3. Uncomplicated placement of a dual-lumen peripherally inserted   central catheter (PICC) via the left brachial vein with trim length of   42 cm. IR ULTRASOUND GUIDANCE VASCULAR ACCESS   Final Result      1. Ultrasound-guided peripheral venous access in the right upper   extremity. 2. Right upper extremity venogram demonstrating patency of the right   central veins. 3. Uncomplicated placement of a dual-lumen peripherally inserted   central catheter (PICC) via the left brachial vein with trim length of   42 cm. CT Head WO Contrast   Final Result      No acute intracranial hemorrhage or mass effect. CT is insensitive for   acute infarct. If there is concern for acute infarct, MRI with   diffusion-weighted imaging is more sensitive and would be the   recommended study. Right ethmoid sinus mucosal thickening, new since prior study. XR CHEST PORTABLE   Final Result      XR CHEST PORTABLE   Final Result   1. Stable, enlarged cardiomediastinal silhouette with underlying   pulmonary edema. 2. Multifocal airspace disease likely suggestive follow-up multifocal   infiltrate/pneumonia and/or atelectasis, please see CT chest report   5/12/2019. CT Chest WO Contrast   Final Result      1. Bilateral alveolar opacities in upper lobe in perihilar   distribution suggestive of interstitial pulmonary edema or pneumonia. 2. More confluent opacities are seen at right lung base related to   pneumonia and atelectasis. 3. Moderate-sized bilateral pleural effusions. 4. Prominent and mildly enlarged mediastinal lymph nodes. 5. Endotracheal tube is 1 cm above jono. XR CHEST PORTABLE   Final Result      1. Limited visualization of nasogastric tube. 2. Redemonstration of interstitial and hazy opacities throughout both   lungs suggestive of interstitial pulmonary edema or pneumonia. Short-term follow-up could be helpful for further evaluation. XR CHEST PORTABLE   Final Result      Interstitial and hazy opacities throughout both lungs which appear to   have increased throughout left lung since yesterday's exam. Opacities   appear similar on the right. Continued follow-up could be helpful for   further evaluation. XR CHEST PORTABLE   Final Result   CHF with marginal improvement and decreasing edema. XR CHEST PORTABLE   Final Result   Significant worsening of right-sided infiltrate and atelectasis and   small right effusion               XR ABDOMEN (KUB) (SINGLE AP VIEW)   Final Result   No acute process               XR CHEST PORTABLE   Final Result      1. Satisfactory placement of nasogastric tube. 2. Persistent interstitial pulmonary edema, pneumonia, or atelectasis   bilaterally. Continued follow-up could be helpful for further   evaluation. XR Chest Abdomen Ng Placement   Final Result      Nasogastric tubing appears to be appropriately configured. Right common femoral vein catheter appears to be peripherally   configured. XR CHEST PORTABLE   Final Result      1. Abnormal location of nasogastric tube which appears to be coiled   within the mid esophagus. 2. Increased interstitial and hazy opacities at left hilar location   and left lung base which could suggest increasing pneumonia or   atelectasis. Short-term follow-up could be helpful for further   evaluation. ALERT:  THIS IS AN ABNORMAL REPORT. CT Head WO Contrast   Final Result   1. No acute intracranial hemorrhage identified. 2. Additional nonacute/incidental findings as described above. This report has been electronically signed by Julian Mendoza MD.      [id number] Additional Contrast? None   Final Result   1.  Moderate diffuse mucosal thickening from the rectum to the mid sigmoid colon    with mild adjacent stranding, mild mucosal thickening throughout the descending    colon with adjacent stranding, and suggestion of mucosal thickening throughout    the distal transverse colon as well as from the proximal most transverse colon    to the cecum concerning for infectious/inflammatory proctocolitis. 2. Moderate hiatal hernia. 3. Moderate bilateral dependent atelectases versus consolidations with air    bronchograms. 4. No nephrolithiasis or obstructive uropathy. No perinephric stranding. 5. Normal appendix. 6. Additional nonacute/incidental findings as described above. This report has been electronically signed by Lindsey Islas MD.      XR CHEST PORTABLE   Final Result      Interstitial opacities in perihilar and infrahilar locations could   suggest peribronchial thickening/inflammation. Continued follow-up   could be helpful for further evaluation. XR CHEST PORTABLE    (Results Pending)   XR CHEST PORTABLE    (Results Pending)            Resident's Assessment and PLan     Active Problems:    Septic shock (HCC)    Colitis    Seizure ([address]    Gastrointestinal hemorrhage associated with gastritis    Encephalopathy in sepsis  Resolved Problems:    * No resolved hospital problems.  *    Neurology  -Acute encephalopathy 2/2 combination of acute hypoxic respiratory failure, metabolic, sepsis, and seizure activity   -Ct head: no acute hemorrhage and mass effect  -Monitor mental status, improved mentation   -Lumbar Puncture negative for infectious process; awaiting final results of cultures   -Focal Motor Seizures: EEG shows Left Sided PLEDS; EEG shows epileptogenic focus in left hemisphere, keppra 1000 mg BID and pregabalin 200 mg TID;   >repeat  Continues EEG today     Cardiology  -Septic Shock resolved; currently off pressors; tachycardia likely 2/2 dehydration, continue to monitor vitals; free water flushes while patient being tube fed     Respiratory  -Acute hypoxic respiratory failure 2/2 pulmonary edema from sepsis; resolved; now extubated and saturating well on NC   -Continue breathing treatments and pulmonary hygiene    GI  -Transaminitis likely 2/2 shock liver; improving  -Ct abdomen showed hepatomegaly with hepatic steatosis  -INR 8.4, monitor INR 1.9 today  -serum drug screen: positive for opiates  -Monitor LFT   -UGIB; general surgery consulted; input appreciated; continue IV famotidine 2/2 pantoprazole contraindicated in Cdiff; continue monitoring H/H  -Transfuse if hemoglobin <7     Endocrinology  -Hypothyroidism , continue levithyroxine  -Continue tube feeding    Hematology  -Macrocytic Anemia 2/2 steatosis vs vitamin def  -Hb: 10 today, likely dilutional vs acute blood loss; stable trend Hgb daily   -Folate and vitamin B 12: wnl  -monitor CBC  -target Hb >7  -GS following   -Thrombocytopenia - resolved; heparin held for 3 days; likely 2/2 infection, consider restarting heparin     ID  -C. Diff Colitis; improving; continue IV aztreonam and PO vancomycin  -leukocytosis improving; fevers and shock symptoms improving   -Pan culture sent: no growth, C. Diff EIA : C. Diff toxin A and B detected    Renal   -STEFANIA, prerenal, resolved; Likely 2/2 dehydration from septic shock and diarrhea,resolved   -Baseline Cr. 0.9, Cr. 4.2>>2.6>0.6  -IVF:d/eliseo  -bumex drip stop in setting of contraction alkalosis   -Nephrology consult   -Monitor renal function, urine output and BMP     PT/OT: ordered; awaiting AMPAK; SLP seeing   DVT/GI: famotidine/PCD  Dispo: will monitor, likely transfer tomorrow Nesha Cardenas MD, [facility]   Internal Medicine resident    Attending Physician: Dr. Lawanda Han     Addendum ICU Attending Luisa Murillo was seen, examined and discussed with the multi-disciplinary ICU team during rounds. A addendum note may be separate to the residents note. If not then, I have personally seen and examined the patient and the key elements of the encounter were performed by me (> 85 % time).   The medications & laboratory data was discussed and adjusted where necessary. The radiographic images were reviewed either as a group or with radiologist.  Any changes are document or changed if felt dis-concordant with the exam or history. The above findings were corroborated, plans confirmed and changes made if needed. Family was updated at the bedside as available. Key issues of the case were discussed among consultants. Critical Care time is documented if appropriate.       Garett Parker DO, MPH  Professor of Medicine

## 2019-05-19 NOTE — PLAN OF CARE
Please refer to today's EEG report. Worsening of PLEDs in left side. I will start her on Vimpat 200 mg IV load and then 100 mg BID. Will repeat EEG tomorrow. Ordered.

## 2019-05-19 NOTE — PROGRESS NOTES
Department of Internal Medicine  Nephrology Resident  Progress Note    Events reviewed. SUBJECTIVE: We are following Mrs. Haji for acute kidney injury. Awake but nonverbal. Having aerosol treatment with chest PT.    PHYSICAL EXAM:      VITALS:  /75   Pulse 124   Temp 98.8 °F (37.1 °C) (Core)   Resp 29   Ht 5' (1.524 m)   Wt 205 lb 4.8 oz (93.1 kg)   SpO2 95%   BMI 40.09 kg/m²    Intake and Output last 24hrs: In: 9136 [I.V.:2907;  NG/GT:489]  Out: 3365 [Urine:3365]      Constitutional:  Awake non verbal in no distress  HEENT:  MESSI   Respiratory:  Coarse breath sounds bilaterally, frothy secretions from ET  Cardiovascular/Edema:  Regular rate and rhythm, no murmurs appreciated  Gastrointestinal:  Soft, non-tender, bowel sounds present  Neurologic:  Awake, non-focal but nonverbal  Skin:  warm, + edema  Other:  Erythematous lesion on lower extrtemities    DATA:    CBC with Differential:    Lab Results   Component Value Date    WBC 6.3 05/19/2019    RBC 3.22 05/19/2019    HGB 10.7 05/19/2019    HCT 32.8 05/19/2019     05/19/2019    .9 05/19/2019    MCH 33.2 05/19/2019    MCHC 32.6 05/19/2019    RDW 15.1 05/19/2019    NRBC 0.9 05/10/2019    LYMPHOPCT 21.0 05/19/2019    PROMYELOPCT 0.9 05/08/2019    MONOPCT 8.9 05/19/2019    BASOPCT 0.6 05/19/2019    MONOSABS 0.56 05/19/2019    LYMPHSABS 1.32 05/19/2019    EOSABS 0.24 05/19/2019    BASOSABS 0.04 05/19/2019     CMP:    Lab Results   Component Value Date     05/19/2019    K 3.6 05/19/2019    CL 97 05/19/2019    CO2 28 05/19/2019    BUN 5 05/19/2019    CREATININE 0.4 05/19/2019    GFRAA >60 05/19/2019    LABGLOM >60 05/19/2019    GLUCOSE 127 05/19/2019    PROT 5.9 05/19/2019    LABALBU 2.7 05/19/2019    LABALBU 4.4 08/09/2011    CALCIUM 8.2 05/19/2019    BILITOT 0.6 05/19/2019    ALKPHOS 44 05/19/2019    AST 24 05/19/2019    ALT 27 05/19/2019     BMP:    Lab Results   Component Value Date     05/19/2019    K 3.6 05/19/2019    CL Meds:.morphine, acetaminophen, sodium chloride flush, heparin flush, sodium chloride flush, magnesium hydroxide, ondansetron, mineral oil-hydrophilic petrolatum **AND** mineral oil-hydrophilic petrolatum      BRIEF SUMMARY OF INITIAL CONSULT:  Briefly, Mrs. Raman Garcia is a 58-year-old female with h/o of hypothyroidism, lumbar radiculopathy s/p laminectomy, HLD, asthma, was brought to the ED for worsening lethargy. She was recently treated for lower extremity cellulitis with clindamycin and then levofloxacin around 2-3 weeks ago. She developed profuse diarrhea shortly thereafter. Family reported some vomiting initially and poor oral intake. Over the past 3 days, she became severely weak and dehydrated. At the ED, she was in shock requiring vasopressor despite aggressive fluid resuscitation and was intubated for airway protection. Labs were notable for creatinine of 4.2 mg/dL, BUN of 73 mg/dL, lactic acid of 3.4, sodium of 129 mmol/L, liver enzymes were also markedly elevated, reasons for this consult. Of note, she has no known history of kidney disease. She was on furosemide at home, but has not been taken since the onset of diarrhea. Problems resolved:    · Hyponatremia, multifactorial, due to hypovolemia and decreased GFR, resolved  · Hypoglycemia secondary to #6, resolved  · Coagulopathy, high PT/INR, resolved  · S/p Hemodynamic shock, hypovolemic and septic shock, melena likely improving with decreasing dose of pressors  · STEFANIA, stage 3, volume responsive pre-renal STEFANIA  (diarrhea, poor oral intake, shock), FeNa 0.5% , FeUrea 12%. Resolved. · Hypomagnesemia  · S/p Respiratory failure, status post extubation; chest x-ray worsening pleural effusion  · s/pShock liver   · Hypernatremia, likely 2/2 diuresis. · Hyperchloremia, probably multifactorial, 2/2 diuresis and diarrhea. IMPRESSION/RECOMMENDATIONS:      1.  Pulmonary edema with high proBNP, with echo with preserved EF 60%, HFpEF in the setting of large volume resuscitation. Lasix 40mg QD given yesterday. Edema stable on CXR. 2. Hypocalcemia, probably multifactorial, including vitamin D deficiency and hypomagnesemia. Continue ergocalciferol. 3. Hypokalemia, multifactorial, diuretics, diarrhea. Improving. 4. Hypophosphatemia, secondary to vitamin D deficiency and poor oral intake. Improving     5. Hypomagnesemia, probably multifactorial, 2/2 poor oral intake, diuretics. Stable.   6. C. difficile infection, on vancomycin and metronidazole and aztreonam.  7. Severe hypoalbuminemia, multifactorial    Plan:    · Continue   D5W + KCl 20mEq/L to 50 cc/hr  · Replace K prn  · Replace magnesium  · Replace phosphorus prn  · Continue to monitor renal function      Aida Porras M.D     5/19/2019

## 2019-05-19 NOTE — PLAN OF CARE
[facility] Quality Flow/Interdisciplinary Rounds Progress Note        Quality Flow Rounds held on May 19, 2019    Disciplines Attending:  Bedside Nurse, Nursing Unit Leadership and Intensivist and ICU team    Tracee Gates was admitted on 5/7/2019 12:26 AM    Anticipated Discharge Date:  Expected Discharge Date: 05/21/19    Disposition: ICU    Lorenzo Score:  Lorenzo Scale Score: 13    Readmission Risk              Risk of Unplanned Readmission:        20           Discussed patient goal for the day, patient clinical progression, and barriers to discharge.   The following Goal(s) of the Day/Commitment(s) have been identified:  EEG, possible transfer later    Lorne Cook  May 19, 2019

## 2019-05-19 NOTE — PROCEDURES
EEG Report  Maris Gonsales is a 59 y.o. female      Appointment Date 5/19/2019 Appointment Time     Facility Location [facility] EEG Number [id number]   Type of Study [facility] Port[facility] 4409-A     Technical Specifications  Technician [facility] of consciousness Awake/Asleep   Sleep deprived? NO   Hyperventilation tested? NO   Photic stim tested?  No   EEG recording Standard 10-20 electrode placement    Duration of recording 00;30;00   EEG complete? yes       Clinical History   AMS    Medications    Current Facility-Administered Medications:     sodium chloride (Inhalant) 3 % nebulizer solution 4 mL, 4 mL, Nebulization, BID, Wilder Ramos MD, 4 mL at 05/19/19 0845    albuterol (ACCUNEB) nebulizer solution 0.63 mg, 0.63 mg, Nebulization, 4x daily, Wilder Ramos MD, 0.63 mg at 05/19/19 0845    magnesium sulfate 2 g in 50 mL IVPB premix, 2 g, Intravenous, Once, Madison Hung MD    levetiracetam (KEPPRA) 500 mg/100 mL IVPB, 1,000 mg, Intravenous, BID, Karine Mcmillan APRN - CNP, 1,000 mg at 05/19/19 0841    levothyroxine (SYNTHROID) injection 25 mcg, 25 mcg, Intravenous, Daily, Chantelle Williamson MD, 25 mcg at 05/19/19 0824    famotidine (PEPCID) injection 20 mg, 20 mg, Intravenous, BID, Eri Bob MD, 20 mg at 05/19/19 0820    potassium chloride 20 mEq in dextrose 5 % 1,000 mL infusion, , Intravenous, Continuous, Madison Hung MD, Last Rate: 50 mL/hr at 05/19/19 1014    aztreonam (AZACTAM) 2 g IVPB mini-bag, 2 g, Intravenous, Q8H, Olu Greene MD, Stopped at 05/19/19 1154    metoprolol (LOPRESSOR) injection 2.5 mg, 2.5 mg, Intravenous, Q6H, Mj Barker MD, 2.5 mg at 05/19/19 1210    pregabalin (LYRICA) capsule 200 mg, 200 mg, Oral, 3 times per day, Eri Bob MD, 200 mg at 05/19/19 0514    vancomycin (VANCOCIN) oral solution 500 mg, 500 mg, Oral, 4 times per day, Eri Bob MD, 500 mg at 05/19/19 1214    lactobacillus (CULTURELLE) capsule 1 capsule, 1 capsule, Oral, BID, Janak Khalil MD, 1 capsule at 05/19/19 0820    morphine (PF) injection 2 mg, 2 mg, Intravenous, Q4H PRN, Le Escudero MD, 2 mg at 05/13/19 1827    acetaminophen (TYLENOL) 160 MG/5ML solution 650 mg, 650 mg, Oral, Q6H PRN, Ailyn Luque MD, 650 mg at 05/13/19 1509    chlorhexidine (PERIDEX) 0.12 % solution 15 mL, 15 mL, Mouth/Throat, BID, Kathleen Hutchinson MD, 15 mL at 05/19/19 0820    sodium chloride flush 0.9 % injection 10 mL, 10 mL, Intravenous, PRN, Ethel Lambert MD, 10 mL at 05/11/19 0504    heparin flush 100 UNIT/ML injection 300 Units, 3 mL, Intravenous, 2 times per day, Ethel Lambert MD, 300 Units at 05/17/19 0855    heparin flush 100 UNIT/ML injection 300 Units, 3 mL, Intracatheter, PRN, Ethel Lambert MD    [Held by provider] heparin (porcine) injection 5,000 Units, 5,000 Units, Subcutaneous, TID, Dequan Camacho MD, 5,000 Units at 05/15/19 1946    vitamin D (ERGOCALCIFEROL) capsule 50,000 Units, 50,000 Units, Oral, Weekly, Ailyn Luque MD, Stopped at 05/08/19 2015    sodium chloride flush 0.9 % injection 10 mL, 10 mL, Intravenous, 2 times per day, Miguel Gonzalez MD, 10 mL at 05/19/19 0825    sodium chloride flush 0.9 % injection 10 mL, 10 mL, Intravenous, PRN, Miguel Gonzalez MD, 10 mL at 05/15/19 0304    magnesium hydroxide (MILK OF MAGNESIA) 400 MG/5ML suspension 30 mL, 30 mL, Oral, Daily PRN, Miguel Gonzalez MD    ondansetron [facility]) injection 4 mg, 4 mg, Intravenous, Q6H PRN, Miguel Gonzalez MD, 4 mg at 05/15/19 2008    mineral oil-hydrophilic petrolatum (HYDROPHOR) ointment, , Topical, TID **AND** mineral oil-hydrophilic petrolatum (HYDROPHOR) ointment, , Topical, TID PRN, Mila Wells MD        Physician Interpretation    General EEG Report        Epileptiform Discharge   PLEDS, arising from left hemisphere throughout the record        Non-Epileptiform Abnormality    Continuous slowing better seen in the right side, mostly theta activites      Ictal  None    General Impression   This EEG shows a highly epileptogenic focus in the left hemisphere. Also it shows a moderate diffuse encephalopathy. Compared to records from yesterday, a significant worsening in epileptogencity is seen.

## 2019-05-19 NOTE — PROGRESS NOTES
Subjective:    Awake  More alert  Son is by the bed side     Objective:    /88   Pulse 96   Temp 99.3 °F (37.4 °C) (Core)   Resp 20   Ht 5' (1.524 m)   Wt 205 lb 4.8 oz (93.1 kg)   SpO2 94%   BMI 40.09 kg/m²     In: 3396 [I.V.:2907; NG/GT:489]  Out: 2890     HEENT:NCAT,  PERRLA, No JVD  Heart:  RRR, no murmurs, gallops, or rubs. Lungs:  CTA bilaterally, no wheeze, rales or rhonchi  Abd: bowel sounds hypoactive, distended but soft abdomen   Extrem:  No clubbing, cyanosis, or edema     Recent Labs     05/17/19  0545  05/17/19  2030 05/18/19  0430 05/19/19  0320   WBC 6.5  --   --  6.8 6.3   HGB 9.4*   < > 9.7* 10.3* 10.7*   HCT 29.4*   < > 30.6* 32.3* 32.8*     --   --  358 350    < > = values in this interval not displayed.        Recent Labs     05/17/19  1714 05/18/19  0430 05/19/19  0320    137 132   K 3.7 3.7 3.6    98 97*   CO2 32* 27 28   BUN 9 8 5*   CREATININE 0.6 0.5 0.4*   CALCIUM 7.7* 7.7* 8.2*       Assessment:    Patient Active Problem List   Diagnosis    Post lumbar laminectomy syndrome    Herniated disc L4-L5    Lumbar sprain     Status post lumbar spinal cord stimulator 2007    Spinal cord stimulator dysfunction/Charging of SCS    Lumbar radiculopathy    Chronic pain syndrome    Septic shock (HCC)    Colitis    Seizure (HCC)    Gastrointestinal hemorrhage associated with gastritis    Encephalopathy in sepsis   UGIB  seizures    Plan:    Admit to micu for eval of sepsis / shock from GI source -   Severe C diff infection   extubated 5/13   Mri / CT head unremarkable for acute event   neurology following - resume Home meds if able to tolerate po intake     abx therapy with po vanc and iv flagyl per ID   Supportive care   supplement lytes for hypokalemia   Renal and id following   Continue  icu care     Continue supportive care  Discussed with son      Petros Cao MD  10:53 AM  5/19/2019

## 2019-05-19 NOTE — PROGRESS NOTES
C/C: C diff infection     Pt is awake and alert  Denies fever and chills  Reports cough  Denies abdomen pain   Loose stool +        Current Facility-Administered Medications   Medication Dose Route Frequency Provider Last Rate Last Dose    magnesium sulfate 3 g in dextrose 5 % 100 mL IVPB  3 g Intravenous Once Pansy [place]., DO 25 mL/hr at 05/19/19 0809 3 g at 05/19/19 0809    sodium chloride (Inhalant) 3 % nebulizer solution 4 mL  4 mL Nebulization BID Eleuterio Delacruz MD   4 mL at 05/19/19 0845    albuterol (ACCUNEB) nebulizer solution 0.63 mg  0.63 mg Nebulization 4x daily Eleuterio Delacruz MD   0.63 mg at 05/19/19 0845    magnesium sulfate 2 g in 50 mL IVPB premix  2 g Intravenous Once Chloe Ryan MD        levetiracetam (KEPPRA) 500 mg/100 mL IVPB  1,000 mg Intravenous BID Jettie Angelique, APRN - CNP   1,000 mg at 05/19/19 0841    levothyroxine (SYNTHROID) injection 25 mcg  25 mcg Intravenous Daily Shameka Sierra MD   25 mcg at 05/19/19 0824    famotidine (PEPCID) injection 20 mg  20 mg Intravenous BID Ayaz Luna MD   20 mg at 05/19/19 0820    potassium chloride 20 mEq in dextrose 5 % 1,000 mL infusion   Intravenous Continuous Chloe Ryan MD 50 mL/hr at 05/19/19 1014      aztreonam (AZACTAM) 2 g IVPB mini-bag  2 g Intravenous Q8H Olu Greene MD   Stopped at 05/19/19 0200    metoprolol (LOPRESSOR) injection 2.5 mg  2.5 mg Intravenous Q6H Ayaz Luna MD   2.5 mg at 05/19/19 0517    pregabalin (LYRICA) capsule 200 mg  200 mg Oral 3 times per day Ayaz Luna MD   200 mg at 05/19/19 0514    vancomycin (VANCOCIN) oral solution 500 mg  500 mg Oral 4 times per day Ayaz Luna MD   500 mg at 05/19/19 0515    lactobacillus (CULTURELLE) capsule 1 capsule  1 capsule Oral BID Olu Greene MD   1 capsule at 05/19/19 0820    morphine (PF) injection 2 mg  2 mg Intravenous Q4H PRN Ayaz Luna MD   2 mg at 05/13/19 1827    acetaminophen (TYLENOL) 160 MG/5ML solution 650 mg  650 mg Oral Q6H PRN Tika Payan MD   650 mg at 05/13/19 1509    chlorhexidine (PERIDEX) 0.12 % solution 15 mL  15 mL Mouth/Throat BID Kathleen Manzano MD   15 mL at 05/19/19 0820    sodium chloride flush 0.9 % injection 10 mL  10 mL Intravenous PRN Archie De La Cruz MD   10 mL at 05/11/19 0504    heparin flush 100 UNIT/ML injection 300 Units  3 mL Intravenous 2 times per day Archie De La Cruz MD   300 Units at 05/17/19 0855    heparin flush 100 UNIT/ML injection 300 Units  3 mL Intracatheter PRN Archie De La Cruz MD        [Held by provider] heparin (porcine) injection 5,000 Units  5,000 Units Subcutaneous TID Dequan Camacho MD   5,000 Units at 05/15/19 1946    vitamin D (ERGOCALCIFEROL) capsule 50,000 Units  50,000 Units Oral Weekly Tika Payan MD   Stopped at 05/08/19 2015    sodium chloride flush 0.9 % injection 10 mL  10 mL Intravenous 2 times per day Winsome Heller MD   10 mL at 05/19/19 0825    sodium chloride flush 0.9 % injection 10 mL  10 mL Intravenous PRN Winsome Heller MD   10 mL at 05/15/19 0304    magnesium hydroxide (MILK OF MAGNESIA) 400 MG/5ML suspension 30 mL  30 mL Oral Daily PRN Winsome Heller MD        ondansetron [facility]) injection 4 mg  4 mg Intravenous Q6H PRN Winsome Heller MD   4 mg at 05/15/19 2008    mineral oil-hydrophilic petrolatum (HYDROPHOR) ointment   Topical TID Neymar Fagan MD        And    mineral oil-hydrophilic petrolatum (HYDROPHOR) ointment   Topical TID PRN Neymar Fagan MD               REVIEW OF SYSTEMS:       CONSTITUTIONAL: Denies fever    RESPIRATORY : Reports cough   GASTROINTESTINAL:  Diarrhea , denies abdomen pain   GENITOURINARY:  No dysuria    INTEGUMENT:no rash   MUSCULOSKELETAL:  Weakness   NEUROLOGICAL:  awake and alert    Objective:    /88   Pulse 96   Temp 99.3 °F (37.4 °C) (Core)   Resp 20   Ht 5' (1.524 m)   Wt 205 lb 4.8 oz (93.1 kg)   SpO2 94%   BMI 40.09 kg/m²       General Appearance:    Awake and alert, no distress     Head:    Normocephalic, Aspiration   4. Leukocytosis - improved   5. Respiratory failure  s/p extubation     RECOMMENDATIONS:      1. Vancomycin 500 mg po q 6 hrs   2. Aztreonam 2 grams IV q 8 hrs    3.  Contact isolation         10:23 AM      5/19/2019

## 2019-05-19 NOTE — PROGRESS NOTES
Bony Alamo is a 59 y.o. right handed female    Neurology following for an acute encephalopathy and focal seizure    Presented on 5/7 with an AMS in the setting of 2 weeks of diarrheal illness-- on antibiotic therapy   Found to be in septic shock--with c-diff and  multi-system organ failure   Required intubation, abtx, and pressors--extubated  5/13   Had facial twitching/contorsion with R gaze for 20 sec  on 5/14   Was on Lyrica 600 mg at home but not started until  5/14 here    Seen in MICU    She is still encephalopathic---She seems a little more alert than previously. Will nod for me and follow some simple commands and speaking minimally. She will show me a thumbs up. She says she is \"feeling okay\".       Repeat EEG yesterday with worsening PLEDs on L--Keppra increased to 1G BID on 5/17/19   No further seizure activity reported      Hypocalcemia 8.2 and hypomagnesemia 1.5 today but otherwise medically stable    No chest pain or palpitations  No vertigo, lightheadedness or loss of consciousness  No itching or bruising appreciated    ROS otherwise negative     Current Facility-Administered Medications   Medication Dose Route Frequency Provider Last Rate Last Dose    magnesium sulfate 3 g in dextrose 5 % 100 mL IVPB  3 g Intravenous Once Herminialino Lima., DO 25 mL/hr at 05/19/19 0809 3 g at 05/19/19 0809    sodium chloride (Inhalant) 3 % nebulizer solution 4 mL  4 mL Nebulization BID Barrington Hilliard MD   4 mL at 05/19/19 0845    albuterol (ACCUNEB) nebulizer solution 0.63 mg  0.63 mg Nebulization 4x daily Barrington Hilliard MD   0.63 mg at 05/19/19 0845    magnesium sulfate 2 g in 50 mL IVPB premix  2 g Intravenous Once Julian Hamilton MD        levetiracetam (KEPPRA) 500 mg/100 mL IVPB  1,000 mg Intravenous BID OSMANI Delgado - CNP   1,000 mg at 05/19/19 0841    levothyroxine (SYNTHROID) injection 25 mcg  25 mcg Intravenous Daily Chapincito Thomas MD   25 mcg at 05/19/19 0824    famotidine Aleksandr Suh MD   10 mL at 05/15/19 0304    magnesium hydroxide (MILK OF MAGNESIA) 400 MG/5ML suspension 30 mL  30 mL Oral Daily PRN Heidi Lima MD        ondansetron [facility] injection 4 mg  4 mg Intravenous Q6H PRN Heidi Lima MD   4 mg at 05/15/19 2008    mineral oil-hydrophilic petrolatum (HYDROPHOR) ointment   Topical TID Berna Loving MD        And    mineral oil-hydrophilic petrolatum (HYDROPHOR) ointment   Topical TID PRN Berna Loving MD           Objective:     /88   Pulse 96   Temp 99.7 °F (37.6 °C)   Resp 20   Ht 5' (1.524 m)   Wt 205 lb 4.8 oz (93.1 kg)   SpO2 95%   BMI 40.09 kg/m²     General: awake, in no acute distress; appears stated age; lying in bed  Head:  Normocephalic and atraumatic--NGT in place  ENT: dry, cracked lips and oral mucosa again today  Eyes: b/l scleral injection but no drainage  Lungs: upper airway congestion and moist coughing--resps nonlabored  Heart: tachycardic rate and rhythm  Extremities: 1+ edema to hands and ankles; no cyanosis  Pulses: 2+ throughout  Skin: No lesions or rashes     Mental Status: alert, Only verbal output today \"feeling okay\". Would not tell me her name.  will follow a few simple commands with much verbal direction and passive demonstration    Cranial Nerves:  I: smell NA   II: visual acuity  NA   II: visual fields B/l threat   II: pupils BOB   III,VII: ptosis None   III,IV,VI: extraocular muscles  EOMI without nystagmus   V: mastication Normal   V: facial light touch sensation     V,VII: corneal reflex     VII: facial muscle function - upper     VII: facial muscle function - lower Symmetric   VIII: hearing Normal   IX: soft palate elevation     IX,X: gag reflex    XI: trapezius strength  4/5   XI: sternocleidomastoid strength 4/5   XI: neck extension strength  4/5   XII: tongue strength  Normal       Motor:  Holds L arm against gravity and  strength 5/5 on L   Will not hold R arm against gravity or squeeze my hand today   Wiggles both toes for me but will not hold legs against gravity   Obese bulk and normal tone  No abnormal movements or tremors    Sensory:  Patient nods yes to LT in all limbs    DTR:   No Haji's    No Babinski    No pathological reflexes    Laboratory/Radiology:     Lab Results   Component Value Date    WBC 6.3 05/19/2019    HGB 10.7 (L) 05/19/2019    HCT 32.8 (L) 05/19/2019    .9 (H) 05/19/2019     05/19/2019     Lab Results   Component Value Date     05/19/2019    K 3.6 05/19/2019    CL 97 (L) 05/19/2019    CO2 28 05/19/2019    BUN 5 (L) 05/19/2019    CREATININE 0.4 (L) 05/19/2019    GLUCOSE 127 (H) 05/19/2019    CALCIUM 8.2 (L) 05/19/2019    PROT 5.9 (L) 05/19/2019    LABALBU 2.7 (L) 05/19/2019    BILITOT 0.6 05/19/2019    ALKPHOS 44 05/19/2019    AST 24 05/19/2019    ALT 27 05/19/2019    LABGLOM >60 05/19/2019    GFRAA >60 05/19/2019     LP: WBC 3 and protein normal  Cryptococci neg    Final bacterial and viral CSF studies pending    MRI brain: no acute events    R/P EEG 5/17 highly epileptogenic focus in the left hemisphere. Also it shows a moderate diffuse encephalopathy. Compared to records from yesterday, a worsening in epileptogencity is seen     All labs and imaging studies reviewed independently today. 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  Worsening PLEDs on L--- await repeat EEG    Will monitor clinically on Keppra increase-- repeat EEG today     Acute encephalopathy   Secondary to metabolic disarray, sepsis, and PLEDs   No evidence of CNS infection on initial LP---final  studies still pending   ID and renal following    Plan:     Continue Keppra 1G BID    Repeat EEG today     F/u final CSF studies    Continue Pregabalin 200 mg TID     Will follow    Raya Gtz PA-C  9:26 AM  5/19/2019

## 2019-05-20 ENCOUNTER — APPOINTMENT (OUTPATIENT)
Dept: GENERAL RADIOLOGY | Age: 59
DRG: 870 | End: 2019-05-20
Payer: COMMERCIAL

## 2019-05-20 ENCOUNTER — APPOINTMENT (OUTPATIENT)
Dept: NEUROLOGY | Age: 59
DRG: 870 | End: 2019-05-20
Payer: COMMERCIAL

## 2019-05-20 PROBLEM — G93.41 METABOLIC ENCEPHALOPATHY: Status: ACTIVE | Noted: 2019-05-20

## 2019-05-20 PROBLEM — A04.72 CLOSTRIDIUM DIFFICILE COLITIS: Status: ACTIVE | Noted: 2019-05-20

## 2019-05-20 PROBLEM — E43 SEVERE PROTEIN-CALORIE MALNUTRITION (HCC): Status: ACTIVE | Noted: 2019-05-20

## 2019-05-20 PROBLEM — N17.9 ACUTE KIDNEY INJURY (HCC): Status: ACTIVE | Noted: 2019-05-20

## 2019-05-20 PROBLEM — E66.9 OBESITY (BMI 30-39.9): Chronic | Status: ACTIVE | Noted: 2019-05-20

## 2019-05-20 PROBLEM — J96.01 ACUTE RESPIRATORY FAILURE WITH HYPOXIA (HCC): Status: ACTIVE | Noted: 2019-05-20

## 2019-05-20 PROBLEM — I10 ESSENTIAL HYPERTENSION: Chronic | Status: ACTIVE | Noted: 2019-05-20

## 2019-05-20 PROBLEM — E78.5 HYPERLIPIDEMIA LDL GOAL <100: Chronic | Status: ACTIVE | Noted: 2019-05-20

## 2019-05-20 PROBLEM — G93.41 ENCEPHALOPATHY IN SEPSIS: Status: RESOLVED | Noted: 2019-05-18 | Resolved: 2019-05-20

## 2019-05-20 PROBLEM — E03.9 ACQUIRED HYPOTHYROIDISM: Chronic | Status: ACTIVE | Noted: 2019-05-20

## 2019-05-20 PROBLEM — G89.4 CHRONIC PAIN SYNDROME: Status: RESOLVED | Noted: 2017-10-04 | Resolved: 2019-05-20

## 2019-05-20 PROBLEM — K92.2 GI BLEED: Status: ACTIVE | Noted: 2019-05-20

## 2019-05-20 LAB
ALBUMIN SERPL-MCNC: 2.7 G/DL (ref 3.5–5.2)
ALP BLD-CCNC: 44 U/L (ref 35–104)
ALT SERPL-CCNC: 24 U/L (ref 0–32)
ANION GAP SERPL CALCULATED.3IONS-SCNC: 10 MMOL/L (ref 7–16)
AST SERPL-CCNC: 31 U/L (ref 0–31)
BASOPHILS ABSOLUTE: 0.04 E9/L (ref 0–0.2)
BASOPHILS RELATIVE PERCENT: 0.6 % (ref 0–2)
BILIRUB SERPL-MCNC: 0.6 MG/DL (ref 0–1.2)
BILIRUBIN DIRECT: <0.2 MG/DL (ref 0–0.3)
BILIRUBIN, INDIRECT: ABNORMAL MG/DL (ref 0–1)
BUN BLDV-MCNC: 6 MG/DL (ref 8–23)
CALCIUM SERPL-MCNC: 8.6 MG/DL (ref 8.6–10.2)
CHLORIDE BLD-SCNC: 100 MMOL/L (ref 98–107)
CO2: 23 MMOL/L (ref 22–29)
CREAT SERPL-MCNC: 0.4 MG/DL (ref 0.5–1)
CSF CULTURE: NORMAL
EOSINOPHILS ABSOLUTE: 0.14 E9/L (ref 0.05–0.5)
EOSINOPHILS RELATIVE PERCENT: 2.2 % (ref 0–6)
GFR AFRICAN AMERICAN: >60
GFR NON-AFRICAN AMERICAN: >60 ML/MIN/1.73
GLUCOSE BLD-MCNC: 114 MG/DL (ref 74–99)
GRAM STAIN RESULT: NORMAL
HCT VFR BLD CALC: 33.3 % (ref 34–48)
HEMOGLOBIN: 10.7 G/DL (ref 11.5–15.5)
HERPES SIMPLEX VIRUS BY PCR: NOT DETECTED
HSV SOURCE: NORMAL
IMMATURE GRANULOCYTES #: 0.08 E9/L
IMMATURE GRANULOCYTES %: 1.3 % (ref 0–5)
INR BLD: 1.2
LYMPHOCYTES ABSOLUTE: 1.55 E9/L (ref 1.5–4)
LYMPHOCYTES RELATIVE PERCENT: 24.8 % (ref 20–42)
MAGNESIUM: 1.7 MG/DL (ref 1.6–2.6)
MCH RBC QN AUTO: 33.2 PG (ref 26–35)
MCHC RBC AUTO-ENTMCNC: 32.1 % (ref 32–34.5)
MCV RBC AUTO: 103.4 FL (ref 80–99.9)
METER GLUCOSE: 104 MG/DL (ref 74–99)
METER GLUCOSE: 106 MG/DL (ref 74–99)
METER GLUCOSE: 109 MG/DL (ref 74–99)
MONOCYTES ABSOLUTE: 0.67 E9/L (ref 0.1–0.95)
MONOCYTES RELATIVE PERCENT: 10.7 % (ref 2–12)
NEUTROPHILS ABSOLUTE: 3.77 E9/L (ref 1.8–7.3)
NEUTROPHILS RELATIVE PERCENT: 60.4 % (ref 43–80)
PDW BLD-RTO: 15.3 FL (ref 11.5–15)
PHOSPHORUS: 2.6 MG/DL (ref 2.5–4.5)
PLATELET # BLD: 343 E9/L (ref 130–450)
PMV BLD AUTO: 11.2 FL (ref 7–12)
POTASSIUM REFLEX MAGNESIUM: 4 MMOL/L (ref 3.5–5)
PROTHROMBIN TIME: 14 SEC (ref 9.3–12.4)
RBC # BLD: 3.22 E12/L (ref 3.5–5.5)
SODIUM BLD-SCNC: 133 MMOL/L (ref 132–146)
TOTAL PROTEIN: 5.8 G/DL (ref 6.4–8.3)
WBC # BLD: 6.3 E9/L (ref 4.5–11.5)

## 2019-05-20 PROCEDURE — 6370000000 HC RX 637 (ALT 250 FOR IP): Performed by: INTERNAL MEDICINE

## 2019-05-20 PROCEDURE — 80076 HEPATIC FUNCTION PANEL: CPT

## 2019-05-20 PROCEDURE — 95816 EEG AWAKE AND DROWSY: CPT

## 2019-05-20 PROCEDURE — 6360000002 HC RX W HCPCS: Performed by: INTERNAL MEDICINE

## 2019-05-20 PROCEDURE — 6360000002 HC RX W HCPCS: Performed by: NURSE PRACTITIONER

## 2019-05-20 PROCEDURE — 85610 PROTHROMBIN TIME: CPT

## 2019-05-20 PROCEDURE — 6360000002 HC RX W HCPCS: Performed by: PSYCHIATRY & NEUROLOGY

## 2019-05-20 PROCEDURE — 85025 COMPLETE CBC W/AUTO DIFF WBC: CPT

## 2019-05-20 PROCEDURE — 71045 X-RAY EXAM CHEST 1 VIEW: CPT

## 2019-05-20 PROCEDURE — 36415 COLL VENOUS BLD VENIPUNCTURE: CPT

## 2019-05-20 PROCEDURE — 2580000003 HC RX 258: Performed by: PSYCHIATRY & NEUROLOGY

## 2019-05-20 PROCEDURE — 2500000003 HC RX 250 WO HCPCS: Performed by: INTERNAL MEDICINE

## 2019-05-20 PROCEDURE — 83735 ASSAY OF MAGNESIUM: CPT

## 2019-05-20 PROCEDURE — 80048 BASIC METABOLIC PNL TOTAL CA: CPT

## 2019-05-20 PROCEDURE — C9254 INJECTION, LACOSAMIDE: HCPCS | Performed by: PSYCHIATRY & NEUROLOGY

## 2019-05-20 PROCEDURE — 2700000000 HC OXYGEN THERAPY PER DAY

## 2019-05-20 PROCEDURE — 94669 MECHANICAL CHEST WALL OSCILL: CPT

## 2019-05-20 PROCEDURE — 84100 ASSAY OF PHOSPHORUS: CPT

## 2019-05-20 PROCEDURE — 94640 AIRWAY INHALATION TREATMENT: CPT

## 2019-05-20 PROCEDURE — 2580000003 HC RX 258: Performed by: INTERNAL MEDICINE

## 2019-05-20 PROCEDURE — 2060000000 HC ICU INTERMEDIATE R&B

## 2019-05-20 PROCEDURE — 82962 GLUCOSE BLOOD TEST: CPT

## 2019-05-20 PROCEDURE — 99233 SBSQ HOSP IP/OBS HIGH 50: CPT | Performed by: NURSE PRACTITIONER

## 2019-05-20 RX ORDER — MAGNESIUM SULFATE 1 G/100ML
1 INJECTION INTRAVENOUS ONCE
Status: COMPLETED | OUTPATIENT
Start: 2019-05-20 | End: 2019-05-20

## 2019-05-20 RX ADMIN — METOPROLOL TARTRATE 2.5 MG: 1 INJECTION, SOLUTION INTRAVENOUS at 12:33

## 2019-05-20 RX ADMIN — PREGABALIN 200 MG: 100 CAPSULE ORAL at 20:40

## 2019-05-20 RX ADMIN — AZTREONAM 2 G: 2 INJECTION, POWDER, LYOPHILIZED, FOR SOLUTION INTRAMUSCULAR; INTRAVENOUS at 10:12

## 2019-05-20 RX ADMIN — PETROLATUM: 42 OINTMENT TOPICAL at 20:46

## 2019-05-20 RX ADMIN — SODIUM CHLORIDE SOLN NEBU 3% 4 ML: 3 NEBU SOLN at 21:04

## 2019-05-20 RX ADMIN — FAMOTIDINE 20 MG: 10 INJECTION INTRAVENOUS at 20:40

## 2019-05-20 RX ADMIN — FAMOTIDINE 20 MG: 10 INJECTION INTRAVENOUS at 08:21

## 2019-05-20 RX ADMIN — HEPARIN SODIUM 5000 UNITS: 10000 INJECTION INTRAVENOUS; SUBCUTANEOUS at 08:15

## 2019-05-20 RX ADMIN — Medication 10 ML: at 08:21

## 2019-05-20 RX ADMIN — AZTREONAM 2 G: 2 INJECTION, POWDER, LYOPHILIZED, FOR SOLUTION INTRAMUSCULAR; INTRAVENOUS at 17:42

## 2019-05-20 RX ADMIN — ACETAMINOPHEN 650 MG: 160 SOLUTION ORAL at 17:54

## 2019-05-20 RX ADMIN — HEPARIN SODIUM 5000 UNITS: 10000 INJECTION INTRAVENOUS; SUBCUTANEOUS at 20:40

## 2019-05-20 RX ADMIN — ALBUTEROL SULFATE 0.63 MG: 0.63 SOLUTION RESPIRATORY (INHALATION) at 14:04

## 2019-05-20 RX ADMIN — ALBUTEROL SULFATE 0.63 MG: 0.63 SOLUTION RESPIRATORY (INHALATION) at 21:04

## 2019-05-20 RX ADMIN — LEVETIRACETAM 1000 MG: 5 INJECTION INTRAVENOUS at 08:33

## 2019-05-20 RX ADMIN — AZTREONAM 2 G: 2 INJECTION, POWDER, LYOPHILIZED, FOR SOLUTION INTRAMUSCULAR; INTRAVENOUS at 01:22

## 2019-05-20 RX ADMIN — SODIUM CHLORIDE SOLN NEBU 3% 4 ML: 3 NEBU SOLN at 09:52

## 2019-05-20 RX ADMIN — Medication 500 MG: at 06:10

## 2019-05-20 RX ADMIN — CHLORHEXIDINE GLUCONATE 0.12% ORAL RINSE 15 ML: 1.2 LIQUID ORAL at 20:47

## 2019-05-20 RX ADMIN — POTASSIUM CHLORIDE: 2 INJECTION, SOLUTION, CONCENTRATE INTRAVENOUS at 06:19

## 2019-05-20 RX ADMIN — PETROLATUM: 42 OINTMENT TOPICAL at 09:14

## 2019-05-20 RX ADMIN — Medication 500 MG: at 17:54

## 2019-05-20 RX ADMIN — Medication 10 ML: at 20:40

## 2019-05-20 RX ADMIN — Medication 500 MG: at 12:32

## 2019-05-20 RX ADMIN — Medication 1 CAPSULE: at 20:46

## 2019-05-20 RX ADMIN — LEVETIRACETAM 1000 MG: 5 INJECTION INTRAVENOUS at 20:40

## 2019-05-20 RX ADMIN — DEXTROSE MONOHYDRATE 100 MG: 50 INJECTION, SOLUTION INTRAVENOUS at 10:08

## 2019-05-20 RX ADMIN — HEPARIN SODIUM 5000 UNITS: 10000 INJECTION INTRAVENOUS; SUBCUTANEOUS at 13:49

## 2019-05-20 RX ADMIN — CHLORHEXIDINE GLUCONATE 0.12% ORAL RINSE 15 ML: 1.2 LIQUID ORAL at 08:21

## 2019-05-20 RX ADMIN — PETROLATUM: 42 OINTMENT TOPICAL at 13:47

## 2019-05-20 RX ADMIN — PREGABALIN 200 MG: 100 CAPSULE ORAL at 06:09

## 2019-05-20 RX ADMIN — PREGABALIN 200 MG: 100 CAPSULE ORAL at 13:41

## 2019-05-20 RX ADMIN — ALBUTEROL SULFATE 0.63 MG: 0.63 SOLUTION RESPIRATORY (INHALATION) at 17:27

## 2019-05-20 RX ADMIN — METOPROLOL TARTRATE 2.5 MG: 1 INJECTION, SOLUTION INTRAVENOUS at 01:19

## 2019-05-20 RX ADMIN — ALBUTEROL SULFATE 0.63 MG: 0.63 SOLUTION RESPIRATORY (INHALATION) at 09:52

## 2019-05-20 RX ADMIN — Medication 1 CAPSULE: at 08:21

## 2019-05-20 RX ADMIN — METOPROLOL TARTRATE 2.5 MG: 1 INJECTION, SOLUTION INTRAVENOUS at 17:44

## 2019-05-20 RX ADMIN — LEVOTHYROXINE SODIUM ANHYDROUS 25 MCG: 100 INJECTION, POWDER, LYOPHILIZED, FOR SOLUTION INTRAVENOUS at 08:23

## 2019-05-20 RX ADMIN — MAGNESIUM SULFATE HEPTAHYDRATE 1 G: 1 INJECTION, SOLUTION INTRAVENOUS at 13:41

## 2019-05-20 RX ADMIN — DEXTROSE MONOHYDRATE 100 MG: 50 INJECTION, SOLUTION INTRAVENOUS at 22:35

## 2019-05-20 RX ADMIN — METOPROLOL TARTRATE 2.5 MG: 1 INJECTION, SOLUTION INTRAVENOUS at 06:10

## 2019-05-20 ASSESSMENT — PAIN SCALES - GENERAL
PAINLEVEL_OUTOF10: 0
PAINLEVEL_OUTOF10: 0
PAINLEVEL_OUTOF10: 9
PAINLEVEL_OUTOF10: 0
PAINLEVEL_OUTOF10: 10
PAINLEVEL_OUTOF10: 0

## 2019-05-20 ASSESSMENT — PAIN DESCRIPTION - LOCATION: LOCATION: BACK

## 2019-05-20 ASSESSMENT — PAIN DESCRIPTION - ONSET: ONSET: GRADUAL

## 2019-05-20 ASSESSMENT — PAIN DESCRIPTION - FREQUENCY: FREQUENCY: CONTINUOUS

## 2019-05-20 ASSESSMENT — PAIN DESCRIPTION - DESCRIPTORS: DESCRIPTORS: ACHING;CONSTANT

## 2019-05-20 ASSESSMENT — PAIN DESCRIPTION - ORIENTATION: ORIENTATION: RIGHT;LEFT

## 2019-05-20 ASSESSMENT — PAIN DESCRIPTION - PAIN TYPE: TYPE: CHRONIC PAIN

## 2019-05-20 NOTE — PROGRESS NOTES
**AND** mineral oil-hydrophilic petrolatum    DATA:    CBC with Differential:    Lab Results   Component Value Date    WBC 6.3 05/20/2019    RBC 3.22 05/20/2019    HGB 10.7 05/20/2019    HCT 33.3 05/20/2019     05/20/2019    .4 05/20/2019    MCH 33.2 05/20/2019    MCHC 32.1 05/20/2019    RDW 15.3 05/20/2019    NRBC 0.9 05/10/2019    LYMPHOPCT 24.8 05/20/2019    PROMYELOPCT 0.9 05/08/2019    MONOPCT 10.7 05/20/2019    BASOPCT 0.6 05/20/2019    MONOSABS 0.67 05/20/2019    LYMPHSABS 1.55 05/20/2019    EOSABS 0.14 05/20/2019    BASOSABS 0.04 05/20/2019     CMP:    Lab Results   Component Value Date     05/20/2019    K 4.0 05/20/2019     05/20/2019    CO2 23 05/20/2019    BUN 6 05/20/2019    CREATININE 0.4 05/20/2019    GFRAA >60 05/20/2019    LABGLOM >60 05/20/2019    GLUCOSE 114 05/20/2019    PROT 5.8 05/20/2019    LABALBU 2.7 05/20/2019    LABALBU 4.4 08/09/2011    CALCIUM 8.6 05/20/2019    BILITOT 0.6 05/20/2019    ALKPHOS 44 05/20/2019    AST 31 05/20/2019    ALT 24 05/20/2019     BMP:    Lab Results   Component Value Date     05/20/2019    K 4.0 05/20/2019     05/20/2019    CO2 23 05/20/2019    BUN 6 05/20/2019    LABALBU 2.7 05/20/2019    LABALBU 4.4 08/09/2011    CREATININE 0.4 05/20/2019    CALCIUM 8.6 05/20/2019    GFRAA >60 05/20/2019    LABGLOM >60 05/20/2019    GLUCOSE 114 05/20/2019     Calcium:    Lab Results   Component Value Date    CALCIUM 8.6 05/20/2019     Ionized Calcium:  No results found for: IONCA  Magnesium:    Lab Results   Component Value Date    MG 1.7 05/20/2019     Phosphorus:    Lab Results   Component Value Date    PHOS 2.6 05/20/2019         Urine studies:  Urine sodium: 22  Urine chloride: <20  Urine creatinine: 100  Urine urea: 251  Urine potassium: 48.2    Fraction excretion of sodium: 0.5%  Fraction excretion of urea: 12.6%    Vitamin D 25 level: 13 (low)      Radiology Review:       Chest Xray May 16, 2019   1.  Multifocal airspace disease most likely suspicious for a multifocal   infiltrate/pneumonia with underlying pulmonary edema and a right   pleural effusion. Interval placement NG tube without identification of   the distal tibia. BRIEF SUMMARY OF INITIAL CONSULT:  Briefly, Mrs. Emmett Saucedo is a 78-year-old female with h/o of hypothyroidism, lumbar radiculopathy s/p laminectomy, HLD, asthma, was brought to the ED for worsening lethargy. She was recently treated for lower extremity cellulitis with clindamycin and then levofloxacin around 2-3 weeks ago. She developed profuse diarrhea shortly thereafter. Family reported some vomiting initially and poor oral intake. Over the past 3 days, she became severely weak and dehydrated. At the ED, she was in shock requiring vasopressor despite aggressive fluid resuscitation and was intubated for airway protection. Labs were notable for creatinine of 4.2 mg/dL, BUN of 73 mg/dL, lactic acid of 3.4, sodium of 129 mmol/L, liver enzymes were also markedly elevated, reasons for this consult. Of note, she has no known history of kidney disease. She was on furosemide at home, but has not been taken since the onset of diarrhea. Problems resolved:    · Hyponatremia, multifactorial, due to hypovolemia and decreased GFR, resolved  · Hypoglycemia secondary to #6, resolved  · Coagulopathy, high PT/INR, resolved  · S/p Hemodynamic shock, hypovolemic and septic shock, melena likely improving with decreasing dose of pressors  · STEFANIA, stage 3, volume responsive pre-renal STEFANIA  (diarrhea, poor oral intake, shock), FeNa 0.5% , FeUrea 12%. Resolved. · Hypomagnesemia  · S/p Respiratory failure, status post extubation; chest x-ray worsening pleural effusion  · s/pShock liver   · Hypernatremia, likely 2/2 diuresis. · Hyperchloremia, probably multifactorial, 2/2 diuresis and diarrhea. · Hypokalemia, multifactorial, diuretics, diarrhea. Improving. IMPRESSION/RECOMMENDATIONS:         1.  Hypocalcemia, probably multifactorial, including vitamin D deficiency and hypomagnesemia. Continue ergocalciferol, resolved. 2. Hypophosphatemia, secondary to vitamin D deficiency and poor oral intake. Corrected    3. Hypomagnesemia, probably multifactorial, 2/2 poor oral intake, diuretics. Corrected  4. HFpEF 60%  5. Aspiration pneumonia, on aztreonam  6. C. difficile infection, on vancomycin    7. Severe hypoalbuminemia, multifactorial  8.  Nutrition, TF at 20 cc/hour, free water at 30 cc every 4 hours     Plan:    · Discontinue IV fluids  · Replace magnesium  · Continue to monitor renal function       Kendall Walter M.D     5/20/2019

## 2019-05-20 NOTE — PROCEDURES
EEG Report  Sydnie Rincon is a 59 y.o. female      Appointment Date 5/20/2019   Appointment Time 11:00am   Facility Location [facility] EEG Number [id number]   Type of Study Routine Floor 4409-A     Technical Specifications  Technician [facility] of consciousness Awake   Sleep deprived? No   Hyperventilation tested? No   Photic stim tested? Yes   EEG recording Standard 10-20 electrode placement    Duration of recording 26.53   EEG complete?  Yes       Clinical History  seizures    Medications    Current Facility-Administered Medications:     lacosamide (VIMPAT) 100 mg in dextrose 5 % 50 mL IVPB, 100 mg, Intravenous, BID, Luci Bates MD, Stopped at 05/20/19 1046    sodium chloride (Inhalant) 3 % nebulizer solution 4 mL, 4 mL, Nebulization, BID, Dwight Sutherland MD, 4 mL at 05/20/19 0952    albuterol (ACCUNEB) nebulizer solution 0.63 mg, 0.63 mg, Nebulization, 4x daily, Dwight Sutherland MD, 0.63 mg at 05/20/19 8073    magnesium sulfate 2 g in 50 mL IVPB premix, 2 g, Intravenous, Once, Jere Jernigan MD    levetiracetam (KEPPRA) 500 mg/100 mL IVPB, 1,000 mg, Intravenous, BID, Carlito Chappell APRN - CNP, 1,000 mg at 05/20/19 5228    levothyroxine (SYNTHROID) injection 25 mcg, 25 mcg, Intravenous, Daily, Terra De La Fuente MD, 25 mcg at 05/20/19 0823    famotidine (PEPCID) injection 20 mg, 20 mg, Intravenous, BID, Paola Allan MD, 20 mg at 05/20/19 0821    potassium chloride 20 mEq in dextrose 5 % 1,000 mL infusion, , Intravenous, Continuous, Jere Jernigan MD, Last Rate: 50 mL/hr at 05/20/19 0619    aztreonam (AZACTAM) 2 g IVPB mini-bag, 2 g, Intravenous, Q8H, Olu Greene MD, Stopped at 05/20/19 1046    metoprolol (LOPRESSOR) injection 2.5 mg, 2.5 mg, Intravenous, Q6H, Mj Barker MD, 2.5 mg at 05/20/19 1233    pregabalin (LYRICA) capsule 200 mg, 200 mg, Oral, 3 times per day, Paola Allan MD, 200 mg at 05/20/19 0609    vancomycin (VANCOCIN) oral solution 500 mg, 500 mg, Oral, 4 times per day, Lexis Irizarry MD, 500 mg at 05/20/19 1232    lactobacillus (CULTURELLE) capsule 1 capsule, 1 capsule, Oral, BID, Olu Greene MD, 1 capsule at 05/20/19 0821    morphine (PF) injection 2 mg, 2 mg, Intravenous, Q4H PRN, Lexis Irizarry MD, 2 mg at 05/13/19 1827    acetaminophen (TYLENOL) 160 MG/5ML solution 650 mg, 650 mg, Oral, Q6H PRN, Ruba Gilbert MD, 650 mg at 05/13/19 1509    chlorhexidine (PERIDEX) 0.12 % solution 15 mL, 15 mL, Mouth/Throat, BID, Kathleen Hutchinson MD, 15 mL at 05/20/19 0821    sodium chloride flush 0.9 % injection 10 mL, 10 mL, Intravenous, PRN, Loc Parra MD, 10 mL at 05/11/19 0504    heparin flush 100 UNIT/ML injection 300 Units, 3 mL, Intravenous, 2 times per day, Loc Parra MD, Stopped at 05/19/19 2250    heparin flush 100 UNIT/ML injection 300 Units, 3 mL, Intracatheter, PRN, Loc Parra MD    heparin (porcine) injection 5,000 Units, 5,000 Units, Subcutaneous, TID, Dequan Camacho MD, 5,000 Units at 05/20/19 0815    vitamin D (ERGOCALCIFEROL) capsule 50,000 Units, 50,000 Units, Oral, Weekly, Ruba Gilbert MD, Stopped at 05/08/19 2015    sodium chloride flush 0.9 % injection 10 mL, 10 mL, Intravenous, 2 times per day, Uma Katz MD, 10 mL at 05/20/19 8028    sodium chloride flush 0.9 % injection 10 mL, 10 mL, Intravenous, PRN, Uma Katz MD, 10 mL at 05/15/19 0304    magnesium hydroxide (MILK OF MAGNESIA) 400 MG/5ML suspension 30 mL, 30 mL, Oral, Daily PRN, Uma Katz MD    ondansetron [facility]) injection 4 mg, 4 mg, Intravenous, Q6H PRN, Uma Katz MD, 4 mg at 05/15/19 2008    mineral oil-hydrophilic petrolatum (HYDROPHOR) ointment, , Topical, TID **AND** mineral oil-hydrophilic petrolatum (HYDROPHOR) ointment, , Topical, TID PRN, Katherin Jarrell MD        Physician Interpretation    General EEG Report  There are continuous PLEDs in the left hemisphere.  Diffuse midamplitude alpha is seen on the right    Type of EEG >>  Routine, abnormal III

## 2019-05-20 NOTE — CARE COORDINATION
Social work went to patient room to follow up. Patient active in care and no family present. FRANKLYN called spouse over phone. Discussed LTAC, would like [facility]. FRANKLYN made Ashley with [facility] aware. Awaiting if accept. Electronically signed by FAROOQ Mccloud on 5/20/2019 at 9:27 AM    Spoke with Ashley, able to accept and start precert today.   Electronically signed by FAROOQ Mccloud on 5/20/2019 at 9:35 AM

## 2019-05-20 NOTE — PROGRESS NOTES
Sierra Chase is a 59 y.o. right handed female    Neurology following for an acute encephalopathy and focal seizure    Presented on 5/7 with an AMS in the setting of 2 weeks of diarrheal illness-- on antibiotic therapy   Found to be in septic shock--with c-diff and multi-system organ failure   Required intubation, abtx, and pressors--extubated  5/13   Had facial twitching/contorsion with R gaze for 20 sec on 5/14   Was on Lyrica 600 mg at home but not started until  5/14 here    She remains in [facility]    EEG Saturday and yesterday with worsening PLEDs on L--Keppra increased to 1G BID on 5/17/19  ---Started Vimpat 200 mg IV load x 1 and then 100 mg BID   ---No further seizure activity reported      No chest pain, palpitations, or shortness of breath  No vertigo, lightheadedness or loss of consciousness  No itching or bruising appreciated    ROS otherwise negative     Current Facility-Administered Medications   Medication Dose Route Frequency Provider Last Rate Last Dose    lacosamide (VIMPAT) 100 mg in dextrose 5 % 50 mL IVPB  100 mg Intravenous BID Lisa Monge MD   Stopped at 05/19/19 2330    sodium chloride (Inhalant) 3 % nebulizer solution 4 mL  4 mL Nebulization BID Berta Hook MD   4 mL at 05/19/19 2120    albuterol (ACCUNEB) nebulizer solution 0.63 mg  0.63 mg Nebulization 4x daily Berta Hook MD   0.63 mg at 05/19/19 2120    magnesium sulfate 2 g in 50 mL IVPB premix  2 g Intravenous Once Aubree Meneses MD        levetiracetam (KEPPRA) 500 mg/100 mL IVPB  1,000 mg Intravenous BID OSMANI Barahona - CNP   1,000 mg at 05/20/19 2087    levothyroxine (SYNTHROID) injection 25 mcg  25 mcg Intravenous Daily Hal Engle MD   25 mcg at 05/20/19 0823    famotidine (PEPCID) injection 20 mg  20 mg Intravenous BID Shira Glass MD   20 mg at 05/20/19 1322    potassium chloride 20 mEq in dextrose 5 % 1,000 mL infusion   Intravenous Continuous Aubree Meneses MD 50 mL/hr at 05/20/19 3294      aztreonam (AZACTAM) 2 g IVPB mini-bag  2 g Intravenous Q8H Olu Greene MD   Stopped at 05/20/19 0200    metoprolol (LOPRESSOR) injection 2.5 mg  2.5 mg Intravenous Q6H Mj Barker MD   2.5 mg at 05/20/19 0610    pregabalin (LYRICA) capsule 200 mg  200 mg Oral 3 times per day Cece Lara MD   200 mg at 05/20/19 0609    vancomycin (VANCOCIN) oral solution 500 mg  500 mg Oral 4 times per day Cece Lara MD   500 mg at 05/20/19 0610    lactobacillus (CULTURELLE) capsule 1 capsule  1 capsule Oral BID Stephanie Greene MD   1 capsule at 05/20/19 0821    morphine (PF) injection 2 mg  2 mg Intravenous Q4H PRN Cece Lara MD   2 mg at 05/13/19 1827    acetaminophen (TYLENOL) 160 MG/5ML solution 650 mg  650 mg Oral Q6H PRN Mendel Donna, MD   650 mg at 05/13/19 1509    chlorhexidine (PERIDEX) 0.12 % solution 15 mL  15 mL Mouth/Throat BID Kathleen Hutchinson MD   15 mL at 05/20/19 0821    sodium chloride flush 0.9 % injection 10 mL  10 mL Intravenous PRN Darlyn Cohen MD   10 mL at 05/11/19 0504    heparin flush 100 UNIT/ML injection 300 Units  3 mL Intravenous 2 times per day Darlyn Cohen MD   Stopped at 05/19/19 2250    heparin flush 100 UNIT/ML injection 300 Units  3 mL Intracatheter PRN Darlyn Cohen MD        heparin (porcine) injection 5,000 Units  5,000 Units Subcutaneous TID Dequan Camacho MD   5,000 Units at 05/20/19 0815    vitamin D (ERGOCALCIFEROL) capsule 50,000 Units  50,000 Units Oral Weekly Mendel Donna, MD   Stopped at 05/08/19 2015    sodium chloride flush 0.9 % injection 10 mL  10 mL Intravenous 2 times per day Linus Anaya MD   10 mL at 05/20/19 0821    sodium chloride flush 0.9 % injection 10 mL  10 mL Intravenous PRN Linus Anaya MD   10 mL at 05/15/19 0304    magnesium hydroxide (MILK OF MAGNESIA) 400 MG/5ML suspension 30 mL  30 mL Oral Daily PRN Linus Anaya MD        ondansetron [facility]) injection 4 mg  4 mg Intravenous Q6H PRN Linus Anaya MD   4 mg at 05/15/19 2008    mineral oil-hydrophilic petrolatum (HYDROPHOR) ointment   Topical TID Clinton Hart MD        And    mineral oil-hydrophilic petrolatum (HYDROPHOR) ointment   Topical TID PRN Clinton Hart MD           Objective:     /64   Pulse 114   Temp 99.5 °F (37.5 °C) (Core)   Resp 24   Ht 5' (1.524 m)   Wt 198 lb 4.8 oz (89.9 kg)   SpO2 95%   BMI 38.73 kg/m²     General: awake, in no acute distress; appears stated age; lying in bed  Head:  Normocephalic and atraumatic--NGT in place  ENT: dry, cracked lips and oral mucosa   Eyes: b/l scleral injection but no drainage  Lungs: upper airway congestion and moist coughing, rhonchi throughout--resps nonlabored  Heart: tachycardic rate and rhythm  Extremities: 1+ edema to hands and ankles; no cyanosis  Pulses: 2+ throughout  Skin: No lesions or rashes     Mental Status: alert and was able to state her name and she knew she was in the hospital but could not recall which one. She thought it was 0 and knew current president.     Cranial Nerves:  I: smell NA   II: visual acuity  NA   II: visual fields B/l threat   II: pupils BOB   III,VII: ptosis None   III,IV,VI: extraocular muscles  EOMI without nystagmus   V: mastication Normal   V: facial light touch sensation  Normal    V,VII: corneal reflex     VII: facial muscle function - upper     VII: facial muscle function - lower Symmetric   VIII: hearing Normal   IX: soft palate elevation     IX,X: gag reflex    XI: trapezius strength  4/5   XI: sternocleidomastoid strength 4/5   XI: neck extension strength  4/5   XII: tongue strength  Normal       Motor:  Holds L arm against gravity and  strength 5/5 on L   Will not hold R arm against gravity, squeezed with 3/5   Wiggles both toes for me but will not hold legs against gravity   ---was able to hold left leg up, held right leg up but not for long   Obese bulk and normal tone  No abnormal movements or tremors    Sensory:  Patient nods yes to LT in all limbs    DTR:   No Haji's    Babinski on left     No pathological reflexes    Laboratory/Radiology:     Lab Results   Component Value Date    WBC 6.3 05/20/2019    HGB 10.7 (L) 05/20/2019    HCT 33.3 (L) 05/20/2019    .4 (H) 05/20/2019     05/20/2019     Lab Results   Component Value Date     05/20/2019    K 4.0 05/20/2019     05/20/2019    CO2 23 05/20/2019    BUN 6 (L) 05/20/2019    CREATININE 0.4 (L) 05/20/2019    GLUCOSE 114 (H) 05/20/2019    CALCIUM 8.6 05/20/2019    PROT 5.8 (L) 05/20/2019    LABALBU 2.7 (L) 05/20/2019    BILITOT 0.6 05/20/2019    ALKPHOS 44 05/20/2019    AST 31 05/20/2019    ALT 24 05/20/2019    LABGLOM >60 05/20/2019    GFRAA >60 05/20/2019       Final bacterial and viral CSF studies pending  LP: WBC 3 and protein normal  Cryptococci neg  HSV/ PCR neg  VDRL non reactive   AFB stain neg   CSF cx neg   Glucose 59 WNL  WBC 3 increased  Neutrophils increased and lymphs decreased  Crypto AG neg    MRI brain: no acute events    R/P EEG 5/17 highly epileptogenic focus in the left hemisphere. Also it shows a moderate diffuse encephalopathy. Compared to records from yesterday, a worsening in epileptogencity is seen     R/P EEG 5/19 This EEG shows a highly epileptogenic focus in the left hemisphere. Also it shows a moderate diffuse encephalopathy. Compared to records from yesterday, a significant worsening in epileptogencity is seen. All labs and imaging studies reviewed independently today. 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  Worsening PLEDs on L--- await repeat  EEG    Will monitor clinically on Vimpat and Keppra-- repeat EEG today     Acute encephalopathy   Secondary to metabolic disarray, sepsis, and PLEDs   No evidence of CNS infection on initial LP---final studies still pending   ID and renal following    Plan:     Continue Keppra 1G BID  Continue Vimpat 100 mg BID  Repeat EEG today   CSF Fungus pending   VZV PCR pending   Lyme ABS pending   Cytology pending   Continue Pregabalin 200 mg TID   Will follow      OSMANI Hicks, CNP  9:36 AM  5/20/2019

## 2019-05-20 NOTE — PROGRESS NOTES
Subjective: The patient is awake. No problems overnight. Offers no complaints. Denies chest pain, shortness of breath, abdominal pain. Objective:    /83   Pulse 110   Temp 99.5 °F (37.5 °C) (Core)   Resp 27   Ht 5' (1.524 m)   Wt 198 lb 4.8 oz (89.9 kg)   SpO2 96%   BMI 38.73 kg/m²     Current medications that patient is taking have been reviewed. Heart:  RRR, no murmurs, gallops, or rubs.   Lungs:  CTA bilaterally, no wheeze, rales or rhonchi  Abd: bowel sounds present, soft, nontender, nondistended, no masses  Extrem:  No cyanosis or edema    CBC with Differential:    Lab Results   Component Value Date    WBC 6.3 05/20/2019    RBC 3.22 05/20/2019    HGB 10.7 05/20/2019    HCT 33.3 05/20/2019     05/20/2019    .4 05/20/2019    MCH 33.2 05/20/2019    MCHC 32.1 05/20/2019    RDW 15.3 05/20/2019    NRBC 0.9 05/10/2019    LYMPHOPCT 24.8 05/20/2019    PROMYELOPCT 0.9 05/08/2019    MONOPCT 10.7 05/20/2019    BASOPCT 0.6 05/20/2019    MONOSABS 0.67 05/20/2019    LYMPHSABS 1.55 05/20/2019    EOSABS 0.14 05/20/2019    BASOSABS 0.04 05/20/2019     CMP:    Lab Results   Component Value Date     05/20/2019    K 4.0 05/20/2019     05/20/2019    CO2 23 05/20/2019    BUN 6 05/20/2019    CREATININE 0.4 05/20/2019    GFRAA >60 05/20/2019    LABGLOM >60 05/20/2019    GLUCOSE 114 05/20/2019    PROT 5.8 05/20/2019    LABALBU 2.7 05/20/2019    LABALBU 4.4 08/09/2011    CALCIUM 8.6 05/20/2019    BILITOT 0.6 05/20/2019    ALKPHOS 44 05/20/2019    AST 31 05/20/2019    ALT 24 05/20/2019     BMP:    Lab Results   Component Value Date     05/20/2019    K 4.0 05/20/2019     05/20/2019    CO2 23 05/20/2019    BUN 6 05/20/2019    LABALBU 2.7 05/20/2019    LABALBU 4.4 08/09/2011    CREATININE 0.4 05/20/2019    CALCIUM 8.6 05/20/2019    GFRAA >60 05/20/2019    LABGLOM >60 05/20/2019    GLUCOSE 114 05/20/2019     Magnesium:    Lab Results   Component Value Date    MG 1.7 05/20/2019 Phosphorus:    Lab Results   Component Value Date    PHOS 2.6 05/20/2019     PT/INR:    Lab Results   Component Value Date    PROTIME 14.0 05/20/2019    INR 1.2 05/20/2019     PTT:    Lab Results   Component Value Date    APTT 39.3 05/07/2019   [APTT}     Assessment:    Patient Active Problem List   Diagnosis    Lumbar radiculopathy    Septic shock (HCC)    Seizure (HCC)    Obesity (BMI 30-39. 9)    Metabolic encephalopathy    GI bleed    Essential hypertension    Hyperlipidemia LDL goal <100    Acquired hypothyroidism    Acute kidney injury ([facility] [place] [address]    Acute respiratory failure with hypoxia (HCC)    Clostridium difficile colitis       Plan:  Stable. Secondary to C.diff colitis. Neurology on board. Adjusting anti-epileptics. Continue to encourage weight loss. Secondary to sepsis and seizures. Mental status improving. Secondary to colitis. Surgery on board. Blood pressure ok, continue current medications  Continue aggressive lipid therapy  Continue synthroid. Secondary to volume depletion from c diff and sepsis. Renal function improving. Appreciate nephrology assist.  Secondary to sepsis. Extubated. Continue aggressive pulmonary hygiene. Oral vancomycin.   Pt/Ot evaluations for discharge planning    Leyla Cardenas    12:32 PM  5/20/2019

## 2019-05-20 NOTE — PROGRESS NOTES
Nutrition Assessment (Enteral Nutrition)    Type and Reason for Visit: Reassess    Nutrition Summary: Pt declining from a nutritional standpoint now w/ severe malnutrition AEB <50% energy intake since admit, weight loss, & fat/muscle wasting 2/2 septic shock/respiratory failure complicated by UGIB & dysphagia s/p extubation. Pt tolerating trickle feed. Will provide goal TF rec to meet 100% estimated needs. Nutrition Recommendations: Continue NPO    Increase TF to goal rate: Semi-Elemental (Vital AF) @ 60 ml/hr. Will provide: 1440 ml tv, 108 gm pro, 1168 ml free water    Malnutrition Assessment:  · Malnutrition Status: Meets the criteria for severe malnutrition  · Context: Acute illness or injury  · Findings of the 6 clinical characteristics of malnutrition (Minimum of 2 out of 6 clinical characteristics is required to make the diagnosis of moderate or severe Protein Calorie Malnutrition based on AND/ASPEN Guidelines):  1. Energy Intake-Less than or equal to 50% of estimated energy requirement, Greater than or equal to 7 days    2. Weight Loss-5% loss or greater, (x 2 weeks)  3. Fat Loss-Mild subcutaneous fat loss, Orbital  4. Muscle Loss-Mild muscle mass loss, Temples (temporalis muscle)  5. Fluid Accumulation-Mild fluid accumulation, Extremities  6.  Strength-Not measured    Nutrition Risk Level:  Moderate    Nutrition Needs:  · Estimated Daily Total Kcal: 6058-0134 (MSJ REE 1395 x 1.3 SF)  · Estimated Daily Protein (g):  (1.8-2.2 g/kg )  · Estimated Daily Fluid (ml/day): per critical care management     Nutrition Diagnosis:   · Problem: Severe malnutrition, In context of acute illness or injury  · Etiology: related to Insufficient energy/nutrient consumption     Signs and symptoms:  as evidenced by Diet history of poor intake, Weight loss greater than or equal to 2% in 1 week, Mild muscle loss, Mild loss of subcutaneous fat, Localized or generalized fluid accumulation    Objective Information:  · Nutrition-Focused Physical Findings: Pt lethargic, nonverbal, s/p extubation, fluid bal WNL, +1/+2 edema, weakness, active BS, Cdiff/diarrhea improving, dysphagia, NGT w/ trickle feed      · Wound Type: Multiple, Deep Tissue Injury(abrasions )     · Current Nutrition Therapies:  · Oral Diet Orders: NPO   · Tube Feeding (TF) Orders:   · Feeding Route: Nasogastric  · Formula: Semi-elemental  · Rate (ml/hr):20 ml/hr     · Volume (ml/day): 480 ml tv   · Duration: Continuous   · Additives/Modulars:    · Water Flushes: 30 ml q 4 hr   · Current TF & Flush Orders Provides: 576 kcals, 36 gm pro, 389 ml free water   · Additional Calories: d/c      · Anthropometric Measures:  · Ht: 5' (152.4 cm)   · Current Body Wt: 198 lb (89.8 kg)(5/20 actual )  · Admission Body Wt: 209 lb (94.8 kg)(5/7 first measured )  · Usual Body Wt: (UTO no EMR hx on file )  · Weight Change: ~11lb (5%) wt loss x 2 weeks since admit    · Ideal Body Wt: 100 lb (45.4 kg), % Ideal Body 198%(.)  · BMI Classification: BMI 35.0 - 39.9 Obese Class II(.)    Nutrition Interventions:   Continued Inpatient Monitoring, Education not appropriate at this time, Coordination of Care    Nutrition Evaluation:   · Evaluation: Goals set   · Goals: Pt to tolerate TF at goal rate    · Monitoring: Nutrition Progression, TF Intake, TF Tolerance, Skin Integrity, Wound Healing, I&O, Mental Status/Confusion, Weight, Pertinent Labs, Monitor Bowel Function, Diarrhea      Electronically signed by Sierra Julian RD, LD on 5/20/19 at 3:35 PM    Contact Number: Ext [phone]

## 2019-05-20 NOTE — PROGRESS NOTES
P Quality Flow/Interdisciplinary Rounds Progress Note      Quality Flow Rounds held on May 20, 2019    Disciplines Attending: Dr. Lisa Marin, Nursing, Medical Residents, Pharmacy        Giovany Horner was admitted on 5/7/2019 12:26 AM    Anticipated Discharge Date:  Expected Discharge Date: 05/21/19    Disposition: Transfer out of ICU    Lorenzo Score:  Lorenzo Scale Score: 13    Readmission Risk              Risk of Unplanned Readmission:        17           Discussed patient goal for the day, patient clinical progression, and barriers to discharge. The following Goal(s) of the Day/Commitment(s) have been identified: Transfer to PICU or LTAC, continue treatment.       Robyn Betancourt  May 20, 2019

## 2019-05-20 NOTE — PROGRESS NOTES
C/C: C diff infection , aspiration pneumonia       Pt is awake and alert  Denies fever and chills  Reports cough, not able to expectorate   Mild abdomen pain   Loose stool X 1, but on tube feeding now  Afebrile        Current Facility-Administered Medications   Medication Dose Route Frequency Provider Last Rate Last Dose    lacosamide (VIMPAT) 100 mg in dextrose 5 % 50 mL IVPB  100 mg Intravenous BID Daryl Harvey  mL/hr at 05/20/19 1008 100 mg at 05/20/19 1008    sodium chloride (Inhalant) 3 % nebulizer solution 4 mL  4 mL Nebulization BID Irving Chavez MD   4 mL at 05/19/19 2120    albuterol (ACCUNEB) nebulizer solution 0.63 mg  0.63 mg Nebulization 4x daily Irving Chavez MD   0.63 mg at 05/19/19 2120    magnesium sulfate 2 g in 50 mL IVPB premix  2 g Intravenous Once Chadd Up MD        levetiracetam (KEPPRA) 500 mg/100 mL IVPB  1,000 mg Intravenous BID OSMANI [place] - CNP   1,000 mg at 05/20/19 7777    levothyroxine (SYNTHROID) injection 25 mcg  25 mcg Intravenous Daily Junior Li MD   25 mcg at 05/20/19 0823    famotidine (PEPCID) injection 20 mg  20 mg Intravenous BID Yessenia Hoff MD   20 mg at 05/20/19 5263    potassium chloride 20 mEq in dextrose 5 % 1,000 mL infusion   Intravenous Continuous Chadd Up MD 50 mL/hr at 05/20/19 0619      aztreonam (AZACTAM) 2 g IVPB mini-bag  2 g Intravenous Q8H Olu Greene  mL/hr at 05/20/19 1012 2 g at 05/20/19 1012    metoprolol (LOPRESSOR) injection 2.5 mg  2.5 mg Intravenous Q6H Mj Barker MD   2.5 mg at 05/20/19 0610    pregabalin (LYRICA) capsule 200 mg  200 mg Oral 3 times per day Yessenia Hoff MD   200 mg at 05/20/19 0609    vancomycin (VANCOCIN) oral solution 500 mg  500 mg Oral 4 times per day Yessenia Hoff MD   500 mg at 05/20/19 0610    lactobacillus (CULTURELLE) capsule 1 capsule  1 capsule Oral BID Rondal Mcardle Limbu, MD   1 capsule at 05/20/19 0821    morphine (PF) injection 2 mg  2 mg Intravenous Q4H PRN Appearance:    Awake and alert, no distress     Head:    Normocephalic, atraumatic   Eyes:    No pallor, no icterus,   Ears:    No obvious deformity or drainage. Nose:   No nasal drainage   Throat:   Mucosa dry, no oral thrush   Neck:   Supple, no lymphadenopathy   Lungs:     Bilateral coarse rhonchi    Heart:    Regular rate and rhythm   Abdomen:     Soft, bowel sounds present , loose stool     Extremities:   + edema, erythema + ,warm    Pulses:   Dorsalis pedis palpable    Skin:    No erythema       CBC with Differential:      Lab Results   Component Value Date    WBC 6.3 05/20/2019    RBC 3.22 05/20/2019    HGB 10.7 05/20/2019    HCT 33.3 05/20/2019     05/20/2019    .4 05/20/2019    MCH 33.2 05/20/2019    MCHC 32.1 05/20/2019    RDW 15.3 05/20/2019    NRBC 0.9 05/10/2019    LYMPHOPCT 24.8 05/20/2019    PROMYELOPCT 0.9 05/08/2019    MONOPCT 10.7 05/20/2019    BASOPCT 0.6 05/20/2019    MONOSABS 0.67 05/20/2019    LYMPHSABS 1.55 05/20/2019    EOSABS 0.14 05/20/2019    BASOSABS 0.04 05/20/2019       CMP:    Lab Results   Component Value Date     05/20/2019    K 4.0 05/20/2019     05/20/2019    CO2 23 05/20/2019    BUN 6 05/20/2019    CREATININE 0.4 05/20/2019    GFRAA >60 05/20/2019    LABGLOM >60 05/20/2019    GLUCOSE 114 05/20/2019    PROT 5.8 05/20/2019    LABALBU 2.7 05/20/2019    LABALBU 4.4 08/09/2011    CALCIUM 8.6 05/20/2019    BILITOT 0.6 05/20/2019    ALKPHOS 44 05/20/2019    AST 31 05/20/2019    ALT 24 05/20/2019       Hepatic Function Panel:    Lab Results   Component Value Date    ALKPHOS 44 05/20/2019    ALT 24 05/20/2019    AST 31 05/20/2019    PROT 5.8 05/20/2019    BILITOT 0.6 05/20/2019    BILIDIR <0.2 05/20/2019    IBILI see below 05/20/2019    LABALBU 2.7 05/20/2019    LABALBU 4.4 08/09/2011       MICROBIOLOGY:     Blood culture - neg to date  Tip cx - VRE , CONS    Urine cx - Candida         Radiology :     Chest x ray -  Bilateral infiltrates      IMPRESSION:      1.

## 2019-05-20 NOTE — PLAN OF CARE
Problem: Pain:  Goal: Pain level will decrease  Description  Pain level will decrease  Outcome: Met This Shift     Problem: Pain:  Goal: Control of acute pain  Description  Control of acute pain  Outcome: Met This Shift     Problem: Pain:  Goal: Control of chronic pain  Description  Control of chronic pain  Outcome: Met This Shift     Problem: Risk for Impaired Skin Integrity  Goal: Tissue integrity - skin and mucous membranes  Description  Structural intactness and normal physiological function of skin and  mucous membranes.   Outcome: Met This Shift     Problem: Falls - Risk of:  Goal: Will remain free from falls  Description  Will remain free from falls  Outcome: Met This Shift     Problem: Falls - Risk of:  Goal: Absence of physical injury  Description  Absence of physical injury  Outcome: Met This Shift     Problem: Gas Exchange - Impaired:  Goal: Levels of oxygenation will improve  Description  Levels of oxygenation will improve  Outcome: Met This Shift     Problem: Infection, Septic Shock:  Goal: Will show no infection signs and symptoms  Description  Will show no infection signs and symptoms  Outcome: Met This Shift     Problem: Infection - Ventilator-Associated Pneumonia:  Goal: Absence of pulmonary infection  Description  Absence of pulmonary infection  Outcome: Met This Shift     Problem: Infection - Ventilator-Associated Pneumonia:  Goal: Absence of pulmonary infection  Description  Absence of pulmonary infection  Outcome: Met This Shift     Problem: Tissue Perfusion, Altered:  Goal: Circulatory function within specified parameters  Description  Circulatory function within specified parameters  Outcome: Met This Shift     Problem: Venous Thromboembolism:  Goal: Will show no signs or symptoms of venous thromboembolism  Description  Will show no signs or symptoms of venous thromboembolism  Outcome: Met This Shift     Problem: Venous Thromboembolism:  Goal: Absence of signs or symptoms of impaired coagulation  Description  Absence of signs or symptoms of impaired coagulation  Outcome: Met This Shift     Problem: Skin Integrity:  Goal: Will show no infection signs and symptoms  Description  Will show no infection signs and symptoms  Outcome: Met This Shift     Problem: Skin Integrity:  Goal: Absence of new skin breakdown  Description  Absence of new skin breakdown  Outcome: Met This Shift  Note:   Plan of care discussed with patient / family.        Problem: Discharge Planning:  Goal: Discharged to appropriate level of care  Description  Discharged to appropriate level of care  Outcome: Not Met This Shift     Problem: Restraint Use - Nonviolent/Non-Self-Destructive Behavior:  Goal: Absence of restraint indications  Description  Absence of restraint indications  Outcome: Completed     Problem: Restraint Use - Nonviolent/Non-Self-Destructive Behavior:  Goal: Absence of restraint-related injury  Description  Absence of restraint-related injury  Outcome: Completed

## 2019-05-20 NOTE — DISCHARGE INSTR - COC
Continuity of Care Form    Patient Name: Kavita Martin   :  1960  MRN:  [id number]    Admit date:  2019  Discharge date:  2019    Code Status Order: Full Code   Advance Directives:   Advance Care Flowsheet Documentation     Date/Time Healthcare Directive Type of Healthcare Directive Copy in [address] Agent's Name Healthcare Agent's Phone Number    [phone]  Yes, patient has an advance directive for healthcare treatment  --  --  --  --  --          Admitting Physician:  Leigha De Luna MD  PCP: Francis Stewart MD    Discharging Nurse: Naman Gonzalez. Unit/Room#: 1672/8497-A  Discharging Unit Phone Number: [phone]    Emergency Contact:   Extended Emergency Contact Information  Primary Emergency Contact: Kyler Gonzalez  Address: [address] [place] [address] Phone: [phone]  Work Phone: [phone]  Mobile Phone: [phone]  Relation: Spouse  Secondary Emergency Contact: Jerson Haji  Address: [address]           FerMarci martinez [address] [place] [address] Phone: [phone]  Relation: Child    Past Surgical History:  Past Surgical History:   Procedure Laterality Date    CHOLECYSTECTOMY  1992    DILATION AND CURETTAGE OF UTERUS  1983   [address] SURGERY  2001    LUMBAR SPINE SURGERY      LASE    NERVE BLOCK  13    therapeutic caudal with modified Charlestine Gala OTHER SURGICAL HISTORY N/A 16    Surgical Replacement medtronic lumbar spinal cord stimulator    SPINE SURGERY  2007    Spinal Cord Stimulator Implant    TUBAL LIGATION  1994       Immunization History: There is no immunization history on file for this patient. Active Problems:  Patient Active Problem List   Diagnosis Code    Lumbar radiculopathy M54.16    Septic shock (HCC) A41.9, R65.21    Seizure ([address] R56.9    Obesity (BMI 30-39. 9) I08.8    Metabolic (cm^2) 54 cm^2 5/7/2019  3:51 PM   Change in Wound Size % (l*w) -28.57 5/7/2019  3:51 PM   Wound Volume (cm^3) 4.2 cm^3 5/7/2019  8:16 AM   Wound Assessment Purple;Red;Denuded 5/20/2019  1:51 PM   Drainage Amount None 5/20/2019  1:51 PM   Rea-wound Assessment Blanchable erythema; Excoriated;Pink;Fragile;Painful 5/20/2019  1:51 PM   Purple%Wound Bed 100 5/7/2019  3:51 PM   Number of days: 13       Wound 05/07/19 Buttocks Right;Mid; Inner purple, non blanchable, surrounded by reddened non blanchable area (Active)   Wound Deep tissue/Injury 5/7/2019  3:51 PM   Dressing Status Other (Comment) 5/20/2019  1:51 PM   Dressing Changed Other (Comment) 5/10/2019 12:00 AM   Dressing/Treatment Moisture barrier;Open to air 5/20/2019  1:51 PM   Wound Length (cm) 1 cm 5/15/2019  5:30 AM   Wound Width (cm) 0.7 cm 5/15/2019  5:30 AM   Wound Depth (cm) 0.1 cm 5/7/2019  8:16 AM   Wound Surface Area (cm^2) 0.7 cm^2 5/15/2019  5:30 AM   Change in Wound Size % (l*w) 99.03 5/15/2019  5:30 AM   Wound Volume (cm^3) 7.2 cm^3 5/7/2019  8:16 AM   Wound Assessment Purple;Black 5/20/2019  1:51 PM   Drainage Amount None 5/20/2019  1:51 PM   Rea-wound Assessment Blanchable erythema;Pink;Fragile; Excoriated;Painful 5/20/2019  1:51 PM   Purple%Wound Bed 100 5/7/2019  3:51 PM   Number of days: 13        Elimination:  Continence:   · Bowel: No  · Bladder: No  Urinary Catheter:urinary catheter discontinued at 1800. Patient is due to void between 7724-8381  Colostomy/Ileostomy/Ileal Conduit: No  [REMOVED] Fecal Management System-Stool Appearance: Watery  [REMOVED] Fecal Management System-Stool Color: Green  [REMOVED] Fecal Management System-Stool Amount: Small    Date of Last BM: 5/24/2019    Intake/Output Summary (Last 24 hours) at 5/20/2019 1447  Last data filed at 5/20/2019 1351  Gross per 24 hour   Intake 4167 ml   Output 4125 ml   Net 42 ml     I/O last 3 completed shifts:   In: 3089 [I.V.:2486; NG/GT:603]  Out: 4325 [Urine:4325]    Safety Concerns: SIGNATURE: Electronically signed by Vane Hwang MD on 5/21/2019 at 9:52 AM    Follow up with dr Ofelia Davison in 1 week. Follow up with dr Binu Shoemaker in 1-2 weeks. Follow up with dr Omar Ramirez in 1 month. Follow up with dr kaylyn Hernandez in 2-3 weeks. Follow up with dr Tr Longoria in 2-3 weeks.

## 2019-05-20 NOTE — PROGRESS NOTES
[facility]   Department of Internal Medicine   Internal Medicine Residency  MICU Progress Note    Patient:  Esequiel House 59 y.o. female   MRN: [id number]       Date of Service: 5/20/2019        Subjective   Patient was seen and examined at bedside this morning. Patient alert and oriented,following command. Patient had improved aeration of lungs with diffuse crackle. 24 hour:  -No focal seizure activity noted. -repeat EEG showed:worsening PLED, patient started on Vimpat and keppra continued  -currently on oral vancomycin and Aztreonam  -LTAC precert started      ROS: Denies Fever/chills/CP/SOB/N/V/D/C/Dysuria/Blood in stool or urine      Objective     Vitals:    05/20/19 1351 05/20/19 1400 05/20/19 1500 05/20/19 1600   BP:  112/75 111/68 118/75   Pulse: 111 114 112 120   Resp: 24 20 25 24   Temp:    98.8 °F (37.1 °C)   TempSrc:    Core   SpO2: 99% 100% 96% 97%   Weight:       Height:           Physical Exam:  I & O - 24hr:I/O this shift:  In: -   · Out: 400 [Urine:400]     GENERAL: Alert, cooperative, no acute distress. HEENT: Normocephalic, atraumatic. PERRLA, conjunctiva/corneas clear, EOM's intact, no pallor or icterus. NECK: Supple, symmetrical, trachea midline, no cervical LAD. No carotid bruit or JVD  CHEST: No tenderness or deformity, full & symmetric excursion  LUNG: B/l diffuse crackle present,  respirations unlabored. HEART: RRR, S1 and S2 normal, no murmur, rub or gallop. DP pulses 2/4  ABDOMEN: SNTND, no masses, no organomegaly, no guarding, rebound or rigidity. GENITOURINARY: Urinary cath present   EXTREMITIES:  Extremities normal, atraumatic, no cyanosis or edema. Distal pulses equal bilaterally  SKIN: Skin color, texture, turgor normal, no rashes or lesions  MUSCULOSKELETAL: No joint swelling, no muscle tenderness. Normal ROM in extremities.    LYMPH NODES: no lymph node enlargement appreciated  NEUROLOGIC: Alert & Oriented; 4/5 in LUE, and 3/5 in RUE, 4/5 symmetric strength in LEs;  Sensation intact          Medications     Continuous Infusions:    Scheduled Meds:   lacosamide (VIMPAT) IVPB  100 mg Intravenous BID    sodium chloride (Inhalant)  4 mL Nebulization BID    albuterol  0.63 mg Nebulization 4x daily    levetiracetam  1,000 mg Intravenous BID    levothyroxine  25 mcg Intravenous Daily    famotidine (PEPCID) injection  20 mg Intravenous BID    aztreonam  2 g Intravenous Q8H    metoprolol  2.5 mg Intravenous Q6H    pregabalin  200 mg Oral 3 times per day    vancomycin  500 mg Oral 4 times per day    lactobacillus  1 capsule Oral BID    chlorhexidine  15 mL Mouth/Throat BID    heparin flush  3 mL Intravenous 2 times per day    heparin (porcine)  5,000 Units Subcutaneous TID    vitamin D  50,000 Units Oral Weekly    sodium chloride flush  10 mL Intravenous 2 times per day    mineral oil-hydrophilic petrolatum   Topical TID     PRN Meds: morphine, acetaminophen, sodium chloride flush, heparin flush, sodium chloride flush, magnesium hydroxide, ondansetron, mineral oil-hydrophilic petrolatum **AND** mineral oil-hydrophilic petrolatum  Nutrition:       Labs and Imaging Studies     CBC:   Recent Labs     05/18/19  0430 05/19/19  0320 05/20/19  0450   WBC 6.8 6.3 6.3   RBC 3.12* 3.22* 3.22*   HGB 10.3* 10.7* 10.7*   HCT 32.3* 32.8* 33.3*   .5* 101.9* 103.4*   MCH 33.0 33.2 33.2   MCHC 31.9* 32.6 32.1   RDW 15.7* 15.1* 15.3*    350 343   MPV 10.7 10.8 11.2       BMP:    Recent Labs     05/18/19  0430 05/19/19  0320 05/20/19  0450    132 133   K 3.7 3.6 4.0   CL 98 97* 100   CO2 27 28 23   BUN 8 5* 6*   CREATININE 0.5 0.4* 0.4*   GLUCOSE 119* 127* 114*   CALCIUM 7.7* 8.2* 8.6   PROT 5.5* 5.9* 5.8*   LABALBU 2.4* 2.7* 2.7*   BILITOT 0.6 0.6 0.6   ALKPHOS 41 44 44   AST 23 24 31   ALT 33* 27 24       LIVER PROFILE:   Recent Labs     05/18/19  0430 05/19/19  0320 05/20/19  0450   AST 23 24 31   ALT 33* 27 24   BILIDIR <0.2 0.3 <0.2   BILITOT 0.6 0.6 0.6   ALKPHOS 41 44 44       PT/INR:   Recent Labs     05/18/19  0430 05/19/19  0320 05/20/19  0450   PROTIME 15.0* 14.9* 14.0*   INR 1.3 1.3 1.2       APTT:   No results for input(s): APTT in the last 72 hours. Fasting Lipid Panel:    Lab Results   Component Value Date    CHOL 205 06/21/2012    TRIG 404 06/21/2012    HDL 44.0 06/21/2012       Cardiac Enzymes:    Lab Results   Component Value Date    TROPONINI <0.01 05/07/2019    TROPONINI <0.01 05/07/2019    TROPONINI <0.01 05/07/2019       Notable Cultures:      Blood cultures   Blood Culture, Routine   Date Value Ref Range Status   05/12/2019 5 Days- no growth  Final     Respiratory cultures No results found for: RESPCULTURE   Gram Stain Result   Date Value Ref Range Status   05/16/2019   Final    Gram stain performed from cytospun specimen  Polymorphonuclear leukocytes not seen  Epithelial cells not seen  Few Mononuclear leukocytes  No organisms seen. Urine   Urine Culture, Routine   Date Value Ref Range Status   05/12/2019 (A)  Final    THIS IS A CORRECTED REPORT  PLEASE DISREGARD PREVIOUS REPORT OF \"COAGULASE NEGATIVE STAPH\"     05/12/2019 >100,000 CFU/ml  Final     Legionella No results found for: LABLEGI  C Diff PCR No results found for: CDIFPCR  Wound culture/abscess: No results for input(s): WNDABS in the last 72 hours. Tip culture:No results for input(s): CXCATHTIP in the last 72 hours. Imaging Studies:  XR CHEST PORTABLE   Final Result   tortuous ectatic aorta   Airspace disease compatible with pneumonia in the right lower lung and   left upper lung   Probable right effusion   An endotracheal tube is not visible. XR CHEST PORTABLE   Final Result   Cardiomegaly   Tortuous aorta    There are patchy infiltrates seen throughout both the lung fields. Pneumonia could give this appearance.  Underlying edema could also have   this appearance   The chest appears improved in the interval                  US DUP LOWER EXTREMITIES BILATERAL VENOUS   Final Result   No evidence for deep venous thrombosis               XR CHEST PORTABLE   Final Result   ALERT:  THIS IS AN ABNORMAL REPORT   1. Abnormal airspace disease in the bilateral upper lungs and   bilateral lung bases. Findings are nonspecific and could reflect an   infiltrate/pneumonia. An infiltrative mass or malignancy is not   excluded particularly in the right and the left upper lung/suprahilar   regions, further evaluation with CT scan the chest is recommended to   exclude any potential underlying malignancy. 2. Stable, enlarged cardiac mediastinal silhouette with thoracic   aortic vascular calcifications. FL LUMBAR PUNCTURE DIAG   Final Result   Successful Uncomplicated Fluoroscopic-Guided Lumbar Puncture. This procedure was performed by Chelsea Rai PA-C under the indirect   supervision of Danielle Oropeza MD   who was not present for the   procedure. .      The exam has been dictated and signed by Chelsea Rai PA-C. Salty Lerner MD , Radiologist, have reviewed the images and   report and concur with these findings. FL GUIDED FOR SPINE INJECT   Final Result   Successful Uncomplicated Fluoroscopic-Guided Lumbar Puncture. This procedure was performed by Chelsea Rai PA-C under the indirect   supervision of Danielle Oropeza MD   who was not present for the   procedure. .      The exam has been dictated and signed by Chelsea Rai PA-C. Salty Lerner MD , Radiologist, have reviewed the images and   report and concur with these findings. XR CHEST PORTABLE   Final Result   1. Multifocal airspace disease most likely suspicious for a multifocal   infiltrate/pneumonia with underlying pulmonary edema and a right   pleural effusion. Interval placement NG tube without identification of   the distal tibia.       MRI BRAIN WO CONTRAST   Final Result   Diffuse atrophy likely age related   Findings compatible with small vessel ischemic changes. XR Chest Abdomen Ng Placement   Final Result   NG tube tip in the stomach. Additional observations as outlined above. XR CHEST PORTABLE   Final Result   1. Bibasilar airspace disease, right greater than left, nonspecific   finding, findings can be seen infiltrate/pneumonia/atelectasis, with   right pleural effusion. 2. Vascular calcifications thoracic aorta. 3. Suspected underlying pulmonary edema. 4. Interval placement left-sided PICC line. IR INJ VENOGRAM EXTREMITY   Final Result      1. Ultrasound-guided peripheral venous access in the right upper   extremity. 2. Right upper extremity venogram demonstrating patency of the right   central veins. 3. Uncomplicated placement of a dual-lumen peripherally inserted   central catheter (PICC) via the left brachial vein with trim length of   42 cm. IR VENOGRAM UPPER EXTREMITY RIGHT   Final Result      1. Ultrasound-guided peripheral venous access in the right upper   extremity. 2. Right upper extremity venogram demonstrating patency of the right   central veins. 3. Uncomplicated placement of a dual-lumen peripherally inserted   central catheter (PICC) via the left brachial vein with trim length of   42 cm. IR PICC WO SQ PORT/PUMP > 5 YEARS   Final Result      1. Ultrasound-guided peripheral venous access in the right upper   extremity. 2. Right upper extremity venogram demonstrating patency of the right   central veins. 3. Uncomplicated placement of a dual-lumen peripherally inserted   central catheter (PICC) via the left brachial vein with trim length of   42 cm. IR ULTRASOUND GUIDANCE VASCULAR ACCESS   Final Result      1. Ultrasound-guided peripheral venous access in the right upper   extremity. 2. Right upper extremity venogram demonstrating patency of the right   central veins.       3. Uncomplicated placement of a dual-lumen peripherally inserted   central catheter (PICC) via the left brachial vein with trim length of   42 cm. IR ULTRASOUND GUIDANCE VASCULAR ACCESS   Final Result      1. Ultrasound-guided peripheral venous access in the right upper   extremity. 2. Right upper extremity venogram demonstrating patency of the right   central veins. 3. Uncomplicated placement of a dual-lumen peripherally inserted   central catheter (PICC) via the left brachial vein with trim length of   42 cm. CT Head WO Contrast   Final Result      No acute intracranial hemorrhage or mass effect. CT is insensitive for   acute infarct. If there is concern for acute infarct, MRI with   diffusion-weighted imaging is more sensitive and would be the   recommended study. Right ethmoid sinus mucosal thickening, new since prior study. XR CHEST PORTABLE   Final Result      XR CHEST PORTABLE   Final Result   1. Stable, enlarged cardiomediastinal silhouette with underlying   pulmonary edema. 2. Multifocal airspace disease likely suggestive follow-up multifocal   infiltrate/pneumonia and/or atelectasis, please see CT chest report   5/12/2019. CT Chest WO Contrast   Final Result      1. Bilateral alveolar opacities in upper lobe in perihilar   distribution suggestive of interstitial pulmonary edema or pneumonia. 2. More confluent opacities are seen at right lung base related to   pneumonia and atelectasis. 3. Moderate-sized bilateral pleural effusions. 4. Prominent and mildly enlarged mediastinal lymph nodes. 5. Endotracheal tube is 1 cm above jono. XR CHEST PORTABLE   Final Result      1. Limited visualization of nasogastric tube. 2. Redemonstration of interstitial and hazy opacities throughout both   lungs suggestive of interstitial pulmonary edema or pneumonia. Short-term follow-up could be helpful for further evaluation.       XR CHEST PORTABLE   Final Result      Interstitial and hazy opacities throughout both lungs which appear to   have increased throughout left lung since yesterday's exam. Opacities   appear similar on the right. Continued follow-up could be helpful for   further evaluation. XR CHEST PORTABLE   Final Result   CHF with marginal improvement and decreasing edema. XR CHEST PORTABLE   Final Result   Significant worsening of right-sided infiltrate and atelectasis and   small right effusion               XR ABDOMEN (KUB) (SINGLE AP VIEW)   Final Result   No acute process               XR CHEST PORTABLE   Final Result      1. Satisfactory placement of nasogastric tube. 2. Persistent interstitial pulmonary edema, pneumonia, or atelectasis   bilaterally. Continued follow-up could be helpful for further   evaluation. XR Chest Abdomen Ng Placement   Final Result      Nasogastric tubing appears to be appropriately configured. Right common femoral vein catheter appears to be peripherally   configured. XR CHEST PORTABLE   Final Result      1. Abnormal location of nasogastric tube which appears to be coiled   within the mid esophagus. 2. Increased interstitial and hazy opacities at left hilar location   and left lung base which could suggest increasing pneumonia or   atelectasis. Short-term follow-up could be helpful for further   evaluation. ALERT:  THIS IS AN ABNORMAL REPORT. CT Head WO Contrast   Final Result   1. No acute intracranial hemorrhage identified. 2. Additional nonacute/incidental findings as described above. This report has been electronically signed by Rafael Mai MD.      [address] [facility] Additional Contrast? None   Final Result   1.  Moderate diffuse mucosal thickening from the rectum to the mid sigmoid colon    with mild adjacent stranding, mild mucosal thickening throughout the descending    colon with adjacent stranding, and suggestion of mucosal thickening throughout    the distal transverse colon as well as from the proximal most OK to transfer patient out of ICU level of care    Thank you for allowing me to participate in the care of this patient. Care reviewed with nursing staff, medical and surgical specialty care, primary care and the patient's family as available. Restraints are ordered when the patient can do harm to him/herself by pulling out devices. Mauro Hudson M.D.     Mauro Hudson  5/20/2019  4:48 PM

## 2019-05-20 NOTE — PLAN OF CARE
Problem: Malnutrition  (NI-5.2)  Goal: Food and/or Nutrient Delivery  Description- Increase TF to goal rate   Individualized approach for food/nutrient provision.   Outcome: Met This Shift

## 2019-05-21 ENCOUNTER — APPOINTMENT (OUTPATIENT)
Dept: NEUROLOGY | Age: 59
DRG: 870 | End: 2019-05-21
Payer: COMMERCIAL

## 2019-05-21 ENCOUNTER — APPOINTMENT (OUTPATIENT)
Dept: GENERAL RADIOLOGY | Age: 59
DRG: 870 | End: 2019-05-21
Payer: COMMERCIAL

## 2019-05-21 LAB
ALBUMIN SERPL-MCNC: 3 G/DL (ref 3.5–5.2)
ALP BLD-CCNC: 56 U/L (ref 35–104)
ALT SERPL-CCNC: 22 U/L (ref 0–32)
ANION GAP SERPL CALCULATED.3IONS-SCNC: 11 MMOL/L (ref 7–16)
AST SERPL-CCNC: 24 U/L (ref 0–31)
BASOPHILS ABSOLUTE: 0.04 E9/L (ref 0–0.2)
BASOPHILS RELATIVE PERCENT: 0.7 % (ref 0–2)
BILIRUB SERPL-MCNC: 0.6 MG/DL (ref 0–1.2)
BILIRUBIN DIRECT: 0.2 MG/DL (ref 0–0.3)
BILIRUBIN, INDIRECT: 0.4 MG/DL (ref 0–1)
BUN BLDV-MCNC: 9 MG/DL (ref 8–23)
CALCIUM SERPL-MCNC: 8.6 MG/DL (ref 8.6–10.2)
CHLORIDE BLD-SCNC: 101 MMOL/L (ref 98–107)
CO2: 24 MMOL/L (ref 22–29)
CREAT SERPL-MCNC: 0.4 MG/DL (ref 0.5–1)
EOSINOPHILS ABSOLUTE: 0.27 E9/L (ref 0.05–0.5)
EOSINOPHILS RELATIVE PERCENT: 4.7 % (ref 0–6)
GFR AFRICAN AMERICAN: >60
GFR NON-AFRICAN AMERICAN: >60 ML/MIN/1.73
GLUCOSE BLD-MCNC: 126 MG/DL (ref 74–99)
HCT VFR BLD CALC: 32.9 % (ref 34–48)
HEMOGLOBIN: 10.6 G/DL (ref 11.5–15.5)
IMMATURE GRANULOCYTES #: 0.09 E9/L
IMMATURE GRANULOCYTES %: 1.6 % (ref 0–5)
INR BLD: 1.2
LYMPHOCYTES ABSOLUTE: 1.43 E9/L (ref 1.5–4)
LYMPHOCYTES RELATIVE PERCENT: 24.7 % (ref 20–42)
MAGNESIUM: 1.6 MG/DL (ref 1.6–2.6)
MCH RBC QN AUTO: 33.2 PG (ref 26–35)
MCHC RBC AUTO-ENTMCNC: 32.2 % (ref 32–34.5)
MCV RBC AUTO: 103.1 FL (ref 80–99.9)
METER GLUCOSE: 103 MG/DL (ref 74–99)
METER GLUCOSE: 116 MG/DL (ref 74–99)
METER GLUCOSE: 124 MG/DL (ref 74–99)
METER GLUCOSE: 71 MG/DL (ref 74–99)
METER GLUCOSE: 98 MG/DL (ref 74–99)
MONOCYTES ABSOLUTE: 0.54 E9/L (ref 0.1–0.95)
MONOCYTES RELATIVE PERCENT: 9.3 % (ref 2–12)
NEUTROPHILS ABSOLUTE: 3.41 E9/L (ref 1.8–7.3)
NEUTROPHILS RELATIVE PERCENT: 59 % (ref 43–80)
PDW BLD-RTO: 15.5 FL (ref 11.5–15)
PHOSPHORUS: 2.8 MG/DL (ref 2.5–4.5)
PLATELET # BLD: 290 E9/L (ref 130–450)
PMV BLD AUTO: 10.9 FL (ref 7–12)
POTASSIUM REFLEX MAGNESIUM: 3.3 MMOL/L (ref 3.5–5)
PROTHROMBIN TIME: 14 SEC (ref 9.3–12.4)
RBC # BLD: 3.19 E12/L (ref 3.5–5.5)
SODIUM BLD-SCNC: 136 MMOL/L (ref 132–146)
TOTAL PROTEIN: 6.2 G/DL (ref 6.4–8.3)
WBC # BLD: 5.8 E9/L (ref 4.5–11.5)

## 2019-05-21 PROCEDURE — 2580000003 HC RX 258: Performed by: INTERNAL MEDICINE

## 2019-05-21 PROCEDURE — 97140 MANUAL THERAPY 1/> REGIONS: CPT

## 2019-05-21 PROCEDURE — 92611 MOTION FLUOROSCOPY/SWALLOW: CPT

## 2019-05-21 PROCEDURE — 95816 EEG AWAKE AND DROWSY: CPT | Performed by: PSYCHIATRY & NEUROLOGY

## 2019-05-21 PROCEDURE — 6360000002 HC RX W HCPCS: Performed by: INTERNAL MEDICINE

## 2019-05-21 PROCEDURE — 2060000000 HC ICU INTERMEDIATE R&B

## 2019-05-21 PROCEDURE — 6370000000 HC RX 637 (ALT 250 FOR IP): Performed by: INTERNAL MEDICINE

## 2019-05-21 PROCEDURE — 2500000003 HC RX 250 WO HCPCS: Performed by: INTERNAL MEDICINE

## 2019-05-21 PROCEDURE — 99232 SBSQ HOSP IP/OBS MODERATE 35: CPT | Performed by: INTERNAL MEDICINE

## 2019-05-21 PROCEDURE — 84100 ASSAY OF PHOSPHORUS: CPT

## 2019-05-21 PROCEDURE — 99232 SBSQ HOSP IP/OBS MODERATE 35: CPT | Performed by: PHYSICIAN ASSISTANT

## 2019-05-21 PROCEDURE — 2500000003 HC RX 250 WO HCPCS: Performed by: PHYSICIAN ASSISTANT

## 2019-05-21 PROCEDURE — 80076 HEPATIC FUNCTION PANEL: CPT

## 2019-05-21 PROCEDURE — 36415 COLL VENOUS BLD VENIPUNCTURE: CPT

## 2019-05-21 PROCEDURE — 74230 X-RAY XM SWLNG FUNCJ C+: CPT

## 2019-05-21 PROCEDURE — 85025 COMPLETE CBC W/AUTO DIFF WBC: CPT

## 2019-05-21 PROCEDURE — 71045 X-RAY EXAM CHEST 1 VIEW: CPT

## 2019-05-21 PROCEDURE — 71046 X-RAY EXAM CHEST 2 VIEWS: CPT

## 2019-05-21 PROCEDURE — 85610 PROTHROMBIN TIME: CPT

## 2019-05-21 PROCEDURE — C9254 INJECTION, LACOSAMIDE: HCPCS | Performed by: INTERNAL MEDICINE

## 2019-05-21 PROCEDURE — 94640 AIRWAY INHALATION TREATMENT: CPT

## 2019-05-21 PROCEDURE — 36592 COLLECT BLOOD FROM PICC: CPT

## 2019-05-21 PROCEDURE — 80048 BASIC METABOLIC PNL TOTAL CA: CPT

## 2019-05-21 PROCEDURE — 94669 MECHANICAL CHEST WALL OSCILL: CPT

## 2019-05-21 PROCEDURE — 97110 THERAPEUTIC EXERCISES: CPT

## 2019-05-21 PROCEDURE — 97112 NEUROMUSCULAR REEDUCATION: CPT

## 2019-05-21 PROCEDURE — 83735 ASSAY OF MAGNESIUM: CPT

## 2019-05-21 PROCEDURE — 97530 THERAPEUTIC ACTIVITIES: CPT

## 2019-05-21 PROCEDURE — 82962 GLUCOSE BLOOD TEST: CPT

## 2019-05-21 PROCEDURE — 2700000000 HC OXYGEN THERAPY PER DAY

## 2019-05-21 RX ORDER — MAGNESIUM SULFATE 1 G/100ML
1 INJECTION INTRAVENOUS ONCE
Status: COMPLETED | OUTPATIENT
Start: 2019-05-21 | End: 2019-05-21

## 2019-05-21 RX ORDER — FAMOTIDINE 20 MG/1
20 TABLET, FILM COATED ORAL 2 TIMES DAILY
Status: DISCONTINUED | OUTPATIENT
Start: 2019-05-21 | End: 2019-05-24 | Stop reason: HOSPADM

## 2019-05-21 RX ORDER — METOPROLOL TARTRATE 50 MG/1
50 TABLET, FILM COATED ORAL 2 TIMES DAILY
Status: DISCONTINUED | OUTPATIENT
Start: 2019-05-21 | End: 2019-05-24 | Stop reason: HOSPADM

## 2019-05-21 RX ORDER — LEVETIRACETAM 10 MG/ML
1000 INJECTION INTRAVASCULAR 2 TIMES DAILY
Status: DISCONTINUED | OUTPATIENT
Start: 2019-05-21 | End: 2019-05-21

## 2019-05-21 RX ORDER — LEVETIRACETAM 500 MG/1
1000 TABLET ORAL 2 TIMES DAILY
Status: DISCONTINUED | OUTPATIENT
Start: 2019-05-21 | End: 2019-05-24 | Stop reason: HOSPADM

## 2019-05-21 RX ORDER — POTASSIUM CHLORIDE 7.45 MG/ML
10 INJECTION INTRAVENOUS
Status: COMPLETED | OUTPATIENT
Start: 2019-05-21 | End: 2019-05-21

## 2019-05-21 RX ORDER — LEVOTHYROXINE SODIUM 0.05 MG/1
50 TABLET ORAL DAILY
Status: DISCONTINUED | OUTPATIENT
Start: 2019-05-21 | End: 2019-05-24 | Stop reason: HOSPADM

## 2019-05-21 RX ADMIN — Medication 1 CAPSULE: at 20:31

## 2019-05-21 RX ADMIN — DEXTROSE MONOHYDRATE 100 MG: 50 INJECTION, SOLUTION INTRAVENOUS at 20:30

## 2019-05-21 RX ADMIN — Medication 500 MG: at 00:31

## 2019-05-21 RX ADMIN — HEPARIN SODIUM 5000 UNITS: 10000 INJECTION INTRAVENOUS; SUBCUTANEOUS at 20:30

## 2019-05-21 RX ADMIN — ALBUTEROL SULFATE 0.63 MG: 0.63 SOLUTION RESPIRATORY (INHALATION) at 08:00

## 2019-05-21 RX ADMIN — Medication 500 MG: at 11:55

## 2019-05-21 RX ADMIN — HEPARIN SODIUM 5000 UNITS: 10000 INJECTION INTRAVENOUS; SUBCUTANEOUS at 10:12

## 2019-05-21 RX ADMIN — POTASSIUM CHLORIDE 10 MEQ: 7.46 INJECTION, SOLUTION INTRAVENOUS at 10:15

## 2019-05-21 RX ADMIN — SODIUM CHLORIDE, PRESERVATIVE FREE 300 UNITS: 5 INJECTION INTRAVENOUS at 11:55

## 2019-05-21 RX ADMIN — METOPROLOL TARTRATE 2.5 MG: 1 INJECTION, SOLUTION INTRAVENOUS at 05:23

## 2019-05-21 RX ADMIN — CHLORHEXIDINE GLUCONATE 0.12% ORAL RINSE 15 ML: 1.2 LIQUID ORAL at 20:30

## 2019-05-21 RX ADMIN — SODIUM CHLORIDE, PRESERVATIVE FREE 300 UNITS: 5 INJECTION INTRAVENOUS at 20:30

## 2019-05-21 RX ADMIN — Medication 10 ML: at 11:55

## 2019-05-21 RX ADMIN — PREGABALIN 200 MG: 100 CAPSULE ORAL at 20:31

## 2019-05-21 RX ADMIN — FAMOTIDINE 20 MG: 20 TABLET ORAL at 11:56

## 2019-05-21 RX ADMIN — FAMOTIDINE 20 MG: 20 TABLET ORAL at 20:30

## 2019-05-21 RX ADMIN — ALBUTEROL SULFATE 0.63 MG: 0.63 SOLUTION RESPIRATORY (INHALATION) at 11:45

## 2019-05-21 RX ADMIN — MAGNESIUM SULFATE HEPTAHYDRATE 1 G: 1 INJECTION, SOLUTION INTRAVENOUS at 14:52

## 2019-05-21 RX ADMIN — CHLORHEXIDINE GLUCONATE 0.12% ORAL RINSE 15 ML: 1.2 LIQUID ORAL at 11:55

## 2019-05-21 RX ADMIN — DEXTROSE MONOHYDRATE 100 MG: 50 INJECTION, SOLUTION INTRAVENOUS at 11:56

## 2019-05-21 RX ADMIN — PETROLATUM: 42 OINTMENT TOPICAL at 12:18

## 2019-05-21 RX ADMIN — AZTREONAM 2 G: 2 INJECTION, POWDER, LYOPHILIZED, FOR SOLUTION INTRAMUSCULAR; INTRAVENOUS at 20:30

## 2019-05-21 RX ADMIN — LEVOTHYROXINE SODIUM 50 MCG: 50 TABLET ORAL at 11:56

## 2019-05-21 RX ADMIN — AZTREONAM 2 G: 2 INJECTION, POWDER, LYOPHILIZED, FOR SOLUTION INTRAMUSCULAR; INTRAVENOUS at 02:30

## 2019-05-21 RX ADMIN — BARIUM SULFATE 45 G: 0.6 CREAM ORAL at 14:13

## 2019-05-21 RX ADMIN — METOPROLOL TARTRATE 50 MG: 50 TABLET, FILM COATED ORAL at 11:56

## 2019-05-21 RX ADMIN — Medication 10 ML: at 20:30

## 2019-05-21 RX ADMIN — PREGABALIN 200 MG: 100 CAPSULE ORAL at 05:07

## 2019-05-21 RX ADMIN — Medication 500 MG: at 05:07

## 2019-05-21 RX ADMIN — SODIUM CHLORIDE SOLN NEBU 3% 4 ML: 3 NEBU SOLN at 08:04

## 2019-05-21 RX ADMIN — Medication 500 MG: at 18:00

## 2019-05-21 RX ADMIN — POTASSIUM CHLORIDE 10 MEQ: 7.46 INJECTION, SOLUTION INTRAVENOUS at 11:24

## 2019-05-21 RX ADMIN — AZTREONAM 2 G: 2 INJECTION, POWDER, LYOPHILIZED, FOR SOLUTION INTRAMUSCULAR; INTRAVENOUS at 12:54

## 2019-05-21 RX ADMIN — Medication 1 CAPSULE: at 11:57

## 2019-05-21 RX ADMIN — METOPROLOL TARTRATE 2.5 MG: 1 INJECTION, SOLUTION INTRAVENOUS at 00:31

## 2019-05-21 RX ADMIN — PREGABALIN 200 MG: 100 CAPSULE ORAL at 18:42

## 2019-05-21 RX ADMIN — BARIUM SULFATE 58 G: 960 POWDER, FOR SUSPENSION ORAL at 14:13

## 2019-05-21 RX ADMIN — PETROLATUM: 42 OINTMENT TOPICAL at 21:23

## 2019-05-21 RX ADMIN — METOPROLOL TARTRATE 50 MG: 50 TABLET, FILM COATED ORAL at 20:30

## 2019-05-21 RX ADMIN — LEVETIRACETAM 1000 MG: 500 TABLET ORAL at 20:31

## 2019-05-21 RX ADMIN — LEVETIRACETAM 1000 MG: 500 TABLET ORAL at 11:58

## 2019-05-21 ASSESSMENT — PAIN SCALES - GENERAL
PAINLEVEL_OUTOF10: 0

## 2019-05-21 NOTE — PROGRESS NOTES
Occupational Therapy  OT BEDSIDE TREATMENT NOTE      Date:2019  Patient Name: Kamla Ladd  MRN: [id number]  : 1960  Room: [address] [place] [address]-A     Modified Raleigh Scale   Score     Description  0             No symptoms  1             No significant disability despite symptoms  2             Slight disability; able to look after own affairs  3             Moderate disability; able to ambulate without assist/ requires assist with ADLs  4             Moderate/Severe disability;requires assist to ambulate/assist with ADLs  5             Severe disability;bedridden/incontinent   6               Score:   5     Evaluating OT: JANET Lau/L     AM-PAC Daily Activity Raw Score:   Recommended Adaptive Equipment: to be determined      Comments: Based on patient's functional performance as stated above and level of assistance needed prior to admission, this therapist believes that the patient would benefit from further skilled OT following hospital stay in an effort to increase safety, functional independence, and quality of life.     Diagnosis: Septic Shock  Comment: Pt intubated -   Pertinent Medical History: Arthritis, asthma, high blood pressure, HLD, irregular heart beat, migraines (neuralgic), PONV, thyroid disorder     Precautions:  Falls, , contact isolation     Home Living: Pt lives with  in a 1 story home with 4 step(s) to enter and no rail(s); bed/bath on main floor. Basement with no needs. Bathroom setup: tub shower with SBs, elevated toilet  Equipment owned: shower stool, QC, bedside commode     Prior Level of Function: Per , pt mostly independent/modified independent with ADLs ( A PRN),  assists with IADLs; using QC for short distance ambulation.  states pt slept upright on couch (to help with reflux d/t hiatal hernia). States she hasn't done laundry, managed bills, done grocery shopping, or cleaned their home in years.   somewhat inconsistent with baseline level of function. Driving: yes  Occupation: retired from home health care     Pain Level: \"bad\" pain, pt unable to specify location  Cognition: A&O: to hospital when given options, not to month or year; Follows 1 step directions ~25% of the time. Slow processing. Follows commands on R>L UE. Pt minimally verbal. Inconsistent with Y/N.              Memory: NT              Sequencing: P-              Problem solving: P-              Judgement/safety: P-              RASS: 0  CAM-ICU: positive                Functional Assessment:    Initial Eval Status  Date: 5/17/19 Treatment Status  Date: 5/21/19 Short Term Goals  Treatment frequency: 1-3x/week on MICU; PRN on stepdown unit  -pt will. .. Feeding Dependent  NPO Dep;    Pt requires assistance to bring food/liquids to mouth however pt just passed swallow test and has not attempted a meal at this time.      improve self feeding task to independent when/if medically appropriate   Grooming Dependent  To brush hair, to wash hands  Dep;    Craig assistance to wash face while pt in supine.       improve grooming/hygiene tasks to min A while seated    UB Dressing Dependent Dep;    Per last treatment.     improve UB dressing to mod A   LB Dressing Dependent  Dep; Dep to complete donning of socks.         Bathing Dependent  N/T      improve UB bathing to mod A   Toileting Dependent Incontinent     improve toileting task and all clothing mgmt to max A   Bed Mobility  Supine to sit:  Max A x2  Sit to supine: Dep x2 Max A of 2    To roll pt from side to side in bed with increased time and verbal prompting.     improve bed mobility to mod A in prep for EOB ADL tasks   Functional Transfers Sit to stand: NT  Stand to sit: NT  N/A improve all functional transfers to Max A   Functional Mobility NT N/A  improve functional mobility to max A with AE PRN   Balance Sitting:     Static:  Min A    Dynamic: NT  Standing: NT Sitting:     Static:  N/A - pt declined

## 2019-05-21 NOTE — PROGRESS NOTES
Pulmonary/Critical Care to sign off at this time once out of ICU. Please let us know if there are any further questions. Thank you.     Monet Nj  5/21/2019  5:18 AM

## 2019-05-21 NOTE — PROGRESS NOTES
Physical Therapy  Facility/Department: [address] PICU  Daily Treatment Note  NAME: Tracee Gates  : 1960  MRN: [id number]    Date of Service: 2019    Attempted PT treatment at [phone], pt declined reporting fatigue. encouraged extensively and pt continued to decline. Was however agreeable to ROM. When beginning transport arrived to take pt for testing. Will recheck at a later time/date as able. Continue with current POC.      Kanchan Lovett PTA [id number]

## 2019-05-21 NOTE — PROGRESS NOTES
SPEECH/LANGUAGE PATHOLOGY  VIDEOFLUOROSCOPIC STUDY OF SWALLOWING (MBS)      PATIENT NAME:  Jhonatan Scales      :  1960      TODAY'S DATE:  2019    SUMMARY OF EVALUATION     DYSPHAGIA DIAGNOSIS:  Moderate oropharyngeal dysphagia      DIET RECOMMENDATIONS: Dysphagia 2 diet,(mechanicallly altered solids, ground meats, no sandwiches) with honey consistency liquids       FEEDING RECOMMENDATIONS:     Assistance level:  Full assistance is needed during all oral intake      Compensatory strategies recommended: No strategies are recommended at this time    THERAPY RECOMMENDATIONS:      Dysphagia therapy is not recommended due to cognitive status               PROCEDURE     Consistencies Administered During the Evaluation   Liquids: thin liquid, nectar thick liquid and honey thick liquid   Solids:  pureed foods and solid foods      Method of Intake:   cup, spoon  Fed by clinician      Position:   Seated, upright, Lateral plane    Current Respiratory Status   nasal canula                RESULTS     ORAL STAGE       Decreased mastication due to:  poor/missing dentition   and cognitive function and Delayed A-P transit due to: reduced lingual strength  and cognitive function         PHARYNGEAL STAGE           ONSET TIME     Onset time of the pharyngeal swallow was adequate       PHARYNGEAL RESIDUALS          Vallecula/Pharyngeal Wall           Reduced tongue base retraction resulting in residuals in vallecula and/or posterior pharyngeal wall for honey consistency liquid and pureed foods which minimally cleared with spontaneous double swallow      Pyriform Sinuses      Residuals in the pyriform sinuses were noted due to pharyngeal weakness for honey consistency liquid and pureed foods which minimally cleared with spontaneous double swallow    LARYNGEAL PENETRATION     Laryngeal penetration occurred prior to aspiration. Further details under aspiration section.       Laryngeal penetration occurred in the absence of aspiration DURING the swallow for nectar consistency liquid due to  delayed laryngeal closurewhich remained in the laryngeal vestibule. . Laryngeal penetration was mild and occurred consistently   an absent cough/throat clear was noted                 ASPIRATION    Aspiration occurred DURING the swallow for thin liquid due to  inadequate laryngeal closure . Aspiration was mild and occurred consistently . an absent cough/throat clear was noted         COMPENSATORY STRATEGIES       Compensatory strategies were not attempted      STRUCTURAL/FUNCTIONAL ANOMALIES       No structural/functional anomalies were noted      CERVICAL ESOPHAGEAL STAGE :        The cervical esophagus appeared adequate                                  [x]The admitting diagnosis and active problem list, as listed below have been reviewed prior to initiation of this evaluation. ADMITTING DIAGNOSIS: Septic shock ([address] [A41.9, R65.21]  Septic shock ([address] [A41.9, R65.21]  Septic shock ([address] [A41.9, R65.21]     ACTIVE PROBLEM LIST:   Patient Active Problem List   Diagnosis    Lumbar radiculopathy    Septic shock ([address]    Seizure ([address]    Obesity (BMI 30-39. 9)    Metabolic encephalopathy    GI bleed    Essential hypertension    Hyperlipidemia LDL goal <100    Acquired hypothyroidism    Acute kidney injury ([address]    Acute respiratory failure with hypoxia (HCC)    Clostridium difficile colitis    Severe protein-calorie malnutrition ([address]

## 2019-05-21 NOTE — PROGRESS NOTES
Tracee Gates is a 59 y.o. right handed female    Neurology following for an acute encephalopathy and focal seizure    Presented on 5/7 with an AMS in the setting of 2 weeks of diarrheal illness-- on antibiotic therapy   Found to be in septic shock--with c-diff and multi-system organ failure   Required intubation, abtx, and pressors--extubated  5/13   Had facial twitching/contorsion with R gaze for 20 sec  on 5/14   Was on Lyrica 600 mg at home but not started until  5/14 here     EEG Saturday and yesterday with worsening PLEDs on L--Keppra increased to 1G BID on 5/17/19   Started Vimpat 200 mg IV load x 1 and then 100 mg  BID    No further seizure activity reported     She is now out of ICU. Another Repeat EEG in process. She appears more awake and alert today. No new complaints.       No chest pain, palpitations, or shortness of breath  No vertigo, lightheadedness or loss of consciousness  No itching or bruising appreciated    ROS otherwise negative     Current Facility-Administered Medications   Medication Dose Route Frequency Provider Last Rate Last Dose    potassium chloride 10 mEq/100 mL IVPB (Peripheral Line)  10 mEq Intravenous Q1H Jf Archer  mL/hr at 05/21/19 1015 10 mEq at 05/21/19 1015    magnesium sulfate 1 g in dextrose 5% 100 mL IVPB  1 g Intravenous Once Jf Archer MD        famotidine (PEPCID) tablet 20 mg  20 mg Oral BID Mony Ng MD        levETIRAcetam (KEPPRA) tablet 1,000 mg  1,000 mg Oral BID Mony Ng MD        levothyroxine (SYNTHROID) tablet 50 mcg  50 mcg Oral Daily Mony Ng MD        metoprolol tartrate (LOPRESSOR) tablet 50 mg  50 mg Oral BID Mony Ng MD        lacosamide (VIMPAT) 100 mg in dextrose 5 % 50 mL IVPB  100 mg Intravenous BID Heath Mendoza MD   Stopped at 05/20/19 2305    sodium chloride (Inhalant) 3 % nebulizer solution 4 mL  4 mL Nebulization BID Heath Mendoza MD   4 mL at 05/21/19 0804    albuterol (ACCUNEB) nebulizer solution 0.63 mg  0.63 mg Nebulization 4x daily Antonio Ragland MD   0.63 mg at 05/21/19 0800    aztreonam (AZACTAM) 2 g IVPB mini-bag  2 g Intravenous Q8H Antonio Ragland MD   Stopped at 05/21/19 0300    pregabalin (LYRICA) capsule 200 mg  200 mg Oral 3 times per day Antonio Ragland MD   200 mg at 05/21/19 0507    vancomycin (VANCOCIN) oral solution 500 mg  500 mg Oral 4 times per day Antonio Ragland MD   500 mg at 05/21/19 0507    lactobacillus (CULTURELLE) capsule 1 capsule  1 capsule Oral BID Antonio Ragland MD   1 capsule at 05/20/19 2046    morphine (PF) injection 2 mg  2 mg Intravenous Q4H PRN Antonio Ragland MD   2 mg at 05/13/19 1827    acetaminophen (TYLENOL) 160 MG/5ML solution 650 mg  650 mg Oral Q6H PRN Antonio Ragland MD   650 mg at 05/20/19 1754    chlorhexidine (PERIDEX) 0.12 % solution 15 mL  15 mL Mouth/Throat BID Antonio Ragland MD   15 mL at 05/20/19 2047    sodium chloride flush 0.9 % injection 10 mL  10 mL Intravenous PRN Antonio Ragland MD   10 mL at 05/11/19 0504    heparin flush 100 UNIT/ML injection 300 Units  3 mL Intravenous 2 times per day Antonio Ragland MD   Stopped at 05/19/19 2250    heparin flush 100 UNIT/ML injection 300 Units  3 mL Intracatheter PRN Antonio Ragland MD        heparin (porcine) injection 5,000 Units  5,000 Units Subcutaneous TID Antonio Ragland MD   5,000 Units at 05/21/19 1012    vitamin D (ERGOCALCIFEROL) capsule 50,000 Units  50,000 Units Oral Weekly Antonio Ragland MD   Stopped at 05/08/19 2015    sodium chloride flush 0.9 % injection 10 mL  10 mL Intravenous 2 times per day Antonio Ragland MD   10 mL at 05/20/19 2040    sodium chloride flush 0.9 % injection 10 mL  10 mL Intravenous PRN Antonio Ragland MD   10 mL at 05/15/19 0304    magnesium hydroxide (MILK OF MAGNESIA) 400 MG/5ML suspension 30 mL  30 mL Oral Daily PRN Antonio Ragland MD        ondansetron (ZOFRAN) injection 4 mg  4 mg Intravenous Q6H PRN to LT in all limbs    DTR:   No Haji's    Babinski on left     No pathological reflexes    Laboratory/Radiology:     Lab Results   Component Value Date    WBC 5.8 05/21/2019    HGB 10.6 (L) 05/21/2019    HCT 32.9 (L) 05/21/2019    .1 (H) 05/21/2019     05/21/2019     Lab Results   Component Value Date     05/21/2019    K 3.3 (L) 05/21/2019     05/21/2019    CO2 24 05/21/2019    BUN 9 05/21/2019    CREATININE 0.4 (L) 05/21/2019    GLUCOSE 126 (H) 05/21/2019    CALCIUM 8.6 05/21/2019    PROT 6.2 (L) 05/21/2019    LABALBU 3.0 (L) 05/21/2019    BILITOT 0.6 05/21/2019    ALKPHOS 56 05/21/2019    AST 24 05/21/2019    ALT 22 05/21/2019    LABGLOM >60 05/21/2019    GFRAA >60 05/21/2019       Final bacterial and viral CSF studies pending  LP: WBC 3 and protein normal  Cryptococci neg  HSV/ PCR neg  VDRL non reactive   AFB stain neg   CSF cx neg   Glucose 59 WNL  WBC 3 increased  Neutrophils increased and lymphs decreased  Crypto AG neg    MRI brain: no acute events    R/P EEG 5/17 highly epileptogenic focus in the left hemisphere. Also it shows a moderate diffuse encephalopathy. Compared to records from yesterday, a worsening in epileptogencity is seen     R/P EEG 5/19 This EEG shows a highly epileptogenic focus in the left hemisphere. Also it shows a moderate diffuse encephalopathy. Compared to records from yesterday, a significant worsening in epileptogencity is seen. R/P EEG 5/20 This EEG is consistent with a highly epileptogenic focus in the left hemisphere. No discrete seizures are seen. All labs and imaging studies reviewed independently today.     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  Related to Worsening PLEDs on L   Will monitor clinically on Vimpat and Keppra-- repeat EEG today    No further clinical seizures reported     Acute encephalopathy   Secondary to metabolic disarray, sepsis, and PLEDs   No evidence of CNS infection on initial LP---final  studies still pending   ID and renal following    Plan:     Continue Keppra 1G BID  Continue Vimpat 100 mg BID  Continue Pregabalin 200 mg TID   Repeat EEG today   CSF Fungus pending   VZV PCR pending   Lyme ABS pending   Cytology pending   Will follow      Lazarus Dubin, PA-C  10:46 AM  5/21/2019

## 2019-05-21 NOTE — PLAN OF CARE
Problem: Infection, Septic Shock:  Goal: Will show no infection signs and symptoms  Description  Will show no infection signs and symptoms  Outcome: Met This Shift

## 2019-05-21 NOTE — PROCEDURES
EEG Report  Abdon Soto is a 59 y.o. female      Appointment Date 5/21/2019   Appointment Time  10:00am   Facility Location [facility] [place] EEG Number [id number]   Type of Study Portable routine Floor 45[address]A     Technical Specifications  Technician [id number] of consciousness alert   Sleep deprived? No   Hyperventilation tested? No   Photic stim tested?  No   EEG recording Standard 10-20 electrode placement    Duration of recording 29 mins   EEG complete? yes       Clinical History  Seizures    Medications    Current Facility-Administered Medications:     magnesium sulfate 1 g in dextrose 5% 100 mL IVPB, 1 g, Intravenous, Once, Jf Archer MD    famotidine (PEPCID) tablet 20 mg, 20 mg, Oral, BID, Teresa Eduardo MD    levETIRAcetam (KEPPRA) tablet 1,000 mg, 1,000 mg, Oral, BID, Teresa Eduardo MD    levothyroxine (SYNTHROID) tablet 50 mcg, 50 mcg, Oral, Daily, Teresa Eduardo MD    metoprolol tartrate (LOPRESSOR) tablet 50 mg, 50 mg, Oral, BID, Teresa Eduardo MD    lacosamide (VIMPAT) 100 mg in dextrose 5 % 50 mL IVPB, 100 mg, Intravenous, BID, Shira Mejia MD, Stopped at 05/20/19 2305    sodium chloride (Inhalant) 3 % nebulizer solution 4 mL, 4 mL, Nebulization, BID, Shira Mejia MD, 4 mL at 05/21/19 0804    albuterol (ACCUNEB) nebulizer solution 0.63 mg, 0.63 mg, Nebulization, 4x daily, Shira Mejia MD, 0.63 mg at 05/21/19 0800    aztreonam (AZACTAM) 2 g IVPB mini-bag, 2 g, Intravenous, Q8H, Shira Mejia MD, Stopped at 05/21/19 0300    pregabalin (LYRICA) capsule 200 mg, 200 mg, Oral, 3 times per day, Shira Mejia MD, 200 mg at 05/21/19 0507    vancomycin (VANCOCIN) oral solution 500 mg, 500 mg, Oral, 4 times per day, Shira Mejia MD, 500 mg at 05/21/19 0507    lactobacillus (CULTURELLE) capsule 1 capsule, 1 capsule, Oral, BID, Shira Mejia MD, 1 capsule at 05/20/19 2046    morphine (PF) injection 2 mg, 2 mg, Intravenous, Q4H PRN, Shira Mejia MD, 2 mg at 05/13/19 activity.   MD Loren Mahmood

## 2019-05-21 NOTE — CARE COORDINATION
Keila Howe has accepted the patient, precert started yesterday. NG in place for tube feeds. IV Azactam q8, IV Vimpat bid, IV K+, IV Mag continues.  CM will follow

## 2019-05-21 NOTE — PLAN OF CARE
Problem: Gas Exchange - Impaired:  Goal: Levels of oxygenation will improve  Description  Levels of oxygenation will improve  5/20/2019 2031 by Gab Ricks RN  Outcome: Met This Shift     Problem: Infection, Septic Shock:  Goal: Will show no infection signs and symptoms  Description  Will show no infection signs and symptoms  5/20/2019 2031 by Gab Ricks RN  Outcome: Met This Shift     Problem: Tissue Perfusion, Altered:  Goal: Circulatory function within specified parameters  Description  Circulatory function within specified parameters  5/20/2019 2031 by Gab Ricks RN  Outcome: Met This Shift     Problem: Skin Integrity:  Goal: Absence of new skin breakdown  Description  Absence of new skin breakdown  5/20/2019 2031 by Gab Ricks RN  Outcome: Met This Shift

## 2019-05-21 NOTE — PROGRESS NOTES
Pulmonary [facility]                             Pulmonary Consult/Progress Note :    Chief complaint: AMS/resp failure   Subjective   SOB has improved a lot   No fever or chills   No chest pain   Has some secretion   For swallow study      Physical Exam   · Vitals: BP (!) 142/78   Pulse 136   Temp 97.8 °F (36.6 °C) (Temporal)   Resp 20   Ht 5' (1.524 m)   Wt 196 lb 3.2 oz (89 kg)   SpO2 94%   BMI 38.32 kg/m²   ABP (Arterial line BP): 91/81  ABP mean (Arterial line mean): 86 mmHg    · General Appearance:on NC   · Skin: cold and pale blue in the finger tips, multiple rashes over lower extremities   · Head: normocephalic and atraumatic  · Eyes: pupils equal, round, and reactive to light, extraocular eye movements intact, conjunctivae normal  · ENT: tympanic membrane, external ear and ear canal normal bilaterally, nose without deformity, nasal mucosa and turbinates normal without polyps  · Neck: supple and non-tender without mass, no thyromegaly or thyroid nodules, no cervical lymphadenopathy  · Pulmonary/Chest: clear to auscultation bilaterally- no wheezes, rales or rhonchi, normal air movement, no respiratory distress  · Cardiovascular: normal rate, regular rhythm, normal S1 and S2, no murmurs, rubs, clicks, or gallops, distal pulses intact, no carotid bruits  · Abdomen: soft, right sided tenderness, Non distended.    No rebound tenderness   · Extremities: no cyanosis, clubbing or edema  · Musculoskeletal: normal range of motion, no joint swelling, deformity or tenderness  · Neurologic: reflexes normal and symmetric, no cranial nerve deficit, gait, coordination and speech normal     ABG:     Lab Results   Component Value Date    PH 7.433 05/16/2019    PCO2 40.9 05/16/2019    PO2 97.1 05/16/2019    HCO3 26.7 05/16/2019    BE 2.3 05/16/2019    THB 10.9 05/16/2019    O2SAT 97.1 05/16/2019     Labs and Imaging Studies   Basic Labs  CBC:   Lab Results   Component Value

## 2019-05-21 NOTE — PROGRESS NOTES
Department of Internal Medicine  Nephrology Resident  Progress Note    Events reviewed. SUBJECTIVE: We are following Mrs. Haji for acute kidney injury. Awake but nonverbal.    PHYSICAL EXAM:      VITALS:  BP (!) 142/78   Pulse 136   Temp 97.8 °F (36.6 °C) (Temporal)   Resp 20   Ht 5' (1.524 m)   Wt 196 lb 3.2 oz (89 kg)   SpO2 94%   BMI 38.32 kg/m²    Intake and Output last 24hrs:   In: 986 [NG/GT:879]  Out: 6363 [Urine:1564]      Constitutional:  Awake non verbal in no distress  HEENT:  MESSI   Respiratory:  Coarse breath sounds bilaterally, frothy secretions from ET  Cardiovascular/Edema:  Regular rate and rhythm, no murmurs appreciated  Gastrointestinal:  Soft, non-tender, bowel sounds present  Neurologic:  Awake, non-focal but nonverbal  Skin:  warm, + edema  Other:  Erythematous lesion on lower extrtemities    Scheduled Meds:   levETIRAcetam in NaCl  1,000 mg Intravenous BID    lacosamide (VIMPAT) IVPB  100 mg Intravenous BID    sodium chloride (Inhalant)  4 mL Nebulization BID    albuterol  0.63 mg Nebulization 4x daily    levothyroxine  25 mcg Intravenous Daily    famotidine (PEPCID) injection  20 mg Intravenous BID    aztreonam  2 g Intravenous Q8H    metoprolol  2.5 mg Intravenous Q6H    pregabalin  200 mg Oral 3 times per day    vancomycin  500 mg Oral 4 times per day    lactobacillus  1 capsule Oral BID    chlorhexidine  15 mL Mouth/Throat BID    heparin flush  3 mL Intravenous 2 times per day    heparin (porcine)  5,000 Units Subcutaneous TID    vitamin D  50,000 Units Oral Weekly    sodium chloride flush  10 mL Intravenous 2 times per day    mineral oil-hydrophilic petrolatum   Topical TID     Continuous Infusions:    PRN Meds:.morphine, acetaminophen, sodium chloride flush, heparin flush, sodium chloride flush, magnesium hydroxide, ondansetron, mineral oil-hydrophilic petrolatum **AND** mineral oil-hydrophilic petrolatum    DATA:    CBC with Differential:    Lab Results Component Value Date    WBC 5.8 05/21/2019    RBC 3.19 05/21/2019    HGB 10.6 05/21/2019    HCT 32.9 05/21/2019     05/21/2019    .1 05/21/2019    MCH 33.2 05/21/2019    MCHC 32.2 05/21/2019    RDW 15.5 05/21/2019    NRBC 0.9 05/10/2019    LYMPHOPCT 24.7 05/21/2019    PROMYELOPCT 0.9 05/08/2019    MONOPCT 9.3 05/21/2019    BASOPCT 0.7 05/21/2019    MONOSABS 0.54 05/21/2019    LYMPHSABS 1.43 05/21/2019    EOSABS 0.27 05/21/2019    BASOSABS 0.04 05/21/2019     CMP:    Lab Results   Component Value Date     05/21/2019    K 3.3 05/21/2019     05/21/2019    CO2 24 05/21/2019    BUN 9 05/21/2019    CREATININE 0.4 05/21/2019    GFRAA >60 05/21/2019    LABGLOM >60 05/21/2019    GLUCOSE 126 05/21/2019    PROT 6.2 05/21/2019    LABALBU 3.0 05/21/2019    LABALBU 4.4 08/09/2011    CALCIUM 8.6 05/21/2019    BILITOT 0.6 05/21/2019    ALKPHOS 56 05/21/2019    AST 24 05/21/2019    ALT 22 05/21/2019     BMP:    Lab Results   Component Value Date     05/21/2019    K 3.3 05/21/2019     05/21/2019    CO2 24 05/21/2019    BUN 9 05/21/2019    LABALBU 3.0 05/21/2019    LABALBU 4.4 08/09/2011    CREATININE 0.4 05/21/2019    CALCIUM 8.6 05/21/2019    GFRAA >60 05/21/2019    LABGLOM >60 05/21/2019    GLUCOSE 126 05/21/2019     Calcium:    Lab Results   Component Value Date    CALCIUM 8.6 05/21/2019     Ionized Calcium:  No results found for: IONCA  Magnesium:    Lab Results   Component Value Date    MG 1.6 05/21/2019     Phosphorus:    Lab Results   Component Value Date    PHOS 2.8 05/21/2019         Urine studies:  Urine sodium: 22  Urine chloride: <20  Urine creatinine: 100  Urine urea: 251  Urine potassium: 48.2    Fraction excretion of sodium: 0.5%  Fraction excretion of urea: 12.6%    Vitamin D 25 level: 13 (low)      Radiology Review:       Chest Xray May 16, 2019   1.  Multifocal airspace disease most likely suspicious for a multifocal   infiltrate/pneumonia with underlying pulmonary edema and a right   pleural effusion. Interval placement NG tube without identification of   the distal tibia. BRIEF SUMMARY OF INITIAL CONSULT:  Briefly, Mrs. Jeannette Nugent is a 66-year-old female with h/o of hypothyroidism, lumbar radiculopathy s/p laminectomy, HLD, asthma, was brought to the ED for worsening lethargy. She was recently treated for lower extremity cellulitis with clindamycin and then levofloxacin around 2-3 weeks ago. She developed profuse diarrhea shortly thereafter. Family reported some vomiting initially and poor oral intake. Over the past 3 days, she became severely weak and dehydrated. At the ED, she was in shock requiring vasopressor despite aggressive fluid resuscitation and was intubated for airway protection. Labs were notable for creatinine of 4.2 mg/dL, BUN of 73 mg/dL, lactic acid of 3.4, sodium of 129 mmol/L, liver enzymes were also markedly elevated, reasons for this consult. Of note, she has no known history of kidney disease. She was on furosemide at home, but has not been taken since the onset of diarrhea. Problems resolved:    · Hyponatremia, multifactorial, due to hypovolemia and decreased GFR, resolved  · Hypoglycemia secondary to #6, resolved  · Coagulopathy, high PT/INR, resolved  · S/p Hemodynamic shock, hypovolemic and septic shock, melena likely improving with decreasing dose of pressors  · STEFANIA, stage 3, volume responsive pre-renal STEFANIA  (diarrhea, poor oral intake, shock), FeNa 0.5% , FeUrea 12%. Resolved. · Hypomagnesemia  · S/p Respiratory failure, status post extubation; chest x-ray worsening pleural effusion  · s/pShock liver   · Hypernatremia, likely 2/2 diuresis. · Hyperchloremia, probably multifactorial, 2/2 diuresis and diarrhea. · Hypocalcemia, probably multifactorial, including vitamin D deficiency and hypomagnesemia. Continue ergocalciferol, resolved. · Hypophosphatemia, secondary to vitamin D deficiency and poor oral intake. Corrected        IMPRESSION/RECOMMENDATIONS:        1. Hypokalemia, secondary to poor intake and hypomagnesemia  2. Hypomagnesemia, probably multifactorial, 2/2 poor oral intake, diuretics. Improved. 3. HFpEF 60%  4. Aspiration pneumonia, on aztreonam  5. C. difficile infection, on vancomycin    6. Severe hypoalbuminemia, multifactorial  7.  Nutrition, TF at 60 cc/hour, free water at 200 cc every 6 hours     Plan:    · Replace magnesium  · Replace potassium  · Continue to monitor renal function       Smooth Blue M.D     5/21/2019

## 2019-05-21 NOTE — PROGRESS NOTES
Subjective: The patient is awake. No problems overnight. Offers no complaints. Denies chest pain, shortness of breath, abdominal pain. Tolerating tube feeds. Objective:    BP (!) 142/78   Pulse 136   Temp 97.8 °F (36.6 °C) (Temporal)   Resp 20   Ht 5' (1.524 m)   Wt 196 lb 3.2 oz (89 kg)   SpO2 94%   BMI 38.32 kg/m²     Current medications that patient is taking have been reviewed. Heart:  RRR, no murmurs, gallops, or rubs.   Lungs:  CTA bilaterally, no wheeze, rales or rhonchi  Abd: bowel sounds present, soft, nontender, nondistended, no masses  Extrem:  No cyanosis or edema    CBC with Differential:    Lab Results   Component Value Date    WBC 5.8 05/21/2019    RBC 3.19 05/21/2019    HGB 10.6 05/21/2019    HCT 32.9 05/21/2019     05/21/2019    .1 05/21/2019    MCH 33.2 05/21/2019    MCHC 32.2 05/21/2019    RDW 15.5 05/21/2019    NRBC 0.9 05/10/2019    LYMPHOPCT 24.7 05/21/2019    PROMYELOPCT 0.9 05/08/2019    MONOPCT 9.3 05/21/2019    BASOPCT 0.7 05/21/2019    MONOSABS 0.54 05/21/2019    LYMPHSABS 1.43 05/21/2019    EOSABS 0.27 05/21/2019    BASOSABS 0.04 05/21/2019     CMP:    Lab Results   Component Value Date     05/21/2019    K 3.3 05/21/2019     05/21/2019    CO2 24 05/21/2019    BUN 9 05/21/2019    CREATININE 0.4 05/21/2019    GFRAA >60 05/21/2019    LABGLOM >60 05/21/2019    GLUCOSE 126 05/21/2019    PROT 6.2 05/21/2019    LABALBU 3.0 05/21/2019    LABALBU 4.4 08/09/2011    CALCIUM 8.6 05/21/2019    BILITOT 0.6 05/21/2019    ALKPHOS 56 05/21/2019    AST 24 05/21/2019    ALT 22 05/21/2019     BMP:    Lab Results   Component Value Date     05/21/2019    K 3.3 05/21/2019     05/21/2019    CO2 24 05/21/2019    BUN 9 05/21/2019    LABALBU 3.0 05/21/2019    LABALBU 4.4 08/09/2011    CREATININE 0.4 05/21/2019    CALCIUM 8.6 05/21/2019    GFRAA >60 05/21/2019    LABGLOM >60 05/21/2019    GLUCOSE 126 05/21/2019     Magnesium:    Lab Results   Component Value Date    MG 1.6 05/21/2019     Phosphorus:    Lab Results   Component Value Date    PHOS 2.8 05/21/2019     PT/INR:    Lab Results   Component Value Date    PROTIME 14.0 05/21/2019    INR 1.2 05/21/2019     PTT:    Lab Results   Component Value Date    APTT 39.3 05/07/2019   [APTT}     Assessment:    Patient Active Problem List   Diagnosis    Lumbar radiculopathy    Septic shock (HCC)    Seizure (HCC)    Obesity (BMI 30-39. 9)    Metabolic encephalopathy    GI bleed    Essential hypertension    Hyperlipidemia LDL goal <100    Acquired hypothyroidism    Acute kidney [facility] ([facility] [address]    Acute respiratory failure with hypoxia (HCC)    Clostridium difficile colitis    Severe protein-calorie malnutrition (HCC)       Plan:  Stable. Secondary to C.diff colitis. Neurology on board. Adjusting anti-epileptics. Continue to encourage weight loss. Secondary to sepsis and seizures. Mental status improving. Secondary to colitis. Surgery on board. Blood pressure ok, continue current medications  Continue aggressive lipid therapy  Continue synthroid. Secondary to volume depletion from c diff and sepsis. Renal function improving. Appreciate nephrology assist.  Secondary to sepsis. Extubated. Continue aggressive pulmonary hygiene. Oral vancomycin. Pt/Ot evaluations for discharge planning.  to set up discharge in next 24-48 hours.       Vane Hwang    11:41 AM  5/21/2019

## 2019-05-21 NOTE — PROGRESS NOTES
Occupational Therapy     Date:2019  Patient Name: Genna Galindo  MRN: [id number]  : 1960  Room: [address] [place] [address]       Reviewed chart and attempted to treat pt however pt very lethargic and was refusing to complete transfers this date. Pt agreeable to ROM through BUEs. After 1-2 minutes of treatment transport arrived to take pt to her swallow study. Will attempt at later date/time.          Eri Amato 46, 50 [facility]

## 2019-05-22 LAB
ALBUMIN SERPL-MCNC: 2.9 G/DL (ref 3.5–5.2)
ALP BLD-CCNC: 52 U/L (ref 35–104)
ALT SERPL-CCNC: 20 U/L (ref 0–32)
ANION GAP SERPL CALCULATED.3IONS-SCNC: 10 MMOL/L (ref 7–16)
AST SERPL-CCNC: 23 U/L (ref 0–31)
BASOPHILS ABSOLUTE: 0.02 E9/L (ref 0–0.2)
BASOPHILS RELATIVE PERCENT: 0.5 % (ref 0–2)
BILIRUB SERPL-MCNC: 0.5 MG/DL (ref 0–1.2)
BUN BLDV-MCNC: 10 MG/DL (ref 8–23)
CALCIUM SERPL-MCNC: 8.9 MG/DL (ref 8.6–10.2)
CHLORIDE BLD-SCNC: 104 MMOL/L (ref 98–107)
CO2: 24 MMOL/L (ref 22–29)
CREAT SERPL-MCNC: 0.4 MG/DL (ref 0.5–1)
EOSINOPHILS ABSOLUTE: 0.17 E9/L (ref 0.05–0.5)
EOSINOPHILS RELATIVE PERCENT: 4.1 % (ref 0–6)
GFR AFRICAN AMERICAN: >60
GFR NON-AFRICAN AMERICAN: >60 ML/MIN/1.73
GLUCOSE BLD-MCNC: 102 MG/DL (ref 74–99)
HCT VFR BLD CALC: 25.3 % (ref 34–48)
HEMOGLOBIN: 8 G/DL (ref 11.5–15.5)
IMMATURE GRANULOCYTES #: 0.06 E9/L
IMMATURE GRANULOCYTES %: 1.4 % (ref 0–5)
INR BLD: 1.2
LYMPHOCYTES ABSOLUTE: 1.45 E9/L (ref 1.5–4)
LYMPHOCYTES RELATIVE PERCENT: 34.9 % (ref 20–42)
Lab: NORMAL
Lab: NORMAL
MAGNESIUM: 1.7 MG/DL (ref 1.6–2.6)
MCH RBC QN AUTO: 32.9 PG (ref 26–35)
MCHC RBC AUTO-ENTMCNC: 31.6 % (ref 32–34.5)
MCV RBC AUTO: 104.1 FL (ref 80–99.9)
METER GLUCOSE: 94 MG/DL (ref 74–99)
MONOCYTES ABSOLUTE: 0.47 E9/L (ref 0.1–0.95)
MONOCYTES RELATIVE PERCENT: 11.3 % (ref 2–12)
NEUTROPHILS ABSOLUTE: 1.98 E9/L (ref 1.8–7.3)
NEUTROPHILS RELATIVE PERCENT: 47.8 % (ref 43–80)
PDW BLD-RTO: 15.4 FL (ref 11.5–15)
PLATELET # BLD: 212 E9/L (ref 130–450)
PMV BLD AUTO: 11 FL (ref 7–12)
POTASSIUM SERPL-SCNC: 3.5 MMOL/L (ref 3.5–5)
PROTHROMBIN TIME: 13.9 SEC (ref 9.3–12.4)
RBC # BLD: 2.43 E12/L (ref 3.5–5.5)
REPORT: NORMAL
REPORT: NORMAL
SODIUM BLD-SCNC: 138 MMOL/L (ref 132–146)
THIS TEST SENT TO: NORMAL
THIS TEST SENT TO: NORMAL
TOTAL PROTEIN: 6.4 G/DL (ref 6.4–8.3)
WBC # BLD: 4.2 E9/L (ref 4.5–11.5)

## 2019-05-22 PROCEDURE — 99232 SBSQ HOSP IP/OBS MODERATE 35: CPT | Performed by: PHYSICIAN ASSISTANT

## 2019-05-22 PROCEDURE — 2580000003 HC RX 258: Performed by: INTERNAL MEDICINE

## 2019-05-22 PROCEDURE — 6370000000 HC RX 637 (ALT 250 FOR IP): Performed by: INTERNAL MEDICINE

## 2019-05-22 PROCEDURE — 95819 EEG AWAKE AND ASLEEP: CPT | Performed by: PSYCHIATRY & NEUROLOGY

## 2019-05-22 PROCEDURE — C9254 INJECTION, LACOSAMIDE: HCPCS | Performed by: INTERNAL MEDICINE

## 2019-05-22 PROCEDURE — 94669 MECHANICAL CHEST WALL OSCILL: CPT

## 2019-05-22 PROCEDURE — 94668 MNPJ CHEST WALL SBSQ: CPT

## 2019-05-22 PROCEDURE — 6360000002 HC RX W HCPCS: Performed by: INTERNAL MEDICINE

## 2019-05-22 PROCEDURE — 80053 COMPREHEN METABOLIC PANEL: CPT

## 2019-05-22 PROCEDURE — 82962 GLUCOSE BLOOD TEST: CPT

## 2019-05-22 PROCEDURE — 36592 COLLECT BLOOD FROM PICC: CPT

## 2019-05-22 PROCEDURE — 94640 AIRWAY INHALATION TREATMENT: CPT

## 2019-05-22 PROCEDURE — 2500000003 HC RX 250 WO HCPCS: Performed by: INTERNAL MEDICINE

## 2019-05-22 PROCEDURE — 2700000000 HC OXYGEN THERAPY PER DAY

## 2019-05-22 PROCEDURE — 83735 ASSAY OF MAGNESIUM: CPT

## 2019-05-22 PROCEDURE — 97535 SELF CARE MNGMENT TRAINING: CPT

## 2019-05-22 PROCEDURE — 97110 THERAPEUTIC EXERCISES: CPT

## 2019-05-22 PROCEDURE — 97112 NEUROMUSCULAR REEDUCATION: CPT

## 2019-05-22 PROCEDURE — 99232 SBSQ HOSP IP/OBS MODERATE 35: CPT | Performed by: INTERNAL MEDICINE

## 2019-05-22 PROCEDURE — 85025 COMPLETE CBC W/AUTO DIFF WBC: CPT

## 2019-05-22 PROCEDURE — 2060000000 HC ICU INTERMEDIATE R&B

## 2019-05-22 PROCEDURE — 85610 PROTHROMBIN TIME: CPT

## 2019-05-22 PROCEDURE — 36415 COLL VENOUS BLD VENIPUNCTURE: CPT

## 2019-05-22 RX ADMIN — ERGOCALCIFEROL 50000 UNITS: 1.25 CAPSULE ORAL at 08:32

## 2019-05-22 RX ADMIN — ALBUTEROL SULFATE 0.63 MG: 0.63 SOLUTION RESPIRATORY (INHALATION) at 20:23

## 2019-05-22 RX ADMIN — PREGABALIN 200 MG: 100 CAPSULE ORAL at 05:33

## 2019-05-22 RX ADMIN — Medication 500 MG: at 05:33

## 2019-05-22 RX ADMIN — LEVOTHYROXINE SODIUM 50 MCG: 50 TABLET ORAL at 05:33

## 2019-05-22 RX ADMIN — Medication 500 MG: at 00:38

## 2019-05-22 RX ADMIN — CHLORHEXIDINE GLUCONATE 0.12% ORAL RINSE 15 ML: 1.2 LIQUID ORAL at 08:31

## 2019-05-22 RX ADMIN — Medication 10 ML: at 20:12

## 2019-05-22 RX ADMIN — Medication 1 CAPSULE: at 08:32

## 2019-05-22 RX ADMIN — HEPARIN SODIUM 5000 UNITS: 10000 INJECTION INTRAVENOUS; SUBCUTANEOUS at 13:53

## 2019-05-22 RX ADMIN — PETROLATUM: 42 OINTMENT TOPICAL at 20:05

## 2019-05-22 RX ADMIN — LEVETIRACETAM 1000 MG: 500 TABLET ORAL at 20:09

## 2019-05-22 RX ADMIN — HEPARIN SODIUM 5000 UNITS: 10000 INJECTION INTRAVENOUS; SUBCUTANEOUS at 20:01

## 2019-05-22 RX ADMIN — Medication 10 ML: at 10:23

## 2019-05-22 RX ADMIN — PETROLATUM: 42 OINTMENT TOPICAL at 13:56

## 2019-05-22 RX ADMIN — ALBUTEROL SULFATE 0.63 MG: 0.63 SOLUTION RESPIRATORY (INHALATION) at 13:45

## 2019-05-22 RX ADMIN — CHLORHEXIDINE GLUCONATE 0.12% ORAL RINSE 15 ML: 1.2 LIQUID ORAL at 20:05

## 2019-05-22 RX ADMIN — ALBUTEROL SULFATE 0.63 MG: 0.63 SOLUTION RESPIRATORY (INHALATION) at 10:58

## 2019-05-22 RX ADMIN — SODIUM CHLORIDE, PRESERVATIVE FREE 300 UNITS: 5 INJECTION INTRAVENOUS at 08:42

## 2019-05-22 RX ADMIN — FAMOTIDINE 20 MG: 20 TABLET ORAL at 20:04

## 2019-05-22 RX ADMIN — DEXTROSE MONOHYDRATE 100 MG: 50 INJECTION, SOLUTION INTRAVENOUS at 10:23

## 2019-05-22 RX ADMIN — Medication 500 MG: at 12:00

## 2019-05-22 RX ADMIN — Medication 1 CAPSULE: at 20:06

## 2019-05-22 RX ADMIN — PREGABALIN 200 MG: 100 CAPSULE ORAL at 13:53

## 2019-05-22 RX ADMIN — PREGABALIN 200 MG: 100 CAPSULE ORAL at 20:07

## 2019-05-22 RX ADMIN — FAMOTIDINE 20 MG: 20 TABLET ORAL at 08:31

## 2019-05-22 RX ADMIN — AZTREONAM 2 G: 2 INJECTION, POWDER, LYOPHILIZED, FOR SOLUTION INTRAMUSCULAR; INTRAVENOUS at 04:30

## 2019-05-22 RX ADMIN — VANCOMYCIN HYDROCHLORIDE 250 MG: 10 INJECTION, POWDER, LYOPHILIZED, FOR SOLUTION INTRAVENOUS at 20:03

## 2019-05-22 RX ADMIN — METOPROLOL TARTRATE 50 MG: 50 TABLET, FILM COATED ORAL at 08:33

## 2019-05-22 RX ADMIN — DEXTROSE MONOHYDRATE 100 MG: 50 INJECTION, SOLUTION INTRAVENOUS at 22:12

## 2019-05-22 RX ADMIN — METOPROLOL TARTRATE 50 MG: 50 TABLET, FILM COATED ORAL at 20:07

## 2019-05-22 RX ADMIN — SODIUM CHLORIDE SOLN NEBU 3% 4 ML: 3 NEBU SOLN at 20:23

## 2019-05-22 RX ADMIN — PETROLATUM: 42 OINTMENT TOPICAL at 08:42

## 2019-05-22 RX ADMIN — LEVETIRACETAM 1000 MG: 500 TABLET ORAL at 08:32

## 2019-05-22 RX ADMIN — SODIUM CHLORIDE, PRESERVATIVE FREE 300 UNITS: 5 INJECTION INTRAVENOUS at 20:02

## 2019-05-22 RX ADMIN — SODIUM CHLORIDE SOLN NEBU 3% 4 ML: 3 NEBU SOLN at 10:58

## 2019-05-22 RX ADMIN — HEPARIN SODIUM 5000 UNITS: 10000 INJECTION INTRAVENOUS; SUBCUTANEOUS at 08:33

## 2019-05-22 ASSESSMENT — PAIN SCALES - GENERAL
PAINLEVEL_OUTOF10: 0
PAINLEVEL_OUTOF10: 0

## 2019-05-22 NOTE — PROGRESS NOTES
Cele Blunt is a 59 y.o. right handed female    Neurology following for an acute encephalopathy and focal seizure    Presented on 5/7 with an AMS in the setting of 2 weeks of diarrheal illness-- on antibiotic therapy   Found to be in septic shock--with c-diff and multi-system organ  failure   Required intubation, abtx, and pressors--extubated  5/13   Had facial twitching/contorsion with R gaze for 20 sec on 5/14   Was on Lyrica 600 mg at home but not started until  5/14 here     Most recent EEG with slight improvement of L epileptogenic focus compared to previous records   On Keppra 750 mg BID, Vimpat 100 mg BID and Lyrica 200 mg  TID    She is now out of ICU. She appears more awake and alert today. No new complaints.  Moving R arm better today      No chest pain, palpitations, or shortness of breath  No vertigo, lightheadedness or loss of consciousness  No itching or bruising appreciated    ROS otherwise negative      at bedside -- he reports that she had seizures as an infant--- however, both he and the patient cannot recall details of this     Current Facility-Administered Medications   Medication Dose Route Frequency Provider Last Rate Last Dose    famotidine (PEPCID) tablet 20 mg  20 mg Oral BID Ольга Melendez MD   20 mg at 05/22/19 0831    levETIRAcetam (KEPPRA) tablet 1,000 mg  1,000 mg Oral BID Ольга Melendez MD   1,000 mg at 05/22/19 6574    levothyroxine (SYNTHROID) tablet 50 mcg  50 mcg Oral Daily Ольга Melendez MD   50 mcg at 05/22/19 0533    metoprolol tartrate (LOPRESSOR) tablet 50 mg  50 mg Oral BID Ольга Melendez MD   50 mg at 05/22/19 4401    lacosamide (VIMPAT) 100 mg in dextrose 5 % 50 mL IVPB  100 mg Intravenous BID Patricio Amato  mL/hr at 05/22/19 1023 100 mg at 05/22/19 1023    sodium chloride (Inhalant) 3 % nebulizer solution 4 mL  4 mL Nebulization BID Patricio Amato MD   4 mL at 05/22/19 1058    albuterol (ACCUNEB) nebulizer solution 0.63 mg  0.63 mg Nebulization 4x daily Chasidy Loza MD   0.63 mg at 05/22/19 1058    aztreonam (AZACTAM) 2 g IVPB mini-bag  2 g Intravenous Q8H Chasidy Loza MD   Stopped at 05/22/19 0500    pregabalin (LYRICA) capsule 200 mg  200 mg Oral 3 times per day Chasidy Loza MD   200 mg at 05/22/19 0533    vancomycin (VANCOCIN) oral solution 500 mg  500 mg Oral 4 times per day Chasidy Loza MD   500 mg at 05/22/19 0533    lactobacillus (CULTURELLE) capsule 1 capsule  1 capsule Oral BID Chasidy Loza MD   1 capsule at 05/22/19 0832    morphine (PF) injection 2 mg  2 mg Intravenous Q4H PRN Chasidy Loza MD   2 mg at 05/13/19 1827    acetaminophen (TYLENOL) 160 MG/5ML solution 650 mg  650 mg Oral Q6H PRN Chasidy Loza MD   650 mg at 05/20/19 1754    chlorhexidine (PERIDEX) 0.12 % solution 15 mL  15 mL Mouth/Throat BID Chasidy Loza MD   15 mL at 05/22/19 0831    sodium chloride flush 0.9 % injection 10 mL  10 mL Intravenous PRN Chasidy Loza MD   10 mL at 05/11/19 0504    heparin flush 100 UNIT/ML injection 300 Units  3 mL Intravenous 2 times per day Chasidy Loza MD   300 Units at 05/22/19 0842    heparin flush 100 UNIT/ML injection 300 Units  3 mL Intracatheter PRN Chasidy Loza MD        heparin (porcine) injection 5,000 Units  5,000 Units Subcutaneous TID Chasidy Loza MD   5,000 Units at 05/22/19 0833    vitamin D (ERGOCALCIFEROL) capsule 50,000 Units  50,000 Units Oral Weekly Chasidy Loza MD   50,000 Units at 05/22/19 9329    sodium chloride flush 0.9 % injection 10 mL  10 mL Intravenous 2 times per day Chasidy Loza MD   10 mL at 05/22/19 1023    sodium chloride flush 0.9 % injection 10 mL  10 mL Intravenous PRN Chasidy Loza MD   10 mL at 05/15/19 0304    magnesium hydroxide (MILK OF MAGNESIA) 400 MG/5ML suspension 30 mL  30 mL Oral Daily PRN Chasidy Loza MD        ondansetron (ZOFRAN) injection 4 mg  4 mg Intravenous Q6H PRN Chasidy Loza MD   4 mg at 05/15/19 2008  mineral oil-hydrophilic petrolatum (HYDROPHOR) ointment   Topical TID Giorgio Martinez MD        And    mineral oil-hydrophilic petrolatum (HYDROPHOR) ointment   Topical TID PRN Giorgio Martinez MD           Objective:     /78   Pulse 122   Temp 98 °F (36.7 °C) (Temporal)   Resp 20   Ht 5' (1.524 m)   Wt 195 lb 4.8 oz (88.6 kg)   SpO2 96%   BMI 38.14 kg/m²     General: awake, in no acute distress; appears stated age; lying in bed  Head:  Normocephalic and atraumatic  ENT: dry, cracked lips and oral mucosa   Eyes: Conjunctiva/corneas clear   Lungs: resps nonlabored  Heart: tachycardic rate and rhythm  Extremities: 1+ edema to hands and ankles; no cyanosis  Pulses: 2+ throughout  Skin: No lesions or rashes     Mental Status: alert and was able to state her name and she knew she was in the hospital.  She knew current president. Follows all simple commands for me. More talkative today.     Appropriate attention and concentration     Speech: Clear   Language: No aphasias     Cranial Nerves:  I: smell    II: visual acuity     II: visual fields Full    II: pupils BOB   III,VII: ptosis None   III,IV,VI: extraocular muscles  EOMI without nystagmus   V: mastication Normal   V: facial light touch sensation  Normal    V,VII: corneal reflex     VII: facial muscle function - upper  Normal    VII: facial muscle function - lower Symmetric   VIII: hearing Normal   IX: soft palate elevation  Normal    IX,X: gag reflex    XI: trapezius strength  5/5   XI: sternocleidomastoid strength 5/5   XI: neck extension strength  5/5   XII: tongue strength  Normal       Motor:  Holds L arm against gravity and  strength 5/5 on L   Able to hold R arm anti gravity today; squeezed with 3/5   Lifts both legs anti gravity   Obese bulk and normal tone  No abnormal movements or tremors    Sensory:  Intact to LT     DTR:   No Haji's    Babinski on left     No pathological reflexes    Laboratory/Radiology:     Lab Results focus and no further clinical seizures on current  AEDs      Acute encephalopathy--- much improved    Secondary to metabolic disarray, sepsis, and PLEDs   No evidence of CNS infection on initial LP---final studies still  pending   ID and renal following    Plan:     Continue Keppra 750 mg BID BID, Vimpat 100 mg BID, Pregabalin 200 mg TID   Repeat EEG in a month as OP to compare    With goal to decrease the amount of her AEDs     Okay to discharge from neurology POV once medically cleared     Neuro to sign off, please call with new issues     She will need OP neuro follow up       Jomar Philippe PA-C  11:31 AM  5/22/2019

## 2019-05-22 NOTE — PROGRESS NOTES
Occupational Therapy  OT BEDSIDE TREATMENT NOTE      Date:2019  Patient Name: Mag Jimenez  MRN: [id number]  : 1960  Room: [address] [place] [address]-A     Modified Socrates Scale   Score     Description  0             No symptoms  1             No significant disability despite symptoms  2             Slight disability; able to look after own affairs  3             Moderate disability; able to ambulate without assist/ requires assist with ADLs  4             Moderate/Severe disability;requires assist to ambulate/assist with ADLs  5             Severe disability;bedridden/incontinent   6               Score:   56     Evaluating OT: LUIS Rodriguez     AM-PAC Daily Activity Raw Score:   Recommended Adaptive Equipment: to be determined      Comments: Based on patient's functional performance as stated above and level of assistance needed prior to admission, this therapist believes that the patient would benefit from further skilled OT following hospital stay in an effort to increase safety, functional independence, and quality of life.     Diagnosis: Septic Shock  Comment: Pt intubated -   Pertinent Medical History: Arthritis, asthma, high blood pressure, HLD, irregular heart beat, migraines (neuralgic), PONV, thyroid disorder     Precautions:  Falls, , contact isolation     Home Living: Pt lives with  in a 1 story home with 4 step(s) to enter and no rail(s); bed/bath on main floor. Basement with no needs. Bathroom setup: tub shower with SBs, elevated toilet  Equipment owned: shower stool, QC, bedside commode     Prior Level of Function: Per , pt mostly independent/modified independent with ADLs ( A PRN),  assists with IADLs; using QC for short distance ambulation.  states pt slept upright on couch (to help with reflux d/t hiatal hernia). States she hasn't done laundry, managed bills, done grocery shopping, or cleaned their home in years.   somewhat inconsistent with baseline level of function. Driving: yes  Occupation: retired from home health care     Pain Level: \"bad\" pain, pt unable to specify location  Cognition: A&O x 2 to name and place; Follows 1 step directions ~25% of the time. Slow processing. Follows commands with increased awareness. Inconsistent with Y/N.              Memory: NT              Sequencing: P              Problem solving: P              Judgement/safety: P-              RASS: 0  CAM-ICU: positive                Functional Assessment:    Initial Eval Status  Date: 5/17/19 Treatment Status  Date: 5/22/19 Short Term Goals  Treatment frequency: 1-3x/week on MICU; PRN on stepdown unit  -pt will. .. Feeding Dependent  NPO Max A [place];    Pt requires assistance to bring food to mouth.     improve self feeding task to independent when/if medically appropriate   Grooming Dependent  To brush hair, to wash hands  Max A;    [place] assistance to wash face while pt in supine.       improve grooming/hygiene tasks to min A while seated    UB Dressing Dependent Max A; To alexus/doff gown while increased time.    improve UB dressing to mod A   LB Dressing Dependent  Dep; Dep to complete donning of socks.         Bathing Dependent  Dep; Per last treatment.      improve UB bathing to mod A   Toileting Dependent Incontinent     improve toileting task and all clothing mgmt to max A   Bed Mobility  Supine to sit: Max A x2  Sit to supine: Dep x2 Dep; Pt requires Dep A to transfer from supine to sitting position with use of TAPs system.    Pt sat up on the EOB for 10 minutes with Mod verbal prompting to improve sitting balance and safety.     improve bed mobility to mod A in prep for EOB ADL tasks   Functional Transfers Sit to stand: NT  Stand to sit: NT N/A improve all functional transfers to Max A   Functional Mobility NT N/A  improve functional mobility to max A with AE PRN   Balance Sitting:     Static:  Min A    Dynamic: NT  Standing: NT Sitting: Static: Min A    Dynamic: Max A  Standing: NT  demo CGA dynamic sitting balance EOB during ADL tasks   Endurance/Activity Tolerance poor Poor+ demo fair activity tolerance/endurance during 15-20 min ADL task    Visual/  Perceptual Glasses: bifocals, not in room                    Hand dominance: R  UE ROM:        RUE: WFL PROM; 3rd digit limited in extension                          LUE: WFL AAROM  Strength:        RUE: grossly 2-/5        LUE: grossly 3-/5   Strength: impaired BL  Fine Motor Coordination: WFL     Hearing: WFL  Sensation: N/T in BLE, unable to identify specific location  Tone: WNL  Edema: mild global edema    Comments: Upon arrival pt sitting up in supine with head tilted to the right side. Pt sat up on the EOB for 10 minutes while working on core strengthening. Pt transferred back into bed with Max A of 2. Pt positioned into sitting position in bed with pillows to improve proper position. Pt educated on completed BUE exercises to increase strength and tolerance for functional tasks. Will continue to increase strength and tolerance through the next visits. Pt positioned with all lines and tubes intact, call light within reach, and  in room. · Pt has made fair progress towards set goals.    · Continue with current plan of care    Time in: 11:30  Time out: 12:10  Total Tx Time: 40 mins    Will Donna Amato 46, 50 [facility]

## 2019-05-22 NOTE — PLAN OF CARE
Problem: Risk for Impaired Skin Integrity  Goal: Tissue integrity - skin and mucous membranes  Description  Structural intactness and normal physiological function of skin and  mucous membranes.   Outcome: Met This Shift     Problem: Falls - Risk of:  Goal: Will remain free from falls  Description  Will remain free from falls  Outcome: Met This Shift     Problem: Skin Integrity:  Goal: Will show no infection signs and symptoms  Description  Will show no infection signs and symptoms  Outcome: Met This Shift

## 2019-05-22 NOTE — PROGRESS NOTES
Department of Internal Medicine  Nephrology Resident  Progress Note    Events reviewed. SUBJECTIVE: We are following Mrs. Haji for acute kidney injury. Awake able to answer simple questions appropriately    PHYSICAL EXAM:      VITALS:  /78   Pulse 122   Temp 98 °F (36.7 °C) (Temporal)   Resp 20   Ht 5' (1.524 m)   Wt 195 lb 4.8 oz (88.6 kg)   SpO2 96%   BMI 38.14 kg/m²    Intake and Output last 24hrs:   In: 180 [P.O.:180]  Out: 1775 [Urine:1775]      Constitutional:  Awake , appears in no acute distress, able to answer simple questions, FiO2 requirement is at 3 L via nasal cannula  HEENT:  MESSI   Respiratory:  No wheezing rales or rhonchi, decreased breath sounds in bases   Cardiovascular/Edema:  Regular rate and rhythm, no murmurs appreciated  Gastrointestinal:  Soft, non-tender, bowel sounds present  Neurologic:  Awake, non-focal but nonverbal  Skin:  warm, + edema  Other:  Erythematous lesion on lower extrtemities    Scheduled Meds:   vancomycin  250 mg Oral 4 times per day    famotidine  20 mg Oral BID    levETIRAcetam  1,000 mg Oral BID    levothyroxine  50 mcg Oral Daily    metoprolol tartrate  50 mg Oral BID    lacosamide (VIMPAT) IVPB  100 mg Intravenous BID    sodium chloride (Inhalant)  4 mL Nebulization BID    albuterol  0.63 mg Nebulization 4x daily    pregabalin  200 mg Oral 3 times per day    lactobacillus  1 capsule Oral BID    chlorhexidine  15 mL Mouth/Throat BID    heparin flush  3 mL Intravenous 2 times per day    heparin (porcine)  5,000 Units Subcutaneous TID    vitamin D  50,000 Units Oral Weekly    sodium chloride flush  10 mL Intravenous 2 times per day    mineral oil-hydrophilic petrolatum   Topical TID     Continuous Infusions:    PRN Meds:.morphine, acetaminophen, sodium chloride flush, heparin flush, sodium chloride flush, magnesium hydroxide, ondansetron, mineral oil-hydrophilic petrolatum **AND** mineral oil-hydrophilic petrolatum    DATA:    CBC with Differential:    Lab Results   Component Value Date    WBC 4.2 05/22/2019    RBC 2.43 05/22/2019    HGB 8.0 05/22/2019    HCT 25.3 05/22/2019     05/22/2019    .1 05/22/2019    MCH 32.9 05/22/2019    MCHC 31.6 05/22/2019    RDW 15.4 05/22/2019    NRBC 0.9 05/10/2019    LYMPHOPCT 34.9 05/22/2019    PROMYELOPCT 0.9 05/08/2019    MONOPCT 11.3 05/22/2019    BASOPCT 0.5 05/22/2019    MONOSABS 0.47 05/22/2019    LYMPHSABS 1.45 05/22/2019    EOSABS 0.17 05/22/2019    BASOSABS 0.02 05/22/2019     CMP:    Lab Results   Component Value Date     05/22/2019    K 3.5 05/22/2019    K 3.3 05/21/2019     05/22/2019    CO2 24 05/22/2019    BUN 10 05/22/2019    CREATININE 0.4 05/22/2019    GFRAA >60 05/22/2019    LABGLOM >60 05/22/2019    GLUCOSE 102 05/22/2019    PROT 6.4 05/22/2019    LABALBU 2.9 05/22/2019    LABALBU 4.4 08/09/2011    CALCIUM 8.9 05/22/2019    BILITOT 0.5 05/22/2019    ALKPHOS 52 05/22/2019    AST 23 05/22/2019    ALT 20 05/22/2019     BMP:    Lab Results   Component Value Date     05/22/2019    K 3.5 05/22/2019    K 3.3 05/21/2019     05/22/2019    CO2 24 05/22/2019    BUN 10 05/22/2019    LABALBU 2.9 05/22/2019    LABALBU 4.4 08/09/2011    CREATININE 0.4 05/22/2019    CALCIUM 8.9 05/22/2019    GFRAA >60 05/22/2019    LABGLOM >60 05/22/2019    GLUCOSE 102 05/22/2019     Calcium:    Lab Results   Component Value Date    CALCIUM 8.9 05/22/2019     Ionized Calcium:  No results found for: IONCA  Magnesium:    Lab Results   Component Value Date    MG 1.7 05/22/2019     Phosphorus:    Lab Results   Component Value Date    PHOS 2.8 05/21/2019         Urine studies:  Urine sodium: 22  Urine chloride: <20  Urine creatinine: 100  Urine urea: 251  Urine potassium: 48.2    Fraction excretion of sodium: 0.5%  Fraction excretion of urea: 12.6%    Vitamin D 25 level: 13 (low)      Radiology Review:       Chest Xray May 16, 2019   1.  Multifocal airspace disease most likely suspicious for a multifocal   infiltrate/pneumonia with underlying pulmonary edema and a right   pleural effusion. Interval placement NG tube without identification of   the distal tibia. BRIEF SUMMARY OF INITIAL CONSULT:  Briefly, Mrs. Rashmi Carter is a 28-year-old female with h/o of hypothyroidism, lumbar radiculopathy s/p laminectomy, HLD, asthma, was brought to the ED for worsening lethargy. She was recently treated for lower extremity cellulitis with clindamycin and then levofloxacin around 2-3 weeks ago. She developed profuse diarrhea shortly thereafter. Family reported some vomiting initially and poor oral intake. Over the past 3 days, she became severely weak and dehydrated. At the ED, she was in shock requiring vasopressor despite aggressive fluid resuscitation and was intubated for airway protection. Labs were notable for creatinine of 4.2 mg/dL, BUN of 73 mg/dL, lactic acid of 3.4, sodium of 129 mmol/L, liver enzymes were also markedly elevated, reasons for this consult. Of note, she has no known history of kidney disease. She was on furosemide at home, but has not been taken since the onset of diarrhea. Problems resolved:    · Hyponatremia, multifactorial, due to hypovolemia and decreased GFR, resolved  · Hypoglycemia secondary to #6, resolved  · Coagulopathy, high PT/INR, resolved  · S/p Hemodynamic shock, hypovolemic and septic shock, melena likely improving with decreasing dose of pressors  · STEFANIA, stage 3, volume responsive pre-renal STEFANIA  (diarrhea, poor oral intake, shock), FeNa 0.5% , FeUrea 12%. Resolved. · Hypomagnesemia  · S/p Respiratory failure, status post extubation; chest x-ray worsening pleural effusion  · s/pShock liver   · Hypernatremia, likely 2/2 diuresis. · Hyperchloremia, probably multifactorial, 2/2 diuresis and diarrhea. · Hypocalcemia, probably multifactorial, including vitamin D deficiency and hypomagnesemia. Continue ergocalciferol, resolved.   · Hypophosphatemia, secondary to vitamin D

## 2019-05-22 NOTE — PROGRESS NOTES
Pulmonary [facility]                             Pulmonary Consult/Progress Note :    Chief complaint: AMS/resp failure   Subjective   SOB has improved a lot   More awake and alert with no fever or chills   No chest pain   Has some secretion   start to eat pureed diet with  help     Physical Exam   · Vitals: /78   Pulse 111   Temp 98 °F (36.7 °C) (Temporal)   Resp 20   Ht 5' (1.524 m)   Wt 195 lb 4.8 oz (88.6 kg)   SpO2 95%   BMI 38.14 kg/m²   ABP (Arterial line BP): 91/81  ABP mean (Arterial line mean): 86 mmHg    · General Appearance:on NC   · Skin: warm   · Head: normocephalic and atraumatic  · Eyes: pupils equal, round, and reactive to light, extraocular eye movements intact, conjunctivae normal  · ENT: tympanic membrane, external ear and ear canal normal bilaterally, nose without deformity, nasal mucosa and turbinates normal without polyps  · Neck: supple and non-tender without mass, no thyromegaly or thyroid nodules, no cervical lymphadenopathy  · Pulmonary/Chest: clear to auscultation bilaterally- no wheezes, rales or rhonchi, normal air movement, no respiratory distress  · Cardiovascular: normal rate, regular rhythm, normal S1 and S2, no murmurs, rubs, clicks, or gallops, distal pulses intact, no carotid bruits  · Abdomen: soft, right sided tenderness, Non distended.    No rebound tenderness   · Extremities: no cyanosis, clubbing or edema  · Musculoskeletal: normal range of motion, no joint swelling, deformity or tenderness  · Neurologic: reflexes normal and symmetric, no cranial nerve deficit, gait, coordination and speech normal     ABG:     Lab Results   Component Value Date    PH 7.433 05/16/2019    PCO2 40.9 05/16/2019    PO2 97.1 05/16/2019    HCO3 26.7 05/16/2019    BE 2.3 05/16/2019    THB 10.9 05/16/2019    O2SAT 97.1 05/16/2019     Labs and Imaging Studies   Basic Labs  CBC:   Lab Results   Component Value Date    WBC 4.2 05/22/2019 RBC 2.43 05/22/2019    HGB 8.0 05/22/2019    HCT 25.3 05/22/2019    .1 05/22/2019    RDW 15.4 05/22/2019     05/22/2019     CMP:  Lab Results   Component Value Date     05/22/2019    K 3.5 05/22/2019    K 3.3 05/21/2019     05/22/2019    CO2 24 05/22/2019    BUN 10 05/22/2019    PROT 6.4 05/22/2019     PT/INR:  No results found for: PTINR    Imaging Studies:     Ct Abdomen Pelvis Wo Contrast Additional Contrast? None     Assessment and Plan       Acute encephalopathy, improving  Septic shock , improved  Acute hypoxic respiratory failure   C diff colitis   Atelectasis R> L    acute pulmonary edema/Fluid overload      Plan   Suction as needed   Aggressive Pulmonary rehab with ambulation   IC   Up to chair soon   Watch I&OS,avoid fluid overload   Aspiration precaution   Follow on CXR   Wean O2 as tolerated   Azactam stopped by ID and on  Vanco Po   BD       Magdi Vega M.D.   5/22/2019  3:18 PM

## 2019-05-22 NOTE — PROGRESS NOTES
C/C: C diff infection , Aspiration pneumonia       Pt is awake and alert  Denies fever and chills  Feels better  Diarrhea -stopped  Now on oral food   Afebrile       Current Facility-Administered Medications   Medication Dose Route Frequency Provider Last Rate Last Dose    famotidine (PEPCID) tablet 20 mg  20 mg Oral BID Reid Swain MD   20 mg at 05/22/19 0831    levETIRAcetam (KEPPRA) tablet 1,000 mg  1,000 mg Oral BID Reid Swain MD   1,000 mg at 05/22/19 0990    levothyroxine (SYNTHROID) tablet 50 mcg  50 mcg Oral Daily Reid Swain MD   50 mcg at 05/22/19 0533    metoprolol tartrate (LOPRESSOR) tablet 50 mg  50 mg Oral BID Reid Swain MD   50 mg at 05/22/19 8246    lacosamide (VIMPAT) 100 mg in dextrose 5 % 50 mL IVPB  100 mg Intravenous BID Darlyn Cohen MD   Stopped at 05/22/19 1053    sodium chloride (Inhalant) 3 % nebulizer solution 4 mL  4 mL Nebulization BID Darlyn Cohen MD   4 mL at 05/22/19 1058    albuterol (ACCUNEB) nebulizer solution 0.63 mg  0.63 mg Nebulization 4x daily Darlyn Cohen MD   0.63 mg at 05/22/19 1058    aztreonam (AZACTAM) 2 g IVPB mini-bag  2 g Intravenous Q8H Darlyn Cohen MD   Stopped at 05/22/19 0500    pregabalin (LYRICA) capsule 200 mg  200 mg Oral 3 times per day Darlyn Cohen MD   200 mg at 05/22/19 0533    vancomycin (VANCOCIN) oral solution 500 mg  500 mg Oral 4 times per day Darlyn Cohen MD   500 mg at 05/22/19 0533    lactobacillus (CULTURELLE) capsule 1 capsule  1 capsule Oral BID Darlyn Cohen MD   1 capsule at 05/22/19 0832    morphine (PF) injection 2 mg  2 mg Intravenous Q4H PRN Darlyn Cohen MD   2 mg at 05/13/19 1827    acetaminophen (TYLENOL) 160 MG/5ML solution 650 mg  650 mg Oral Q6H PRN Darlyn Cohen MD   650 mg at 05/20/19 1754    chlorhexidine (PERIDEX) 0.12 % solution 15 mL  15 mL Mouth/Throat BID Darlyn Cohen MD   15 mL at 05/22/19 0831    sodium chloride flush 0.9 % injection 10 mL  10 mL scattered rhonchi    Heart:    Regular rate and rhythm   Abdomen:     Soft, bowel sounds present , loose stool     Extremities:   + edema, erythema + ,warm    Pulses:   Dorsalis pedis palpable    Skin:    No erythema       CBC with Differential:      Lab Results   Component Value Date    WBC 4.2 05/22/2019    RBC 2.43 05/22/2019    HGB 8.0 05/22/2019    HCT 25.3 05/22/2019     05/22/2019    .1 05/22/2019    MCH 32.9 05/22/2019    MCHC 31.6 05/22/2019    RDW 15.4 05/22/2019    NRBC 0.9 05/10/2019    LYMPHOPCT 34.9 05/22/2019    PROMYELOPCT 0.9 05/08/2019    MONOPCT 11.3 05/22/2019    BASOPCT 0.5 05/22/2019    MONOSABS 0.47 05/22/2019    LYMPHSABS 1.45 05/22/2019    EOSABS 0.17 05/22/2019    BASOSABS 0.02 05/22/2019       CMP:    Lab Results   Component Value Date     05/22/2019    K 3.5 05/22/2019    K 3.3 05/21/2019     05/22/2019    CO2 24 05/22/2019    BUN 10 05/22/2019    CREATININE 0.4 05/22/2019    GFRAA >60 05/22/2019    LABGLOM >60 05/22/2019    GLUCOSE 102 05/22/2019    PROT 6.4 05/22/2019    LABALBU 2.9 05/22/2019    LABALBU 4.4 08/09/2011    CALCIUM 8.9 05/22/2019    BILITOT 0.5 05/22/2019    ALKPHOS 52 05/22/2019    AST 23 05/22/2019    ALT 20 05/22/2019       Hepatic Function Panel:    Lab Results   Component Value Date    ALKPHOS 52 05/22/2019    ALT 20 05/22/2019    AST 23 05/22/2019    PROT 6.4 05/22/2019    BILITOT 0.5 05/22/2019    BILIDIR 0.2 05/21/2019    IBILI 0.4 05/21/2019    LABALBU 2.9 05/22/2019    LABALBU 4.4 08/09/2011       MICROBIOLOGY:     Blood culture - neg to date  Tip cx - VRE , CONS    Urine cx - Candida         Radiology :     Chest x ray - bilateral congestion        IMPRESSION:      1. Septic shock - improved   2. Sever  C diff infection - improved    3. Pneumonia - Aspiration   4. Leukocytosis - improved   5. Respiratory failure - improved   6. Candiduria     RECOMMENDATIONS:      1. Vancomycin to 250 mg po q 6 hrs   2.  Stop aztreonam            12:25

## 2019-05-23 LAB
ANION GAP SERPL CALCULATED.3IONS-SCNC: 13 MMOL/L (ref 7–16)
BUN BLDV-MCNC: 12 MG/DL (ref 8–23)
CALCIUM SERPL-MCNC: 8.9 MG/DL (ref 8.6–10.2)
CHLORIDE BLD-SCNC: 106 MMOL/L (ref 98–107)
CO2: 23 MMOL/L (ref 22–29)
CREAT SERPL-MCNC: 0.5 MG/DL (ref 0.5–1)
GFR AFRICAN AMERICAN: >60
GFR NON-AFRICAN AMERICAN: >60 ML/MIN/1.73
GLUCOSE BLD-MCNC: 106 MG/DL (ref 74–99)
POTASSIUM SERPL-SCNC: 3.4 MMOL/L (ref 3.5–5)
SODIUM BLD-SCNC: 142 MMOL/L (ref 132–146)

## 2019-05-23 PROCEDURE — 36415 COLL VENOUS BLD VENIPUNCTURE: CPT

## 2019-05-23 PROCEDURE — 2580000003 HC RX 258: Performed by: INTERNAL MEDICINE

## 2019-05-23 PROCEDURE — 6370000000 HC RX 637 (ALT 250 FOR IP): Performed by: INTERNAL MEDICINE

## 2019-05-23 PROCEDURE — 6360000002 HC RX W HCPCS: Performed by: INTERNAL MEDICINE

## 2019-05-23 PROCEDURE — 36592 COLLECT BLOOD FROM PICC: CPT

## 2019-05-23 PROCEDURE — 97112 NEUROMUSCULAR REEDUCATION: CPT

## 2019-05-23 PROCEDURE — 2700000000 HC OXYGEN THERAPY PER DAY

## 2019-05-23 PROCEDURE — C9254 INJECTION, LACOSAMIDE: HCPCS | Performed by: INTERNAL MEDICINE

## 2019-05-23 PROCEDURE — 97530 THERAPEUTIC ACTIVITIES: CPT

## 2019-05-23 PROCEDURE — 94668 MNPJ CHEST WALL SBSQ: CPT

## 2019-05-23 PROCEDURE — 94640 AIRWAY INHALATION TREATMENT: CPT

## 2019-05-23 PROCEDURE — 2060000000 HC ICU INTERMEDIATE R&B

## 2019-05-23 PROCEDURE — 99232 SBSQ HOSP IP/OBS MODERATE 35: CPT | Performed by: INTERNAL MEDICINE

## 2019-05-23 PROCEDURE — 97535 SELF CARE MNGMENT TRAINING: CPT

## 2019-05-23 PROCEDURE — 80048 BASIC METABOLIC PNL TOTAL CA: CPT

## 2019-05-23 RX ORDER — CHLORHEXIDINE GLUCONATE 0.12 MG/ML
15 RINSE ORAL 2 TIMES DAILY
Qty: 420 ML | Refills: 0 | DISCHARGE
Start: 2019-05-23 | End: 2019-06-06

## 2019-05-23 RX ORDER — LACTOBACILLUS RHAMNOSUS GG 10B CELL
1 CAPSULE ORAL 2 TIMES DAILY
DISCHARGE
Start: 2019-05-23

## 2019-05-23 RX ORDER — HEPARIN SODIUM 10000 [USP'U]/ML
5000 INJECTION, SOLUTION INTRAVENOUS; SUBCUTANEOUS 3 TIMES DAILY
DISCHARGE
Start: 2019-05-23

## 2019-05-23 RX ORDER — LACOSAMIDE 100 MG/1
100 TABLET ORAL 2 TIMES DAILY
Qty: 60 TABLET | Status: SHIPPED | DISCHARGE
Start: 2019-05-23 | End: 2019-08-21

## 2019-05-23 RX ORDER — SODIUM CHLORIDE FOR INHALATION 3 %
4 VIAL, NEBULIZER (ML) INHALATION 2 TIMES DAILY
DISCHARGE
Start: 2019-05-23

## 2019-05-23 RX ORDER — METOPROLOL TARTRATE 50 MG/1
50 TABLET, FILM COATED ORAL 2 TIMES DAILY
Qty: 60 TABLET | Refills: 3 | DISCHARGE
Start: 2019-05-23

## 2019-05-23 RX ORDER — ALBUTEROL SULFATE 0.63 MG/3ML
0.63 SOLUTION RESPIRATORY (INHALATION) 4 TIMES DAILY
Qty: 270 ML | Refills: 3 | DISCHARGE
Start: 2019-05-23

## 2019-05-23 RX ORDER — LEVETIRACETAM 1000 MG/1
1000 TABLET ORAL 2 TIMES DAILY
Qty: 60 TABLET | Refills: 3 | DISCHARGE
Start: 2019-05-23

## 2019-05-23 RX ORDER — FAMOTIDINE 20 MG/1
20 TABLET, FILM COATED ORAL 2 TIMES DAILY
Qty: 60 TABLET | Refills: 3 | DISCHARGE
Start: 2019-05-23

## 2019-05-23 RX ORDER — LEVOTHYROXINE SODIUM 0.05 MG/1
50 TABLET ORAL DAILY
Qty: 30 TABLET | Refills: 3 | DISCHARGE
Start: 2019-05-24

## 2019-05-23 RX ADMIN — VANCOMYCIN HYDROCHLORIDE 250 MG: 10 INJECTION, POWDER, LYOPHILIZED, FOR SOLUTION INTRAVENOUS at 17:27

## 2019-05-23 RX ADMIN — PREGABALIN 200 MG: 100 CAPSULE ORAL at 06:11

## 2019-05-23 RX ADMIN — VANCOMYCIN HYDROCHLORIDE 250 MG: 10 INJECTION, POWDER, LYOPHILIZED, FOR SOLUTION INTRAVENOUS at 12:28

## 2019-05-23 RX ADMIN — LEVOTHYROXINE SODIUM 50 MCG: 50 TABLET ORAL at 06:11

## 2019-05-23 RX ADMIN — PREGABALIN 200 MG: 100 CAPSULE ORAL at 22:53

## 2019-05-23 RX ADMIN — SODIUM CHLORIDE, PRESERVATIVE FREE 300 UNITS: 5 INJECTION INTRAVENOUS at 21:17

## 2019-05-23 RX ADMIN — Medication 10 ML: at 09:08

## 2019-05-23 RX ADMIN — DEXTROSE MONOHYDRATE 100 MG: 50 INJECTION, SOLUTION INTRAVENOUS at 09:20

## 2019-05-23 RX ADMIN — FAMOTIDINE 20 MG: 20 TABLET ORAL at 09:09

## 2019-05-23 RX ADMIN — LEVETIRACETAM 1000 MG: 500 TABLET ORAL at 21:16

## 2019-05-23 RX ADMIN — HEPARIN SODIUM 5000 UNITS: 10000 INJECTION INTRAVENOUS; SUBCUTANEOUS at 14:14

## 2019-05-23 RX ADMIN — LEVETIRACETAM 1000 MG: 500 TABLET ORAL at 09:09

## 2019-05-23 RX ADMIN — ALBUTEROL SULFATE 0.63 MG: 0.63 SOLUTION RESPIRATORY (INHALATION) at 17:09

## 2019-05-23 RX ADMIN — VANCOMYCIN HYDROCHLORIDE 250 MG: 10 INJECTION, POWDER, LYOPHILIZED, FOR SOLUTION INTRAVENOUS at 00:13

## 2019-05-23 RX ADMIN — HEPARIN SODIUM 5000 UNITS: 10000 INJECTION INTRAVENOUS; SUBCUTANEOUS at 20:30

## 2019-05-23 RX ADMIN — SODIUM CHLORIDE SOLN NEBU 3% 4 ML: 3 NEBU SOLN at 08:14

## 2019-05-23 RX ADMIN — Medication 1 CAPSULE: at 21:16

## 2019-05-23 RX ADMIN — HEPARIN SODIUM 5000 UNITS: 10000 INJECTION INTRAVENOUS; SUBCUTANEOUS at 09:13

## 2019-05-23 RX ADMIN — DEXTROSE MONOHYDRATE 100 MG: 50 INJECTION, SOLUTION INTRAVENOUS at 21:42

## 2019-05-23 RX ADMIN — PETROLATUM: 42 OINTMENT TOPICAL at 09:12

## 2019-05-23 RX ADMIN — PREGABALIN 200 MG: 100 CAPSULE ORAL at 14:14

## 2019-05-23 RX ADMIN — METOPROLOL TARTRATE 50 MG: 50 TABLET, FILM COATED ORAL at 21:16

## 2019-05-23 RX ADMIN — SODIUM CHLORIDE, PRESERVATIVE FREE 300 UNITS: 5 INJECTION INTRAVENOUS at 09:09

## 2019-05-23 RX ADMIN — CHLORHEXIDINE GLUCONATE 0.12% ORAL RINSE 15 ML: 1.2 LIQUID ORAL at 09:09

## 2019-05-23 RX ADMIN — Medication 1 CAPSULE: at 09:09

## 2019-05-23 RX ADMIN — ALBUTEROL SULFATE 0.63 MG: 0.63 SOLUTION RESPIRATORY (INHALATION) at 08:14

## 2019-05-23 RX ADMIN — ALBUTEROL SULFATE 0.63 MG: 0.63 SOLUTION RESPIRATORY (INHALATION) at 11:27

## 2019-05-23 RX ADMIN — VANCOMYCIN HYDROCHLORIDE 250 MG: 10 INJECTION, POWDER, LYOPHILIZED, FOR SOLUTION INTRAVENOUS at 06:12

## 2019-05-23 RX ADMIN — Medication 10 ML: at 21:42

## 2019-05-23 RX ADMIN — METOPROLOL TARTRATE 50 MG: 50 TABLET, FILM COATED ORAL at 09:20

## 2019-05-23 RX ADMIN — FAMOTIDINE 20 MG: 20 TABLET ORAL at 21:16

## 2019-05-23 RX ADMIN — PETROLATUM: 42 OINTMENT TOPICAL at 14:14

## 2019-05-23 RX ADMIN — PETROLATUM: 42 OINTMENT TOPICAL at 21:42

## 2019-05-23 ASSESSMENT — PAIN SCALES - GENERAL: PAINLEVEL_OUTOF10: 0

## 2019-05-23 NOTE — PROGRESS NOTES
Subjective:    Per , more alert, eating finally  Denies chest pain or dyspnea. Denies abdominal pain. Tolerating diet. No nausea or vomiting. Objective:    /72   Pulse 119   Temp 98 °F (36.7 °C) (Temporal)   Resp 16   Ht 5' (1.524 m)   Wt 195 lb 4.8 oz (88.6 kg)   SpO2 93%   BMI 38.14 kg/m²   Skin: Warm and dry  Neck: Supple, no JVD  Heart:  RRR, no murmurs, gallops, or rubs. Lungs:  CTA bilaterally, no wheeze, rales or rhonchi  Abd: bowel sounds present, nontender, nondistended, no masses  Extrem:  No clubbing, cyanosis, or edema, pulses intact    I/O last 3 completed shifts: In: 420 [P.O.:420]  Out: 2225 [Urine:2225]    Results      Component Value Units   POCT Glucose [[id number]] Collected: 05/22/19 1612   Updated: 05/22/19 1620     Meter Glucose 94 mg/dL   Miscellaneous Sendout 1 [[id number]] Collected: 05/16/19 1238   Updated: 05/22/19 1400     test code VZV PCR CSF    This Test Sent To [facility]    Report SEE IMAGE   Narrative:     ARUP 42638, Varicella-Zoster Virus by PCR, CSF, FROZEN  ARUP 19890, Varicella-Zoster Virus by PCR, CSF, FROZEN   Miscellaneous sendout 1 [[id number]] Collected: 05/16/19 1238   Updated: 05/22/19 1311    Specimen Source: Other     test code LYME ABS CSF    This Test Sent To [facility]    Report SEE IMAGE   Narrative:     ARUP 57598, LYME Antibodies, Total by ALFREDO, CSF, REFRIG  ARUP 11441, LYME Antibodies, Total by ALFREDO, CSF, REFRIG   Comprehensive Metabolic Panel [[id number]] (Abnormal) Collected: 05/22/19 0430   Updated: 05/22/19 0509    Specimen Type: Blood     Sodium 138 mmol/L    Potassium 3.5 mmol/L    Chloride 104 mmol/L    CO2 24 mmol/L    Anion Gap 10 mmol/L    Glucose 102High  mg/dL    BUN 10 mg/dL    CREATININE 0.4Low  mg/dL    GFR Non-African American >60 mL/min/1.73    Comment: Chronic Kidney Disease: less than 60 ml/min/1.73 sq. m.         Kidney Failure: less than 15 ml/min/1.73 sq.m. Results valid for patients 18 years and older.         GFR African American >60    Calcium 8.9 mg/dL    Total Protein 6.4 g/dL    Alb 2.9Low  g/dL    Total Bilirubin 0.5 mg/dL    Alkaline Phosphatase 52 U/L    ALT 20 U/L    AST 23 U/L   Magnesium [[id number]] Collected: 05/22/19 0430   Updated: 05/22/19 0509    Specimen Type: Blood     Magnesium 1.7 mg/dL   Protime-INR [[id number]] (Abnormal) Collected: 05/22/19 0430   Updated: 05/22/19 0442    Specimen Source: Blood     Protime 13. 9High  sec    INR 1.2   CBC Auto Differential [[id number]] (Abnormal) Collected: 05/22/19 0430   Updated: 05/22/19 0437    Specimen Source: Blood     WBC 4.2Low  E9/L    RBC 2.43Low  E12/L    Hemoglobin 8.0Low  g/dL    Hematocrit 25.3Low  %    . 1High  fL    MCH 32.9 pg    MCHC 31.6Low  %    RDW 15.4High  fL    Platelets 883 Z6/K    MPV 11.0 fL    Neutrophils % 47.8 %    Immature Granulocytes % 1.4 %    Lymphocytes % 34.9 %    Monocytes % 11.3 %    Eosinophils % 4.1 %    Basophils % 0.5 %    Neutrophils # 1.98 E9/L    Immature Granulocytes # 0.06 E9/L    Lymphocytes # 1.45Low  E9/L    Monocytes # 0.47 E9/L    Eosinophils # 0.17 E9/L    Basophils # 0.02 E9/L         XR CHEST STANDARD (2 VW)   Final Result   Suboptimal inspiration with probable atelectasis on the right. Fluoroscopy modified barium swallow with video   Final Result   Positive study for aspiration of thin textures. Positive study for penetration of nectar textures. Oral delay was demonstrated. Retention within the valleculae and piriform sinuses was also   demonstrated. Reduced laryngeal elevation was demonstrated      Please refer to speech pathologist note(s) for additional findings and   recommendations based on this study. This procedure was performed by Gage Calixto PA-C under the indirect   supervision of Solo Luis MD   who was not present for the   procedure. .      The exam has been dictated and signed by KEMAL Wellington.    I, Solo Luis MD , Radiologist, have reviewed the images and report and concur with these findings. XR CHEST PORTABLE   Final Result   1. Stable, enlarged cardiomediastinal silhouette with suspected   underlying pulmonary edema. XR CHEST PORTABLE   Final Result   Mild CHF with improvement in the right lung base. XR CHEST PORTABLE   Final Result   tortuous ectatic aorta   Airspace disease compatible with pneumonia in the right lower lung and   left upper lung   Probable right effusion   An endotracheal tube is not visible. XR CHEST PORTABLE   Final Result   Cardiomegaly   Tortuous aorta    There are patchy infiltrates seen throughout both the lung fields. Pneumonia could give this appearance. Underlying edema could also have   this appearance   The chest appears improved in the interval                  US DUP LOWER EXTREMITIES BILATERAL VENOUS   Final Result   No evidence for deep venous thrombosis               XR CHEST PORTABLE   Final Result   ALERT:  THIS IS AN ABNORMAL REPORT   1. Abnormal airspace disease in the bilateral upper lungs and   bilateral lung bases. Findings are nonspecific and could reflect an   infiltrate/pneumonia. An infiltrative mass or malignancy is not   excluded particularly in the right and the left upper lung/suprahilar   regions, further evaluation with CT scan the chest is recommended to   exclude any potential underlying malignancy. 2. Stable, enlarged cardiac mediastinal silhouette with thoracic   aortic vascular calcifications. FL LUMBAR PUNCTURE DIAG   Final Result   Successful Uncomplicated Fluoroscopic-Guided Lumbar Puncture. This procedure was performed by Ward Covington PA-C under the indirect   supervision of Angle Godoy MD   who was not present for the   procedure. .      The exam has been dictated and signed by Ward Covington PA-C. Justen Peter MD , Radiologist, have reviewed the images and   report and concur with these findings.             FL GUIDED FOR SPINE INJECT Final Result   Successful Uncomplicated Fluoroscopic-Guided Lumbar Puncture. This procedure was performed by Joanne Fontanez PA-C under the indirect   supervision of Farida Raman MD   who was not present for the   procedure. .      The exam has been dictated and signed by Joanne Fontanez PA-C. Socrates Jay MD , Radiologist, have reviewed the images and   report and concur with these findings. XR CHEST PORTABLE   Final Result   1. Multifocal airspace disease most likely suspicious for a multifocal   infiltrate/pneumonia with underlying pulmonary edema and a right   pleural effusion. Interval placement NG tube without identification of   the distal tibia. MRI BRAIN WO CONTRAST   Final Result   Diffuse atrophy likely age related   Findings compatible with small vessel ischemic changes. XR Chest Abdomen Ng Placement   Final Result   NG tube tip in the stomach. Additional observations as outlined above. XR CHEST PORTABLE   Final Result   1. Bibasilar airspace disease, right greater than left, nonspecific   finding, findings can be seen infiltrate/pneumonia/atelectasis, with   right pleural effusion. 2. Vascular calcifications thoracic aorta. 3. Suspected underlying pulmonary edema. 4. Interval placement left-sided PICC line. IR INJ VENOGRAM EXTREMITY   Final Result      1. Ultrasound-guided peripheral venous access in the right upper   extremity. 2. Right upper extremity venogram demonstrating patency of the right   central veins. 3. Uncomplicated placement of a dual-lumen peripherally inserted   central catheter (PICC) via the left brachial vein with trim length of   42 cm. IR VENOGRAM UPPER EXTREMITY RIGHT   Final Result      1. Ultrasound-guided peripheral venous access in the right upper   extremity. 2. Right upper extremity venogram demonstrating patency of the right   central veins.       3. Uncomplicated placement of a Chest WO Contrast   Final Result      1. Bilateral alveolar opacities in upper lobe in perihilar   distribution suggestive of interstitial pulmonary edema or pneumonia. 2. More confluent opacities are seen at right lung base related to   pneumonia and atelectasis. 3. Moderate-sized bilateral pleural effusions. 4. Prominent and mildly enlarged mediastinal lymph nodes. 5. Endotracheal tube is 1 cm above jono. XR CHEST PORTABLE   Final Result      1. Limited visualization of nasogastric tube. 2. Redemonstration of interstitial and hazy opacities throughout both   lungs suggestive of interstitial pulmonary edema or pneumonia. Short-term follow-up could be helpful for further evaluation. XR CHEST PORTABLE   Final Result      Interstitial and hazy opacities throughout both lungs which appear to   have increased throughout left lung since yesterday's exam. Opacities   appear similar on the right. Continued follow-up could be helpful for   further evaluation. XR CHEST PORTABLE   Final Result   CHF with marginal improvement and decreasing edema. XR CHEST PORTABLE   Final Result   Significant worsening of right-sided infiltrate and atelectasis and   small right effusion               XR ABDOMEN (KUB) (SINGLE AP VIEW)   Final Result   No acute process               XR CHEST PORTABLE   Final Result      1. Satisfactory placement of nasogastric tube. 2. Persistent interstitial pulmonary edema, pneumonia, or atelectasis   bilaterally. Continued follow-up could be helpful for further   evaluation. XR Chest Abdomen Ng Placement   Final Result      Nasogastric tubing appears to be appropriately configured. Right common femoral vein catheter appears to be peripherally   configured. XR CHEST PORTABLE   Final Result      1. Abnormal location of nasogastric tube which appears to be coiled   within the mid esophagus.       2. Increased interstitial and hazy mouth every 8 hours. 12/6/17 5/7/19 Yes OSMANI Phillips CNP   orphenadrine (NORFLEX) 100 MG extended release tablet Take 1 tablet by mouth 2 times daily 3/14/17 5/7/19 Yes Nga MeeOSMANI CNP   ALBUTEROL SULFATE IN Inhale 2 puffs into the lungs 2 times daily   Yes Historical Provider, MD   guaiFENesin (MUCINEX) 600 MG SR tablet Take 1,200 mg by mouth 2 times daily   Yes Historical Provider, MD   Cholecalciferol (VITAMIN D3) 2000 UNITS CAPS Take 1 capsule by mouth daily   Yes Historical Provider, MD   Ferrous Gluconate (IRON) 240 (27 FE) MG TABS Take 2 tablets by mouth daily   Yes Historical Provider, MD   b complex vitamins capsule Take 1 capsule by mouth daily   Yes Historical Provider, MD   Coenzyme Q10 (CO Q 10) 100 MG CAPS Take 1 capsule by mouth daily   Yes Historical Provider, MD   sucralfate (CARAFATE) 1 GM tablet Take 1 g by mouth 4 times daily. Yes Historical Provider, MD   meclizine (ANTIVERT) 25 MG tablet Take 25 mg by mouth 2 times daily. Yes Historical Provider, MD   atenolol (TENORMIN) 25 MG tablet Take 25 mg by mouth 2 times daily. Yes Historical Provider, MD   thyroid (ARMOUR THYROID) 30 MG tablet Take 30 mg by mouth daily. Yes Historical Provider, MD   loratadine-pseudoephedrine (CLARITIN-D 24 HOUR)  MG per tablet Take 1 tablet by mouth daily. Yes Historical Provider, MD   Calcium Carbonate Antacid (TUMS PO) Take 1 tablet by mouth as needed. Yes Historical Provider, MD   zolpidem (AMBIEN) 10 MG tablet Take 1 tablet by mouth nightly as needed for Sleep for up to 90 days 3 MONTH SUPPLY. DO NOT FILL UNTIL 1-6-17. . 1/6/18 4/6/18  Orquidea Rangel MD   orphenadrine (NORFLEX) 100 MG extended release tablet Take 1 tablet by mouth 2 times daily 12/6/17 3/6/18  OSMANI Phillips CNP   Glucosamine-Chondroit-Vit C-Mn (GLUCOSAMINE CHONDR 1500 COMPLX PO) Take by mouth    Historical Provider, MD   orphenadrine (NORFLEX) 100 MG extended release tablet Take 1 tablet by mouth 2 times daily 6/12/17 7/12/17  OSMANI Mccarthy CNP   Handicap Placard MISC by Does not apply route 2 year duration.  8/16/16   OSMANI Mccarthy CNP   Probiotic Product (PROBIOTIC DAILY PO) Take 1 capsule by mouth daily    Historical Provider, MD        Current Facility-Administered Medications   Medication Dose Route Frequency Provider Last Rate Last Dose    vancomycin (VANCOCIN) oral solution 250 mg  250 mg Oral 4 times per day Rosalinda Gonzalez MD   250 mg at 05/22/19 2003    famotidine (PEPCID) tablet 20 mg  20 mg Oral BID Harriet Alatorre MD   20 mg at 05/22/19 2004    levETIRAcetam (KEPPRA) tablet 1,000 mg  1,000 mg Oral BID Harriet Alatorre MD   1,000 mg at 05/22/19 2009    levothyroxine (SYNTHROID) tablet 50 mcg  50 mcg Oral Daily Harriet Alatorre MD   50 mcg at 05/22/19 0533    metoprolol tartrate (LOPRESSOR) tablet 50 mg  50 mg Oral BID Harriet Alatorre MD   50 mg at 05/22/19 2007    lacosamide (VIMPAT) 100 mg in dextrose 5 % 50 mL IVPB  100 mg Intravenous BID Latia Rea MD   Stopped at 05/22/19 1053    sodium chloride (Inhalant) 3 % nebulizer solution 4 mL  4 mL Nebulization BID Latia Rea MD   4 mL at 05/22/19 2023    albuterol (ACCUNEB) nebulizer solution 0.63 mg  0.63 mg Nebulization 4x daily Latia Rea MD   0.63 mg at 05/22/19 2023    pregabalin (LYRICA) capsule 200 mg  200 mg Oral 3 times per day Latia Rea MD   200 mg at 05/22/19 2007    lactobacillus (CULTURELLE) capsule 1 capsule  1 capsule Oral BID Latia Rea MD   1 capsule at 05/22/19 2006    morphine (PF) injection 2 mg  2 mg Intravenous Q4H PRN Latia Rea MD   2 mg at 05/13/19 1827    acetaminophen (TYLENOL) 160 MG/5ML solution 650 mg  650 mg Oral Q6H PRN Latia Rea MD   650 mg at 05/20/19 1754    chlorhexidine (PERIDEX) 0.12 % solution 15 mL  15 mL Mouth/Throat BID Latia Rea MD   15 mL at 05/22/19 2005    sodium chloride flush 0.9 % injection 10 mL  10 encephalopathy    GI bleed    Essential hypertension    Hyperlipidemia LDL goal <100    Acquired hypothyroidism    Acute kidney injury ([facility] [address]    Acute respiratory failure with hypoxia (HCC)    Clostridium difficile colitis    Severe protein-calorie malnutrition ([facility] [address]           Plan:    Continue antibiotics as per ID    Encourage diet, activity    Discharge planning in progress      Jorge A Moody D.O., Debbie Pacheco  8:24 PM  5/22/2019

## 2019-05-23 NOTE — PROGRESS NOTES
Department of Internal Medicine  Nephrology Resident  Progress Note    Events reviewed. SUBJECTIVE: We are following Mrs. Haji for acute kidney injury. Awake but nonverbal.    PHYSICAL EXAM:      VITALS:  /70   Pulse 115   Temp 96.4 °F (35.8 °C) (Oral)   Resp 18   Ht 5' (1.524 m)   Wt 200 lb 12.8 oz (91.1 kg)   SpO2 96%   BMI 39.22 kg/m²    Intake and Output last 24hrs:   In: 320 [P.O.:320]  Out: 775 [Urine:775]      Constitutional:  Awake non verbal in no distress  HEENT:  MESSI   Respiratory:  Coarse breath sounds bilaterally, frothy secretions from ET  Cardiovascular/Edema:  Regular rate and rhythm, no murmurs appreciated  Gastrointestinal:  Soft, non-tender, bowel sounds present  Neurologic:  Awake, non-focal but nonverbal  Skin:  warm, + edema  Other:  Erythematous lesion on lower extrtemities    Scheduled Meds:   vancomycin  250 mg Oral 4 times per day    famotidine  20 mg Oral BID    levETIRAcetam  1,000 mg Oral BID    levothyroxine  50 mcg Oral Daily    metoprolol tartrate  50 mg Oral BID    lacosamide (VIMPAT) IVPB  100 mg Intravenous BID    sodium chloride (Inhalant)  4 mL Nebulization BID    albuterol  0.63 mg Nebulization 4x daily    pregabalin  200 mg Oral 3 times per day    lactobacillus  1 capsule Oral BID    chlorhexidine  15 mL Mouth/Throat BID    heparin flush  3 mL Intravenous 2 times per day    heparin (porcine)  5,000 Units Subcutaneous TID    vitamin D  50,000 Units Oral Weekly    sodium chloride flush  10 mL Intravenous 2 times per day    mineral oil-hydrophilic petrolatum   Topical TID     Continuous Infusions:    PRN Meds:.morphine, acetaminophen, sodium chloride flush, heparin flush, sodium chloride flush, magnesium hydroxide, ondansetron, mineral oil-hydrophilic petrolatum **AND** mineral oil-hydrophilic petrolatum    DATA:    CBC with Differential:    Lab Results   Component Value Date    WBC 4.2 05/22/2019    RBC 2.43 05/22/2019    HGB 8.0 05/22/2019 HCT 25.3 05/22/2019     05/22/2019    .1 05/22/2019    MCH 32.9 05/22/2019    MCHC 31.6 05/22/2019    RDW 15.4 05/22/2019    NRBC 0.9 05/10/2019    LYMPHOPCT 34.9 05/22/2019    PROMYELOPCT 0.9 05/08/2019    MONOPCT 11.3 05/22/2019    BASOPCT 0.5 05/22/2019    MONOSABS 0.47 05/22/2019    LYMPHSABS 1.45 05/22/2019    EOSABS 0.17 05/22/2019    BASOSABS 0.02 05/22/2019     CMP:    Lab Results   Component Value Date     05/22/2019    K 3.5 05/22/2019    K 3.3 05/21/2019     05/22/2019    CO2 24 05/22/2019    BUN 10 05/22/2019    CREATININE 0.4 05/22/2019    GFRAA >60 05/22/2019    LABGLOM >60 05/22/2019    GLUCOSE 102 05/22/2019    PROT 6.4 05/22/2019    LABALBU 2.9 05/22/2019    LABALBU 4.4 08/09/2011    CALCIUM 8.9 05/22/2019    BILITOT 0.5 05/22/2019    ALKPHOS 52 05/22/2019    AST 23 05/22/2019    ALT 20 05/22/2019     BMP:    Lab Results   Component Value Date     05/22/2019    K 3.5 05/22/2019    K 3.3 05/21/2019     05/22/2019    CO2 24 05/22/2019    BUN 10 05/22/2019    LABALBU 2.9 05/22/2019    LABALBU 4.4 08/09/2011    CREATININE 0.4 05/22/2019    CALCIUM 8.9 05/22/2019    GFRAA >60 05/22/2019    LABGLOM >60 05/22/2019    GLUCOSE 102 05/22/2019     Calcium:    Lab Results   Component Value Date    CALCIUM 8.9 05/22/2019     Ionized Calcium:  No results found for: IONCA  Magnesium:    Lab Results   Component Value Date    MG 1.7 05/22/2019     Phosphorus:    Lab Results   Component Value Date    PHOS 2.8 05/21/2019         Urine studies:  Urine sodium: 22  Urine chloride: <20  Urine creatinine: 100  Urine urea: 251  Urine potassium: 48.2    Fraction excretion of sodium: 0.5%  Fraction excretion of urea: 12.6%    Vitamin D 25 level: 13 (low)      Radiology Review:       Chest Xray May 16, 2019   1. Multifocal airspace disease most likely suspicious for a multifocal   infiltrate/pneumonia with underlying pulmonary edema and a right   pleural effusion.  Interval placement NG

## 2019-05-23 NOTE — PROGRESS NOTES
Physical Therapy    Treatment Note    Name: Gregory Rodriguez  : 1960  MRN: [id number]    Date of Service: 2019    Evaluating PT:  Cruz Chaudhari, PT, DPT [id number]    Room #:  0856/7759-N  Diagnosis:  Septic shock   PMHx:  Arthritis, asthma, HTN, HLD, irregular heart beat, migraines, thyroid disorder   Precautions:  Falls, Contact isolation, NGT, O2  Equipment Needs:  [employer]    Pt lives with  in a 1 story home with 4 stairs to enter and 0 rail(s). Bedroom and bathroom are on the main level. Pt ambulated with quadcane PTA. Initial Evaluation  Date: 19 Treatment 19 Short Term/ Long Term   Goals   AM-PAC 6 Clicks     Was pt agreeable to Eval/treatment? Yes yes    Does pt have pain? Pt reports abdominal pain, will only specify severity as \"bad\" No c/o pain     Bed Mobility  Rolling: Max A  Supine to sit: Max A x2  Sit to supine: Dep x2  Scooting: Max A to EOB Rolling MaxA  Supine to sit MaxA +2  Sit to supine MaxA +2 Min A   Transfers Sit to stand: NT  Stand to sit: NT  Stand pivot: NT Sit to stand ModA +2  Stand to sit ModA +2 Mod A with [employer]   Ambulation    NT N/T >10 feet with [employer] Max A   Stair negotiation: ascended and descended  NT N/T >1 steps with 2 rail Max A   ROM BUE:  Per OT eval   BLE:  WFL     Strength BUE:  Per OT eval   LLE:  Grossly 2/5 at hip; 3/5 at knee and ankle  RLE:  Grossly 2/5 at hip and knee; 3/5 at ankle     Balance Sitting EOB:  Min A  Dynamic Standing:  NT Sitting EOB Min/modA  Standing ModA +2 Sitting EOB:  SBA  Dynamic Standing: Mod A with [employer]     Therapeutic ex: AAROM to PROM B UEs AROM B LEs in all planes. Edema:  Mild BLE swelling     Patient education  Pt educated on safety during functional mobility LE TE and to participate.       Patient response to education:   Pt verbalized understanding Pt demonstrated skill Pt requires further education in this area   Minimally  Minimally  Yes      Comments:  Pt received supine with  present and agreeable

## 2019-05-23 NOTE — PROGRESS NOTES
C/C: C diff infection , Aspiration pneumonia       Pt is awake and alert  Denies fever and chills  Feels better  Reports cough    Afebrile       Current Facility-Administered Medications   Medication Dose Route Frequency Provider Last Rate Last Dose    vancomycin (VANCOCIN) oral solution 250 mg  250 mg Oral 4 times per day Janell Burt MD   250 mg at 05/23/19 1228    famotidine (PEPCID) tablet 20 mg  20 mg Oral BID Xu Arias MD   20 mg at 05/23/19 5866    levETIRAcetam (KEPPRA) tablet 1,000 mg  1,000 mg Oral BID Xu Arias MD   1,000 mg at 05/23/19 0187    levothyroxine (SYNTHROID) tablet 50 mcg  50 mcg Oral Daily Xu Arias MD   50 mcg at 05/23/19 7839    metoprolol tartrate (LOPRESSOR) tablet 50 mg  50 mg Oral BID Xu Arias MD   50 mg at 05/23/19 0920    lacosamide (VIMPAT) 100 mg in dextrose 5 % 50 mL IVPB  100 mg Intravenous BID Jacki Santa MD   Stopped at 05/23/19 1228    sodium chloride (Inhalant) 3 % nebulizer solution 4 mL  4 mL Nebulization BID Jacki Santa MD   4 mL at 05/23/19 0814    albuterol (ACCUNEB) nebulizer solution 0.63 mg  0.63 mg Nebulization 4x daily Jacki Santa MD   0.63 mg at 05/23/19 1127    pregabalin (LYRICA) capsule 200 mg  200 mg Oral 3 times per day Jacki Santa MD   200 mg at 05/23/19 9718    lactobacillus (CULTURELLE) capsule 1 capsule  1 capsule Oral BID Jacki Santa MD   1 capsule at 05/23/19 0909    morphine (PF) injection 2 mg  2 mg Intravenous Q4H PRN Jacki Santa MD   2 mg at 05/13/19 1827    acetaminophen (TYLENOL) 160 MG/5ML solution 650 mg  650 mg Oral Q6H PRN Jacki Santa MD   650 mg at 05/20/19 1754    chlorhexidine (PERIDEX) 0.12 % solution 15 mL  15 mL Mouth/Throat BID Jacki Santa MD   15 mL at 05/23/19 0909    sodium chloride flush 0.9 % injection 10 mL  10 mL Intravenous PRN Jacki Santa MD   10 mL at 05/11/19 0500    heparin flush 100 UNIT/ML injection 300 Units  3 mL Intravenous 2 times per day Rocco Carreon MD   300 Units at 05/23/19 0909    heparin flush 100 UNIT/ML injection 300 Units  3 mL Intracatheter PRN Rocco Carreon MD        heparin (porcine) injection 5,000 Units  5,000 Units Subcutaneous TID Rocco Carreon MD   5,000 Units at 05/23/19 0913    vitamin D (ERGOCALCIFEROL) capsule 50,000 Units  50,000 Units Oral Weekly Rocco Carreon MD   50,000 Units at 05/22/19 5801    sodium chloride flush 0.9 % injection 10 mL  10 mL Intravenous 2 times per day Rocco Carreon MD   10 mL at 05/23/19 0908    sodium chloride flush 0.9 % injection 10 mL  10 mL Intravenous PRN Rocco Carreon MD   10 mL at 05/15/19 0304    magnesium hydroxide (MILK OF MAGNESIA) 400 MG/5ML suspension 30 mL  30 mL Oral Daily PRN Rocco Carreon MD        ondansetron (ZOFRAN) injection 4 mg  4 mg Intravenous Q6H PRN Rocco Carreon MD   4 mg at 05/15/19 2008    mineral oil-hydrophilic petrolatum (HYDROPHOR) ointment   Topical TID Rocco Carreon MD        And    mineral oil-hydrophilic petrolatum (HYDROPHOR) ointment   Topical TID PRN Rocco Carreon MD               REVIEW OF SYSTEMS:       CONSTITUTIONAL: Denies fever    RESPIRATORY : Cough - improving    GASTROINTESTINAL: loose stool - slowing down   GENITOURINARY:  No dysuria    INTEGUMENT:no rash   MUSCULOSKELETAL:  Weakness   NEUROLOGICAL:  awake and alert    Objective:    /70   Pulse 115   Temp 96.4 °F (35.8 °C) (Oral)   Resp 18   Ht 5' (1.524 m)   Wt 200 lb 12.8 oz (91.1 kg)   SpO2 96%   BMI 39.22 kg/m²       General Appearance:    Awake and alert      Head:    Normocephalic, atraumatic   Eyes:    No pallor, no icterus,   Ears:    No obvious deformity or drainage.    Nose:   No nasal drainage   Throat:   Mucosa dry, no oral thrush   Neck:   Supple, no lymphadenopathy   Lungs:     Bilateral scattered rhonchi    Heart:    Regular rate and rhythm   Abdomen:     Soft, bowel sounds present , loose stool     Extremities:   + edema, erythema + ,warm    Pulses:   Dorsalis pedis palpable    Skin:    No erythema       CBC with Differential:      Lab Results   Component Value Date    WBC 4.2 05/22/2019    RBC 2.43 05/22/2019    HGB 8.0 05/22/2019    HCT 25.3 05/22/2019     05/22/2019    .1 05/22/2019    MCH 32.9 05/22/2019    MCHC 31.6 05/22/2019    RDW 15.4 05/22/2019    NRBC 0.9 05/10/2019    LYMPHOPCT 34.9 05/22/2019    PROMYELOPCT 0.9 05/08/2019    MONOPCT 11.3 05/22/2019    BASOPCT 0.5 05/22/2019    MONOSABS 0.47 05/22/2019    LYMPHSABS 1.45 05/22/2019    EOSABS 0.17 05/22/2019    BASOSABS 0.02 05/22/2019       CMP:    Lab Results   Component Value Date     05/22/2019    K 3.5 05/22/2019    K 3.3 05/21/2019     05/22/2019    CO2 24 05/22/2019    BUN 10 05/22/2019    CREATININE 0.4 05/22/2019    GFRAA >60 05/22/2019    LABGLOM >60 05/22/2019    GLUCOSE 102 05/22/2019    PROT 6.4 05/22/2019    LABALBU 2.9 05/22/2019    LABALBU 4.4 08/09/2011    CALCIUM 8.9 05/22/2019    BILITOT 0.5 05/22/2019    ALKPHOS 52 05/22/2019    AST 23 05/22/2019    ALT 20 05/22/2019       Hepatic Function Panel:    Lab Results   Component Value Date    ALKPHOS 52 05/22/2019    ALT 20 05/22/2019    AST 23 05/22/2019    PROT 6.4 05/22/2019    BILITOT 0.5 05/22/2019    BILIDIR 0.2 05/21/2019    IBILI 0.4 05/21/2019    LABALBU 2.9 05/22/2019    LABALBU 4.4 08/09/2011       MICROBIOLOGY:     Blood culture - neg to date  Tip cx - VRE , CONS    Urine cx - Candida         Radiology :     Chest x ray - bilateral congestion        IMPRESSION:      1. Septic shock - improved   2. Sever  C diff infection - improved    3. Pneumonia - Aspiration   4. Leukocytosis - improved   5. Respiratory failure - improved   6.  Candiduria     RECOMMENDATIONS:      1. Vancomycin  250 mg po q 6 hrs              1:50 PM      5/23/2019

## 2019-05-23 NOTE — PROGRESS NOTES
Occupational Therapy  OT BEDSIDE TREATMENT NOTE      Date:2019  Patient Name: Gilma Hale  MRN: [id number]  : 1960  Room: [address] [place] [address]A     Modified Socrates Scale   Score     Description  0             No symptoms  1             No significant disability despite symptoms  2             Slight disability; able to look after own affairs  3             Moderate disability; able to ambulate without assist/ requires assist with ADLs  4             Moderate/Severe disability;requires assist to ambulate/assist with ADLs  5             Severe disability;bedridden/incontinent   6               Score:   4     Evaluating OT: JANET Bartholomew/L     AM-PAC Daily Activity Raw Score:   Recommended Adaptive Equipment: to be determined      Comments: Based on patient's functional performance as stated above and level of assistance needed prior to admission, this therapist believes that the patient would benefit from further skilled OT following hospital stay in an effort to increase safety, functional independence, and quality of life.     Diagnosis: Septic Shock  Comment: Pt intubated -   Pertinent Medical History: Arthritis, asthma, high blood pressure, HLD, irregular heart beat, migraines (neuralgic), PONV, thyroid disorder     Precautions:  Falls, , contact isolation - CDiff     Home Living: Pt lives with  in a 1 story home with 4 step(s) to enter and no rail(s); bed/bath on main floor. Basement with no needs. Bathroom setup: tub shower with SBs, elevated toilet  Equipment owned: shower stool, QC, bedside commode     Prior Level of Function: Per , pt mostly independent/modified independent with ADLs ( A PRN),  assists with IADLs; using QC for short distance ambulation.  states pt slept upright on couch (to help with reflux d/t hiatal hernia). States she hasn't done laundry, managed bills, done grocery shopping, or cleaned their home in years.   somewhat inconsistent with baseline level of function. Driving: yes  Occupation: retired from home health care     Pain Level: No c/o pain at this time. Cognition: A&O x 2 to name and place; Follows 1 step directions ~50% of the time. Slow processing. Follows commands with increased awareness. Inconsistent with Y/N.              Memory: P              Sequencing: P              Problem solving: P              Judgement/safety: P-                  Functional Assessment:    Initial Eval Status  Date: 5/17/19 Treatment Status  Date: 5/23/19 Short Term Goals  Treatment frequency: 1-3x/week on MICU; PRN on stepdown unit  -pt will. .. Feeding Dependent  NPO Max A [place];    Per last treatment. improve self feeding task to independent when/if medically appropriate   Grooming Dependent  To brush hair, to wash hands  Max A;    [place] assistance to wash face while pt sitting up on the EOB.   improve grooming/hygiene tasks to min A while seated    UB Dressing Dependent Max A; To alexus/doff gown while increased time.    improve UB dressing to mod A   LB Dressing Dependent  Dep; Dep to complete don/doff of socks.         Bathing Dependent  Dep; Per last treatment.      improve UB bathing to mod A   Toileting Dependent Incontinent     improve toileting task and all clothing mgmt to max A   Bed Mobility  Supine to sit: Max A x2  Sit to supine: Dep x2 Max A of 2;    Pt requires Max A of 2 to transfer from supine to and from sitting position with verbal prompting to increase proper hand placement.    improve bed mobility to mod A in prep for EOB ADL tasks   Functional Transfers Sit to stand: NT  Stand to sit: NT Sit <> stand: Mod A of 2; Mod A of 2 to transfer from sit to standing position with HHA, Max verbal prompting for body alignment and use of w/w. Pt stood 2x for approximately 45 secs to 1 minute. improve all functional transfers to Max A   Functional Mobility NT N/A;    Pt too fatigue to attempt on this date. improve functional mobility to max A with AE PRN   Balance Sitting:     Static:  Min A    Dynamic: NT  Standing: NT Sitting:     Static: Min A    Dynamic: Max A  Standing: Mod A of 2 and use of w.w    demo CGA dynamic sitting balance EOB during ADL tasks   Endurance/Activity Tolerance poor Poor+ demo fair activity tolerance/endurance during 15-20 min ADL task    Visual/  Perceptual Glasses: bifocals, not in room                    Hand dominance: R  UE ROM:        RUE: WFL PROM; 3rd digit limited in extension                          LUE: WFL AAROM  Strength:        RUE: grossly 2-/5        LUE: grossly 3-/5   Strength: impaired BL  Fine Motor Coordination: WFL     Hearing: WFL  Sensation: N/T in BLE, unable to identify specific location  Tone: WNL  Edema: mild global edema    Comments: Upon arrival pt sitting up in supine with head tilted to the right side. Pt sat up on the EOB for 15 minutes while working on core strengthening. Pt transferred back into bed with Max A of 2. Pt positioned into sitting position in bed with pillows to improve proper position. Pt educated on completed BUE exercises to increase strength and tolerance for functional tasks. Will continue to increase strength and tolerance through the next visits. Pt positioned with all lines and tubes intact, call light within reach, and  in room. · Pt has made fair progress towards set goals.    · Continue with current plan of care    Time in: 10:05  Time out: 10:35  Total Tx Time: 30 mins    Patrice Amato 46, [address]

## 2019-05-24 ENCOUNTER — APPOINTMENT (OUTPATIENT)
Dept: GENERAL RADIOLOGY | Age: 59
DRG: 870 | End: 2019-05-24
Payer: COMMERCIAL

## 2019-05-24 VITALS
WEIGHT: 200.8 LBS | TEMPERATURE: 97.3 F | HEIGHT: 60 IN | HEART RATE: 100 BPM | RESPIRATION RATE: 22 BRPM | SYSTOLIC BLOOD PRESSURE: 130 MMHG | OXYGEN SATURATION: 96 % | DIASTOLIC BLOOD PRESSURE: 68 MMHG | BODY MASS INDEX: 39.42 KG/M2

## 2019-05-24 LAB
ALBUMIN SERPL-MCNC: 3.1 G/DL (ref 3.5–5.2)
ALP BLD-CCNC: 65 U/L (ref 35–104)
ALT SERPL-CCNC: 21 U/L (ref 0–32)
ANION GAP SERPL CALCULATED.3IONS-SCNC: 12 MMOL/L (ref 7–16)
AST SERPL-CCNC: 29 U/L (ref 0–31)
BILIRUB SERPL-MCNC: 0.6 MG/DL (ref 0–1.2)
BUN BLDV-MCNC: 12 MG/DL (ref 8–23)
CALCIUM SERPL-MCNC: 9 MG/DL (ref 8.6–10.2)
CHLORIDE BLD-SCNC: 105 MMOL/L (ref 98–107)
CO2: 24 MMOL/L (ref 22–29)
CREAT SERPL-MCNC: 0.5 MG/DL (ref 0.5–1)
GFR AFRICAN AMERICAN: >60
GFR NON-AFRICAN AMERICAN: >60 ML/MIN/1.73
GLUCOSE BLD-MCNC: 101 MG/DL (ref 74–99)
INR BLD: 1.2
MAGNESIUM: 1.5 MG/DL (ref 1.6–2.6)
PHOSPHORUS: 3.2 MG/DL (ref 2.5–4.5)
POTASSIUM SERPL-SCNC: 3.4 MMOL/L (ref 3.5–5)
PROTHROMBIN TIME: 13.7 SEC (ref 9.3–12.4)
SODIUM BLD-SCNC: 141 MMOL/L (ref 132–146)
TOTAL PROTEIN: 6.6 G/DL (ref 6.4–8.3)

## 2019-05-24 PROCEDURE — 2700000000 HC OXYGEN THERAPY PER DAY

## 2019-05-24 PROCEDURE — 94668 MNPJ CHEST WALL SBSQ: CPT

## 2019-05-24 PROCEDURE — 84100 ASSAY OF PHOSPHORUS: CPT

## 2019-05-24 PROCEDURE — 83735 ASSAY OF MAGNESIUM: CPT

## 2019-05-24 PROCEDURE — 6370000000 HC RX 637 (ALT 250 FOR IP): Performed by: INTERNAL MEDICINE

## 2019-05-24 PROCEDURE — 6360000002 HC RX W HCPCS: Performed by: INTERNAL MEDICINE

## 2019-05-24 PROCEDURE — 92611 MOTION FLUOROSCOPY/SWALLOW: CPT

## 2019-05-24 PROCEDURE — 80053 COMPREHEN METABOLIC PANEL: CPT

## 2019-05-24 PROCEDURE — 94640 AIRWAY INHALATION TREATMENT: CPT

## 2019-05-24 PROCEDURE — 2500000003 HC RX 250 WO HCPCS: Performed by: INTERNAL MEDICINE

## 2019-05-24 PROCEDURE — C9254 INJECTION, LACOSAMIDE: HCPCS | Performed by: INTERNAL MEDICINE

## 2019-05-24 PROCEDURE — 2580000003 HC RX 258: Performed by: INTERNAL MEDICINE

## 2019-05-24 PROCEDURE — 85610 PROTHROMBIN TIME: CPT

## 2019-05-24 PROCEDURE — 74230 X-RAY XM SWLNG FUNCJ C+: CPT

## 2019-05-24 PROCEDURE — 99232 SBSQ HOSP IP/OBS MODERATE 35: CPT | Performed by: INTERNAL MEDICINE

## 2019-05-24 PROCEDURE — 36415 COLL VENOUS BLD VENIPUNCTURE: CPT

## 2019-05-24 PROCEDURE — 36592 COLLECT BLOOD FROM PICC: CPT

## 2019-05-24 RX ORDER — MAGNESIUM SULFATE IN WATER 40 MG/ML
2 INJECTION, SOLUTION INTRAVENOUS ONCE
Status: COMPLETED | OUTPATIENT
Start: 2019-05-24 | End: 2019-05-24

## 2019-05-24 RX ADMIN — VANCOMYCIN HYDROCHLORIDE 250 MG: 10 INJECTION, POWDER, LYOPHILIZED, FOR SOLUTION INTRAVENOUS at 12:15

## 2019-05-24 RX ADMIN — CHLORHEXIDINE GLUCONATE 0.12% ORAL RINSE 15 ML: 1.2 LIQUID ORAL at 08:16

## 2019-05-24 RX ADMIN — SODIUM CHLORIDE SOLN NEBU 3% 4 ML: 3 NEBU SOLN at 09:24

## 2019-05-24 RX ADMIN — PREGABALIN 200 MG: 100 CAPSULE ORAL at 06:29

## 2019-05-24 RX ADMIN — LEVETIRACETAM 1000 MG: 500 TABLET ORAL at 08:16

## 2019-05-24 RX ADMIN — HEPARIN SODIUM 5000 UNITS: 10000 INJECTION INTRAVENOUS; SUBCUTANEOUS at 08:17

## 2019-05-24 RX ADMIN — ALBUTEROL SULFATE 0.63 MG: 0.63 SOLUTION RESPIRATORY (INHALATION) at 17:51

## 2019-05-24 RX ADMIN — VANCOMYCIN HYDROCHLORIDE 250 MG: 10 INJECTION, POWDER, LYOPHILIZED, FOR SOLUTION INTRAVENOUS at 06:29

## 2019-05-24 RX ADMIN — Medication 1 CAPSULE: at 08:17

## 2019-05-24 RX ADMIN — MAGNESIUM SULFATE HEPTAHYDRATE 2 G: 40 INJECTION, SOLUTION INTRAVENOUS at 12:15

## 2019-05-24 RX ADMIN — PREGABALIN 200 MG: 100 CAPSULE ORAL at 14:05

## 2019-05-24 RX ADMIN — ALBUTEROL SULFATE 0.63 MG: 0.63 SOLUTION RESPIRATORY (INHALATION) at 09:23

## 2019-05-24 RX ADMIN — VANCOMYCIN HYDROCHLORIDE 250 MG: 10 INJECTION, POWDER, LYOPHILIZED, FOR SOLUTION INTRAVENOUS at 00:10

## 2019-05-24 RX ADMIN — BARIUM SULFATE 58 ML: 960 POWDER, FOR SUSPENSION ORAL at 15:03

## 2019-05-24 RX ADMIN — ALBUTEROL SULFATE 0.63 MG: 0.63 SOLUTION RESPIRATORY (INHALATION) at 13:57

## 2019-05-24 RX ADMIN — DEXTROSE MONOHYDRATE 100 MG: 50 INJECTION, SOLUTION INTRAVENOUS at 10:02

## 2019-05-24 RX ADMIN — FAMOTIDINE 20 MG: 20 TABLET ORAL at 08:17

## 2019-05-24 RX ADMIN — Medication 10 ML: at 08:16

## 2019-05-24 RX ADMIN — SODIUM CHLORIDE, PRESERVATIVE FREE 300 UNITS: 5 INJECTION INTRAVENOUS at 08:16

## 2019-05-24 RX ADMIN — PETROLATUM: 42 OINTMENT TOPICAL at 14:05

## 2019-05-24 RX ADMIN — PETROLATUM: 42 OINTMENT TOPICAL at 08:16

## 2019-05-24 RX ADMIN — LEVOTHYROXINE SODIUM 50 MCG: 50 TABLET ORAL at 06:28

## 2019-05-24 RX ADMIN — BARIUM SULFATE 45 G: 0.6 CREAM ORAL at 15:02

## 2019-05-24 RX ADMIN — METOPROLOL TARTRATE 50 MG: 50 TABLET, FILM COATED ORAL at 08:17

## 2019-05-24 RX ADMIN — HEPARIN SODIUM 5000 UNITS: 10000 INJECTION INTRAVENOUS; SUBCUTANEOUS at 14:02

## 2019-05-24 RX ADMIN — VANCOMYCIN HYDROCHLORIDE 250 MG: 10 INJECTION, POWDER, LYOPHILIZED, FOR SOLUTION INTRAVENOUS at 18:00

## 2019-05-24 ASSESSMENT — PAIN SCALES - GENERAL
PAINLEVEL_OUTOF10: 0

## 2019-05-24 NOTE — DISCHARGE SUMMARY
Physician Discharge Summary     Patient ID:  Giovany Horner  [id number]  59 y.o.  1960    Admit date: 5/7/2019    Discharge date and time: 5/24/2019  7:56 PM     Admission Diagnoses: Septic shock ([address] [A41.9, R65.21]  Septic shock ([address] [A41.9, R65.21]  Septic shock ([address] [A41.9, R65.21]    Discharge Diagnoses:      Lumbar radiculopathy    Septic shock ([address]    Seizure ([address]    Obesity (BMI 30-39. 9)    Metabolic encephalopathy    GI bleed    Essential hypertension    Hyperlipidemia LDL goal <100    Acquired hypothyroidism    Acute [facility] ([facility]    Acute respiratory failure with hypoxia (HCC)    Clostridium difficile colitis    Severe protein-calorie malnutrition (HCC              Consults: pulmonary/intensive care, ID, nephrology, hematology/oncology, neurology and general surgery    Procedures: EEG    Laboratory:   Last 3 BMP  Recent Labs     05/22/19  0430 05/23/19  1725 05/24/19  0515    142 141   K 3.5 3.4* 3.4*    106 105   CO2 24 23 24   BUN 10 12 12   CREATININE 0.4* 0.5 0.5   GLUCOSE 102* 106* 101*   CALCIUM 8.9 8.9 9.0       Last 3 CBC:  Recent Labs     05/22/19  0430   WBC 4.2*   RBC 2.43*   HGB 8.0*   HCT 25.3*   .1*   MCH 32.9   MCHC 31.6*   RDW 15.4*      MPV 11.0           Hospital Course: The patient is a 59 y.o. female of Boy Moura MD with significant past medical history of hld , asthma, recent infection on skin on righ tleg treated with clinda then levaquin who presents with worsening lethargy and weakness. According to  she has been havign profuse diarrhea for the past 2 weeks. She has been chilled but did not have fevers. Diarrhea was watery. thierryand noted her urine looked almost brown . she was unable to eat or drink,. She was advised to go to ED . On arrival , noted to be in septic shock and was intubated. found to have acute liver failure, hypotension , renal failure. procal of 99. Wbc 19k.  Ast/ ALT in the 5000 range , inr 8.4 from liver failure. She was seen by infectious disease as well as nephrology; she improved slowly but steadily but remained extremely weak. She was discharged to [address] [place] [address] for ongoing rehab    Discharge Exam:  See progress note from today    Disposition: SNF    Patient Instructions:   Current Discharge Medication List      START taking these medications    Details   Heparin Sodium, Porcine, (HEPARIN, PORCINE,) 09130 UNIT/ML injection Inject 0.5 mLs into the skin three times daily      levETIRAcetam (KEPPRA) 1000 MG tablet Take 1 tablet by mouth 2 times daily  Qty: 60 tablet, Refills: 3      lacosamide (VIMPAT) 100 MG TABS tablet Take 1 tablet by mouth 2 times daily for 90 days. Qty: 60 tablet    Associated Diagnoses: Seizure-like activity ([address]      lactobacillus (CULTURELLE) CAPS capsule Take 1 capsule by mouth 2 times daily      metoprolol tartrate (LOPRESSOR) 50 MG tablet Take 1 tablet by mouth 2 times daily  Qty: 60 tablet, Refills: 3      sodium chloride, Inhalant, 3 % nebulizer solution Take 4 mLs by nebulization 2 times daily      famotidine (PEPCID) 20 MG tablet Take 1 tablet by mouth 2 times daily  Qty: 60 tablet, Refills: 3      levothyroxine (SYNTHROID) 50 MCG tablet Take 1 tablet by mouth Daily  Qty: 30 tablet, Refills: 3      chlorhexidine (PERIDEX) 0.12 % solution Take 15 mLs by mouth 2 times daily for 14 days  Qty: 420 mL, Refills: 0      vancomycin (VANCOCIN) 50 mg/mL oral solution Take 5 mLs by mouth 4 times daily for 7 days         CONTINUE these medications which have CHANGED    Details   albuterol (ACCUNEB) 0.63 MG/3ML nebulizer solution Take 3 mLs by nebulization 4 times daily  Qty: 270 mL, Refills: 3         CONTINUE these medications which have NOT CHANGED    Details   pregabalin (LYRICA) 200 MG capsule Take 1 capsule by mouth every 8 hours . Qty: 90 capsule, Refills: 3    Comments: ICD-10: M96.1, M51.26,S33. 5XXA      Cholecalciferol (VITAMIN D3) 2000 UNITS CAPS Take 1 capsule by mouth daily         STOP taking these medications       zolpidem (AMBIEN) 10 MG tablet Comments:   Reason for Stopping:         morphine (MS CONTIN) 30 MG extended release tablet Comments:   Reason for Stopping:         orphenadrine (NORFLEX) 100 MG extended release tablet Comments:   Reason for Stopping:         Glucosamine-Chondroit-Vit C-Mn (GLUCOSAMINE CHONDR 1500 COMPLX PO) Comments:   Reason for Stopping:         orphenadrine (NORFLEX) 100 MG extended release tablet Comments:   Reason for Stopping:         orphenadrine (NORFLEX) 100 MG extended release tablet Comments:   Reason for Stopping:         Handicap Placard MISC Comments:   Reason for Stopping:         guaiFENesin (MUCINEX) 600 MG SR tablet Comments:   Reason for Stopping:         Ferrous Gluconate (IRON) 240 (27 FE) MG TABS Comments:   Reason for Stopping:         b complex vitamins capsule Comments:   Reason for Stopping:         Coenzyme Q10 (CO Q 10) 100 MG CAPS Comments:   Reason for Stopping:         Probiotic Product (PROBIOTIC DAILY PO) Comments:   Reason for Stopping:         sucralfate (CARAFATE) 1 GM tablet Comments:   Reason for Stopping:         meclizine (ANTIVERT) 25 MG tablet Comments:   Reason for Stopping:         atenolol (TENORMIN) 25 MG tablet Comments:   Reason for Stopping:         thyroid (ARMOUR THYROID) 30 MG tablet Comments:   Reason for Stopping:         loratadine-pseudoephedrine (CLARITIN-D 24 HOUR)  MG per tablet Comments:   Reason for Stopping:         Calcium Carbonate Antacid (TUMS PO) Comments:   Reason for Stopping:             Activity: activity as tolerated  Diet: regular diet    Follow-up with Dr Osiris Nguyen in 1 month. Note that over 30 minutes was spent in preparing discharge papers, discussing discharge with patient, medication review, etc.    Signed:  GEORGIA Posadas  5/24/2019  4:23 PM

## 2019-05-24 NOTE — PROGRESS NOTES
SPEECH/LANGUAGE PATHOLOGY  VIDEOFLUOROSCOPIC STUDY OF SWALLOWING (MBS)      PATIENT NAME:  Cynthia Stone      :  1960      TODAY'S DATE:  2019    SUMMARY OF EVALUATION     DYSPHAGIA DIAGNOSIS:  Moderate pharyngeal dysphagia      DIET RECOMMENDATIONS: Regular consistency solids with honey consistency liquids     FEEDING RECOMMENDATIONS:     Assistance level:  Stand by assistance is needed during all oral intake      Compensatory strategies recommended: No strategies are recommended at this time    THERAPY RECOMMENDATIONS:      Dysphagia therapy is recommended with emphasis on improving laryngeal elevation and closure               PROCEDURE     Consistencies Administered During the Evaluation   Liquids: thin liquid, nectar thick liquid and honey thick liquid   Solids:  pureed foods and solid foods      Method of Intake:   cup, spoon  Fed by clinician      Position:   Seated, upright, Lateral plane    Current Respiratory Status   nasal canula                RESULTS     ORAL STAGE       The oral stage of swallowing was within functional limits       PHARYNGEAL STAGE           ONSET TIME     Onset time of the pharyngeal swallow was adequate       PHARYNGEAL RESIDUALS          Vallecula/Pharyngeal Wall           Reduced tongue base retraction resulting in residuals in vallecula and/or posterior pharyngeal wall for honey consistency liquid and pureed foods which cleared with spontaneous double swallow      Pyriform Sinuses      Residuals in the pyriform sinuses were noted due to pharyngeal weakness for honey consistency liquid and pureed foods which cleared with spontaneous double swallow    LARYNGEAL PENETRATION     Laryngeal penetration occurred in the absence of aspiration DURING the swallow for  Thin and nectar consistency liquid due to  delayed laryngeal closurewhich remained in the laryngeal vestibule.  . Laryngeal penetration was mild and occurred consistently   an absent cough/throat clear was noted                 ASPIRATION    Aspiration was noted present during the evaluation however there is high risk for aspiration  of thin liquid and nectar consistency liquid due to laryngeal penetration and pharyngeal residuals         COMPENSATORY STRATEGIES       Compensatory strategies were not attempted      STRUCTURAL/FUNCTIONAL ANOMALIES       No structural/functional anomalies were noted      CERVICAL ESOPHAGEAL STAGE :        The cervical esophagus appeared adequate                                  [x]The admitting diagnosis and active problem list, as listed below have been reviewed prior to initiation of this evaluation. ADMITTING DIAGNOSIS: Septic shock ([facility] [A41.9, R65.21]  Septic shock ([address] [A41.9, R65.21]  Septic shock ([address] [A41.9, R65.21]     ACTIVE PROBLEM LIST:   Patient Active Problem List   Diagnosis    Lumbar radiculopathy    Septic shock ([address]    Seizure ([address]    Obesity (BMI 30-39. 9)    Metabolic encephalopathy    GI bleed    Essential hypertension    Hyperlipidemia LDL goal <100    Acquired hypothyroidism    Acute kidney injury ([address]    Acute respiratory failure with hypoxia (HCC)    Clostridium difficile colitis    Severe protein-calorie malnutrition ([address]

## 2019-05-24 NOTE — CARE COORDINATION
Discharge order noted on chart. Transport to [place] set up with [facility] between 6/7pm. HENS and ambulance form on soft chart. Nurse and  notified. ambulatory

## 2019-05-24 NOTE — PROGRESS NOTES
Subjective:    Awake and alert. No problems overnight. Denies chest pain or dyspnea. Denies abdominal pain. Tolerating diet. No nausea or vomiting. Objective:      96.4 (35.8)  18  115  122/70  --  --  --  3  96  Nasal cannula       Skin: Warm and dry  Neck: Supple, no JVD  Heart:  RRR, no murmurs, gallops, or rubs. Lungs:  CTA bilaterally, no wheeze, rales or rhonchi  Abd: bowel sounds present, nontender, nondistended, no masses  Extrem:  No clubbing, cyanosis, or edema, pulses intact           XR CHEST STANDARD (2 VW)   Final Result   Suboptimal inspiration with probable atelectasis on the right. Fluoroscopy modified barium swallow with video   Final Result   Positive study for aspiration of thin textures. Positive study for penetration of nectar textures. Oral delay was demonstrated. Retention within the valleculae and piriform sinuses was also   demonstrated. Reduced laryngeal elevation was demonstrated      Please refer to speech pathologist note(s) for additional findings and   recommendations based on this study. This procedure was performed by Ward Covington PA-C under the indirect   supervision of Angle Godoy MD   who was not present for the   procedure. .      The exam has been dictated and signed by KEMAL Walsh.    I, Angle Godoy MD , Radiologist, have reviewed the images and   report and concur with these findings. XR CHEST PORTABLE   Final Result   1. Stable, enlarged cardiomediastinal silhouette with suspected   underlying pulmonary edema. XR CHEST PORTABLE   Final Result   Mild CHF with improvement in the right lung base. XR CHEST PORTABLE   Final Result   tortuous ectatic aorta   Airspace disease compatible with pneumonia in the right lower lung and   left upper lung   Probable right effusion   An endotracheal tube is not visible.                XR CHEST PORTABLE   Final Result   Cardiomegaly   Tortuous aorta    There are patchy infiltrates seen throughout both the lung fields. Pneumonia could give this appearance. Underlying edema could also have   this appearance   The chest appears improved in the interval                  US DUP LOWER EXTREMITIES BILATERAL VENOUS   Final Result   No evidence for deep venous thrombosis               XR CHEST PORTABLE   Final Result   ALERT:  THIS IS AN ABNORMAL REPORT   1. Abnormal airspace disease in the bilateral upper lungs and   bilateral lung bases. Findings are nonspecific and could reflect an   infiltrate/pneumonia. An infiltrative mass or malignancy is not   excluded particularly in the right and the left upper lung/suprahilar   regions, further evaluation with CT scan the chest is recommended to   exclude any potential underlying malignancy. 2. Stable, enlarged cardiac mediastinal silhouette with thoracic   aortic vascular calcifications. FL LUMBAR PUNCTURE DIAG   Final Result   Successful Uncomplicated Fluoroscopic-Guided Lumbar Puncture. This procedure was performed by Chelsea Rai PA-C under the indirect   supervision of Danielle Oropeza MD   who was not present for the   procedure. .      The exam has been dictated and signed by Chelsea Rai PA-C. Salty Lerner MD , Radiologist, have reviewed the images and   report and concur with these findings. FL GUIDED FOR SPINE INJECT   Final Result   Successful Uncomplicated Fluoroscopic-Guided Lumbar Puncture. This procedure was performed by Chelsea Rai PA-C under the indirect   supervision of Danielle Oropeza MD   who was not present for the   procedure. .      The exam has been dictated and signed by Chelsea Rai PA-C. Salty Lerner MD , Radiologist, have reviewed the images and   report and concur with these findings. XR CHEST PORTABLE   Final Result   1.  Multifocal airspace disease most likely suspicious for a multifocal   infiltrate/pneumonia with underlying pulmonary edema and a right   pleural effusion. Interval placement NG tube without identification of   the distal tibia. MRI BRAIN WO CONTRAST   Final Result   Diffuse atrophy likely age related   Findings compatible with small vessel ischemic changes. XR Chest Abdomen Ng Placement   Final Result   NG tube tip in the stomach. Additional observations as outlined above. XR CHEST PORTABLE   Final Result   1. Bibasilar airspace disease, right greater than left, nonspecific   finding, findings can be seen infiltrate/pneumonia/atelectasis, with   right pleural effusion. 2. Vascular calcifications thoracic aorta. 3. Suspected underlying pulmonary edema. 4. Interval placement left-sided PICC line. IR INJ VENOGRAM EXTREMITY   Final Result      1. Ultrasound-guided peripheral venous access in the right upper   extremity. 2. Right upper extremity venogram demonstrating patency of the right   central veins. 3. Uncomplicated placement of a dual-lumen peripherally inserted   central catheter (PICC) via the left brachial vein with trim length of   42 cm. IR VENOGRAM UPPER EXTREMITY RIGHT   Final Result      1. Ultrasound-guided peripheral venous access in the right upper   extremity. 2. Right upper extremity venogram demonstrating patency of the right   central veins. 3. Uncomplicated placement of a dual-lumen peripherally inserted   central catheter (PICC) via the left brachial vein with trim length of   42 cm. IR PICC WO SQ PORT/PUMP > 5 YEARS   Final Result      1. Ultrasound-guided peripheral venous access in the right upper   extremity. 2. Right upper extremity venogram demonstrating patency of the right   central veins. 3. Uncomplicated placement of a dual-lumen peripherally inserted   central catheter (PICC) via the left brachial vein with trim length of   42 cm. IR ULTRASOUND GUIDANCE VASCULAR ACCESS   Final Result      1.  Ultrasound-guided daily 5/23/19  Yes Judie Khan DO   levothyroxine (SYNTHROID) 50 MCG tablet Take 1 tablet by mouth Daily 5/24/19  Yes Judie Khan DO   chlorhexidine (PERIDEX) 0.12 % solution Take 15 mLs by mouth 2 times daily for 14 days 5/23/19 6/6/19 Yes Judie Khan DO   vancomycin (VANCOCIN) 50 mg/mL oral solution Take 5 mLs by mouth 4 times daily for 7 days 5/23/19 5/30/19 Yes Judie Khan DO   pregabalin (LYRICA) 200 MG capsule Take 1 capsule by mouth every 8 hours . Patient taking differently: Take 100 mg by mouth every 8 hours.   12/6/17 5/7/19 Yes OSMANI May CNP   Cholecalciferol (VITAMIN D3) 2000 UNITS CAPS Take 1 capsule by mouth daily   Yes Historical Provider, MD        Current Facility-Administered Medications   Medication Dose Route Frequency Provider Last Rate Last Dose    vancomycin (VANCOCIN) oral solution 250 mg  250 mg Oral 4 times per day Nicolasa Waite MD   250 mg at 05/24/19 4976    famotidine (PEPCID) tablet 20 mg  20 mg Oral BID Kalin Victoria MD   20 mg at 05/24/19 4351    levETIRAcetam (KEPPRA) tablet 1,000 mg  1,000 mg Oral BID Kalin Victoria MD   1,000 mg at 05/24/19 0816    levothyroxine (SYNTHROID) tablet 50 mcg  50 mcg Oral Daily Kalin Victoria MD   50 mcg at 05/24/19 8052    metoprolol tartrate (LOPRESSOR) tablet 50 mg  50 mg Oral BID Kalin Victoria MD   50 mg at 05/24/19 0817    lacosamide (VIMPAT) 100 mg in dextrose 5 % 50 mL IVPB  100 mg Intravenous BID Barb Marquez MD   Stopped at 05/23/19 2212    sodium chloride (Inhalant) 3 % nebulizer solution 4 mL  4 mL Nebulization BID Barb Marquez MD   4 mL at 05/24/19 0924    albuterol (ACCUNEB) nebulizer solution 0.63 mg  0.63 mg Nebulization 4x daily Barb Marquez MD   0.63 mg at 05/24/19 9399    pregabalin (LYRICA) capsule 200 mg  200 mg Oral 3 times per day Barb Marquez MD   200 mg at 05/24/19 3312    lactobacillus (CULTURELLE) capsule 1 capsule  1 capsule Oral BID Barb Marquez MD   1 capsule at 05/24/19 0817    morphine (PF) injection 2 mg  2 mg Intravenous Q4H PRN Edouard Hamm MD   2 mg at 05/13/19 1827    acetaminophen (TYLENOL) 160 MG/5ML solution 650 mg  650 mg Oral Q6H PRN Edouard Hamm MD   650 mg at 05/20/19 1754    chlorhexidine (PERIDEX) 0.12 % solution 15 mL  15 mL Mouth/Throat BID Edouard Hamm MD   15 mL at 05/24/19 0816    sodium chloride flush 0.9 % injection 10 mL  10 mL Intravenous PRN Edouard Hamm MD   10 mL at 05/11/19 0504    heparin flush 100 UNIT/ML injection 300 Units  3 mL Intravenous 2 times per day Edouard Hamm MD   300 Units at 05/24/19 0816    heparin flush 100 UNIT/ML injection 300 Units  3 mL Intracatheter PRN Edouard Hamm MD        heparin (porcine) injection 5,000 Units  5,000 Units Subcutaneous TID Edouard Hamm MD   5,000 Units at 05/24/19 0817    vitamin D (ERGOCALCIFEROL) capsule 50,000 Units  50,000 Units Oral Weekly Edouard Hamm MD   50,000 Units at 05/22/19 6306    sodium chloride flush 0.9 % injection 10 mL  10 mL Intravenous 2 times per day Edouard Hamm MD   10 mL at 05/24/19 0816    sodium chloride flush 0.9 % injection 10 mL  10 mL Intravenous PRN Edouard Hamm MD   10 mL at 05/15/19 0304    magnesium hydroxide (MILK OF MAGNESIA) 400 MG/5ML suspension 30 mL  30 mL Oral Daily PRN Edouard Hamm MD        ondansetron (ZOFRAN) injection 4 mg  4 mg Intravenous Q6H PRN Edouard Hamm MD   4 mg at 05/15/19 2008    mineral oil-hydrophilic petrolatum (HYDROPHOR) ointment   Topical TID Edouard Hamm MD        And    mineral oil-hydrophilic petrolatum (HYDROPHOR) ointment   Topical TID PRN Edouard Hamm MD               Problem list:    Principal Problem:    Septic shock ([address]  Active Problems:    Lumbar radiculopathy    Seizure ([address]    Obesity (BMI 30-39. 9)    Metabolic encephalopathy    GI bleed    Essential hypertension    Hyperlipidemia LDL goal <100    Acquired hypothyroidism    Acute kidney injury ([facility] [address] Acute respiratory failure with hypoxia (HCC)    Clostridium difficile colitis    Severe protein-calorie malnutrition (HCC)  Resolved Problems:    Colitis    Gastrointestinal hemorrhage associated with gastritis    Encephalopathy in sepsis    Seizure-like activity (HCC)      Assessment:             Lumbar radiculopathy    Septic shock (HCC)    Seizure (HCC)    Obesity (BMI 30-39. 9)    Metabolic encephalopathy    GI bleed    Essential hypertension    Hyperlipidemia LDL goal <100    Acquired hypothyroidism    Acute kidney injury ([facility] [address]    Acute respiratory failure with hypoxia (HCC)    Clostridium difficile colitis    Severe protein-calorie malnutrition ([address]                 Plan:    Continue antibiotics as per ID    Repeat swallowing evaluation    Discharge planning      Sheri Nina D.O., [facility]  9:34 AM  5/24/2019

## 2019-05-24 NOTE — PROGRESS NOTES
Pulmonary [facility]                             Pulmonary Consult/Progress Note :    Chief complaint: AMS/resp failure   Subjective   SOB has improved a lot but still weak   No fever or chills   No chest pain   Has some secretion   start to eat pureed diet with  help   Still weak     Physical Exam   · Vitals: /75   Pulse 109   Temp 98.3 °F (36.8 °C) (Temporal)   Resp 18   Ht 5' (1.524 m)   Wt 200 lb 12.8 oz (91.1 kg)   SpO2 96%   BMI 39.22 kg/m²   ABP (Arterial line BP): 91/81  ABP mean (Arterial line mean): 86 mmHg    · General Appearance:on NC   · Skin: warm   · Head: normocephalic and atraumatic  · Eyes: pupils equal, round, and reactive to light, extraocular eye movements intact, conjunctivae normal  · ENT: tympanic membrane, external ear and ear canal normal bilaterally, nose without deformity, nasal mucosa and turbinates normal without polyps  · Neck: supple and non-tender without mass, no thyromegaly or thyroid nodules, no cervical lymphadenopathy  · Pulmonary/Chest: clear to auscultation bilaterally- no wheezes, rales or rhonchi, normal air movement, no respiratory distress  · Cardiovascular: normal rate, regular rhythm, normal S1 and S2, no murmurs, rubs, clicks, or gallops, distal pulses intact, no carotid bruits  · Abdomen: soft, right sided tenderness, Non distended.    No rebound tenderness   · Extremities: no cyanosis, clubbing or edema  · Musculoskeletal: normal range of motion, no joint swelling, deformity or tenderness  · Neurologic: reflexes normal and symmetric, no cranial nerve deficit, gait, coordination and speech normal     ABG:     Lab Results   Component Value Date    PH 7.433 05/16/2019    PCO2 40.9 05/16/2019    PO2 97.1 05/16/2019    HCO3 26.7 05/16/2019    BE 2.3 05/16/2019    THB 10.9 05/16/2019    O2SAT 97.1 05/16/2019     Labs and Imaging Studies   Basic Labs  CBC:   Lab Results   Component Value Date    WBC 4.2 05/22/2019    RBC 2.43 05/22/2019    HGB 8.0 05/22/2019    HCT 25.3 05/22/2019    .1 05/22/2019    RDW 15.4 05/22/2019     05/22/2019     CMP:  Lab Results   Component Value Date     05/23/2019    K 3.4 05/23/2019    K 3.3 05/21/2019     05/23/2019    CO2 23 05/23/2019    BUN 12 05/23/2019    PROT 6.4 05/22/2019     PT/INR:  No results found for: PTINR    Imaging Studies:     Ct Abdomen Pelvis Wo Contrast Additional Contrast? None     Assessment and Plan       Acute encephalopathy, improving  Septic shock , improved  Acute hypoxic respiratory failure   C diff colitis   Atelectasis R> L    acute pulmonary edema/Fluid overload      Plan   Suction as needed ,continue with Chest vest  Aggressive Pulmonary rehab with ambulation   IC   Watch I&OS,avoid fluid overload   Aspiration precaution   Follow on CXR   Wean O2 as tolerated   Azactam stopped by ID and on  Vanco Po   BD   PT and OT      Tawnya Sparrow M.D.   5/23/2019  9:28 PM

## 2019-05-24 NOTE — PROGRESS NOTES
Department of Internal Medicine  Nephrology Resident  Progress Note    Events reviewed. SUBJECTIVE: We are following Mrs. Haji for acute kidney injury. Awake but nonverbal.    PHYSICAL EXAM:      VITALS:  /68   Pulse 120   Temp 98 °F (36.7 °C) (Oral)   Resp 20   Ht 5' (1.524 m)   Wt 200 lb 12.8 oz (91.1 kg)   SpO2 95%   BMI 39.22 kg/m²    Intake and Output last 24hrs:   In: 0   Out: 375 [Urine:375]      Constitutional:  Awake non verbal in no distress  HEENT:  MESSI   Respiratory:  Coarse breath sounds bilaterally, frothy secretions from ET  Cardiovascular/Edema:  Regular rate and rhythm, no murmurs appreciated  Gastrointestinal:  Soft, non-tender, bowel sounds present  Neurologic:  Awake, non-focal but nonverbal  Skin:  warm, + edema  Other:  Erythematous lesion on lower extrtemities    Scheduled Meds:   vancomycin  250 mg Oral 4 times per day    famotidine  20 mg Oral BID    levETIRAcetam  1,000 mg Oral BID    levothyroxine  50 mcg Oral Daily    metoprolol tartrate  50 mg Oral BID    lacosamide (VIMPAT) IVPB  100 mg Intravenous BID    sodium chloride (Inhalant)  4 mL Nebulization BID    albuterol  0.63 mg Nebulization 4x daily    pregabalin  200 mg Oral 3 times per day    lactobacillus  1 capsule Oral BID    chlorhexidine  15 mL Mouth/Throat BID    heparin flush  3 mL Intravenous 2 times per day    heparin (porcine)  5,000 Units Subcutaneous TID    vitamin D  50,000 Units Oral Weekly    sodium chloride flush  10 mL Intravenous 2 times per day    mineral oil-hydrophilic petrolatum   Topical TID     Continuous Infusions:    PRN Meds:.morphine, acetaminophen, sodium chloride flush, heparin flush, sodium chloride flush, magnesium hydroxide, ondansetron, mineral oil-hydrophilic petrolatum **AND** mineral oil-hydrophilic petrolatum    DATA:    CBC with Differential:    Lab Results   Component Value Date    WBC 4.2 05/22/2019    RBC 2.43 05/22/2019    HGB 8.0 05/22/2019    HCT 25.3 05/22/2019     05/22/2019    .1 05/22/2019    MCH 32.9 05/22/2019    MCHC 31.6 05/22/2019    RDW 15.4 05/22/2019    NRBC 0.9 05/10/2019    LYMPHOPCT 34.9 05/22/2019    PROMYELOPCT 0.9 05/08/2019    MONOPCT 11.3 05/22/2019    BASOPCT 0.5 05/22/2019    MONOSABS 0.47 05/22/2019    LYMPHSABS 1.45 05/22/2019    EOSABS 0.17 05/22/2019    BASOSABS 0.02 05/22/2019     CMP:    Lab Results   Component Value Date     05/24/2019    K 3.4 05/24/2019    K 3.3 05/21/2019     05/24/2019    CO2 24 05/24/2019    BUN 12 05/24/2019    CREATININE 0.5 05/24/2019    GFRAA >60 05/24/2019    LABGLOM >60 05/24/2019    GLUCOSE 101 05/24/2019    PROT 6.6 05/24/2019    LABALBU 3.1 05/24/2019    LABALBU 4.4 08/09/2011    CALCIUM 9.0 05/24/2019    BILITOT 0.6 05/24/2019    ALKPHOS 65 05/24/2019    AST 29 05/24/2019    ALT 21 05/24/2019     BMP:    Lab Results   Component Value Date     05/24/2019    K 3.4 05/24/2019    K 3.3 05/21/2019     05/24/2019    CO2 24 05/24/2019    BUN 12 05/24/2019    LABALBU 3.1 05/24/2019    LABALBU 4.4 08/09/2011    CREATININE 0.5 05/24/2019    CALCIUM 9.0 05/24/2019    GFRAA >60 05/24/2019    LABGLOM >60 05/24/2019    GLUCOSE 101 05/24/2019     Calcium:    Lab Results   Component Value Date    CALCIUM 9.0 05/24/2019     Ionized Calcium:  No results found for: IONCA  Magnesium:    Lab Results   Component Value Date    MG 1.5 05/24/2019     Phosphorus:    Lab Results   Component Value Date    PHOS 3.2 05/24/2019         Urine studies:  Urine sodium: 22  Urine chloride: <20  Urine creatinine: 100  Urine urea: 251  Urine potassium: 48.2    Fraction excretion of sodium: 0.5%  Fraction excretion of urea: 12.6%    Vitamin D 25 level: 13 (low)      Radiology Review:       Chest Xray May 16, 2019   1. Multifocal airspace disease most likely suspicious for a multifocal   infiltrate/pneumonia with underlying pulmonary edema and a right   pleural effusion.  Interval placement NG tube without identification of   the distal tibia. BRIEF SUMMARY OF INITIAL CONSULT:  Briefly, Mrs. Lizette Dias is a 40-year-old female with h/o of hypothyroidism, lumbar radiculopathy s/p laminectomy, HLD, asthma, was brought to the ED for worsening lethargy. She was recently treated for lower extremity cellulitis with clindamycin and then levofloxacin around 2-3 weeks ago. She developed profuse diarrhea shortly thereafter. Family reported some vomiting initially and poor oral intake. Over the past 3 days, she became severely weak and dehydrated. At the ED, she was in shock requiring vasopressor despite aggressive fluid resuscitation and was intubated for airway protection. Labs were notable for creatinine of 4.2 mg/dL, BUN of 73 mg/dL, lactic acid of 3.4, sodium of 129 mmol/L, liver enzymes were also markedly elevated, reasons for this consult. Of note, she has no known history of kidney disease. She was on furosemide at home, but has not been taken since the onset of diarrhea. Problems resolved:    · Hyponatremia, multifactorial, due to hypovolemia and decreased GFR, resolved  · Hypoglycemia secondary to #6, resolved  · Coagulopathy, high PT/INR, resolved  · S/p Hemodynamic shock, hypovolemic and septic shock, melena likely improving with decreasing dose of pressors  · STEFANIA, stage 3, volume responsive pre-renal STEFANIA  (diarrhea, poor oral intake, shock), FeNa 0.5% , FeUrea 12%. Resolved. · Hypomagnesemia  · S/p Respiratory failure, status post extubation; chest x-ray worsening pleural effusion  · s/pShock liver   · Hypernatremia, likely 2/2 diuresis. · Hyperchloremia, probably multifactorial, 2/2 diuresis and diarrhea. · Hypocalcemia, probably multifactorial, including vitamin D deficiency and hypomagnesemia. Continue ergocalciferol, resolved. · Hypophosphatemia, secondary to vitamin D deficiency and poor oral intake. Corrected        IMPRESSION/RECOMMENDATIONS:        1.  Hypokalemia, secondary to poor intake and hypomagnesemia, corrected   2. Hypomagnesemia, probably multifactorial, 2/2 poor oral intake, diuretics. Improved. 3. HFpEF 60%  4. Aspiration pneumonia, on aztreonam  5. C. difficile infection, on vancomycin    6. Severe hypoalbuminemia, multifactorial  7.  Nutrition, TF at 60 cc/hour, free water at 200 cc every 6 hours     Plan:    · Replace magnesium  · Replace potassium  · Continue to monitor renal function       Benigno Peck M.D     5/24/2019

## 2019-05-24 NOTE — PROGRESS NOTES
Nurse to nurse given to Ezra Lares at [facility] [phone]. All discharge instructions faxed to Joel at [phone].

## 2019-05-25 NOTE — PROGRESS NOTES
Pulmonary [address] [facility]                             Pulmonary Consult/Progress Note :    Chief complaint: AMS/resp failure   Subjective   SOB has improved a lot,she is more stronger No fever or chills   No chest pain   Has some secretion   Appetite improved    Physical Exam   · Vitals: /68   Pulse 100   Temp 97.3 °F (36.3 °C) (Temporal)   Resp 22   Ht 5' (1.524 m)   Wt 200 lb 12.8 oz (91.1 kg)   SpO2 96%   BMI 39.22 kg/m²   ABP (Arterial line BP): 91/81  ABP mean (Arterial line mean): 86 mmHg    · General Appearance:on NC   · Skin: warm   · Head: normocephalic and atraumatic  · Eyes: pupils equal, round, and reactive to light, extraocular eye movements intact, conjunctivae normal  · ENT: tympanic membrane, external ear and ear canal normal bilaterally, nose without deformity, nasal mucosa and turbinates normal without polyps  · Neck: supple and non-tender without mass, no thyromegaly or thyroid nodules, no cervical lymphadenopathy  · Pulmonary/Chest: clear to auscultation bilaterally- no wheezes, rales or rhonchi, normal air movement, no respiratory distress  · Cardiovascular: normal rate, regular rhythm, normal S1 and S2, no murmurs, rubs, clicks, or gallops, distal pulses intact, no carotid bruits  · Abdomen: soft, right sided tenderness, Non distended.    No rebound tenderness   · Extremities: no cyanosis, clubbing or edema  · Musculoskeletal: normal range of motion, no joint swelling, deformity or tenderness  · Neurologic: reflexes normal and symmetric, no cranial nerve deficit, gait, coordination and speech normal     ABG:     Lab Results   Component Value Date    PH 7.433 05/16/2019    PCO2 40.9 05/16/2019    PO2 97.1 05/16/2019    HCO3 26.7 05/16/2019    BE 2.3 05/16/2019    THB 10.9 05/16/2019    O2SAT 97.1 05/16/2019     Labs and Imaging Studies   Basic Labs  CBC:   Lab Results   Component Value Date    WBC 4.2 05/22/2019    RBC 2.43 05/22/2019    HGB 8.0 05/22/2019    HCT 25.3 05/22/2019    .1 05/22/2019    RDW 15.4 05/22/2019     05/22/2019     CMP:  Lab Results   Component Value Date     05/24/2019    K 3.4 05/24/2019    K 3.3 05/21/2019     05/24/2019    CO2 24 05/24/2019    BUN 12 05/24/2019    PROT 6.6 05/24/2019     PT/INR:  No results found for: PTINR    Imaging Studies:     Ct Abdomen Pelvis Wo Contrast Additional Contrast? None     Assessment and Plan       Acute encephalopathy, improving  Septic shock , improved  Acute hypoxic respiratory failure   C diff colitis   Atelectasis R> L    acute pulmonary edema/Fluid overload      Plan   Suction as needed ,continue with Chest vest ,less secretions . so we can hold it on discharge   Aggressive Pulmonary rehab with ambulation   IC    Aspiration precaution   Wean O2 as tolerated   Azactam stopped by ID and on  Vanco Po   BD   PT and OT      Wagner Mason M.D.   5/24/2019  9:38 PM

## 2019-06-06 ENCOUNTER — HOSPITAL ENCOUNTER (OUTPATIENT)
Dept: GENERAL RADIOLOGY | Age: 59
Discharge: HOME | End: 2019-06-08
Payer: COMMERCIAL

## 2019-06-06 DIAGNOSIS — R13.10 DYSPHAGIA, UNSPECIFIED TYPE: ICD-10-CM

## 2019-06-06 PROCEDURE — 2500000003 HC RX 250 WO HCPCS: Performed by: FAMILY MEDICINE

## 2019-06-06 PROCEDURE — 74230 X-RAY XM SWLNG FUNCJ C+: CPT

## 2019-06-06 PROCEDURE — 92611 MOTION FLUOROSCOPY/SWALLOW: CPT

## 2019-06-06 RX ADMIN — BARIUM SULFATE 45 G: 0.6 CREAM ORAL at 09:49

## 2019-06-06 RX ADMIN — BARIUM SULFATE 58 G: 960 POWDER, FOR SUSPENSION ORAL at 09:49

## 2019-06-06 NOTE — PROGRESS NOTES
The cervical esophagus appeared adequate                                  [x]The admitting diagnosis and active problem list, as listed below have been reviewed prior to initiation of this evaluation. ADMITTING DIAGNOSIS: Dysphagia, unspecified type [R13.10]     ACTIVE PROBLEM LIST:   Patient Active Problem List   Diagnosis    Lumbar radiculopathy    Septic shock (HCC)    Seizure (HCC)    Obesity (BMI 30-39. 9)    Metabolic encephalopathy    GI bleed    Essential hypertension    Hyperlipidemia LDL goal <100    Acquired hypothyroidism    Acute kidney injury ([facility] [address]    Acute respiratory failure with hypoxia (HCC)    Clostridium difficile colitis    Severe protein-calorie malnutrition ([address]

## 2019-06-17 LAB
FUNGUS (MYCOLOGY) CULTURE: NORMAL
FUNGUS STAIN: NORMAL

## 2022-11-18 NOTE — PLAN OF CARE
[address], [address], [facility], [address] [facility]    ASSESSMENT/PLAN: Olman Augustin is a 38 y o  [id number] who presents for annual gynecologic exam     Encounter for routine gynecologic examination  - Routine well woman exam completed today  - Cervical Cancer Screening: Current ASCCP Guidelines reviewed  Last Pap: 03/01/2019  History of abnormal: None  Repeat pap collected today    - STI screening offered including HIV testing: offered, pt declined  - Contraceptive counseling discussed  Current contraception: condoms as bridge to vasectomy (scheduled): - The following were reviewed in today's visit: breast self exam, exercise and healthy diet    Additional problems addressed during this visit:  1  Women's annual routine gynecological examination    2  Screening for cervical cancer  -     IGP, Aptima HPV, Rfx 16/18,45      CC:  Annual Gynecologic Examination    HPI: Olman Augustin is a 38 y o  [id number] who presents for annual gynecologic examination  She is without complaint today  ROS: Negative except as noted in HPI    Patient's last menstrual period was 11/08/2022 (exact date)  Menstrual History  Period Cycle (Days): 28  Period Duration (Days): 6-7  Period Pattern: Regular  Menstrual Flow: Moderate  Menstrual Control: Maxi pad  Menstrual Control Change Freq (Hours): 4    She  reports being sexually active and has had partner(s) who are male  She reports using the following method of birth control/protection: None  Sexual History  Sexual Assault: No  Sexually Transmitted Infection History: None  Multiple Sex Partners: No  Is Patient in a Monogamous Relationship?: Yes    The following portions of the patient's history were reviewed and updated as appropriate:   History reviewed  No pertinent past medical history    Past Surgical History:   Procedure Laterality Date   • WISDOM TOOTH EXTRACTION       Family History   Problem Relation Age of Onset   • Autoimmune disease Mother Problem: Restraint Use - Nonviolent/Non-Self-Destructive Behavior:  Goal: Absence of restraint-related injury  Description  Absence of restraint-related injury  5/10/2019 2250 by Hannah Lewis RN  Outcome: Met This Shift     Problem: Restraint Use - Nonviolent/Non-Self-Destructive Behavior:  Goal: Absence of restraint indications  Description  Absence of restraint indications  5/10/2019 2250 by Hannah Lewis RN  Outcome: Not Met This Shift   Plan of care discussed with patient / family. • Crohn's disease Mother    • Heart disease Father    • Hypertension Father    • Hyperlipidemia Father    • No Known Problems Brother    • Diabetes Maternal Grandmother    • Heart disease Maternal Grandfather    • Diabetes Maternal Grandfather    • Diabetes Paternal Grandmother    • Diabetes Paternal Grandfather    • Heart disease Paternal Grandfather    • Hypertension Paternal Grandfather    • Speech disorder Son    • Breast cancer Neg Hx    • Ovarian cancer Neg Hx    • Uterine cancer Neg Hx    • Colon cancer Neg Hx      Social History     Socioeconomic History   • Marital status: /Civil Union     Spouse name: None   • Number of children: 1   • Years of education: None   • Highest education level: Associate degree: occupational, technical, or vocational program   Occupational History   • Occupation:    Tobacco Use   • Smoking status: Never     Passive exposure: Never   • Smokeless tobacco: Never   Vaping Use   • Vaping Use: Never used   Substance and Sexual Activity   • Alcohol use: Not Currently     Comment: 1 or less per month   • Drug use: Never     Comment: No use   • Sexual activity: Yes     Partners: Male     Birth control/protection: None   Other Topics Concern   • None   Social History Narrative    Exercises 2-3 times per week    No domestic violence    No sexual abuse     Social Determinants of Health     Financial Resource Strain: Not on file   Food Insecurity: Not on file   Transportation Needs: Not on file   Physical Activity: Not on file   Stress: Not on file   Social Connections: Not on file   Intimate Partner Violence: Not on file   Housing Stability: Not on file     Outpatient Medications Marked as Taking for the 11/18/22 encounter (Annual Exam) with Barbra Mayfield MD   Medication   • Prenatal MV-Min-Fe Fum-FA-DHA (PRENATAL+DHA PO)     No Known Allergies        Objective:  /72 (BP Location: Left arm, Patient Position: Sitting, Cuff Size: Standard)   Ht 5' 2" (1 575 m)   Wt 62 6 kg (138 lb)   LMP 11/08/2022 (Exact Date)   BMI 25 24 kg/m²        Chaperone present? Yes: Ariel Neumann MA  General Appearance: alert and oriented, in no acute distress  Neck/Thyroid: No thyromegaly, no thyroid nodules  Respiratory: Unlabored breathing  Cardiovascular: Regular rate, no peripheral edema  Abdomen: Soft, non-tender, non-distended, no masses, no rebound or guarding  Breast Exam: No dimpling, nipple retraction or discharge  No lumps or masses  No axillary or supraclavicular nodes  Pelvic:       External genitalia: Normal appearance, no abnormal pigmentation, no lesions or masses  Normal Bartholin's and [place]'s  Urinary system: Urethral meatus normal, bladder non-tender  Vaginal: normal mucosa without prolapse or lesions  Normal-appearing physiologic discharge  Cervix: Normal-appearing, well-epithelialized, no gross lesions or masses  No cervical motion tenderness  Adnexa: No adnexal masses or tenderness noted  Uterus: Normal-sized, regular contour, midline, mobile, no uterine tenderness  Extremities: Normal range of motion  Warm, well-perfused, non-tender     Skin: normal, no rash or abnormalities  Neurologic: alert, oriented x3  Psychiatric: Appropriate affect, mood stable, cooperative with exam         Bob Christensen MD  11/18/2022 2:52 PM

## 2023-02-16 NOTE — PROGRESS NOTES
Subjective: The patient awake but not interactive   Follows commands but does not speak   Discussed with  at bedside   Still with multiple loose bm' s     Objective:    BP (!) 141/84   Pulse 123   Temp 99.2 °F (37.3 °C) (Bladder)   Resp (!) 37   Ht 5' (1.524 m)   Wt 200 lb 2.8 oz (90.8 kg)   SpO2 95%   BMI 39.09 kg/m²     In: 627 [I.V.:627]  Out: 1402     HEENT:NCAT,  PERRLA, No JVD  Heart:  RRR, no murmurs, gallops, or rubs. Lungs:  CTA bilaterally, no wheeze, rales or rhonchi  Abd: bowel sounds hypoactive, distended but soft abdomen   Extrem:  No clubbing, cyanosis, or edema     Recent Labs     05/13/19  0430  05/14/19 0425 05/14/19  1900 05/15/19  0307   WBC 9.3  --  13.1*  --  9.6   HGB 10.2*   < > 12.3 10.5* 9.7*   HCT 30.6*   < > 36.9 30.8* 29.1*     --  305  --  392    < > = values in this interval not displayed.        Recent Labs     05/14/19  0425 05/14/19  1900 05/15/19  0307    138 143   K 3.3* 2.8* 3.3*   CL 95* 97* 102   CO2 30* 28 24   BUN 12 14 17   CREATININE 0.8 0.7 0.6   CALCIUM 7.1* 7.4* 7.7*       Assessment:    Patient Active Problem List   Diagnosis    Post lumbar laminectomy syndrome    Herniated disc L4-L5    Lumbar sprain     Status post lumbar spinal cord stimulator 2007    Spinal cord stimulator dysfunction/Charging of SCS    Lumbar radiculopathy    Chronic pain syndrome    Septic shock (HCC)    Colitis       Plan:    Admit to [facility]u for eval of sepsis / shock from GI source -   Severe C diff infection   extubated 5/13   Await MRI today for  decreased responsiveness , CT head unremarkable   neurology following - resume Home meds if able to tolerate po intake     abx therapy with po vanc and flagyl per ID   Supportive care   supplement lytes for hypokalemia   Renal and id following   Continue  icu care       DVT and GERD prophylaxis  All consultants notes reviewed    Shirley Garcia MD  12:41 PM  5/15/2019 Female Pregnancy Counseling Text: Female patients should also be on two forms of birth control while taking this medication and for one month after their last dose.

## 2023-03-30 ENCOUNTER — APPOINTMENT (OUTPATIENT)
Dept: CT IMAGING | Age: 63
DRG: 463 | End: 2023-03-30
Payer: MEDICAID

## 2023-03-30 ENCOUNTER — APPOINTMENT (OUTPATIENT)
Dept: GENERAL RADIOLOGY | Age: 63
DRG: 463 | End: 2023-03-30
Payer: MEDICAID

## 2023-03-30 ENCOUNTER — HOSPITAL ENCOUNTER (INPATIENT)
Age: 63
LOS: 6 days | Discharge: NURSING, REHAB OR LONG-TERM CARE | DRG: 463 | End: 2023-04-05
Attending: EMERGENCY MEDICINE | Admitting: INTERNAL MEDICINE
Payer: MEDICAID

## 2023-03-30 DIAGNOSIS — D64.9 ANEMIA, UNSPECIFIED TYPE: ICD-10-CM

## 2023-03-30 DIAGNOSIS — R53.1 GENERAL WEAKNESS: ICD-10-CM

## 2023-03-30 DIAGNOSIS — M54.16 LUMBAR RADICULOPATHY: Chronic | ICD-10-CM

## 2023-03-30 DIAGNOSIS — N17.9 ACUTE KIDNEY INJURY (HCC): Primary | ICD-10-CM

## 2023-03-30 LAB
ALBUMIN SERPL-MCNC: 2.9 G/DL (ref 3.5–5.2)
ALP SERPL-CCNC: 70 U/L (ref 35–104)
ALT SERPL-CCNC: 25 U/L (ref 0–32)
ANION GAP SERPL CALCULATED.3IONS-SCNC: 15 MMOL/L (ref 7–16)
AST SERPL-CCNC: 51 U/L (ref 0–31)
BACTERIA URNS QL MICRO: ABNORMAL /HPF
BASOPHILS # BLD: 0.04 E9/L (ref 0–0.2)
BASOPHILS NFR BLD: 0.3 % (ref 0–2)
BILIRUB SERPL-MCNC: 0.5 MG/DL (ref 0–1.2)
BILIRUB UR QL STRIP: ABNORMAL
BUN SERPL-MCNC: 39 MG/DL (ref 6–23)
CALCIUM SERPL-MCNC: 8.7 MG/DL (ref 8.6–10.2)
CHLORIDE SERPL-SCNC: 99 MMOL/L (ref 98–107)
CLARITY UR: CLEAR
CO2 SERPL-SCNC: 22 MMOL/L (ref 22–29)
COLOR UR: ABNORMAL
CREAT SERPL-MCNC: 1.8 MG/DL (ref 0.5–1)
EKG ATRIAL RATE: 92 BPM
EKG P AXIS: 33 DEGREES
EKG P-R INTERVAL: 150 MS
EKG Q-T INTERVAL: 398 MS
EKG QRS DURATION: 74 MS
EKG QTC CALCULATION (BAZETT): 492 MS
EKG R AXIS: 20 DEGREES
EKG T AXIS: 19 DEGREES
EKG VENTRICULAR RATE: 92 BPM
EOSINOPHIL # BLD: 0.11 E9/L (ref 0.05–0.5)
EOSINOPHIL NFR BLD: 0.9 % (ref 0–6)
ERYTHROCYTE [DISTWIDTH] IN BLOOD BY AUTOMATED COUNT: 16.9 FL (ref 11.5–15)
GLUCOSE SERPL-MCNC: 105 MG/DL (ref 74–99)
GLUCOSE UR STRIP-MCNC: 100 MG/DL
HCT VFR BLD AUTO: 23.6 % (ref 34–48)
HCT VFR BLD AUTO: 25.2 % (ref 34–48)
HGB BLD-MCNC: 6.8 G/DL (ref 11.5–15.5)
HGB BLD-MCNC: 7.4 G/DL (ref 11.5–15.5)
HGB UR QL STRIP: ABNORMAL
IMM GRANULOCYTES # BLD: 0.11 E9/L
IMM GRANULOCYTES NFR BLD: 0.9 % (ref 0–5)
INFLUENZA A BY PCR: NOT DETECTED
INFLUENZA B BY PCR: NOT DETECTED
IRON SATN MFR SERPL: 12 % (ref 15–50)
IRON SERPL-MCNC: 18 MCG/DL (ref 37–145)
KETONES UR STRIP-MCNC: NEGATIVE MG/DL
LACTATE BLDV-SCNC: 1.6 MMOL/L (ref 0.5–2.2)
LEUKOCYTE ESTERASE UR QL STRIP: ABNORMAL
LYMPHOCYTES # BLD: 1.43 E9/L (ref 1.5–4)
LYMPHOCYTES NFR BLD: 11.3 % (ref 20–42)
MAGNESIUM SERPL-MCNC: 2.2 MG/DL (ref 1.6–2.6)
MCH RBC QN AUTO: 27.4 PG (ref 26–35)
MCHC RBC AUTO-ENTMCNC: 29.4 % (ref 32–34.5)
MCV RBC AUTO: 93.3 FL (ref 80–99.9)
MONOCYTES # BLD: 0.95 E9/L (ref 0.1–0.95)
MONOCYTES NFR BLD: 7.5 % (ref 2–12)
NEUTROPHILS # BLD: 9.98 E9/L (ref 1.8–7.3)
NEUTS SEG NFR BLD: 79.1 % (ref 43–80)
NITRITE UR QL STRIP: POSITIVE
PH UR STRIP: 6.5 [PH] (ref 5–9)
PLATELET # BLD AUTO: 297 E9/L (ref 130–450)
PMV BLD AUTO: 11.1 FL (ref 7–12)
POTASSIUM SERPL-SCNC: 4.2 MMOL/L (ref 3.5–5)
PROT SERPL-MCNC: 7.1 G/DL (ref 6.4–8.3)
PROT UR STRIP-MCNC: 100 MG/DL
RBC # BLD AUTO: 2.7 E12/L (ref 3.5–5.5)
RBC #/AREA URNS HPF: ABNORMAL /HPF (ref 0–2)
SARS-COV-2 RDRP RESP QL NAA+PROBE: NOT DETECTED
SODIUM SERPL-SCNC: 136 MMOL/L (ref 132–146)
SP GR UR STRIP: 1.01 (ref 1–1.03)
TIBC SERPL-MCNC: 152 MCG/DL (ref 250–450)
TROPONIN, HIGH SENSITIVITY: 44 NG/L (ref 0–9)
UROBILINOGEN UR STRIP-ACNC: 4 E.U./DL
WBC # BLD: 12.6 E9/L (ref 4.5–11.5)
WBC #/AREA URNS HPF: ABNORMAL /HPF (ref 0–5)

## 2023-03-30 PROCEDURE — 83550 IRON BINDING TEST: CPT

## 2023-03-30 PROCEDURE — 84484 ASSAY OF TROPONIN QUANT: CPT

## 2023-03-30 PROCEDURE — 83735 ASSAY OF MAGNESIUM: CPT

## 2023-03-30 PROCEDURE — 87088 URINE BACTERIA CULTURE: CPT

## 2023-03-30 PROCEDURE — 2060000000 HC ICU INTERMEDIATE R&B

## 2023-03-30 PROCEDURE — 94640 AIRWAY INHALATION TREATMENT: CPT

## 2023-03-30 PROCEDURE — 71045 X-RAY EXAM CHEST 1 VIEW: CPT

## 2023-03-30 PROCEDURE — 87502 INFLUENZA DNA AMP PROBE: CPT

## 2023-03-30 PROCEDURE — 72131 CT LUMBAR SPINE W/O DYE: CPT

## 2023-03-30 PROCEDURE — 2580000003 HC RX 258: Performed by: NURSE PRACTITIONER

## 2023-03-30 PROCEDURE — 93010 ELECTROCARDIOGRAM REPORT: CPT | Performed by: INTERNAL MEDICINE

## 2023-03-30 PROCEDURE — 83605 ASSAY OF LACTIC ACID: CPT

## 2023-03-30 PROCEDURE — 81001 URINALYSIS AUTO W/SCOPE: CPT

## 2023-03-30 PROCEDURE — 99285 EMERGENCY DEPT VISIT HI MDM: CPT

## 2023-03-30 PROCEDURE — 6360000002 HC RX W HCPCS: Performed by: NURSE PRACTITIONER

## 2023-03-30 PROCEDURE — 83540 ASSAY OF IRON: CPT

## 2023-03-30 PROCEDURE — 6370000000 HC RX 637 (ALT 250 FOR IP): Performed by: NURSE PRACTITIONER

## 2023-03-30 PROCEDURE — 85018 HEMOGLOBIN: CPT

## 2023-03-30 PROCEDURE — 87635 SARS-COV-2 COVID-19 AMP PRB: CPT

## 2023-03-30 PROCEDURE — 93005 ELECTROCARDIOGRAM TRACING: CPT | Performed by: NURSE PRACTITIONER

## 2023-03-30 PROCEDURE — 80053 COMPREHEN METABOLIC PANEL: CPT

## 2023-03-30 PROCEDURE — 36415 COLL VENOUS BLD VENIPUNCTURE: CPT

## 2023-03-30 PROCEDURE — 85025 COMPLETE CBC W/AUTO DIFF WBC: CPT

## 2023-03-30 PROCEDURE — 6360000002 HC RX W HCPCS: Performed by: EMERGENCY MEDICINE

## 2023-03-30 PROCEDURE — 72125 CT NECK SPINE W/O DYE: CPT

## 2023-03-30 PROCEDURE — 74176 CT ABD & PELVIS W/O CONTRAST: CPT

## 2023-03-30 PROCEDURE — 70450 CT HEAD/BRAIN W/O DYE: CPT

## 2023-03-30 PROCEDURE — 85014 HEMATOCRIT: CPT

## 2023-03-30 RX ORDER — LORATADINE AND PSEUDOEPHEDRINE SULFATE 10; 240 MG/1; MG/1
1 TABLET, EXTENDED RELEASE ORAL DAILY
COMMUNITY

## 2023-03-30 RX ORDER — ONDANSETRON 2 MG/ML
4 INJECTION INTRAMUSCULAR; INTRAVENOUS ONCE
Status: DISCONTINUED | OUTPATIENT
Start: 2023-03-30 | End: 2023-04-06 | Stop reason: HOSPADM

## 2023-03-30 RX ORDER — ALBUTEROL SULFATE 0.63 MG/3ML
0.63 SOLUTION RESPIRATORY (INHALATION) 4 TIMES DAILY
Status: DISCONTINUED | OUTPATIENT
Start: 2023-03-30 | End: 2023-04-06 | Stop reason: HOSPADM

## 2023-03-30 RX ORDER — PUMPKIN SEED EXTRACT/SOY GERM 300 MG
1 CAPSULE ORAL 3 TIMES DAILY
COMMUNITY

## 2023-03-30 RX ORDER — LEVOTHYROXINE AND LIOTHYRONINE 38; 9 UG/1; UG/1
60 TABLET ORAL DAILY
COMMUNITY

## 2023-03-30 RX ORDER — PREGABALIN 100 MG/1
100 CAPSULE ORAL EVERY 8 HOURS SCHEDULED
Status: DISCONTINUED | OUTPATIENT
Start: 2023-03-30 | End: 2023-04-06 | Stop reason: HOSPADM

## 2023-03-30 RX ORDER — METOPROLOL TARTRATE 50 MG/1
50 TABLET, FILM COATED ORAL 2 TIMES DAILY
Status: DISCONTINUED | OUTPATIENT
Start: 2023-03-30 | End: 2023-03-31

## 2023-03-30 RX ORDER — ATENOLOL 50 MG/1
25 TABLET ORAL 2 TIMES DAILY
COMMUNITY

## 2023-03-30 RX ORDER — SODIUM CHLORIDE 9 MG/ML
INJECTION, SOLUTION INTRAVENOUS CONTINUOUS
Status: DISCONTINUED | OUTPATIENT
Start: 2023-03-30 | End: 2023-04-02

## 2023-03-30 RX ORDER — ONDANSETRON 4 MG/1
4 TABLET, ORALLY DISINTEGRATING ORAL EVERY 8 HOURS PRN
Status: DISCONTINUED | OUTPATIENT
Start: 2023-03-30 | End: 2023-04-06 | Stop reason: HOSPADM

## 2023-03-30 RX ORDER — FAMOTIDINE 20 MG/1
20 TABLET, FILM COATED ORAL DAILY
Status: DISCONTINUED | OUTPATIENT
Start: 2023-03-30 | End: 2023-04-06 | Stop reason: HOSPADM

## 2023-03-30 RX ORDER — ORPHENADRINE CITRATE 100 MG/1
100 TABLET, EXTENDED RELEASE ORAL 2 TIMES DAILY
COMMUNITY

## 2023-03-30 RX ORDER — FAMOTIDINE 20 MG/1
20 TABLET, FILM COATED ORAL 2 TIMES DAILY
Status: DISCONTINUED | OUTPATIENT
Start: 2023-03-30 | End: 2023-03-30 | Stop reason: DRUGHIGH

## 2023-03-30 RX ORDER — ACETAMINOPHEN 650 MG/1
650 SUPPOSITORY RECTAL EVERY 6 HOURS PRN
Status: DISCONTINUED | OUTPATIENT
Start: 2023-03-30 | End: 2023-04-06 | Stop reason: HOSPADM

## 2023-03-30 RX ORDER — ALBUTEROL SULFATE 90 UG/1
2 AEROSOL, METERED RESPIRATORY (INHALATION) EVERY 6 HOURS PRN
COMMUNITY

## 2023-03-30 RX ORDER — MORPHINE SULFATE 15 MG/1
15 TABLET, FILM COATED, EXTENDED RELEASE ORAL 3 TIMES DAILY
COMMUNITY

## 2023-03-30 RX ORDER — ACETAMINOPHEN 325 MG/1
650 TABLET ORAL EVERY 6 HOURS PRN
Status: DISCONTINUED | OUTPATIENT
Start: 2023-03-30 | End: 2023-04-06 | Stop reason: HOSPADM

## 2023-03-30 RX ORDER — MORPHINE SULFATE 15 MG/1
15 TABLET, FILM COATED, EXTENDED RELEASE ORAL 2 TIMES DAILY PRN
Status: DISCONTINUED | OUTPATIENT
Start: 2023-03-30 | End: 2023-03-31

## 2023-03-30 RX ORDER — PREGABALIN 100 MG/1
100 CAPSULE ORAL 3 TIMES DAILY
Status: ON HOLD | COMMUNITY
End: 2023-04-04 | Stop reason: HOSPADM

## 2023-03-30 RX ORDER — LEVOTHYROXINE SODIUM 0.05 MG/1
50 TABLET ORAL DAILY
Status: DISCONTINUED | OUTPATIENT
Start: 2023-03-31 | End: 2023-04-03

## 2023-03-30 RX ORDER — SODIUM CHLORIDE 0.9 % (FLUSH) 0.9 %
5-40 SYRINGE (ML) INJECTION PRN
Status: DISCONTINUED | OUTPATIENT
Start: 2023-03-30 | End: 2023-04-06 | Stop reason: HOSPADM

## 2023-03-30 RX ORDER — CIPROFLOXACIN 2 MG/ML
400 INJECTION, SOLUTION INTRAVENOUS ONCE
Status: COMPLETED | OUTPATIENT
Start: 2023-03-30 | End: 2023-03-30

## 2023-03-30 RX ORDER — SODIUM CHLORIDE 9 MG/ML
INJECTION, SOLUTION INTRAVENOUS PRN
Status: DISCONTINUED | OUTPATIENT
Start: 2023-03-30 | End: 2023-04-06 | Stop reason: HOSPADM

## 2023-03-30 RX ORDER — ONDANSETRON 2 MG/ML
4 INJECTION INTRAMUSCULAR; INTRAVENOUS EVERY 6 HOURS PRN
Status: DISCONTINUED | OUTPATIENT
Start: 2023-03-30 | End: 2023-04-06 | Stop reason: HOSPADM

## 2023-03-30 RX ORDER — ACETAMINOPHEN 500 MG
1000 TABLET ORAL EVERY 6 HOURS PRN
COMMUNITY

## 2023-03-30 RX ORDER — LEVETIRACETAM 500 MG/1
1000 TABLET ORAL 2 TIMES DAILY
Status: DISCONTINUED | OUTPATIENT
Start: 2023-03-30 | End: 2023-03-31

## 2023-03-30 RX ORDER — SODIUM CHLORIDE 0.9 % (FLUSH) 0.9 %
5-40 SYRINGE (ML) INJECTION EVERY 12 HOURS SCHEDULED
Status: DISCONTINUED | OUTPATIENT
Start: 2023-03-30 | End: 2023-04-06 | Stop reason: HOSPADM

## 2023-03-30 RX ADMIN — Medication 10 ML: at 21:23

## 2023-03-30 RX ADMIN — METOPROLOL TARTRATE 50 MG: 50 TABLET ORAL at 21:20

## 2023-03-30 RX ADMIN — ACETAMINOPHEN 650 MG: 325 TABLET ORAL at 21:21

## 2023-03-30 RX ADMIN — SODIUM CHLORIDE: 9 INJECTION, SOLUTION INTRAVENOUS at 18:29

## 2023-03-30 RX ADMIN — ALBUTEROL SULFATE 0.63 MG: 0.63 SOLUTION RESPIRATORY (INHALATION) at 20:51

## 2023-03-30 RX ADMIN — PREGABALIN 100 MG: 100 CAPSULE ORAL at 21:20

## 2023-03-30 RX ADMIN — CIPROFLOXACIN 400 MG: 2 INJECTION INTRAVENOUS at 12:40

## 2023-03-30 RX ADMIN — LEVETIRACETAM 1000 MG: 500 TABLET, FILM COATED ORAL at 21:21

## 2023-03-30 ASSESSMENT — PAIN DESCRIPTION - PAIN TYPE: TYPE: ACUTE PAIN

## 2023-03-30 ASSESSMENT — PAIN DESCRIPTION - LOCATION
LOCATION: BUTTOCKS
LOCATION: BACK;BUTTOCKS
LOCATION: BACK
LOCATION: BUTTOCKS;BACK

## 2023-03-30 ASSESSMENT — PAIN SCALES - WONG BAKER: WONGBAKER_NUMERICALRESPONSE: 0

## 2023-03-30 ASSESSMENT — PAIN SCALES - GENERAL
PAINLEVEL_OUTOF10: 8
PAINLEVEL_OUTOF10: 6
PAINLEVEL_OUTOF10: 4
PAINLEVEL_OUTOF10: 4
PAINLEVEL_OUTOF10: 5

## 2023-03-30 ASSESSMENT — PAIN DESCRIPTION - ORIENTATION
ORIENTATION: LOWER
ORIENTATION: RIGHT;LEFT;MID

## 2023-03-30 ASSESSMENT — PAIN - FUNCTIONAL ASSESSMENT
PAIN_FUNCTIONAL_ASSESSMENT: ACTIVITIES ARE NOT PREVENTED
PAIN_FUNCTIONAL_ASSESSMENT: 0-10

## 2023-03-30 ASSESSMENT — LIFESTYLE VARIABLES
HOW MANY STANDARD DRINKS CONTAINING ALCOHOL DO YOU HAVE ON A TYPICAL DAY: PATIENT DOES NOT DRINK
HOW OFTEN DO YOU HAVE A DRINK CONTAINING ALCOHOL: NEVER

## 2023-03-30 ASSESSMENT — PAIN DESCRIPTION - ONSET: ONSET: ON-GOING

## 2023-03-30 ASSESSMENT — PAIN DESCRIPTION - FREQUENCY: FREQUENCY: CONTINUOUS

## 2023-03-30 ASSESSMENT — PAIN DESCRIPTION - DESCRIPTORS: DESCRIPTORS: ACHING;DISCOMFORT;SHARP

## 2023-03-30 NOTE — ED PROVIDER NOTES
Patient does not believe she struck her head she does complain of some lower back pain. Patient reporting no active chest pain she did report some shortness of breath the day before she does report some nausea vomiting but the last vomited about a week ago. Patient reporting no black or tarry stools. Patient is awake alert oriented to person and place but not to time. Patient heart exam normal lungs are clear abdomen is soft nontender. Patient does have edema to her lower extremities she is able to move her upper extremities without difficulty she is somewhat weaker in her lower extremities unable to fully raise legs off bed. Patient pulses are intact. Patient differential includes stroke as well as arrhythmia as well as electrolyte imbalance as well as infectious process such as UTI as well as pneumonia. Patient had lab work obtained IV established Hemoccult was done it was negative white count was 12.6 hemoglobin 7.4 COVID and flu were negative troponin was 44. Sodium is 136 potassium was 4.2 creatinine was elevated at 1.8 patient's urine was nitrite and leukocyte esterase positive lactic was 1.6 patient did have CT of the head as well as cervical spine which was negative there is no signs of bleeding or any fracture as well as no fracture noted in her lumbar spine chest x-ray showed no infiltrate. Patient noted to have urinary tract infection she was ordered IV Cipro she has multiple drug allergies. Patient Hemoccult done by me was negative. Patient and family made aware of findings and plan. Patient will be admitted for further evaluation and treatment. Social Determinants affecting Dx or Tx: Patient no longer smokes. Chronic Conditions: History of asthma history of hypertension hyperlipidemia history of migraines history of thyroid disorder    Records Reviewed: Patient was last admitted in May 2019 for seizure-like activity        Re-Evaluations:             Re-evaluation.   Patients

## 2023-03-30 NOTE — H&P
intracranial abnormality. EKG:  I've personally reviewed the patient's EKG:  NSR    Telemetry:  I've personally reviewed the patient's telemetry:      ASSESSMENT/PLAN:  Principal Problem:    Acute kidney injury ([address]  Resolved Problems:    * No resolved hospital problems. *    78-year-old female with a history of hypertension and hyperlipidemia presents to the ED with inability to walk and confusion and is admitted to telemetry unit with    Acute kidney injury/UTI  Monitor labs BUN/creatinine 39/1.8-baseline 12/0.5  WBC 8-12.6  3 fluid hydration  CT abdomen pelvis question pyelonephritis-continue antibiotic therapy  Rocephin 1 g every 24 hours  Await cultures continue antibiotics for discharge  Pain management  Case discussed with  at bedside    Medication for other comorbidities continue as appropriate dose adjustment as necessary. DVT prophylaxis  PT OT  Discharge planning  Case discussed with attending and agreed upon plan of care. Code status: Full  Requires inpatient level of care  OSMANI Navarro CNP    10:08 AM  3/30/2023     Above note edited to reflect my thoughts     I personally saw, examined and provided care for the patient. Radiographs, labs and medication list were reviewed by me independently. The case was discussed in detail and plans for care were established. Review of VIOLETTE Rubalcava   , documentation was conducted and revisions were made as appropriate directly by me. I agree with the above documented exam, problem list, and plan of care.      Yarelis Holbrook MD  5:12 PM  3/30/2023

## 2023-03-30 NOTE — ED NOTES
Pt refused IV Zofran stating she is scared of it despite education. Pt does not want to take a medication she is not familiar with due to fearing it will make her feel ill. Pt education on the drug and benefits given again, but pt still refused. Dr. Tita Kenney notified.       Jami Ibrahim RN  03/30/23 9221

## 2023-03-30 NOTE — ED NOTES
Pt aware of needing a urine sample. Pt states she has been experiencing urinary frequency and some incontinence, but states she still knows when she goes. Pt stated she would alert nurse when she needed to pee to provide urine sample.       Neil Maradiaga RN  03/30/23 9914

## 2023-03-30 NOTE — LETTER
[place] Department Medicaid  CERTIFICATION OF NECESSITY  FOR NON-EMERGENCY TRANSPORTATION   BY GROUND AMBULANCE      Individual Information   1. Name: Joey Barnes 2. [place] Medicaid Billing Number:    3. Address: [address]      Transportation Provider Information   4. Provider Name:    5. [place] Medicaid Provider Number:  National Provider Identifier (NPI):      Certification  7. Criteria:  During transport, this individual requires:  [x] Medical treatment or continuous     supervision by an EMT. [] The administration or regulation of oxygen by another person. [] Supervised protective restraint. 8. Period Beginning Date:    5. Length  [x] Not more than 1 day(s)  [] One Year     Additional Information Relevant to Certification   10. Comments or Explanations, If Necessary or Appropriate     STEFANIA, multiple wounds to buttocks, poor balance sitting, lumbar radiculopathy     Certifying Practitioner Information   11. Name of Practitioner:    12. [place] Medicaid Provider Number, If Applicable:  [address] Provider Identifier (NPI):      Signature Information   14. Date of Signature:  13. Name of Person Signin.  Signature and Professional Designation:      [facility] [id number]  Rev. 2015

## 2023-03-31 LAB
ABO + RH BLD: NORMAL
ALBUMIN SERPL-MCNC: 2.6 G/DL (ref 3.5–5.2)
ANION GAP SERPL CALCULATED.3IONS-SCNC: 14 MMOL/L (ref 7–16)
ANISOCYTOSIS: ABNORMAL
BACTERIA UR CULT: NORMAL
BASOPHILS # BLD: 0.1 E9/L (ref 0–0.2)
BASOPHILS NFR BLD: 0.9 % (ref 0–2)
BLD GP AB SCN SERPL QL: NORMAL
BLOOD BANK DISPENSE STATUS: NORMAL
BLOOD BANK PRODUCT CODE: NORMAL
BLOOD GROUP ANTIBODIES SERPL: NORMAL
BPU ID: NORMAL
BUN SERPL-MCNC: 27 MG/DL (ref 6–23)
BURR CELLS: ABNORMAL
CALCIUM SERPL-MCNC: 8.5 MG/DL (ref 8.6–10.2)
CHLORIDE SERPL-SCNC: 105 MMOL/L (ref 98–107)
CO2 SERPL-SCNC: 21 MMOL/L (ref 22–29)
CREAT SERPL-MCNC: 1.3 MG/DL (ref 0.5–1)
CREAT UR-MCNC: 48 MG/DL (ref 29–226)
DAT POLY-SP REAG RBC QL: NORMAL
DESCRIPTION BLOOD BANK: NORMAL
DR. NOTIFY: NORMAL
EOSINOPHIL # BLD: 0 E9/L (ref 0.05–0.5)
EOSINOPHIL NFR BLD: 0.8 % (ref 0–6)
ERYTHROCYTE [DISTWIDTH] IN BLOOD BY AUTOMATED COUNT: 17.1 FL (ref 11.5–15)
GLUCOSE SERPL-MCNC: 75 MG/DL (ref 74–99)
HCT VFR BLD AUTO: 22.8 % (ref 34–48)
HCT VFR BLD AUTO: 27 % (ref 34–48)
HCT VFR BLD AUTO: 27.9 % (ref 34–48)
HCT VFR BLD AUTO: 28.8 % (ref 34–48)
HGB BLD-MCNC: 6.4 G/DL (ref 11.5–15.5)
HGB BLD-MCNC: 7.7 G/DL (ref 11.5–15.5)
HGB BLD-MCNC: 8.2 G/DL (ref 11.5–15.5)
HGB BLD-MCNC: 8.2 G/DL (ref 11.5–15.5)
HYPOCHROMIA: ABNORMAL
LYMPHOCYTES # BLD: 0.76 E9/L (ref 1.5–4)
LYMPHOCYTES NFR BLD: 6.9 % (ref 20–42)
MCH RBC QN AUTO: 27.3 PG (ref 26–35)
MCHC RBC AUTO-ENTMCNC: 26.7 % (ref 32–34.5)
MCV RBC AUTO: 102.1 FL (ref 80–99.9)
METAMYELOCYTES NFR BLD MANUAL: 0.9 % (ref 0–1)
MONOCYTES # BLD: 0.43 E9/L (ref 0.1–0.95)
MONOCYTES NFR BLD: 4.3 % (ref 2–12)
NEUTROPHILS # BLD: 9.5 E9/L (ref 1.8–7.3)
NEUTS SEG NFR BLD: 87 % (ref 43–80)
OVALOCYTES: ABNORMAL
PHOSPHATE SERPL-MCNC: 2.4 MG/DL (ref 2.5–4.5)
PLATELET # BLD AUTO: 318 E9/L (ref 130–450)
PMV BLD AUTO: 10.3 FL (ref 7–12)
POIKILOCYTES: ABNORMAL
POLYCHROMASIA: ABNORMAL
POTASSIUM SERPL-SCNC: 3.8 MMOL/L (ref 3.5–5)
RBC # BLD AUTO: 2.82 E12/L (ref 3.5–5.5)
SODIUM SERPL-SCNC: 140 MMOL/L (ref 132–146)
SODIUM UR-SCNC: 107 MMOL/L
UREA NITROGEN, UR: 472 MG/DL (ref 800–1666)
WBC # BLD: 10.8 E9/L (ref 4.5–11.5)

## 2023-03-31 PROCEDURE — 2060000000 HC ICU INTERMEDIATE R&B

## 2023-03-31 PROCEDURE — 85025 COMPLETE CBC W/AUTO DIFF WBC: CPT

## 2023-03-31 PROCEDURE — 85014 HEMATOCRIT: CPT

## 2023-03-31 PROCEDURE — 86901 BLOOD TYPING SEROLOGIC RH(D): CPT

## 2023-03-31 PROCEDURE — 6360000002 HC RX W HCPCS: Performed by: NURSE PRACTITIONER

## 2023-03-31 PROCEDURE — 86900 BLOOD TYPING SEROLOGIC ABO: CPT

## 2023-03-31 PROCEDURE — 82570 ASSAY OF URINE CREATININE: CPT

## 2023-03-31 PROCEDURE — 6370000000 HC RX 637 (ALT 250 FOR IP): Performed by: FAMILY MEDICINE

## 2023-03-31 PROCEDURE — 86850 RBC ANTIBODY SCREEN: CPT

## 2023-03-31 PROCEDURE — 94640 AIRWAY INHALATION TREATMENT: CPT

## 2023-03-31 PROCEDURE — 36415 COLL VENOUS BLD VENIPUNCTURE: CPT

## 2023-03-31 PROCEDURE — 86880 COOMBS TEST DIRECT: CPT

## 2023-03-31 PROCEDURE — 84540 ASSAY OF URINE/UREA-N: CPT

## 2023-03-31 PROCEDURE — P9016 RBC LEUKOCYTES REDUCED: HCPCS

## 2023-03-31 PROCEDURE — 85018 HEMOGLOBIN: CPT

## 2023-03-31 PROCEDURE — 86870 RBC ANTIBODY IDENTIFICATION: CPT

## 2023-03-31 PROCEDURE — 86922 COMPATIBILITY TEST ANTIGLOB: CPT

## 2023-03-31 PROCEDURE — 80069 RENAL FUNCTION PANEL: CPT

## 2023-03-31 PROCEDURE — 6370000000 HC RX 637 (ALT 250 FOR IP): Performed by: INTERNAL MEDICINE

## 2023-03-31 PROCEDURE — 6370000000 HC RX 637 (ALT 250 FOR IP): Performed by: NURSE PRACTITIONER

## 2023-03-31 PROCEDURE — 84300 ASSAY OF URINE SODIUM: CPT

## 2023-03-31 PROCEDURE — 2580000003 HC RX 258: Performed by: NURSE PRACTITIONER

## 2023-03-31 PROCEDURE — 36430 TRANSFUSION BLD/BLD COMPNT: CPT

## 2023-03-31 RX ORDER — ATENOLOL 25 MG/1
25 TABLET ORAL 2 TIMES DAILY
Status: DISCONTINUED | OUTPATIENT
Start: 2023-03-31 | End: 2023-04-06 | Stop reason: HOSPADM

## 2023-03-31 RX ORDER — MORPHINE SULFATE 15 MG/1
15 TABLET, FILM COATED, EXTENDED RELEASE ORAL EVERY 12 HOURS SCHEDULED
Status: DISCONTINUED | OUTPATIENT
Start: 2023-04-01 | End: 2023-04-06 | Stop reason: HOSPADM

## 2023-03-31 RX ORDER — SODIUM CHLORIDE 9 MG/ML
INJECTION, SOLUTION INTRAVENOUS PRN
Status: DISCONTINUED | OUTPATIENT
Start: 2023-03-31 | End: 2023-04-06 | Stop reason: HOSPADM

## 2023-03-31 RX ORDER — GUAIFENESIN/DEXTROMETHORPHAN 100-10MG/5
5 SYRUP ORAL EVERY 6 HOURS PRN
Status: DISCONTINUED | OUTPATIENT
Start: 2023-03-31 | End: 2023-04-06 | Stop reason: HOSPADM

## 2023-03-31 RX ADMIN — ALBUTEROL SULFATE 0.63 MG: 0.63 SOLUTION RESPIRATORY (INHALATION) at 11:55

## 2023-03-31 RX ADMIN — ATENOLOL 25 MG: 25 TABLET ORAL at 19:49

## 2023-03-31 RX ADMIN — ALBUTEROL SULFATE 0.63 MG: 0.63 SOLUTION RESPIRATORY (INHALATION) at 08:07

## 2023-03-31 RX ADMIN — METOPROLOL TARTRATE 50 MG: 50 TABLET ORAL at 11:35

## 2023-03-31 RX ADMIN — ALBUTEROL SULFATE 0.63 MG: 0.63 SOLUTION RESPIRATORY (INHALATION) at 15:57

## 2023-03-31 RX ADMIN — CEFTRIAXONE SODIUM 1000 MG: 1 INJECTION, POWDER, FOR SOLUTION INTRAMUSCULAR; INTRAVENOUS at 17:55

## 2023-03-31 RX ADMIN — FAMOTIDINE 20 MG: 20 TABLET ORAL at 11:35

## 2023-03-31 RX ADMIN — PETROLATUM: 420 OINTMENT TOPICAL at 19:51

## 2023-03-31 RX ADMIN — ALBUTEROL SULFATE 0.63 MG: 0.63 SOLUTION RESPIRATORY (INHALATION) at 19:02

## 2023-03-31 RX ADMIN — PREGABALIN 100 MG: 100 CAPSULE ORAL at 06:18

## 2023-03-31 RX ADMIN — SODIUM CHLORIDE: 9 INJECTION, SOLUTION INTRAVENOUS at 23:24

## 2023-03-31 RX ADMIN — PREGABALIN 100 MG: 100 CAPSULE ORAL at 21:33

## 2023-03-31 RX ADMIN — MORPHINE SULFATE 15 MG: 15 TABLET, FILM COATED, EXTENDED RELEASE ORAL at 14:57

## 2023-03-31 RX ADMIN — PREGABALIN 100 MG: 100 CAPSULE ORAL at 14:48

## 2023-03-31 RX ADMIN — LEVOTHYROXINE SODIUM 50 MCG: 0.05 TABLET ORAL at 05:57

## 2023-03-31 RX ADMIN — Medication 5 ML: at 10:03

## 2023-03-31 RX ADMIN — LEVETIRACETAM 1000 MG: 500 TABLET, FILM COATED ORAL at 11:35

## 2023-03-31 ASSESSMENT — PAIN SCALES - GENERAL
PAINLEVEL_OUTOF10: 8
PAINLEVEL_OUTOF10: 7
PAINLEVEL_OUTOF10: 7
PAINLEVEL_OUTOF10: 0

## 2023-03-31 ASSESSMENT — PAIN DESCRIPTION - FREQUENCY: FREQUENCY: CONTINUOUS

## 2023-03-31 ASSESSMENT — PAIN DESCRIPTION - DESCRIPTORS: DESCRIPTORS: ACHING;DISCOMFORT

## 2023-03-31 ASSESSMENT — PAIN DESCRIPTION - LOCATION: LOCATION: BUTTOCKS;BACK

## 2023-03-31 NOTE — ACP (ADVANCE CARE PLANNING)
Advance Care Planning   Healthcare Decision Maker:    Primary Decision Maker: Eliu Keys - Spouse - [phone]    Secondary Decision Maker: Naman Fernandez - Child - [phone]    Click here to complete Healthcare Decision Makers including selection of the Healthcare Decision Maker Relationship (ie \"Primary\").

## 2023-03-31 NOTE — CONSULTS
HISTORY: jerry, r/o pyelo TECHNOLOGIST PROVIDED HISTORY: Reason for exam:->jerry, r/o pyelo Additional Contrast?->None What reading provider will be dictating this exam?->CRC FINDINGS: Lower Chest: Right basilar mild atelectasis or scarring and improved aeration of the right lung base compared to prior exam.  Moderate hiatal hernia, unchanged. No pleural effusion or pericardial effusion. Cholecystectomy with surgical clips within the gallbladder fossa. The no biliary dilatation. Limited nonfusion exam.  Mild enlargement of the liver and additional mild splenomegaly. Splenic volume is increased from prior exam.  Normal spleen. Both kidneys are normal in position. The right kidney is larger than the left measuring 13.3 cm in length as compared to 9.7 cm on the left. No renal or ureteral calculi and no hydronephrosis or hydroureter. Right perinephric minor soft tissue stranding together with subtle parapelvic soft tissue edema. No abscess or focal fluid collection. The adrenal glands are not enlarged. The abdominal aorta is normal in caliber. No ascites or retroperitoneal lymph node enlargement. GI tract: No obstruction. Normal appendix. Pelvis: Anteverted uterus which is normal in size. The urinary bladder is unremarkable and without stones or obvious debris. No free fluid or lymph node enlargement. Bones: No acute osseous abnormality. L5 laminectomy. Dorsal column paired neurostimulator electrodes and with the metallic generator at the superficial left lower quadrant abdomen. 1.  The right kidney is larger than the left and shows subtle parapelvic soft tissue edema. As correlated with the history, potentially acute pyelonephritis on the right but limited nonfusion exam.  CT determination of pyelonephritis is primarily made by post infusion exam. 2.  No hydronephrosis or urolithiasis. No abscess or free fluid. 3.  Mild hepatosplenomegaly and additional nonacute findings, as above.      CT HEAD WO

## 2023-03-31 NOTE — PLAN OF CARE
Problem: Discharge Planning  Goal: Discharge to home or other facility with appropriate resources  Outcome: Progressing  Flowsheets  Taken 3/30/2023 2000 by Naman Reed RN  Discharge to home or other facility with appropriate resources: Identify barriers to discharge with patient and caregiver  Taken 3/30/2023 1826 by Luli Celeste RN  Discharge to home or other facility with appropriate resources: Identify barriers to discharge with patient and caregiver     Problem: Pain  Goal: Verbalizes/displays adequate comfort level or baseline comfort level  Outcome: Progressing     Problem: Safety - Adult  Goal: Free from fall injury  Outcome: Progressing  Flowsheets (Taken 3/31/2023 0012)  Free From Fall Injury: Instruct family/caregiver on patient safety     Problem: ABCDS Injury Assessment  Goal: Absence of physical injury  Outcome: Progressing  Flowsheets (Taken 3/31/2023 0012)  Absence of Physical Injury: Implement safety measures based on patient assessment     Problem: Skin/Tissue Integrity  Goal: Absence of new skin breakdown  Description: 1. Monitor for areas of redness and/or skin breakdown  2. Assess vascular access sites hourly  3. Every 4-6 hours minimum:  Change oxygen saturation probe site  4. Every 4-6 hours:  If on nasal continuous positive airway pressure, respiratory therapy assess nares and determine need for appliance change or resting period.   Outcome: Progressing

## 2023-04-01 LAB
ALBUMIN SERPL-MCNC: 2.6 G/DL (ref 3.5–5.2)
ANION GAP SERPL CALCULATED.3IONS-SCNC: 14 MMOL/L (ref 7–16)
BASOPHILS # BLD: 0.03 E9/L (ref 0–0.2)
BASOPHILS NFR BLD: 0.3 % (ref 0–2)
BUN SERPL-MCNC: 19 MG/DL (ref 6–23)
CALCIUM SERPL-MCNC: 8.3 MG/DL (ref 8.6–10.2)
CHLORIDE SERPL-SCNC: 106 MMOL/L (ref 98–107)
CO2 SERPL-SCNC: 22 MMOL/L (ref 22–29)
CREAT SERPL-MCNC: 1.2 MG/DL (ref 0.5–1)
EOSINOPHIL # BLD: 0.11 E9/L (ref 0.05–0.5)
EOSINOPHIL NFR BLD: 1.3 % (ref 0–6)
ERYTHROCYTE [DISTWIDTH] IN BLOOD BY AUTOMATED COUNT: 16.6 FL (ref 11.5–15)
GLUCOSE SERPL-MCNC: 82 MG/DL (ref 74–99)
HCT VFR BLD AUTO: 27.2 % (ref 34–48)
HCT VFR BLD AUTO: 30.4 % (ref 34–48)
HCT VFR BLD AUTO: 30.7 % (ref 34–48)
HGB BLD-MCNC: 7.8 G/DL (ref 11.5–15.5)
HGB BLD-MCNC: 8.6 G/DL (ref 11.5–15.5)
HGB BLD-MCNC: 8.6 G/DL (ref 11.5–15.5)
HYPOCHROMIA: ABNORMAL
IMM GRANULOCYTES # BLD: 0.14 E9/L
IMM GRANULOCYTES NFR BLD: 1.6 % (ref 0–5)
LYMPHOCYTES # BLD: 1.18 E9/L (ref 1.5–4)
LYMPHOCYTES NFR BLD: 13.4 % (ref 20–42)
MCH RBC QN AUTO: 27.2 PG (ref 26–35)
MCHC RBC AUTO-ENTMCNC: 28.7 % (ref 32–34.5)
MCV RBC AUTO: 94.8 FL (ref 80–99.9)
MONOCYTES # BLD: 0.67 E9/L (ref 0.1–0.95)
MONOCYTES NFR BLD: 7.6 % (ref 2–12)
NEUTROPHILS # BLD: 6.67 E9/L (ref 1.8–7.3)
NEUTS SEG NFR BLD: 75.8 % (ref 43–80)
OVALOCYTES: ABNORMAL
PHOSPHATE SERPL-MCNC: 2.2 MG/DL (ref 2.5–4.5)
PLATELET # BLD AUTO: 331 E9/L (ref 130–450)
PMV BLD AUTO: 10.2 FL (ref 7–12)
POIKILOCYTES: ABNORMAL
POLYCHROMASIA: ABNORMAL
POTASSIUM SERPL-SCNC: 3.3 MMOL/L (ref 3.5–5)
RBC # BLD AUTO: 2.87 E12/L (ref 3.5–5.5)
SODIUM SERPL-SCNC: 142 MMOL/L (ref 132–146)
WBC # BLD: 8.8 E9/L (ref 4.5–11.5)

## 2023-04-01 PROCEDURE — 6360000002 HC RX W HCPCS: Performed by: NURSE PRACTITIONER

## 2023-04-01 PROCEDURE — 85018 HEMOGLOBIN: CPT

## 2023-04-01 PROCEDURE — 6370000000 HC RX 637 (ALT 250 FOR IP): Performed by: NURSE PRACTITIONER

## 2023-04-01 PROCEDURE — 85014 HEMATOCRIT: CPT

## 2023-04-01 PROCEDURE — 2580000003 HC RX 258: Performed by: NURSE PRACTITIONER

## 2023-04-01 PROCEDURE — 85025 COMPLETE CBC W/AUTO DIFF WBC: CPT

## 2023-04-01 PROCEDURE — 80069 RENAL FUNCTION PANEL: CPT

## 2023-04-01 PROCEDURE — 2060000000 HC ICU INTERMEDIATE R&B

## 2023-04-01 PROCEDURE — 94640 AIRWAY INHALATION TREATMENT: CPT

## 2023-04-01 PROCEDURE — 36415 COLL VENOUS BLD VENIPUNCTURE: CPT

## 2023-04-01 PROCEDURE — 6370000000 HC RX 637 (ALT 250 FOR IP): Performed by: INTERNAL MEDICINE

## 2023-04-01 RX ADMIN — PREGABALIN 100 MG: 100 CAPSULE ORAL at 14:54

## 2023-04-01 RX ADMIN — MORPHINE SULFATE 15 MG: 15 TABLET, FILM COATED, EXTENDED RELEASE ORAL at 17:43

## 2023-04-01 RX ADMIN — ATENOLOL 25 MG: 25 TABLET ORAL at 21:16

## 2023-04-01 RX ADMIN — MORPHINE SULFATE 15 MG: 15 TABLET, FILM COATED, EXTENDED RELEASE ORAL at 06:15

## 2023-04-01 RX ADMIN — ALBUTEROL SULFATE 0.63 MG: 0.63 SOLUTION RESPIRATORY (INHALATION) at 16:22

## 2023-04-01 RX ADMIN — ALBUTEROL SULFATE 0.63 MG: 0.63 SOLUTION RESPIRATORY (INHALATION) at 07:44

## 2023-04-01 RX ADMIN — LEVOTHYROXINE SODIUM 50 MCG: 0.05 TABLET ORAL at 06:15

## 2023-04-01 RX ADMIN — ALBUTEROL SULFATE 0.63 MG: 0.63 SOLUTION RESPIRATORY (INHALATION) at 11:39

## 2023-04-01 RX ADMIN — ATENOLOL 25 MG: 25 TABLET ORAL at 08:16

## 2023-04-01 RX ADMIN — PETROLATUM: 420 OINTMENT TOPICAL at 21:17

## 2023-04-01 RX ADMIN — CEFTRIAXONE SODIUM 1000 MG: 1 INJECTION, POWDER, FOR SOLUTION INTRAMUSCULAR; INTRAVENOUS at 17:44

## 2023-04-01 RX ADMIN — PREGABALIN 100 MG: 100 CAPSULE ORAL at 21:16

## 2023-04-01 RX ADMIN — PREGABALIN 100 MG: 100 CAPSULE ORAL at 08:17

## 2023-04-01 RX ADMIN — ALBUTEROL SULFATE 0.63 MG: 0.63 SOLUTION RESPIRATORY (INHALATION) at 21:16

## 2023-04-01 RX ADMIN — PETROLATUM: 420 OINTMENT TOPICAL at 08:17

## 2023-04-01 RX ADMIN — FAMOTIDINE 20 MG: 20 TABLET ORAL at 08:17

## 2023-04-01 ASSESSMENT — PAIN SCALES - GENERAL
PAINLEVEL_OUTOF10: 0
PAINLEVEL_OUTOF10: 0
PAINLEVEL_OUTOF10: 6

## 2023-04-02 LAB
ALBUMIN SERPL-MCNC: 2.6 G/DL (ref 3.5–5.2)
ANION GAP SERPL CALCULATED.3IONS-SCNC: 11 MMOL/L (ref 7–16)
BASOPHILS # BLD: 0.04 E9/L (ref 0–0.2)
BASOPHILS NFR BLD: 0.5 % (ref 0–2)
BUN SERPL-MCNC: 13 MG/DL (ref 6–23)
CALCIUM SERPL-MCNC: 8.4 MG/DL (ref 8.6–10.2)
CHLORIDE SERPL-SCNC: 105 MMOL/L (ref 98–107)
CO2 SERPL-SCNC: 22 MMOL/L (ref 22–29)
CREAT SERPL-MCNC: 1.1 MG/DL (ref 0.5–1)
EOSINOPHIL # BLD: 0.1 E9/L (ref 0.05–0.5)
EOSINOPHIL NFR BLD: 1.2 % (ref 0–6)
ERYTHROCYTE [DISTWIDTH] IN BLOOD BY AUTOMATED COUNT: 16.5 FL (ref 11.5–15)
GLUCOSE SERPL-MCNC: 85 MG/DL (ref 74–99)
HCT VFR BLD AUTO: 31.2 % (ref 34–48)
HCT VFR BLD AUTO: 35.1 % (ref 34–48)
HGB BLD-MCNC: 10.3 G/DL (ref 11.5–15.5)
HGB BLD-MCNC: 9.1 G/DL (ref 11.5–15.5)
IMM GRANULOCYTES # BLD: 0.17 E9/L
IMM GRANULOCYTES NFR BLD: 2 % (ref 0–5)
LYMPHOCYTES # BLD: 1.38 E9/L (ref 1.5–4)
LYMPHOCYTES NFR BLD: 16.6 % (ref 20–42)
MCH RBC QN AUTO: 27.9 PG (ref 26–35)
MCHC RBC AUTO-ENTMCNC: 29.2 % (ref 32–34.5)
MCV RBC AUTO: 95.7 FL (ref 80–99.9)
MONOCYTES # BLD: 0.62 E9/L (ref 0.1–0.95)
MONOCYTES NFR BLD: 7.5 % (ref 2–12)
NEUTROPHILS # BLD: 6.01 E9/L (ref 1.8–7.3)
NEUTS SEG NFR BLD: 72.2 % (ref 43–80)
PHOSPHATE SERPL-MCNC: 2.5 MG/DL (ref 2.5–4.5)
PLATELET # BLD AUTO: 365 E9/L (ref 130–450)
PMV BLD AUTO: 10.2 FL (ref 7–12)
POTASSIUM SERPL-SCNC: 3.4 MMOL/L (ref 3.5–5)
RBC # BLD AUTO: 3.26 E12/L (ref 3.5–5.5)
SODIUM SERPL-SCNC: 138 MMOL/L (ref 132–146)
WBC # BLD: 8.3 E9/L (ref 4.5–11.5)

## 2023-04-02 PROCEDURE — 85025 COMPLETE CBC W/AUTO DIFF WBC: CPT

## 2023-04-02 PROCEDURE — 2060000000 HC ICU INTERMEDIATE R&B

## 2023-04-02 PROCEDURE — 36415 COLL VENOUS BLD VENIPUNCTURE: CPT

## 2023-04-02 PROCEDURE — 85014 HEMATOCRIT: CPT

## 2023-04-02 PROCEDURE — 97165 OT EVAL LOW COMPLEX 30 MIN: CPT

## 2023-04-02 PROCEDURE — 97530 THERAPEUTIC ACTIVITIES: CPT

## 2023-04-02 PROCEDURE — 6370000000 HC RX 637 (ALT 250 FOR IP): Performed by: NURSE PRACTITIONER

## 2023-04-02 PROCEDURE — 85018 HEMOGLOBIN: CPT

## 2023-04-02 PROCEDURE — 6360000002 HC RX W HCPCS: Performed by: NURSE PRACTITIONER

## 2023-04-02 PROCEDURE — 6370000000 HC RX 637 (ALT 250 FOR IP): Performed by: INTERNAL MEDICINE

## 2023-04-02 PROCEDURE — 2580000003 HC RX 258: Performed by: NURSE PRACTITIONER

## 2023-04-02 PROCEDURE — 97535 SELF CARE MNGMENT TRAINING: CPT

## 2023-04-02 PROCEDURE — 94640 AIRWAY INHALATION TREATMENT: CPT

## 2023-04-02 PROCEDURE — 80069 RENAL FUNCTION PANEL: CPT

## 2023-04-02 RX ORDER — 0.9 % SODIUM CHLORIDE 0.9 %
500 INTRAVENOUS SOLUTION INTRAVENOUS ONCE
Status: COMPLETED | OUTPATIENT
Start: 2023-04-02 | End: 2023-04-02

## 2023-04-02 RX ORDER — POTASSIUM CHLORIDE 20 MEQ/1
40 TABLET, EXTENDED RELEASE ORAL ONCE
Status: DISCONTINUED | OUTPATIENT
Start: 2023-04-02 | End: 2023-04-06 | Stop reason: HOSPADM

## 2023-04-02 RX ADMIN — CEFTRIAXONE SODIUM 1000 MG: 1 INJECTION, POWDER, FOR SOLUTION INTRAMUSCULAR; INTRAVENOUS at 17:18

## 2023-04-02 RX ADMIN — ATENOLOL 25 MG: 25 TABLET ORAL at 23:05

## 2023-04-02 RX ADMIN — Medication 10 ML: at 20:55

## 2023-04-02 RX ADMIN — PREGABALIN 100 MG: 100 CAPSULE ORAL at 13:46

## 2023-04-02 RX ADMIN — ALBUTEROL SULFATE 0.63 MG: 0.63 SOLUTION RESPIRATORY (INHALATION) at 18:32

## 2023-04-02 RX ADMIN — MORPHINE SULFATE 15 MG: 15 TABLET, FILM COATED, EXTENDED RELEASE ORAL at 05:54

## 2023-04-02 RX ADMIN — MORPHINE SULFATE 15 MG: 15 TABLET, FILM COATED, EXTENDED RELEASE ORAL at 17:18

## 2023-04-02 RX ADMIN — ALBUTEROL SULFATE 0.63 MG: 0.63 SOLUTION RESPIRATORY (INHALATION) at 11:35

## 2023-04-02 RX ADMIN — LEVOTHYROXINE SODIUM 50 MCG: 0.05 TABLET ORAL at 06:02

## 2023-04-02 RX ADMIN — Medication 10 ML: at 08:17

## 2023-04-02 RX ADMIN — PREGABALIN 100 MG: 100 CAPSULE ORAL at 20:55

## 2023-04-02 RX ADMIN — PREGABALIN 100 MG: 100 CAPSULE ORAL at 05:55

## 2023-04-02 RX ADMIN — PETROLATUM: 420 OINTMENT TOPICAL at 08:18

## 2023-04-02 RX ADMIN — ATENOLOL 25 MG: 25 TABLET ORAL at 08:16

## 2023-04-02 RX ADMIN — PETROLATUM: 420 OINTMENT TOPICAL at 20:58

## 2023-04-02 RX ADMIN — SODIUM CHLORIDE 500 ML: 9 INJECTION, SOLUTION INTRAVENOUS at 20:57

## 2023-04-02 RX ADMIN — ALBUTEROL SULFATE 0.63 MG: 0.63 SOLUTION RESPIRATORY (INHALATION) at 07:41

## 2023-04-02 ASSESSMENT — PAIN SCALES - GENERAL
PAINLEVEL_OUTOF10: 3
PAINLEVEL_OUTOF10: 8
PAINLEVEL_OUTOF10: 7

## 2023-04-02 ASSESSMENT — PAIN DESCRIPTION - DESCRIPTORS: DESCRIPTORS: ACHING;DISCOMFORT

## 2023-04-02 ASSESSMENT — PAIN DESCRIPTION - LOCATION: LOCATION: GENERALIZED

## 2023-04-02 NOTE — PLAN OF CARE
Problem: ABCDS Injury Assessment  Goal: Absence of physical injury  Outcome: Progressing     Problem: Skin/Tissue Integrity  Goal: Absence of new skin breakdown  Description: 1. Monitor for areas of redness and/or skin breakdown  2. Assess vascular access sites hourly  3. Every 4-6 hours minimum:  Change oxygen saturation probe site  4. Every 4-6 hours:  If on nasal continuous positive airway pressure, respiratory therapy assess nares and determine need for appliance change or resting period.   Outcome: Progressing     Problem: Nutrition Deficit:  Goal: Optimize nutritional status  Outcome: Progressing

## 2023-04-03 PROCEDURE — 6370000000 HC RX 637 (ALT 250 FOR IP): Performed by: NURSE PRACTITIONER

## 2023-04-03 PROCEDURE — 6360000002 HC RX W HCPCS: Performed by: NURSE PRACTITIONER

## 2023-04-03 PROCEDURE — 2580000003 HC RX 258: Performed by: NURSE PRACTITIONER

## 2023-04-03 PROCEDURE — 94640 AIRWAY INHALATION TREATMENT: CPT

## 2023-04-03 PROCEDURE — 97530 THERAPEUTIC ACTIVITIES: CPT

## 2023-04-03 PROCEDURE — 97161 PT EVAL LOW COMPLEX 20 MIN: CPT

## 2023-04-03 PROCEDURE — 2060000000 HC ICU INTERMEDIATE R&B

## 2023-04-03 RX ORDER — LEVOTHYROXINE AND LIOTHYRONINE 38; 9 UG/1; UG/1
60 TABLET ORAL DAILY
Status: DISCONTINUED | OUTPATIENT
Start: 2023-04-03 | End: 2023-04-06 | Stop reason: HOSPADM

## 2023-04-03 RX ADMIN — PREGABALIN 100 MG: 100 CAPSULE ORAL at 06:21

## 2023-04-03 RX ADMIN — MORPHINE SULFATE 15 MG: 15 TABLET, FILM COATED, EXTENDED RELEASE ORAL at 17:52

## 2023-04-03 RX ADMIN — PREGABALIN 100 MG: 100 CAPSULE ORAL at 13:46

## 2023-04-03 RX ADMIN — CEFTRIAXONE SODIUM 1000 MG: 1 INJECTION, POWDER, FOR SOLUTION INTRAMUSCULAR; INTRAVENOUS at 17:49

## 2023-04-03 RX ADMIN — LEVOTHYROXINE AND LIOTHYRONINE 60 MG: 38; 9 TABLET ORAL at 08:57

## 2023-04-03 RX ADMIN — PREGABALIN 100 MG: 100 CAPSULE ORAL at 20:28

## 2023-04-03 RX ADMIN — ATENOLOL 25 MG: 25 TABLET ORAL at 07:57

## 2023-04-03 RX ADMIN — PETROLATUM: 420 OINTMENT TOPICAL at 20:28

## 2023-04-03 RX ADMIN — LEVOTHYROXINE SODIUM 50 MCG: 0.05 TABLET ORAL at 06:21

## 2023-04-03 RX ADMIN — PETROLATUM: 420 OINTMENT TOPICAL at 07:57

## 2023-04-03 RX ADMIN — Medication 10 ML: at 07:58

## 2023-04-03 RX ADMIN — MORPHINE SULFATE 15 MG: 15 TABLET, FILM COATED, EXTENDED RELEASE ORAL at 06:21

## 2023-04-03 RX ADMIN — ALBUTEROL SULFATE 0.63 MG: 0.63 SOLUTION RESPIRATORY (INHALATION) at 13:09

## 2023-04-03 RX ADMIN — Medication 10 ML: at 20:29

## 2023-04-03 ASSESSMENT — PAIN DESCRIPTION - LOCATION: LOCATION: GENERALIZED

## 2023-04-03 ASSESSMENT — PAIN SCALES - GENERAL: PAINLEVEL_OUTOF10: 7

## 2023-04-03 ASSESSMENT — PAIN DESCRIPTION - DESCRIPTORS: DESCRIPTORS: ACHING

## 2023-04-04 PROCEDURE — 6370000000 HC RX 637 (ALT 250 FOR IP): Performed by: NURSE PRACTITIONER

## 2023-04-04 PROCEDURE — 94640 AIRWAY INHALATION TREATMENT: CPT

## 2023-04-04 PROCEDURE — 2060000000 HC ICU INTERMEDIATE R&B

## 2023-04-04 PROCEDURE — 2580000003 HC RX 258: Performed by: NURSE PRACTITIONER

## 2023-04-04 PROCEDURE — 6360000002 HC RX W HCPCS: Performed by: NURSE PRACTITIONER

## 2023-04-04 RX ORDER — CEFDINIR 300 MG/1
300 CAPSULE ORAL 2 TIMES DAILY
Qty: 10 CAPSULE | Refills: 0 | Status: SHIPPED | OUTPATIENT
Start: 2023-04-04 | End: 2023-04-09

## 2023-04-04 RX ORDER — MORPHINE SULFATE 15 MG/1
15 TABLET, FILM COATED, EXTENDED RELEASE ORAL EVERY 12 HOURS SCHEDULED
Qty: 6 TABLET | Refills: 0 | Status: SHIPPED | OUTPATIENT
Start: 2023-04-04 | End: 2023-04-07

## 2023-04-04 RX ORDER — FAMOTIDINE 20 MG/1
20 TABLET, FILM COATED ORAL DAILY
Qty: 60 TABLET | Refills: 3 | Status: SHIPPED | OUTPATIENT
Start: 2023-04-05

## 2023-04-04 RX ORDER — PREGABALIN 100 MG/1
100 CAPSULE ORAL EVERY 8 HOURS SCHEDULED
Qty: 9 CAPSULE | Refills: 0 | Status: SHIPPED | OUTPATIENT
Start: 2023-04-04 | End: 2023-04-07

## 2023-04-04 RX ADMIN — ALBUTEROL SULFATE 0.63 MG: 0.63 SOLUTION RESPIRATORY (INHALATION) at 20:17

## 2023-04-04 RX ADMIN — PETROLATUM: 420 OINTMENT TOPICAL at 20:34

## 2023-04-04 RX ADMIN — MORPHINE SULFATE 15 MG: 15 TABLET, FILM COATED, EXTENDED RELEASE ORAL at 18:16

## 2023-04-04 RX ADMIN — ALBUTEROL SULFATE 0.63 MG: 0.63 SOLUTION RESPIRATORY (INHALATION) at 08:48

## 2023-04-04 RX ADMIN — MORPHINE SULFATE 15 MG: 15 TABLET, FILM COATED, EXTENDED RELEASE ORAL at 05:21

## 2023-04-04 RX ADMIN — ATENOLOL 25 MG: 25 TABLET ORAL at 09:48

## 2023-04-04 RX ADMIN — Medication 10 ML: at 20:34

## 2023-04-04 RX ADMIN — PETROLATUM: 420 OINTMENT TOPICAL at 09:49

## 2023-04-04 RX ADMIN — PREGABALIN 100 MG: 100 CAPSULE ORAL at 14:03

## 2023-04-04 RX ADMIN — ALBUTEROL SULFATE 0.63 MG: 0.63 SOLUTION RESPIRATORY (INHALATION) at 12:23

## 2023-04-04 RX ADMIN — ATENOLOL 25 MG: 25 TABLET ORAL at 20:33

## 2023-04-04 RX ADMIN — LEVOTHYROXINE AND LIOTHYRONINE 60 MG: 38; 9 TABLET ORAL at 09:49

## 2023-04-04 RX ADMIN — PREGABALIN 100 MG: 100 CAPSULE ORAL at 20:33

## 2023-04-04 RX ADMIN — PREGABALIN 100 MG: 100 CAPSULE ORAL at 05:21

## 2023-04-04 RX ADMIN — Medication 10 ML: at 09:49

## 2023-04-04 ASSESSMENT — PAIN DESCRIPTION - LOCATION: LOCATION: GENERALIZED

## 2023-04-04 ASSESSMENT — PAIN SCALES - GENERAL
PAINLEVEL_OUTOF10: 0
PAINLEVEL_OUTOF10: 4
PAINLEVEL_OUTOF10: 0
PAINLEVEL_OUTOF10: 8
PAINLEVEL_OUTOF10: 0

## 2023-04-04 ASSESSMENT — PAIN DESCRIPTION - DESCRIPTORS: DESCRIPTORS: ACHING

## 2023-04-04 NOTE — FLOWSHEET NOTE
Inpatient Wound Care(initial evaluation) 7402a    Admit Date: 3/30/2023  3:22 AM    Reason for consult:  buttocks    Significant history:  per H & P  Chief Complaint   Patient presents with    Fatigue       (Body wide weakness, unable to ambulate, altered mental status, progressively getting worse last 6 months)    presents with: Acute kidney injury [facility])  Past medical history of arthritis,, irregular patient presents to the ED with complaints of inability to walk a few days ago. Patient reported becoming weaker at home and yesterday was able to limp. Patient does admit to falling a few days ago without any injury. Patient also complains of right flank pain. Patient is confused family member at bedside to aid with H&P. Family member states this has been ongoing for the past few days.      Wound history:  admitted with wounds from home    Findings:    04/04/23 1620   Skin Integumentary    Skin Integrity Erosion/denuded  (old healed areas)   Location left buttocks   Skin Integrity Site 2   Skin Integrity Location 2 Ecchymosis   Location 2 BUE   Skin Integrity Site 3   Skin Integrity Location 3   (old healed, dry scaly skin)    Location 3 bilateral gluteal folds, posterior thighs- left greater than right   Skin Integrity Site 4   Skin Integrity Location 4   (chronic discoloration, dry flaky)   Location 4 buttocks   Wound 04/04/23 Buttocks Right   Date First Assessed/Time First Assessed: 04/04/23 1620   Present on Hospital Admission: Yes  Location: Buttocks  Wound Location Orientation: Right   Wound Image    Wound Etiology Pressure Stage 3   Dressing/Treatment Pharmaceutical agent (see MAR)   Wound Length (cm) 2.1 cm   Wound Width (cm) 1.4 cm   Wound Depth (cm) 0.1 cm   Wound Surface Area (cm^2) 2.94 cm^2   Wound Volume (cm^3) 0.294 cm^3   Wound Assessment Pink/red  (yellow)   Drainage Amount None   Rea-wound Assessment   (chronic discoloration)   Wound 04/04/23 Buttocks Left;Distal   Date First Assessed/Time First

## 2023-04-04 NOTE — DISCHARGE INSTR - COC
Isolation/Infection:   Isolation            Contact          Patient Infection Status       Infection Onset Added Last Indicated Last Indicated By Review Planned Expiration Resolved Resolved By    VRE 05/13/19 05/17/19 05/13/19 TIP CULTURE        Resolved    COVID-19 (Rule Out) 03/30/23 03/30/23 03/30/23 COVID-19, Rapid (Ordered)   03/30/23 Rule-Out Test Resulted            Nurse Assessment:  Last Vital Signs: /63   Pulse 74   Temp 97 °F (36.1 °C) (Temporal)   Resp 16   Ht 5' 5\" (1.651 m)   Wt 200 lb (90.7 kg)   SpO2 92%   BMI 33.28 kg/m²     Last documented pain score (0-10 scale): Pain Level: 7  Last Weight:   Wt Readings from Last 1 Encounters:   03/30/23 200 lb (90.7 kg)     Mental Status:  oriented and alert    IV Access:  - None    Nursing Mobility/ADLs:  Walking   Assisted  Transfer  Assisted  Bathing  Assisted  Dressing  Assisted  Toileting  Assisted  Feeding  Independent  Med Admin  Assisted  Med Delivery   whole    Wound Care Documentation and Therapy:  Incision 05/09/16 Back Anterior;Posterior (Active)   Number of days: 6042       Wound 05/07/19 Buttocks Left (Active)   Number of days: 1427       Wound 05/07/19 Buttocks Right;Mid; Inner purple, non blanchable, surrounded by reddened non blanchable area (Active)   Number of days: 1427       Wound 03/31/23 Buttocks Left purple maroon (Active)   Dressing/Treatment Open to air 04/03/23 0758   Wound Assessment Purple/maroon 03/31/23 1653   Number of days: 3        Elimination:  Continence: Bowel: Yes  Bladder: Yes  Urinary Catheter: None   Colostomy/Ileostomy/Ileal Conduit: No       Date of Last BM: 4/4/2023    Intake/Output Summary (Last 24 hours) at 4/4/2023 0718  Last data filed at 4/4/2023 0513  Gross per 24 hour   Intake 240 ml   Output 1550 ml   Net -1310 ml     I/O last 3 completed shifts: In: 440 [P.O.:440]  Out: 3950 [Urine:3950]    Safety Concerns:      At Risk for Falls    Impairments/Disabilities:      None    Nutrition

## 2023-04-05 VITALS
HEART RATE: 82 BPM | OXYGEN SATURATION: 96 % | WEIGHT: 200 LBS | HEIGHT: 65 IN | TEMPERATURE: 97.6 F | SYSTOLIC BLOOD PRESSURE: 112 MMHG | RESPIRATION RATE: 16 BRPM | DIASTOLIC BLOOD PRESSURE: 81 MMHG | BODY MASS INDEX: 33.32 KG/M2

## 2023-04-05 LAB
ANION GAP SERPL CALCULATED.3IONS-SCNC: 11 MMOL/L (ref 7–16)
BUN SERPL-MCNC: 14 MG/DL (ref 6–23)
CALCIUM SERPL-MCNC: 8.6 MG/DL (ref 8.6–10.2)
CHLORIDE SERPL-SCNC: 99 MMOL/L (ref 98–107)
CO2 SERPL-SCNC: 25 MMOL/L (ref 22–29)
CREAT SERPL-MCNC: 1.1 MG/DL (ref 0.5–1)
GLUCOSE SERPL-MCNC: 86 MG/DL (ref 74–99)
POTASSIUM SERPL-SCNC: 3.3 MMOL/L (ref 3.5–5)
SODIUM SERPL-SCNC: 135 MMOL/L (ref 132–146)

## 2023-04-05 PROCEDURE — 6370000000 HC RX 637 (ALT 250 FOR IP): Performed by: NURSE PRACTITIONER

## 2023-04-05 PROCEDURE — 94640 AIRWAY INHALATION TREATMENT: CPT

## 2023-04-05 PROCEDURE — 6360000002 HC RX W HCPCS: Performed by: NURSE PRACTITIONER

## 2023-04-05 PROCEDURE — 36415 COLL VENOUS BLD VENIPUNCTURE: CPT

## 2023-04-05 PROCEDURE — 2580000003 HC RX 258: Performed by: NURSE PRACTITIONER

## 2023-04-05 PROCEDURE — 80048 BASIC METABOLIC PNL TOTAL CA: CPT

## 2023-04-05 PROCEDURE — 1200000000 HC SEMI PRIVATE

## 2023-04-05 PROCEDURE — 6370000000 HC RX 637 (ALT 250 FOR IP): Performed by: INTERNAL MEDICINE

## 2023-04-05 RX ORDER — POTASSIUM CHLORIDE 20 MEQ/1
40 TABLET, EXTENDED RELEASE ORAL ONCE
Status: COMPLETED | OUTPATIENT
Start: 2023-04-05 | End: 2023-04-05

## 2023-04-05 RX ADMIN — ALBUTEROL SULFATE 0.63 MG: 0.63 SOLUTION RESPIRATORY (INHALATION) at 21:50

## 2023-04-05 RX ADMIN — PREGABALIN 100 MG: 100 CAPSULE ORAL at 13:42

## 2023-04-05 RX ADMIN — POTASSIUM CHLORIDE 40 MEQ: 1500 TABLET, EXTENDED RELEASE ORAL at 13:42

## 2023-04-05 RX ADMIN — PREGABALIN 100 MG: 100 CAPSULE ORAL at 21:34

## 2023-04-05 RX ADMIN — LEVOTHYROXINE AND LIOTHYRONINE 60 MG: 38; 9 TABLET ORAL at 05:51

## 2023-04-05 RX ADMIN — Medication 10 ML: at 08:31

## 2023-04-05 RX ADMIN — PETROLATUM: 420 OINTMENT TOPICAL at 08:32

## 2023-04-05 RX ADMIN — MORPHINE SULFATE 15 MG: 15 TABLET, FILM COATED, EXTENDED RELEASE ORAL at 05:49

## 2023-04-05 RX ADMIN — ATENOLOL 25 MG: 25 TABLET ORAL at 08:28

## 2023-04-05 RX ADMIN — FAMOTIDINE 20 MG: 20 TABLET ORAL at 08:28

## 2023-04-05 RX ADMIN — SODIUM CHLORIDE, PRESERVATIVE FREE 10 ML: 5 INJECTION INTRAVENOUS at 08:29

## 2023-04-05 RX ADMIN — ALBUTEROL SULFATE 0.63 MG: 0.63 SOLUTION RESPIRATORY (INHALATION) at 09:23

## 2023-04-05 RX ADMIN — ALBUTEROL SULFATE 0.63 MG: 0.63 SOLUTION RESPIRATORY (INHALATION) at 13:34

## 2023-04-05 RX ADMIN — MORPHINE SULFATE 15 MG: 15 TABLET, FILM COATED, EXTENDED RELEASE ORAL at 17:59

## 2023-04-05 RX ADMIN — PREGABALIN 100 MG: 100 CAPSULE ORAL at 05:49

## 2023-04-05 RX ADMIN — ALBUTEROL SULFATE 0.63 MG: 0.63 SOLUTION RESPIRATORY (INHALATION) at 17:25

## 2023-04-05 ASSESSMENT — PAIN DESCRIPTION - LOCATION
LOCATION: GENERALIZED
LOCATION: GENERALIZED

## 2023-04-05 ASSESSMENT — PAIN SCALES - GENERAL
PAINLEVEL_OUTOF10: 5
PAINLEVEL_OUTOF10: 6
PAINLEVEL_OUTOF10: 5

## 2023-04-05 ASSESSMENT — PAIN DESCRIPTION - DESCRIPTORS: DESCRIPTORS: ACHING

## 2023-04-05 NOTE — CARE COORDINATION
4/3/23 Update CM Note. Pt admitted 3/30/23 Dx acute Kidney injury. Pt had increasing confusion and falls at home. PT11/OT13-Now planning for SNF. Pt had been at [facility] in past and would like to use again-referral made. Choice lists provided to pt and she will discuss with  for additional choices if [facility] unable to accept. Marylou EAGLEN RN-BC  [phone]    Addendum: Pt accepted at [facility] to be started. Pt and spouse in agreement with Dc plan.
4/4/23 Update CM Note. Pt admitted 3/30/23 Dx acute Kidney injury. Pt had increasing confusion and falls at home. PT11/OT13-Now planning for SNF. Pt has been Accepted at [facility]. Precert started 6/1/60. Destination/FERNANDA done. PASRR completed. Ambulance form and envelope initiated and placed on chart.    Roseann EAGLEN RN-BC  [phone]
4/5/23 Update CM Note. Pt admitted 3/30/23 Dx acute Kidney injury. Pt had increasing confusion and falls at home. PT11/OT13-Now planning for SNF. Pt has been Accepted at [facility]. Precert pending  Destination/FERNANDA done. PASRR completed. Ambulance form and envelope initiated and placed on chart.    Eliza RUANO RN-BC  [phone]
providers was provided to: Patient         The Patient and/or Patient Representative Agree with the Discharge Plan?  Yes    Jayce Boone RN  Case Management Department  [phone]

## 2023-04-05 NOTE — PLAN OF CARE
Problem: Discharge Planning  Goal: Discharge to home or other facility with appropriate resources  4/5/2023 1720 by Liliana Morgan RN  Outcome: Adequate for Discharge  4/5/2023 1431 by Liliana Morgan RN  Outcome: Progressing     Problem: Pain  Goal: Verbalizes/displays adequate comfort level or baseline comfort level  4/5/2023 1720 by Liliana Morgan RN  Outcome: Adequate for Discharge  4/5/2023 1431 by Liliana Morgan RN  Outcome: Progressing     Problem: Safety - Adult  Goal: Free from fall injury  4/5/2023 1720 by Liliana Morgan RN  Outcome: Adequate for Discharge  4/5/2023 1431 by Liliana Morgan RN  Outcome: Progressing     Problem: ABCDS Injury Assessment  Goal: Absence of physical injury  4/5/2023 1720 by Liliana Morgan RN  Outcome: Adequate for Discharge  4/5/2023 1431 by Liliana Morgan RN  Outcome: Progressing     Problem: Skin/Tissue Integrity  Goal: Absence of new skin breakdown  Description: 1. Monitor for areas of redness and/or skin breakdown  2. Assess vascular access sites hourly  3. Every 4-6 hours minimum:  Change oxygen saturation probe site  4. Every 4-6 hours:  If on nasal continuous positive airway pressure, respiratory therapy assess nares and determine need for appliance change or resting period.   4/5/2023 1720 by Liliana Morgan RN  Outcome: Adequate for Discharge  4/5/2023 1431 by Liliana Morgan RN  Outcome: Progressing     Problem: Nutrition Deficit:  Goal: Optimize nutritional status  4/5/2023 1720 by Liliana Morgan RN  Outcome: Adequate for Discharge  4/5/2023 1431 by Liliana Morgan RN  Outcome: Progressing

## 2023-04-05 NOTE — PLAN OF CARE
Problem: Discharge Planning  Goal: Discharge to home or other facility with appropriate resources  Outcome: Progressing     Problem: Pain  Goal: Verbalizes/displays adequate comfort level or baseline comfort level  Outcome: Progressing     Problem: Safety - Adult  Goal: Free from fall injury  4/5/2023 1431 by Maryam Vogel RN  Outcome: Progressing  4/5/2023 0042 by Demetris Lindquist RN  Outcome: Progressing  Flowsheets (Taken 4/4/2023 1145 by Jd Campbell, RN)  Free From Fall Injury:   Liana Dakins family/caregiver on patient safety   Based on caregiver fall risk screen, instruct family/caregiver to ask for assistance with transferring infant if caregiver noted to have fall risk factors     Problem: ABCDS Injury Assessment  Goal: Absence of physical injury  4/5/2023 1431 by Maryam Vogel RN  Outcome: Progressing  4/5/2023 0042 by Demetris Lindquist RN  Outcome: Progressing  Flowsheets (Taken 4/4/2023 1145 by Jd Campbell, RN)  Absence of Physical Injury: Implement safety measures based on patient assessment     Problem: Skin/Tissue Integrity  Goal: Absence of new skin breakdown  Description: 1. Monitor for areas of redness and/or skin breakdown  2. Assess vascular access sites hourly  3. Every 4-6 hours minimum:  Change oxygen saturation probe site  4. Every 4-6 hours:  If on nasal continuous positive airway pressure, respiratory therapy assess nares and determine need for appliance change or resting period.   4/5/2023 1431 by Maryam Vogel RN  Outcome: Progressing  4/5/2023 0042 by Demetris Lindquist RN  Outcome: Progressing     Problem: Nutrition Deficit:  Goal: Optimize nutritional status  4/5/2023 1431 by Maryam Vogel RN  Outcome: Progressing  4/5/2023 0042 by Demetris Lindquist RN  Outcome: Progressing

## 2023-04-05 NOTE — PLAN OF CARE
Problem: Safety - Adult  Goal: Free from fall injury  Outcome: Progressing  Flowsheets (Taken 4/4/2023 1145 by Yoni Levin RN)  Free From Fall Injury:   Instruct family/caregiver on patient safety   Based on caregiver fall risk screen, instruct family/caregiver to ask for assistance with transferring infant if caregiver noted to have fall risk factors     Problem: Discharge Planning  Goal: Discharge to home or other facility with appropriate resources  Recent Flowsheet Documentation  Taken 4/4/2023 1145 by Yoni Levin RN  Discharge to home or other facility with appropriate resources:   Identify barriers to discharge with patient and caregiver   Arrange for needed discharge resources and transportation as appropriate   Identify discharge learning needs (meds, wound care, etc)   Refer to discharge planning if patient needs post-hospital services based on physician order or complex needs related to functional status, cognitive ability or social support system     Problem: ABCDS Injury Assessment  Goal: Absence of physical injury  Outcome: Progressing  Flowsheets (Taken 4/4/2023 1145 by Yoni Levin RN)  Absence of Physical Injury: Implement safety measures based on patient assessment     Problem: Skin/Tissue Integrity  Goal: Absence of new skin breakdown  Description: 1. Monitor for areas of redness and/or skin breakdown  2. Assess vascular access sites hourly  3. Every 4-6 hours minimum:  Change oxygen saturation probe site  4. Every 4-6 hours:  If on nasal continuous positive airway pressure, respiratory therapy assess nares and determine need for appliance change or resting period.   Outcome: Progressing     Problem: Nutrition Deficit:  Goal: Optimize nutritional status  Outcome: Progressing

## 2023-04-06 NOTE — PROGRESS NOTES
brought in thyroid medication from home, checked in with pharmacy.
Called [facility]. for report. Placed on hold for ovr 10 minutes. Report given to JOLENE Forrester.
Date: 3/31/2023       Patient Name: Be Harrison  : 1960      MRN: [id number]    PT order received. Chart has been reviewed. PT evaluation unable to be completed as pt was initially agreeable to PT IE but then refused all mobility at this time. Pt highly encouraged to participate, educated on benefits of mobility but cont to decline stating \"no, its not going to happen\". Will continue to follow and complete evaluation at later time.      Stanley Espana, PT
Dr George Manifold to notify that precert has been obtained and need discharge order    PAS called and set up transportation to [address] [facility].   Transportation set up for 8-830pm
[facility]                                Progress note    Subjective: The patient is awake and alert. Sitting up in bed continues to complain of right flank pain. No acute events overnight. Denies chest pain, angina, SOB     Objective:    /60   Pulse 85   Temp 97 °F (36.1 °C) (Temporal)   Resp 20   Ht 5' 5\" (1.651 m)   Wt 200 lb (90.7 kg)   SpO2 95%   BMI 33.28 kg/m²     In: -   Out: 500   In: -   Out: 500 [Urine:500]    General appearance: NAD, conversant, pale  HEENT: AT/NC, MMM  Neck: FROM, supple  Lungs: Clear to auscultation  CV: RRR, no MRGs  Vasc: Radial pulses 2+  Abdomen: Soft, non-tender; no masses or HSM  Extremities: No peripheral edema or digital cyanosis  Skin: no rash, lesions or ulcers  Psych: Alert and oriented to person, place and time  Neuro: Alert and interactive     Recent Labs     03/30/23  0328 03/30/23  2234 03/31/23  0007   WBC 12.6*  --   --    HGB 7.4* 6.8* 6.4*   HCT 25.2* 23.6* 22.8*     --   --        Recent Labs     03/30/23  0328      K 4.2   CL 99   CO2 22   BUN 39*   CREATININE 1.8*   CALCIUM 8.7       Assessment:    Principal Problem:    Acute kidney injury (HCC)  Resolved Problems:    * No resolved hospital problems.  *      Plan:  58-year-old female with a history of hypertension and hyperlipidemia presents to the ED with inability to walk and confusion and is admitted to telemetry unit with     Acute kidney injury/UTI  Monitor labs BUN/creatinine 39/1.8-baseline 12/0.5  WBC 8-12.6  3 fluid hydration  CT abdomen pelvis question pyelonephritis-continue antibiotic therapy  Rocephin 1 g every 24 hours  Await cultures continue antibiotics for discharge  Pain management  Case discussed with  at bedside    3/31/2023  Vital signs stable  H&H: 6.4/22.8 transfuse 1 unit-patient had a drop of 1 g overnight without evidence of bleed  Hold anticoagulation  Consult general surgery  Continue PPI twice daily  A.m. labs pending due to
[facility]                                Progress note    Subjective: The patient is awake and alert. Objective:    BP (!) 119/59   Pulse 75   Temp 98.4 °F (36.9 °C) (Temporal)   Resp 17   Ht 5' 5\" (1.651 m)   Wt 200 lb (90.7 kg)   SpO2 95%   BMI 33.28 kg/m²     In: 0   Out: 1050   In: 0   Out: 1050 [Urine:1050]    General appearance: NAD, conversant, pale  HEENT: AT/NC, MMM  Neck: FROM, supple  Lungs: Clear to auscultation  CV: RRR, no MRGs  Vasc: Radial pulses 2+  Abdomen: Soft, non-tender; no masses or HSM  Extremities: No peripheral edema or digital cyanosis  Skin: no rash, lesions or ulcers  Psych: Alert and oriented to person, place and time  Neuro: Alert and interactive     Recent Labs     03/31/23  0732 03/31/23  1331 04/01/23  0446 04/01/23  1128 04/01/23  2237 04/02/23  0607 04/02/23  1105   WBC 10.8  --  8.8  --   --  8.3  --    HGB 7.7*   < > 7.8*   < > 8.6* 9.1* 10.3*   HCT 28.8*   < > 27.2*   < > 30.7* 31.2* 35.1     --  331  --   --  365  --     < > = values in this interval not displayed. Recent Labs     03/31/23  0732 04/01/23  0446 04/02/23  0607    142 138   K 3.8 3.3* 3.4*    106 105   CO2 21* 22 22   BUN 27* 19 13   CREATININE 1.3* 1.2* 1.1*   CALCIUM 8.5* 8.3* 8.4*       Assessment:    Principal Problem:    Acute kidney injury ([address]  Resolved Problems:    * No resolved hospital problems.  *        Plan:  58-year-old female with a history of hypertension and hyperlipidemia presents to the ED with inability to walk and confusion and is admitted to telemetry unit with     Acute kidney injury/UTI  Monitor labs BUN/creatinine 39/1.8-baseline 12/0.5  WBC 8-12.6  3 fluid hydration  CT abdomen pelvis question pyelonephritis-continue antibiotic therapy  Rocephin 1 g every 24 hours  Await cultures continue antibiotics for discharge  Pain management  Case discussed with  at bedside    3/31/2023  Vital signs stable  H&H: 6.4/22.8 transfuse
Messaged IV team for new access.
New General Surgery Consult for Dr. Luciana Jimenez called to answering service at this time
OT SESSION ATTEMPT     Date:3/31/2023  Patient Name: Daphne Gautam  MRN: [id number]  : 1960  [facility]     Occupational therapy orders received/chart review completed and OT session attempted this date:    [] unavailable due to other medical staff currently with pt   [] on hold, await MRI/ neurosurgical recommendations. [] on hold per nursing staff secondary to lab / radiology results    [x] declined Occupational Therapy this date stating \"no, it's not going to happen\" despite max encouragement & education for OOB activity. Pt did answer PLOF & home-setup questions. Benefits of participation in therapy reviewed with pt & pt's . [] off unit   [] Other:     Home Living: Pt lives with  in a 1 story home w/ \"a few\" steps to enter. Laundry in basement. Bathroom setup: [address] owned: ww, shower chair (pt reports doesn't fit in shower)    Prior Level of Function: IND with ADLs , shares IADLs w/ ; engaged in functional mobility with use of  ww  Driving: No  Occupation: None reported      Will reattempt OT eval at a later time/date.     Jazmine Rhoades OTR/L; [id number]
Occupational Therapy  OT BEDSIDE TREATMENT NOTE   [address] [facility] [place] [address] [address]        Date:2023  Patient Name: Lexii Billingsley  MRN: [id number]  : 1960  Room: [address] [place] [address]A     Attempted to see pt this afternoon, with nursing okaying pt to be seen this session, with pt refusing of therapy upon arrival. Pt stating of \"already being up in chair today, and got up and used of BC, she isnt doing anything else today. I sat up in chair for a long time yesterday too\" Will attempt at a later date/time.         [address] JAIN/L [id number]
Patient has refused to take potassium, Patient was educated by bedside and charge nurse on risk of potassium levels being low.
Patient is refusing to take potassium until  comes to make sure its ok for her to take.
Physician Progress Note      Gisell Manriquez  [facility] #:                  [id number]  :                       1960  ADMIT DATE:       3/30/2023 3:22 AM  DISCH DATE:        2023 11:56 PM  RESPONDING  PROVIDER #:        Sloan Lomax MD          QUERY TEXT:    Dear Provider,    Patient admitted with STEFANIA. Noted documentation of UTI. In order to support the   diagnosis of UTI, please include additional clinical indicators in your   documentation. Or please document if the diagnosis of UTI has been ruled out   after further study. The medical record reflects the following:  Risk Factors: 56 yo female  Clinical Indicators: Documentation of : Acute kidney injury/UTI  Monitor labs BUN/creatinine 39/1.8-baseline 12/0.5  WBC 8-12.6  3 fluid hydration  CT abdomen pelvis question pyelonephritis-continue antibiotic therapy  Rocephin 1 g every 24 hours  Await cultures continue antibiotics for discharge  3/30 UA: Nitrate positive, Leukocyte esterase small, WBC 5-10, bacteria rare  3/30 Urine culture:10 to 100,000 CFU/mL,Mixed chelo isolated. Further workup   and sensitivity testing,is not routinely indicated and will not be   performed. Mixed chelo isolated includes:  Proteus species,Gram negative rods,Gram positive organism  Treatment: Urine culture, Imaging, Rocephin      Thank you,  Brittany Koo RN  Clinical Documentation Improvement  [phone]  Options provided:  -- UTI present as evidenced by, Please document evidence.   -- UTI was ruled out  -- Other - I will add my own diagnosis  -- Disagree - Not applicable / Not valid  -- Disagree - Clinically unable to determine / Unknown  -- Refer to Clinical Documentation Reviewer    PROVIDER RESPONSE TEXT:    UTI is present as evidenced by peulonephritis    Query created by: Mirta Starks on 2023 11:22 AM      Electronically signed by:  Sloan Lomax MD 2023 1:03 PM
Pt HGB noted to be 6.4 @0024. PCP made care provider made aware,please see new orders. No sin/symptoms of active bleeding noted. Pt educated on transfusion process and consent form signed. Will cont to monitor.
Pt monitor removed due to med surg status.
[facility]                                Progress note    Subjective: The patient is awake and alert. Sitting up in chair  Discussion held on discharge, she understands that she will likely go to [facility] on discharge        Objective:    /61   Pulse 72   Temp 98.8 °F (37.1 °C) (Temporal)   Resp 18   Ht 5' 5\" (1.651 m)   Wt 200 lb (90.7 kg)   SpO2 98%   BMI 33.28 kg/m²     In: 380 [P.O.:380]  Out: 2400   In: 380   Out: 2400 [Urine:2400]    General appearance: NAD, conversant, pale  HEENT: AT/NC, MMM  Neck: FROM, supple  Lungs: Clear to auscultation  CV: RRR, no MRGs  Vasc: Radial pulses 2+  Abdomen: Soft, non-tender; no masses or HSM  Extremities: No peripheral edema or digital cyanosis  Skin: no rash, lesions or ulcers  Psych: Alert and oriented to person, place and time  Neuro: Alert and interactive     Recent Labs     04/01/23  0446 04/01/23  1128 04/01/23  2237 04/02/23  0607 04/02/23  1105   WBC 8.8  --   --  8.3  --    HGB 7.8*   < > 8.6* 9.1* 10.3*   HCT 27.2*   < > 30.7* 31.2* 35.1     --   --  365  --     < > = values in this interval not displayed. Recent Labs     04/01/23  0446 04/02/23  0607    138   K 3.3* 3.4*    105   CO2 22 22   BUN 19 13   CREATININE 1.2* 1.1*   CALCIUM 8.3* 8.4*       Assessment:    Principal Problem:    Acute kidney injury ([address]  Resolved Problems:    * No resolved hospital problems.  *        Plan:  47-year-old female with a history of hypertension and hyperlipidemia presents to the ED with inability to walk and confusion and is admitted to telemetry unit with     Acute kidney injury/UTI  Monitor labs BUN/creatinine 39/1.8-baseline 12/0.5  WBC 8-12.6  3 fluid hydration  CT abdomen pelvis question pyelonephritis-continue antibiotic therapy  Rocephin 1 g every 24 hours  Await cultures continue antibiotics for discharge  Pain management  Case discussed with  at bedside    3/31/2023  Vital signs stable  H&H: 6.4/22.8
Voicemail left for [facility] NP as patient's  requesting some admission medications to be changed. Waiting for call back. Message left with Dr. Sagar Mcknight on above as well as patient requesting something for a cough. Medications needing addressed are MS contin changed to scheduled every 12 hours instead of PRN as patient forgets to ask, she no longer takes lopressor she takes atenolol 25mg BID, she no longer takes keppra that is ordered and no longer takes pepcid that is ordered.
Wound care consult placed via [employer] serve
Nutrition Care: Continue to monitor while inpatient       Goals:     Goals: PO intake 75% or greater, by next RD assessment       Nutrition Monitoring and Evaluation:      Food/Nutrient Intake Outcomes: Food and Nutrient Intake, Supplement Intake  Physical Signs/Symptoms Outcomes: Biochemical Data, Skin, Nutrition Focused Physical Findings, Weight, GI Status, Fluid Status or Edema    Discharge Planning:     Too soon to determine     Silvinopiper Zarate RD, LD  Contact: Ext [phone]
telemetry unit with     Acute kidney injury/UTI  Monitor labs BUN/creatinine 39/1.8-baseline 12/0.5  WBC 8-12.6  3 fluid hydration  CT abdomen pelvis question pyelonephritis-continue antibiotic therapy  Rocephin 1 g every 24 hours  Await cultures continue antibiotics for discharge  Pain management  Case discussed with  at bedside    3/31/2023  Vital signs stable  H&H: 6.4/22.8 transfuse 1 unit-patient had a drop of 1 g overnight without evidence of bleed  Hold anticoagulation  Consult general surgery  Continue PPI twice daily  A.m. labs pending due to blood transfusion  Continue IV hydration  CT abdomen pelvis-questionable pyelonephritis  Rocephin 1 g  -Resume home medications, for chronic pain  - Discontinue meds that she is currently not taking at home  - at bedside, case discussed  -H&H within normal limits now-no plans by general surgery for any scopes  -Will likely need placement    4/1/2023  Supplement potassium 3.3  Creatinine better 1.2  H&H stable 8.6/30.4  WBC 8.8  Discontinue IV hydration  Monitor labs overnight   Vital signs stable  Await PT/OT evaluations for discharge planning  Okay for discharge to subacute rehab once pre-CERT completed and patient accepted  All narcotics and home medications have been resumed  Patient continues to have multiple complaints    Code status: Full  Consultants: General Surgery     DVT Prophylaxis   PT/OT  Discharge John Lemons MD  2:05 PM  4/1/2023
to supine: NT   Supine to sit: Stand by Assist   Sit to supine: Stand by Assist    Functional Transfers Moderate Assist   Stand by Assist    Functional Mobility Minimal Assist w/ w/w  ~5 steps EOB>chair  Stand by Assist   Balance Sitting:     Static:  Min>SBA    Dynamic: Min A  Standing: Min><Mod A w/ w/w     Activity Tolerance Fair-  Pt c/o increased fatigue w/ all functional tasks. Frequent rest breaks provided. Fair+   Visual/  Perceptual Glasses: yes                  Hand Dominance R   AROM (PROM) Strength Additional Info:    RUE  WFL 4-/5 good  and wfl FMC/dexterity noted during ADL tasks     LUE WFL 4-/5 good  and wfl FMC/dexterity noted during ADL tasks       Hearing: WFL  Sensation:  No c/o numbness or tingling   Tone: WFL   Edema: none noted    Comments: Upon arrival patient lying in bed. Therapist educated pt on role of OT. RN clearance. At end of session, patient seated in chair with call light and phone within reach, all lines and tubes intact. Overall patient demonstrated decreased independence and safety during completion of ADL/functional transfer/mobility tasks. Pt would benefit from continued skilled OT to increase safety and independence with completion of ADL/IADL tasks for functional independence and quality of life. Treatment: OT treatment provided this date includes: Facilitation of bed mobility (education/cues for body mechanics, sequencing and safety), unsupported sitting balance (addressing posture, weight shifting, dynamic reaching to prep for ADL's), functional transfers (various surfaces w/ education/cues for safety/hand placement), standing tolerance tasks (addressing posture, balance and activity tolerance while incorporating light functional reaching impacting ADLs and functional activity) and steps from EOB>chair with w/w (w/ education/cuing on posture, w/w management, sequencing and safety. Verbal encouragement required to complete steps to chair).   Therapist
static/dynamic balance and to reduce fall risk  [x] Endurance Training to improve activity tolerance during functional mobility   [x] Transfer Training to improve safety and independence with all functional transfers   [x] Gait Training to improve gait mechanics, endurance and assess need for appropriate assistive device  [x] Stair Training in preparation for safe discharge home and/or into the community   [x] Positioning to prevent skin breakdown and contractures  [x] Safety and Education Training   [x] Patient/Caregiver Education   [] HEP  [x] Other     PT long term treatment goals are located in above grid    Frequency of treatments: 2-5x/week x 1-2 weeks. Time in  0745  Time out  0838    Total Treatment Time  38 minutes     Evaluation Time includes thorough review of current medical information, gathering information on past medical history/social history and prior level of function, completion of standardized testing/informal observation of tasks, assessment of data and education on plan of care and goals.     CPT codes:  [x] Low Complexity PT evaluation 50744  [] Moderate Complexity PT evaluation 94301  [] High Complexity PT evaluation 31244  [] PT Re-evaluation 02518  [] Gait training 07362 0 minutes  [] Manual therapy 17382 0 minutes  [x] Therapeutic activities 49306 38 minutes  [] Therapeutic exercises 43600 0 minutes  [] Neuromuscular reeducation 66841 0 minutes     Nivia Lin PT, DPT  [id number]

## 2023-04-13 ENCOUNTER — HOSPITAL ENCOUNTER (INPATIENT)
Age: 63
LOS: 11 days | Discharge: NURSING, REHAB OR LONG-TERM CARE | DRG: 177 | End: 2023-04-24
Attending: EMERGENCY MEDICINE | Admitting: INTERNAL MEDICINE
Payer: COMMERCIAL

## 2023-04-13 ENCOUNTER — APPOINTMENT (OUTPATIENT)
Dept: GENERAL RADIOLOGY | Age: 63
DRG: 177 | End: 2023-04-13
Payer: COMMERCIAL

## 2023-04-13 ENCOUNTER — APPOINTMENT (OUTPATIENT)
Dept: CT IMAGING | Age: 63
DRG: 177 | End: 2023-04-13
Payer: COMMERCIAL

## 2023-04-13 DIAGNOSIS — J96.01 ACUTE HYPOXEMIC RESPIRATORY FAILURE DUE TO COVID-19 (HCC): Primary | ICD-10-CM

## 2023-04-13 DIAGNOSIS — U07.1 COVID-19: ICD-10-CM

## 2023-04-13 DIAGNOSIS — J96.01 ACUTE RESPIRATORY FAILURE WITH HYPOXIA (HCC): ICD-10-CM

## 2023-04-13 DIAGNOSIS — U07.1 ACUTE HYPOXEMIC RESPIRATORY FAILURE DUE TO COVID-19 (HCC): Primary | ICD-10-CM

## 2023-04-13 LAB
ALBUMIN SERPL-MCNC: 3.4 G/DL (ref 3.5–5.2)
ALP SERPL-CCNC: 79 U/L (ref 35–104)
ALT SERPL-CCNC: 14 U/L (ref 0–32)
ANION GAP SERPL CALCULATED.3IONS-SCNC: 10 MMOL/L (ref 7–16)
ANISOCYTOSIS: ABNORMAL
AST SERPL-CCNC: 51 U/L (ref 0–31)
B PARAP IS1001 DNA NPH QL NAA+NON-PROBE: NOT DETECTED
B PERT.PT PRMT NPH QL NAA+NON-PROBE: NOT DETECTED
BASOPHILIC STIPPLING: ABNORMAL
BASOPHILS # BLD: 0.08 E9/L (ref 0–0.2)
BASOPHILS NFR BLD: 0.9 % (ref 0–2)
BILIRUB DIRECT SERPL-MCNC: <0.2 MG/DL (ref 0–0.3)
BILIRUB INDIRECT SERPL-MCNC: ABNORMAL MG/DL (ref 0–1)
BILIRUB SERPL-MCNC: 0.3 MG/DL (ref 0–1.2)
BNP BLD-MCNC: 458 PG/ML (ref 0–125)
BUN SERPL-MCNC: 22 MG/DL (ref 6–23)
C PNEUM DNA NPH QL NAA+NON-PROBE: NOT DETECTED
CALCIUM SERPL-MCNC: 8.9 MG/DL (ref 8.6–10.2)
CHLORIDE SERPL-SCNC: 99 MMOL/L (ref 98–107)
CO2 SERPL-SCNC: 27 MMOL/L (ref 22–29)
CREAT SERPL-MCNC: 1.3 MG/DL (ref 0.5–1)
D DIMER: 1662 NG/ML DDU
EOSINOPHIL # BLD: 0 E9/L (ref 0.05–0.5)
EOSINOPHIL NFR BLD: 0.7 % (ref 0–6)
ERYTHROCYTE [DISTWIDTH] IN BLOOD BY AUTOMATED COUNT: ABNORMAL FL (ref 11.5–15)
FLUAV RNA NPH QL NAA+NON-PROBE: NOT DETECTED
FLUBV RNA NPH QL NAA+NON-PROBE: NOT DETECTED
GLUCOSE SERPL-MCNC: 102 MG/DL (ref 74–99)
HADV DNA NPH QL NAA+NON-PROBE: NOT DETECTED
HCOV 229E RNA NPH QL NAA+NON-PROBE: NOT DETECTED
HCOV HKU1 RNA NPH QL NAA+NON-PROBE: NOT DETECTED
HCOV NL63 RNA NPH QL NAA+NON-PROBE: NOT DETECTED
HCOV OC43 RNA NPH QL NAA+NON-PROBE: NOT DETECTED
HCT VFR BLD AUTO: 30.5 % (ref 34–48)
HGB BLD-MCNC: 8.8 G/DL (ref 11.5–15.5)
HMPV RNA NPH QL NAA+NON-PROBE: NOT DETECTED
HPIV1 RNA NPH QL NAA+NON-PROBE: NOT DETECTED
HPIV2 RNA NPH QL NAA+NON-PROBE: NOT DETECTED
HPIV3 RNA NPH QL NAA+NON-PROBE: NOT DETECTED
HPIV4 RNA NPH QL NAA+NON-PROBE: NOT DETECTED
HYPOCHROMIA: ABNORMAL
LACTATE BLDV-SCNC: 1 MMOL/L (ref 0.5–2.2)
LIPASE: 44 U/L (ref 13–60)
LYMPHOCYTES # BLD: 2.78 E9/L (ref 1.5–4)
LYMPHOCYTES NFR BLD: 32.4 % (ref 20–42)
M PNEUMO DNA NPH QL NAA+NON-PROBE: NOT DETECTED
MAGNESIUM SERPL-MCNC: 2 MG/DL (ref 1.6–2.6)
MCH RBC QN AUTO: 26.9 PG (ref 26–35)
MCHC RBC AUTO-ENTMCNC: 28.9 % (ref 32–34.5)
MCV RBC AUTO: 93.3 FL (ref 80–99.9)
MONOCYTES # BLD: 0.35 E9/L (ref 0.1–0.95)
MONOCYTES NFR BLD: 4.4 % (ref 2–12)
NEUTROPHILS # BLD: 5.39 E9/L (ref 1.8–7.3)
NEUTS SEG NFR BLD: 62.3 % (ref 43–80)
PLATELET # BLD AUTO: 242 E9/L (ref 130–450)
PMV BLD AUTO: ABNORMAL FL (ref 7–12)
POIKILOCYTES: ABNORMAL
POLYCHROMASIA: ABNORMAL
POTASSIUM SERPL-SCNC: 5.2 MMOL/L (ref 3.5–5)
PROT SERPL-MCNC: 7.8 G/DL (ref 6.4–8.3)
RBC # BLD AUTO: 3.27 E12/L (ref 3.5–5.5)
REASON FOR REJECTION: NORMAL
REJECTED TEST: NORMAL
RSV RNA NPH QL NAA+NON-PROBE: NOT DETECTED
RV+EV RNA NPH QL NAA+NON-PROBE: NOT DETECTED
SARS-COV-2 RDRP RESP QL NAA+PROBE: DETECTED
SARS-COV-2 RNA NPH QL NAA+NON-PROBE: DETECTED
SODIUM SERPL-SCNC: 136 MMOL/L (ref 132–146)
STOMATOCYTES: ABNORMAL
TARGET CELLS: ABNORMAL
TEAR DROP CELLS: ABNORMAL
TROPONIN, HIGH SENSITIVITY: 19 NG/L (ref 0–9)
WBC # BLD: 8.7 E9/L (ref 4.5–11.5)

## 2023-04-13 PROCEDURE — 96374 THER/PROPH/DIAG INJ IV PUSH: CPT

## 2023-04-13 PROCEDURE — 87635 SARS-COV-2 COVID-19 AMP PRB: CPT

## 2023-04-13 PROCEDURE — 6360000002 HC RX W HCPCS: Performed by: STUDENT IN AN ORGANIZED HEALTH CARE EDUCATION/TRAINING PROGRAM

## 2023-04-13 PROCEDURE — 85025 COMPLETE CBC W/AUTO DIFF WBC: CPT

## 2023-04-13 PROCEDURE — 80048 BASIC METABOLIC PNL TOTAL CA: CPT

## 2023-04-13 PROCEDURE — 0202U NFCT DS 22 TRGT SARS-COV-2: CPT

## 2023-04-13 PROCEDURE — 85378 FIBRIN DEGRADE SEMIQUANT: CPT

## 2023-04-13 PROCEDURE — 83690 ASSAY OF LIPASE: CPT

## 2023-04-13 PROCEDURE — 83735 ASSAY OF MAGNESIUM: CPT

## 2023-04-13 PROCEDURE — 80076 HEPATIC FUNCTION PANEL: CPT

## 2023-04-13 PROCEDURE — 36415 COLL VENOUS BLD VENIPUNCTURE: CPT

## 2023-04-13 PROCEDURE — 93005 ELECTROCARDIOGRAM TRACING: CPT | Performed by: STUDENT IN AN ORGANIZED HEALTH CARE EDUCATION/TRAINING PROGRAM

## 2023-04-13 PROCEDURE — 83880 ASSAY OF NATRIURETIC PEPTIDE: CPT

## 2023-04-13 PROCEDURE — 71275 CT ANGIOGRAPHY CHEST: CPT

## 2023-04-13 PROCEDURE — 84484 ASSAY OF TROPONIN QUANT: CPT

## 2023-04-13 PROCEDURE — 6360000004 HC RX CONTRAST MEDICATION: Performed by: RADIOLOGY

## 2023-04-13 PROCEDURE — 71045 X-RAY EXAM CHEST 1 VIEW: CPT

## 2023-04-13 PROCEDURE — 99285 EMERGENCY DEPT VISIT HI MDM: CPT

## 2023-04-13 PROCEDURE — 83605 ASSAY OF LACTIC ACID: CPT

## 2023-04-13 PROCEDURE — 2060000000 HC ICU INTERMEDIATE R&B

## 2023-04-13 RX ORDER — DEXAMETHASONE SODIUM PHOSPHATE 10 MG/ML
10 INJECTION, SOLUTION INTRAMUSCULAR; INTRAVENOUS ONCE
Status: COMPLETED | OUTPATIENT
Start: 2023-04-13 | End: 2023-04-13

## 2023-04-13 RX ADMIN — IOPAMIDOL 75 ML: 755 INJECTION, SOLUTION INTRAVENOUS at 21:38

## 2023-04-13 RX ADMIN — DEXAMETHASONE SODIUM PHOSPHATE 10 MG: 10 INJECTION, SOLUTION INTRAMUSCULAR; INTRAVENOUS at 22:12

## 2023-04-13 ASSESSMENT — LIFESTYLE VARIABLES
HOW OFTEN DO YOU HAVE A DRINK CONTAINING ALCOHOL: NEVER
HOW MANY STANDARD DRINKS CONTAINING ALCOHOL DO YOU HAVE ON A TYPICAL DAY: PATIENT DOES NOT DRINK

## 2023-04-13 ASSESSMENT — PAIN - FUNCTIONAL ASSESSMENT: PAIN_FUNCTIONAL_ASSESSMENT: NONE - DENIES PAIN

## 2023-04-14 LAB
ANION GAP SERPL CALCULATED.3IONS-SCNC: 6 MMOL/L (ref 7–16)
BACTERIA URNS QL MICRO: ABNORMAL /HPF
BASOPHILS # BLD: 0.01 E9/L (ref 0–0.2)
BASOPHILS NFR BLD: 0.3 % (ref 0–2)
BILIRUB UR QL STRIP: NEGATIVE
BUN SERPL-MCNC: 20 MG/DL (ref 6–23)
CALCIUM SERPL-MCNC: 9.1 MG/DL (ref 8.6–10.2)
CHLORIDE SERPL-SCNC: 96 MMOL/L (ref 98–107)
CLARITY UR: CLEAR
CO2 SERPL-SCNC: 26 MMOL/L (ref 22–29)
COLOR UR: YELLOW
CREAT SERPL-MCNC: 1.2 MG/DL (ref 0.5–1)
CRP SERPL HS-MCNC: 3.5 MG/DL (ref 0–0.4)
EKG ATRIAL RATE: 91 BPM
EKG P AXIS: 56 DEGREES
EKG P-R INTERVAL: 168 MS
EKG Q-T INTERVAL: 390 MS
EKG QRS DURATION: 84 MS
EKG QTC CALCULATION (BAZETT): 479 MS
EKG R AXIS: 36 DEGREES
EKG T AXIS: 22 DEGREES
EKG VENTRICULAR RATE: 91 BPM
EOSINOPHIL # BLD: 0 E9/L (ref 0.05–0.5)
EOSINOPHIL NFR BLD: 0 % (ref 0–6)
ERYTHROCYTE [DISTWIDTH] IN BLOOD BY AUTOMATED COUNT: 16.4 FL (ref 11.5–15)
FERRITIN SERPL-MCNC: 859 NG/ML
FIBRINOGEN: 533 MG/DL (ref 200–400)
GLUCOSE SERPL-MCNC: 159 MG/DL (ref 74–99)
GLUCOSE UR STRIP-MCNC: 250 MG/DL
HCT VFR BLD AUTO: 30 % (ref 34–48)
HGB BLD-MCNC: 8.8 G/DL (ref 11.5–15.5)
HGB UR QL STRIP: ABNORMAL
IMM GRANULOCYTES # BLD: 0.02 E9/L
IMM GRANULOCYTES NFR BLD: 0.6 % (ref 0–5)
KETONES UR STRIP-MCNC: NEGATIVE MG/DL
LDH SERPL-CCNC: 156 U/L (ref 135–214)
LEUKOCYTE ESTERASE UR QL STRIP: ABNORMAL
LYMPHOCYTES # BLD: 1.22 E9/L (ref 1.5–4)
LYMPHOCYTES NFR BLD: 33.9 % (ref 20–42)
MCH RBC QN AUTO: 27.5 PG (ref 26–35)
MCHC RBC AUTO-ENTMCNC: 29.3 % (ref 32–34.5)
MCV RBC AUTO: 93.8 FL (ref 80–99.9)
MONOCYTES # BLD: 0.17 E9/L (ref 0.1–0.95)
MONOCYTES NFR BLD: 4.7 % (ref 2–12)
NEUTROPHILS # BLD: 2.18 E9/L (ref 1.8–7.3)
NEUTS SEG NFR BLD: 60.5 % (ref 43–80)
NITRITE UR QL STRIP: NEGATIVE
PH UR STRIP: 6.5 [PH] (ref 5–9)
PLATELET # BLD AUTO: 268 E9/L (ref 130–450)
PMV BLD AUTO: 10.6 FL (ref 7–12)
POTASSIUM SERPL-SCNC: 4.2 MMOL/L (ref 3.5–5)
PROT UR STRIP-MCNC: 30 MG/DL
RBC # BLD AUTO: 3.2 E12/L (ref 3.5–5.5)
RBC #/AREA URNS HPF: ABNORMAL /HPF (ref 0–2)
SODIUM SERPL-SCNC: 128 MMOL/L (ref 132–146)
SP GR UR STRIP: 1.01 (ref 1–1.03)
TROPONIN, HIGH SENSITIVITY: 14 NG/L (ref 0–9)
UROBILINOGEN UR STRIP-ACNC: 0.2 E.U./DL
WBC # BLD: 3.6 E9/L (ref 4.5–11.5)
WBC #/AREA URNS HPF: ABNORMAL /HPF (ref 0–5)

## 2023-04-14 PROCEDURE — 86140 C-REACTIVE PROTEIN: CPT

## 2023-04-14 PROCEDURE — XW0DXM6 INTRODUCTION OF BARICITINIB INTO MOUTH AND PHARYNX, EXTERNAL APPROACH, NEW TECHNOLOGY GROUP 6: ICD-10-PCS | Performed by: INTERNAL MEDICINE

## 2023-04-14 PROCEDURE — 97161 PT EVAL LOW COMPLEX 20 MIN: CPT

## 2023-04-14 PROCEDURE — 93010 ELECTROCARDIOGRAM REPORT: CPT | Performed by: INTERNAL MEDICINE

## 2023-04-14 PROCEDURE — 87040 BLOOD CULTURE FOR BACTERIA: CPT

## 2023-04-14 PROCEDURE — 84484 ASSAY OF TROPONIN QUANT: CPT

## 2023-04-14 PROCEDURE — 6370000000 HC RX 637 (ALT 250 FOR IP): Performed by: NURSE PRACTITIONER

## 2023-04-14 PROCEDURE — 2700000000 HC OXYGEN THERAPY PER DAY

## 2023-04-14 PROCEDURE — 85025 COMPLETE CBC W/AUTO DIFF WBC: CPT

## 2023-04-14 PROCEDURE — 6360000002 HC RX W HCPCS: Performed by: NURSE PRACTITIONER

## 2023-04-14 PROCEDURE — 80048 BASIC METABOLIC PNL TOTAL CA: CPT

## 2023-04-14 PROCEDURE — 97530 THERAPEUTIC ACTIVITIES: CPT

## 2023-04-14 PROCEDURE — 2580000003 HC RX 258: Performed by: NURSE PRACTITIONER

## 2023-04-14 PROCEDURE — 2060000000 HC ICU INTERMEDIATE R&B

## 2023-04-14 PROCEDURE — 82728 ASSAY OF FERRITIN: CPT

## 2023-04-14 PROCEDURE — 36415 COLL VENOUS BLD VENIPUNCTURE: CPT

## 2023-04-14 PROCEDURE — 81001 URINALYSIS AUTO W/SCOPE: CPT

## 2023-04-14 PROCEDURE — 85384 FIBRINOGEN ACTIVITY: CPT

## 2023-04-14 PROCEDURE — 83615 LACTATE (LD) (LDH) ENZYME: CPT

## 2023-04-14 RX ORDER — ASCORBIC ACID 500 MG
500 TABLET ORAL DAILY
Status: DISPENSED | OUTPATIENT
Start: 2023-04-14 | End: 2023-04-24

## 2023-04-14 RX ORDER — LEVOTHYROXINE AND LIOTHYRONINE 19; 4.5 UG/1; UG/1
60 TABLET ORAL DAILY
Status: DISCONTINUED | OUTPATIENT
Start: 2023-04-14 | End: 2023-04-24 | Stop reason: HOSPADM

## 2023-04-14 RX ORDER — HEPARIN SODIUM 10000 [USP'U]/ML
5000 INJECTION, SOLUTION INTRAVENOUS; SUBCUTANEOUS EVERY 8 HOURS
Status: DISCONTINUED | OUTPATIENT
Start: 2023-04-14 | End: 2023-04-24 | Stop reason: HOSPADM

## 2023-04-14 RX ORDER — DEXAMETHASONE SODIUM PHOSPHATE 10 MG/ML
6 INJECTION, SOLUTION INTRAMUSCULAR; INTRAVENOUS DAILY
Status: DISCONTINUED | OUTPATIENT
Start: 2023-04-14 | End: 2023-04-16 | Stop reason: SDUPTHER

## 2023-04-14 RX ORDER — SODIUM CHLORIDE 9 MG/ML
INJECTION, SOLUTION INTRAVENOUS CONTINUOUS
Status: DISCONTINUED | OUTPATIENT
Start: 2023-04-14 | End: 2023-04-22

## 2023-04-14 RX ORDER — ZINC SULFATE 50(220)MG
50 CAPSULE ORAL DAILY
Status: DISPENSED | OUTPATIENT
Start: 2023-04-14 | End: 2023-04-24

## 2023-04-14 RX ORDER — FAMOTIDINE 20 MG/1
20 TABLET, FILM COATED ORAL DAILY
Status: DISCONTINUED | OUTPATIENT
Start: 2023-04-14 | End: 2023-04-24 | Stop reason: HOSPADM

## 2023-04-14 RX ORDER — POLYETHYLENE GLYCOL 3350 17 G/17G
17 POWDER, FOR SOLUTION ORAL DAILY PRN
Status: DISCONTINUED | OUTPATIENT
Start: 2023-04-14 | End: 2023-04-24 | Stop reason: HOSPADM

## 2023-04-14 RX ORDER — SODIUM CHLORIDE 9 MG/ML
INJECTION, SOLUTION INTRAVENOUS PRN
Status: DISCONTINUED | OUTPATIENT
Start: 2023-04-14 | End: 2023-04-24 | Stop reason: HOSPADM

## 2023-04-14 RX ORDER — ATENOLOL 25 MG/1
25 TABLET ORAL 2 TIMES DAILY
Status: DISCONTINUED | OUTPATIENT
Start: 2023-04-14 | End: 2023-04-24 | Stop reason: HOSPADM

## 2023-04-14 RX ORDER — ALBUTEROL SULFATE 90 UG/1
2 AEROSOL, METERED RESPIRATORY (INHALATION) EVERY 6 HOURS
Status: DISCONTINUED | OUTPATIENT
Start: 2023-04-14 | End: 2023-04-24 | Stop reason: HOSPADM

## 2023-04-14 RX ORDER — SODIUM CHLORIDE 0.9 % (FLUSH) 0.9 %
5-40 SYRINGE (ML) INJECTION EVERY 12 HOURS SCHEDULED
Status: DISCONTINUED | OUTPATIENT
Start: 2023-04-14 | End: 2023-04-24 | Stop reason: HOSPADM

## 2023-04-14 RX ORDER — SODIUM CHLORIDE 0.9 % (FLUSH) 0.9 %
5-40 SYRINGE (ML) INJECTION PRN
Status: DISCONTINUED | OUTPATIENT
Start: 2023-04-14 | End: 2023-04-24 | Stop reason: HOSPADM

## 2023-04-14 RX ADMIN — ATENOLOL 25 MG: 25 TABLET ORAL at 20:12

## 2023-04-14 RX ADMIN — ALBUTEROL SULFATE 2 PUFF: 108 AEROSOL, METERED RESPIRATORY (INHALATION) at 09:10

## 2023-04-14 RX ADMIN — LEVOTHYROXINE, LIOTHYRONINE 60 MG: 19; 4.5 TABLET ORAL at 09:09

## 2023-04-14 RX ADMIN — ALBUTEROL SULFATE 2 PUFF: 108 AEROSOL, METERED RESPIRATORY (INHALATION) at 20:28

## 2023-04-14 RX ADMIN — ATENOLOL 25 MG: 25 TABLET ORAL at 09:09

## 2023-04-14 RX ADMIN — SODIUM CHLORIDE, PRESERVATIVE FREE 10 ML: 5 INJECTION INTRAVENOUS at 20:13

## 2023-04-14 RX ADMIN — HEPARIN SODIUM 5000 UNITS: 10000 INJECTION INTRAVENOUS; SUBCUTANEOUS at 20:13

## 2023-04-14 RX ADMIN — HEPARIN SODIUM 5000 UNITS: 10000 INJECTION INTRAVENOUS; SUBCUTANEOUS at 12:08

## 2023-04-14 RX ADMIN — ALBUTEROL SULFATE 2 PUFF: 108 AEROSOL, METERED RESPIRATORY (INHALATION) at 13:44

## 2023-04-14 RX ADMIN — SODIUM CHLORIDE: 9 INJECTION, SOLUTION INTRAVENOUS at 07:52

## 2023-04-14 RX ADMIN — SODIUM CHLORIDE, PRESERVATIVE FREE 10 ML: 5 INJECTION INTRAVENOUS at 07:52

## 2023-04-14 RX ADMIN — Medication 50 MG: at 07:52

## 2023-04-14 RX ADMIN — BARICITINIB 4 MG: 2 TABLET, FILM COATED ORAL at 10:21

## 2023-04-14 ASSESSMENT — PAIN SCALES - GENERAL
PAINLEVEL_OUTOF10: 0

## 2023-04-14 ASSESSMENT — PAIN SCALES - WONG BAKER
WONGBAKER_NUMERICALRESPONSE: 0

## 2023-04-14 ASSESSMENT — PAIN - FUNCTIONAL ASSESSMENT: PAIN_FUNCTIONAL_ASSESSMENT: NONE - DENIES PAIN

## 2023-04-15 PROCEDURE — 2580000003 HC RX 258: Performed by: NURSE PRACTITIONER

## 2023-04-15 PROCEDURE — 6360000002 HC RX W HCPCS: Performed by: NURSE PRACTITIONER

## 2023-04-15 PROCEDURE — 6370000000 HC RX 637 (ALT 250 FOR IP): Performed by: NURSE PRACTITIONER

## 2023-04-15 PROCEDURE — 2700000000 HC OXYGEN THERAPY PER DAY

## 2023-04-15 PROCEDURE — 2060000000 HC ICU INTERMEDIATE R&B

## 2023-04-15 RX ADMIN — ATENOLOL 25 MG: 25 TABLET ORAL at 08:41

## 2023-04-15 RX ADMIN — HEPARIN SODIUM 5000 UNITS: 10000 INJECTION INTRAVENOUS; SUBCUTANEOUS at 04:09

## 2023-04-15 RX ADMIN — HEPARIN SODIUM 5000 UNITS: 10000 INJECTION INTRAVENOUS; SUBCUTANEOUS at 12:25

## 2023-04-15 RX ADMIN — BARICITINIB 4 MG: 2 TABLET, FILM COATED ORAL at 08:42

## 2023-04-15 RX ADMIN — HEPARIN SODIUM 5000 UNITS: 10000 INJECTION INTRAVENOUS; SUBCUTANEOUS at 19:59

## 2023-04-15 RX ADMIN — ALBUTEROL SULFATE 2 PUFF: 108 AEROSOL, METERED RESPIRATORY (INHALATION) at 08:42

## 2023-04-15 RX ADMIN — SODIUM CHLORIDE, PRESERVATIVE FREE 10 ML: 5 INJECTION INTRAVENOUS at 20:02

## 2023-04-15 RX ADMIN — ALBUTEROL SULFATE 2 PUFF: 108 AEROSOL, METERED RESPIRATORY (INHALATION) at 19:58

## 2023-04-15 RX ADMIN — DEXAMETHASONE SODIUM PHOSPHATE 6 MG: 10 INJECTION, SOLUTION INTRAMUSCULAR; INTRAVENOUS at 08:42

## 2023-04-15 RX ADMIN — LEVOTHYROXINE, LIOTHYRONINE 60 MG: 19; 4.5 TABLET ORAL at 08:41

## 2023-04-15 RX ADMIN — ALBUTEROL SULFATE 2 PUFF: 108 AEROSOL, METERED RESPIRATORY (INHALATION) at 14:50

## 2023-04-15 RX ADMIN — Medication 50 MG: at 08:41

## 2023-04-15 RX ADMIN — DEXAMETHASONE SODIUM PHOSPHATE 6 MG: 10 INJECTION, SOLUTION INTRAMUSCULAR; INTRAVENOUS at 00:48

## 2023-04-15 RX ADMIN — ALBUTEROL SULFATE 2 PUFF: 108 AEROSOL, METERED RESPIRATORY (INHALATION) at 02:39

## 2023-04-15 ASSESSMENT — PAIN SCALES - GENERAL
PAINLEVEL_OUTOF10: 0
PAINLEVEL_OUTOF10: 8
PAINLEVEL_OUTOF10: 0
PAINLEVEL_OUTOF10: 0

## 2023-04-15 ASSESSMENT — PAIN DESCRIPTION - LOCATION: LOCATION: BACK

## 2023-04-15 ASSESSMENT — PAIN DESCRIPTION - DESCRIPTORS: DESCRIPTORS: ACHING

## 2023-04-16 LAB
ALBUMIN SERPL-MCNC: 3.3 G/DL (ref 3.5–5.2)
ALP SERPL-CCNC: 59 U/L (ref 35–104)
ALT SERPL-CCNC: 7 U/L (ref 0–32)
ANION GAP SERPL CALCULATED.3IONS-SCNC: 9 MMOL/L (ref 7–16)
AST SERPL-CCNC: 14 U/L (ref 0–31)
BASOPHILS # BLD: 0.01 E9/L (ref 0–0.2)
BASOPHILS NFR BLD: 0.1 % (ref 0–2)
BILIRUB SERPL-MCNC: <0.2 MG/DL (ref 0–1.2)
BUN SERPL-MCNC: 27 MG/DL (ref 6–23)
CALCIUM SERPL-MCNC: 9.3 MG/DL (ref 8.6–10.2)
CHLORIDE SERPL-SCNC: 103 MMOL/L (ref 98–107)
CO2 SERPL-SCNC: 27 MMOL/L (ref 22–29)
CREAT SERPL-MCNC: 1 MG/DL (ref 0.5–1)
EOSINOPHIL # BLD: 0.01 E9/L (ref 0.05–0.5)
EOSINOPHIL NFR BLD: 0.1 % (ref 0–6)
ERYTHROCYTE [DISTWIDTH] IN BLOOD BY AUTOMATED COUNT: 15.9 FL (ref 11.5–15)
GLUCOSE SERPL-MCNC: 96 MG/DL (ref 74–99)
HCT VFR BLD AUTO: 32.6 % (ref 34–48)
HGB BLD-MCNC: 9.6 G/DL (ref 11.5–15.5)
IMM GRANULOCYTES # BLD: 0.07 E9/L
IMM GRANULOCYTES NFR BLD: 0.9 % (ref 0–5)
LYMPHOCYTES # BLD: 2.73 E9/L (ref 1.5–4)
LYMPHOCYTES NFR BLD: 36.4 % (ref 20–42)
MCH RBC QN AUTO: 27.4 PG (ref 26–35)
MCHC RBC AUTO-ENTMCNC: 29.4 % (ref 32–34.5)
MCV RBC AUTO: 93.1 FL (ref 80–99.9)
MONOCYTES # BLD: 0.4 E9/L (ref 0.1–0.95)
MONOCYTES NFR BLD: 5.3 % (ref 2–12)
NEUTROPHILS # BLD: 4.29 E9/L (ref 1.8–7.3)
NEUTS SEG NFR BLD: 57.2 % (ref 43–80)
PLATELET # BLD AUTO: 318 E9/L (ref 130–450)
PMV BLD AUTO: 10.7 FL (ref 7–12)
POTASSIUM SERPL-SCNC: 4.2 MMOL/L (ref 3.5–5)
PROT SERPL-MCNC: 7.1 G/DL (ref 6.4–8.3)
RBC # BLD AUTO: 3.5 E12/L (ref 3.5–5.5)
SODIUM SERPL-SCNC: 139 MMOL/L (ref 132–146)
WBC # BLD: 7.5 E9/L (ref 4.5–11.5)

## 2023-04-16 PROCEDURE — 6360000002 HC RX W HCPCS: Performed by: NURSE PRACTITIONER

## 2023-04-16 PROCEDURE — 2700000000 HC OXYGEN THERAPY PER DAY

## 2023-04-16 PROCEDURE — 36415 COLL VENOUS BLD VENIPUNCTURE: CPT

## 2023-04-16 PROCEDURE — 2580000003 HC RX 258: Performed by: NURSE PRACTITIONER

## 2023-04-16 PROCEDURE — 80053 COMPREHEN METABOLIC PANEL: CPT

## 2023-04-16 PROCEDURE — 85025 COMPLETE CBC W/AUTO DIFF WBC: CPT

## 2023-04-16 PROCEDURE — 2060000000 HC ICU INTERMEDIATE R&B

## 2023-04-16 PROCEDURE — 6370000000 HC RX 637 (ALT 250 FOR IP): Performed by: NURSE PRACTITIONER

## 2023-04-16 RX ORDER — DEXAMETHASONE SODIUM PHOSPHATE 4 MG/ML
6 INJECTION, SOLUTION INTRA-ARTICULAR; INTRALESIONAL; INTRAMUSCULAR; INTRAVENOUS; SOFT TISSUE DAILY
Status: COMPLETED | OUTPATIENT
Start: 2023-04-16 | End: 2023-04-22

## 2023-04-16 RX ADMIN — ALBUTEROL SULFATE 2 PUFF: 108 AEROSOL, METERED RESPIRATORY (INHALATION) at 09:09

## 2023-04-16 RX ADMIN — HEPARIN SODIUM 5000 UNITS: 10000 INJECTION INTRAVENOUS; SUBCUTANEOUS at 04:35

## 2023-04-16 RX ADMIN — HEPARIN SODIUM 5000 UNITS: 10000 INJECTION INTRAVENOUS; SUBCUTANEOUS at 20:16

## 2023-04-16 RX ADMIN — ALBUTEROL SULFATE 2 PUFF: 108 AEROSOL, METERED RESPIRATORY (INHALATION) at 02:42

## 2023-04-16 RX ADMIN — LEVOTHYROXINE, LIOTHYRONINE 60 MG: 19; 4.5 TABLET ORAL at 09:09

## 2023-04-16 RX ADMIN — BARICITINIB 4 MG: 2 TABLET, FILM COATED ORAL at 09:08

## 2023-04-16 RX ADMIN — ATENOLOL 25 MG: 25 TABLET ORAL at 09:09

## 2023-04-16 RX ADMIN — HEPARIN SODIUM 5000 UNITS: 10000 INJECTION INTRAVENOUS; SUBCUTANEOUS at 11:50

## 2023-04-16 RX ADMIN — SODIUM CHLORIDE, PRESERVATIVE FREE 10 ML: 5 INJECTION INTRAVENOUS at 09:12

## 2023-04-16 RX ADMIN — ALBUTEROL SULFATE 2 PUFF: 108 AEROSOL, METERED RESPIRATORY (INHALATION) at 14:58

## 2023-04-16 RX ADMIN — ALBUTEROL SULFATE 2 PUFF: 108 AEROSOL, METERED RESPIRATORY (INHALATION) at 19:54

## 2023-04-16 RX ADMIN — DEXAMETHASONE SODIUM PHOSPHATE 6 MG: 4 INJECTION, SOLUTION INTRAMUSCULAR; INTRAVENOUS at 11:49

## 2023-04-16 RX ADMIN — SODIUM CHLORIDE, PRESERVATIVE FREE 10 ML: 5 INJECTION INTRAVENOUS at 19:53

## 2023-04-16 RX ADMIN — Medication 50 MG: at 09:09

## 2023-04-16 RX ADMIN — SODIUM CHLORIDE: 9 INJECTION, SOLUTION INTRAVENOUS at 14:58

## 2023-04-16 ASSESSMENT — PAIN SCALES - GENERAL
PAINLEVEL_OUTOF10: 0
PAINLEVEL_OUTOF10: 5
PAINLEVEL_OUTOF10: 0

## 2023-04-16 ASSESSMENT — PAIN DESCRIPTION - DESCRIPTORS: DESCRIPTORS: DISCOMFORT

## 2023-04-16 ASSESSMENT — PAIN DESCRIPTION - LOCATION: LOCATION: BACK

## 2023-04-17 LAB
ALBUMIN SERPL-MCNC: 3.6 G/DL (ref 3.5–5.2)
ALP SERPL-CCNC: 65 U/L (ref 35–104)
ALT SERPL-CCNC: 8 U/L (ref 0–32)
ANION GAP SERPL CALCULATED.3IONS-SCNC: 15 MMOL/L (ref 7–16)
ANISOCYTOSIS: ABNORMAL
AST SERPL-CCNC: 17 U/L (ref 0–31)
BASOPHILS # BLD: 0 E9/L (ref 0–0.2)
BASOPHILS NFR BLD: 0.1 % (ref 0–2)
BILIRUB SERPL-MCNC: 0.2 MG/DL (ref 0–1.2)
BUN SERPL-MCNC: 26 MG/DL (ref 6–23)
BURR CELLS: ABNORMAL
CALCIUM SERPL-MCNC: 9.1 MG/DL (ref 8.6–10.2)
CHLORIDE SERPL-SCNC: 104 MMOL/L (ref 98–107)
CO2 SERPL-SCNC: 19 MMOL/L (ref 22–29)
CREAT SERPL-MCNC: 0.9 MG/DL (ref 0.5–1)
EOSINOPHIL # BLD: 0.06 E9/L (ref 0.05–0.5)
EOSINOPHIL NFR BLD: 0.9 % (ref 0–6)
ERYTHROCYTE [DISTWIDTH] IN BLOOD BY AUTOMATED COUNT: 15.9 FL (ref 11.5–15)
GLUCOSE SERPL-MCNC: 85 MG/DL (ref 74–99)
HCT VFR BLD AUTO: 41.4 % (ref 34–48)
HGB BLD-MCNC: 11.2 G/DL (ref 11.5–15.5)
HYPOCHROMIA: ABNORMAL
LYMPHOCYTES # BLD: 1.94 E9/L (ref 1.5–4)
LYMPHOCYTES NFR BLD: 28.7 % (ref 20–42)
MCH RBC QN AUTO: 26.9 PG (ref 26–35)
MCHC RBC AUTO-ENTMCNC: 27.1 % (ref 32–34.5)
MCV RBC AUTO: 99.3 FL (ref 80–99.9)
METAMYELOCYTES NFR BLD MANUAL: 0.9 % (ref 0–1)
MONOCYTES # BLD: 0.34 E9/L (ref 0.1–0.95)
MONOCYTES NFR BLD: 5.2 % (ref 2–12)
NEUTROPHILS # BLD: 4.36 E9/L (ref 1.8–7.3)
NEUTS SEG NFR BLD: 64.3 % (ref 43–80)
OVALOCYTES: ABNORMAL
PLATELET # BLD AUTO: 281 E9/L (ref 130–450)
PMV BLD AUTO: 11.2 FL (ref 7–12)
POIKILOCYTES: ABNORMAL
POLYCHROMASIA: ABNORMAL
POTASSIUM SERPL-SCNC: 4.2 MMOL/L (ref 3.5–5)
PROT SERPL-MCNC: 7.5 G/DL (ref 6.4–8.3)
RBC # BLD AUTO: 4.17 E12/L (ref 3.5–5.5)
SODIUM SERPL-SCNC: 138 MMOL/L (ref 132–146)
WBC # BLD: 6.7 E9/L (ref 4.5–11.5)

## 2023-04-17 PROCEDURE — 6360000002 HC RX W HCPCS: Performed by: NURSE PRACTITIONER

## 2023-04-17 PROCEDURE — 2580000003 HC RX 258: Performed by: NURSE PRACTITIONER

## 2023-04-17 PROCEDURE — 80053 COMPREHEN METABOLIC PANEL: CPT

## 2023-04-17 PROCEDURE — 97535 SELF CARE MNGMENT TRAINING: CPT

## 2023-04-17 PROCEDURE — 97530 THERAPEUTIC ACTIVITIES: CPT

## 2023-04-17 PROCEDURE — 97165 OT EVAL LOW COMPLEX 30 MIN: CPT

## 2023-04-17 PROCEDURE — 36415 COLL VENOUS BLD VENIPUNCTURE: CPT

## 2023-04-17 PROCEDURE — 2700000000 HC OXYGEN THERAPY PER DAY

## 2023-04-17 PROCEDURE — 2060000000 HC ICU INTERMEDIATE R&B

## 2023-04-17 PROCEDURE — 85025 COMPLETE CBC W/AUTO DIFF WBC: CPT

## 2023-04-17 PROCEDURE — 6370000000 HC RX 637 (ALT 250 FOR IP): Performed by: NURSE PRACTITIONER

## 2023-04-17 RX ORDER — ASCORBIC ACID 500 MG
500 TABLET ORAL DAILY
Qty: 30 TABLET | Refills: 3 | DISCHARGE
Start: 2023-04-18 | End: 2023-04-28

## 2023-04-17 RX ORDER — DEXAMETHASONE 6 MG/1
6 TABLET ORAL DAILY
Qty: 5 TABLET | Refills: 0 | DISCHARGE
Start: 2023-04-17 | End: 2023-04-22

## 2023-04-17 RX ORDER — MORPHINE SULFATE 15 MG/1
15 TABLET, FILM COATED, EXTENDED RELEASE ORAL 3 TIMES DAILY PRN
Status: DISCONTINUED | OUTPATIENT
Start: 2023-04-17 | End: 2023-04-24 | Stop reason: HOSPADM

## 2023-04-17 RX ORDER — ONDANSETRON 2 MG/ML
4 INJECTION INTRAMUSCULAR; INTRAVENOUS EVERY 6 HOURS PRN
Status: DISCONTINUED | OUTPATIENT
Start: 2023-04-17 | End: 2023-04-24 | Stop reason: HOSPADM

## 2023-04-17 RX ORDER — ZINC SULFATE 50(220)MG
50 CAPSULE ORAL DAILY
Qty: 30 CAPSULE | Refills: 3 | DISCHARGE
Start: 2023-04-18 | End: 2023-04-25

## 2023-04-17 RX ADMIN — SODIUM CHLORIDE, PRESERVATIVE FREE 10 ML: 5 INJECTION INTRAVENOUS at 09:26

## 2023-04-17 RX ADMIN — HEPARIN SODIUM 5000 UNITS: 10000 INJECTION INTRAVENOUS; SUBCUTANEOUS at 05:58

## 2023-04-17 RX ADMIN — SODIUM CHLORIDE, PRESERVATIVE FREE 10 ML: 5 INJECTION INTRAVENOUS at 19:50

## 2023-04-17 RX ADMIN — DEXAMETHASONE SODIUM PHOSPHATE 6 MG: 4 INJECTION, SOLUTION INTRAMUSCULAR; INTRAVENOUS at 09:26

## 2023-04-17 RX ADMIN — SODIUM CHLORIDE: 9 INJECTION, SOLUTION INTRAVENOUS at 15:22

## 2023-04-17 RX ADMIN — MORPHINE SULFATE 15 MG: 15 TABLET, FILM COATED, EXTENDED RELEASE ORAL at 17:33

## 2023-04-17 RX ADMIN — HEPARIN SODIUM 5000 UNITS: 10000 INJECTION INTRAVENOUS; SUBCUTANEOUS at 12:34

## 2023-04-17 RX ADMIN — ONDANSETRON 4 MG: 2 INJECTION INTRAMUSCULAR; INTRAVENOUS at 17:47

## 2023-04-17 RX ADMIN — HEPARIN SODIUM 5000 UNITS: 10000 INJECTION INTRAVENOUS; SUBCUTANEOUS at 19:50

## 2023-04-17 RX ADMIN — ALBUTEROL SULFATE 2 PUFF: 108 AEROSOL, METERED RESPIRATORY (INHALATION) at 12:34

## 2023-04-17 RX ADMIN — SODIUM CHLORIDE: 9 INJECTION, SOLUTION INTRAVENOUS at 02:01

## 2023-04-17 RX ADMIN — ALBUTEROL SULFATE 2 PUFF: 108 AEROSOL, METERED RESPIRATORY (INHALATION) at 02:01

## 2023-04-17 RX ADMIN — ALBUTEROL SULFATE 2 PUFF: 108 AEROSOL, METERED RESPIRATORY (INHALATION) at 19:50

## 2023-04-17 ASSESSMENT — PAIN SCALES - GENERAL
PAINLEVEL_OUTOF10: 8
PAINLEVEL_OUTOF10: 9
PAINLEVEL_OUTOF10: 7

## 2023-04-17 ASSESSMENT — PAIN DESCRIPTION - DESCRIPTORS
DESCRIPTORS: DISCOMFORT
DESCRIPTORS: DISCOMFORT

## 2023-04-17 ASSESSMENT — PAIN DESCRIPTION - ORIENTATION
ORIENTATION: LOWER;MID
ORIENTATION: LOWER;MID

## 2023-04-17 ASSESSMENT — PAIN DESCRIPTION - LOCATION
LOCATION: BACK

## 2023-04-18 LAB
ALBUMIN SERPL-MCNC: 3.6 G/DL (ref 3.5–5.2)
ALP SERPL-CCNC: 64 U/L (ref 35–104)
ALT SERPL-CCNC: 9 U/L (ref 0–32)
ANION GAP SERPL CALCULATED.3IONS-SCNC: 11 MMOL/L (ref 7–16)
AST SERPL-CCNC: 18 U/L (ref 0–31)
BASOPHILS # BLD: 0.02 E9/L (ref 0–0.2)
BASOPHILS NFR BLD: 0.3 % (ref 0–2)
BILIRUB SERPL-MCNC: 0.3 MG/DL (ref 0–1.2)
BUN SERPL-MCNC: 22 MG/DL (ref 6–23)
CALCIUM SERPL-MCNC: 9.3 MG/DL (ref 8.6–10.2)
CHLORIDE SERPL-SCNC: 106 MMOL/L (ref 98–107)
CO2 SERPL-SCNC: 22 MMOL/L (ref 22–29)
CREAT SERPL-MCNC: 1 MG/DL (ref 0.5–1)
EOSINOPHIL # BLD: 0.04 E9/L (ref 0.05–0.5)
EOSINOPHIL NFR BLD: 0.6 % (ref 0–6)
ERYTHROCYTE [DISTWIDTH] IN BLOOD BY AUTOMATED COUNT: 15.8 FL (ref 11.5–15)
GLUCOSE SERPL-MCNC: 83 MG/DL (ref 74–99)
HCT VFR BLD AUTO: 37.8 % (ref 34–48)
HGB BLD-MCNC: 11 G/DL (ref 11.5–15.5)
IMM GRANULOCYTES # BLD: 0.09 E9/L
IMM GRANULOCYTES NFR BLD: 1.3 % (ref 0–5)
LYMPHOCYTES # BLD: 2.17 E9/L (ref 1.5–4)
LYMPHOCYTES NFR BLD: 32.4 % (ref 20–42)
MCH RBC QN AUTO: 27 PG (ref 26–35)
MCHC RBC AUTO-ENTMCNC: 29.1 % (ref 32–34.5)
MCV RBC AUTO: 92.9 FL (ref 80–99.9)
MONOCYTES # BLD: 0.41 E9/L (ref 0.1–0.95)
MONOCYTES NFR BLD: 6.1 % (ref 2–12)
NEUTROPHILS # BLD: 3.96 E9/L (ref 1.8–7.3)
NEUTS SEG NFR BLD: 59.3 % (ref 43–80)
PLATELET # BLD AUTO: 259 E9/L (ref 130–450)
PMV BLD AUTO: 11.1 FL (ref 7–12)
POTASSIUM SERPL-SCNC: 4.2 MMOL/L (ref 3.5–5)
PROT SERPL-MCNC: 7.4 G/DL (ref 6.4–8.3)
RBC # BLD AUTO: 4.07 E12/L (ref 3.5–5.5)
SODIUM SERPL-SCNC: 139 MMOL/L (ref 132–146)
WBC # BLD: 6.7 E9/L (ref 4.5–11.5)

## 2023-04-18 PROCEDURE — 2060000000 HC ICU INTERMEDIATE R&B

## 2023-04-18 PROCEDURE — 36415 COLL VENOUS BLD VENIPUNCTURE: CPT

## 2023-04-18 PROCEDURE — 80053 COMPREHEN METABOLIC PANEL: CPT

## 2023-04-18 PROCEDURE — 85025 COMPLETE CBC W/AUTO DIFF WBC: CPT

## 2023-04-18 PROCEDURE — 6370000000 HC RX 637 (ALT 250 FOR IP): Performed by: NURSE PRACTITIONER

## 2023-04-18 PROCEDURE — 6360000002 HC RX W HCPCS: Performed by: NURSE PRACTITIONER

## 2023-04-18 PROCEDURE — 2580000003 HC RX 258: Performed by: NURSE PRACTITIONER

## 2023-04-18 PROCEDURE — 2700000000 HC OXYGEN THERAPY PER DAY

## 2023-04-18 RX ADMIN — ALBUTEROL SULFATE 2 PUFF: 108 AEROSOL, METERED RESPIRATORY (INHALATION) at 09:57

## 2023-04-18 RX ADMIN — ALBUTEROL SULFATE 2 PUFF: 108 AEROSOL, METERED RESPIRATORY (INHALATION) at 03:23

## 2023-04-18 RX ADMIN — ALBUTEROL SULFATE 2 PUFF: 108 AEROSOL, METERED RESPIRATORY (INHALATION) at 19:47

## 2023-04-18 RX ADMIN — ALBUTEROL SULFATE 2 PUFF: 108 AEROSOL, METERED RESPIRATORY (INHALATION) at 14:31

## 2023-04-18 RX ADMIN — LEVOTHYROXINE, LIOTHYRONINE 60 MG: 19; 4.5 TABLET ORAL at 09:56

## 2023-04-18 RX ADMIN — BARICITINIB 4 MG: 2 TABLET, FILM COATED ORAL at 09:56

## 2023-04-18 RX ADMIN — ATENOLOL 25 MG: 25 TABLET ORAL at 09:56

## 2023-04-18 RX ADMIN — Medication 50 MG: at 09:56

## 2023-04-18 RX ADMIN — HEPARIN SODIUM 5000 UNITS: 10000 INJECTION INTRAVENOUS; SUBCUTANEOUS at 14:30

## 2023-04-18 RX ADMIN — HEPARIN SODIUM 5000 UNITS: 10000 INJECTION INTRAVENOUS; SUBCUTANEOUS at 04:12

## 2023-04-18 RX ADMIN — SODIUM CHLORIDE, PRESERVATIVE FREE 10 ML: 5 INJECTION INTRAVENOUS at 19:46

## 2023-04-18 RX ADMIN — HEPARIN SODIUM 5000 UNITS: 10000 INJECTION INTRAVENOUS; SUBCUTANEOUS at 21:17

## 2023-04-18 RX ADMIN — DEXAMETHASONE SODIUM PHOSPHATE 6 MG: 4 INJECTION, SOLUTION INTRAMUSCULAR; INTRAVENOUS at 09:56

## 2023-04-18 RX ADMIN — MORPHINE SULFATE 15 MG: 15 TABLET, FILM COATED, EXTENDED RELEASE ORAL at 14:31

## 2023-04-18 ASSESSMENT — PAIN SCALES - GENERAL
PAINLEVEL_OUTOF10: 8
PAINLEVEL_OUTOF10: 0

## 2023-04-18 ASSESSMENT — PAIN DESCRIPTION - LOCATION: LOCATION: BACK

## 2023-04-18 ASSESSMENT — PAIN DESCRIPTION - ORIENTATION: ORIENTATION: MID;LOWER

## 2023-04-18 ASSESSMENT — PAIN DESCRIPTION - DESCRIPTORS: DESCRIPTORS: DISCOMFORT

## 2023-04-19 LAB
BACTERIA BLD CULT ORG #2: NORMAL
BACTERIA BLD CULT: NORMAL

## 2023-04-19 PROCEDURE — 6370000000 HC RX 637 (ALT 250 FOR IP): Performed by: NURSE PRACTITIONER

## 2023-04-19 PROCEDURE — 6360000002 HC RX W HCPCS: Performed by: NURSE PRACTITIONER

## 2023-04-19 PROCEDURE — 1200000000 HC SEMI PRIVATE

## 2023-04-19 PROCEDURE — 6370000000 HC RX 637 (ALT 250 FOR IP): Performed by: INTERNAL MEDICINE

## 2023-04-19 PROCEDURE — 2580000003 HC RX 258: Performed by: NURSE PRACTITIONER

## 2023-04-19 PROCEDURE — 2500000003 HC RX 250 WO HCPCS: Performed by: INTERNAL MEDICINE

## 2023-04-19 PROCEDURE — 2700000000 HC OXYGEN THERAPY PER DAY

## 2023-04-19 RX ADMIN — PETROLATUM: 420 OINTMENT TOPICAL at 22:15

## 2023-04-19 RX ADMIN — Medication: at 22:15

## 2023-04-19 RX ADMIN — PETROLATUM: 420 OINTMENT TOPICAL at 13:48

## 2023-04-19 RX ADMIN — HEPARIN SODIUM 5000 UNITS: 10000 INJECTION INTRAVENOUS; SUBCUTANEOUS at 22:24

## 2023-04-19 RX ADMIN — ALBUTEROL SULFATE 2 PUFF: 108 AEROSOL, METERED RESPIRATORY (INHALATION) at 22:34

## 2023-04-19 RX ADMIN — DEXAMETHASONE SODIUM PHOSPHATE 6 MG: 4 INJECTION, SOLUTION INTRAMUSCULAR; INTRAVENOUS at 10:04

## 2023-04-19 RX ADMIN — BARICITINIB 4 MG: 2 TABLET, FILM COATED ORAL at 11:08

## 2023-04-19 RX ADMIN — Medication 50 MG: at 10:04

## 2023-04-19 RX ADMIN — LEVOTHYROXINE, LIOTHYRONINE 60 MG: 19; 4.5 TABLET ORAL at 10:04

## 2023-04-19 RX ADMIN — ANTI-FUNGAL POWDER MICONAZOLE NITRATE TALC FREE: 1.42 POWDER TOPICAL at 13:49

## 2023-04-19 RX ADMIN — ALBUTEROL SULFATE 2 PUFF: 108 AEROSOL, METERED RESPIRATORY (INHALATION) at 10:03

## 2023-04-19 RX ADMIN — MORPHINE SULFATE 15 MG: 15 TABLET, FILM COATED, EXTENDED RELEASE ORAL at 10:03

## 2023-04-19 RX ADMIN — ANORECTAL OINTMENT: 15.7; .44; 24; 20.6 OINTMENT TOPICAL at 22:15

## 2023-04-19 RX ADMIN — ANTI-FUNGAL POWDER MICONAZOLE NITRATE TALC FREE: 1.42 POWDER TOPICAL at 22:15

## 2023-04-19 RX ADMIN — ALBUTEROL SULFATE 2 PUFF: 108 AEROSOL, METERED RESPIRATORY (INHALATION) at 13:50

## 2023-04-19 RX ADMIN — ONDANSETRON 4 MG: 2 INJECTION INTRAMUSCULAR; INTRAVENOUS at 22:23

## 2023-04-19 RX ADMIN — SODIUM CHLORIDE: 9 INJECTION, SOLUTION INTRAVENOUS at 01:34

## 2023-04-19 RX ADMIN — HEPARIN SODIUM 5000 UNITS: 10000 INJECTION INTRAVENOUS; SUBCUTANEOUS at 04:18

## 2023-04-19 RX ADMIN — HEPARIN SODIUM 5000 UNITS: 10000 INJECTION INTRAVENOUS; SUBCUTANEOUS at 13:56

## 2023-04-19 RX ADMIN — ANORECTAL OINTMENT: 15.7; .44; 24; 20.6 OINTMENT TOPICAL at 13:49

## 2023-04-19 RX ADMIN — SODIUM CHLORIDE, PRESERVATIVE FREE 10 ML: 5 INJECTION INTRAVENOUS at 10:04

## 2023-04-19 RX ADMIN — Medication: at 13:49

## 2023-04-19 RX ADMIN — MORPHINE SULFATE 15 MG: 15 TABLET, FILM COATED, EXTENDED RELEASE ORAL at 16:42

## 2023-04-19 RX ADMIN — SODIUM CHLORIDE, PRESERVATIVE FREE 10 ML: 5 INJECTION INTRAVENOUS at 22:14

## 2023-04-19 RX ADMIN — ONDANSETRON 4 MG: 2 INJECTION INTRAMUSCULAR; INTRAVENOUS at 01:32

## 2023-04-19 ASSESSMENT — PAIN DESCRIPTION - ORIENTATION: ORIENTATION: LOWER;MID

## 2023-04-19 ASSESSMENT — PAIN - FUNCTIONAL ASSESSMENT
PAIN_FUNCTIONAL_ASSESSMENT: ACTIVITIES ARE NOT PREVENTED
PAIN_FUNCTIONAL_ASSESSMENT: ACTIVITIES ARE NOT PREVENTED

## 2023-04-19 ASSESSMENT — PAIN DESCRIPTION - LOCATION
LOCATION: BACK
LOCATION: GENERALIZED

## 2023-04-19 ASSESSMENT — PAIN DESCRIPTION - DESCRIPTORS
DESCRIPTORS: ACHING;SORE
DESCRIPTORS: ACHING;THROBBING

## 2023-04-19 ASSESSMENT — PAIN SCALES - GENERAL: PAINLEVEL_OUTOF10: 0

## 2023-04-20 PROCEDURE — 6360000002 HC RX W HCPCS: Performed by: NURSE PRACTITIONER

## 2023-04-20 PROCEDURE — 1200000000 HC SEMI PRIVATE

## 2023-04-20 PROCEDURE — 6370000000 HC RX 637 (ALT 250 FOR IP): Performed by: NURSE PRACTITIONER

## 2023-04-20 RX ADMIN — ONDANSETRON 4 MG: 2 INJECTION INTRAMUSCULAR; INTRAVENOUS at 12:32

## 2023-04-20 RX ADMIN — MORPHINE SULFATE 15 MG: 15 TABLET, FILM COATED, EXTENDED RELEASE ORAL at 20:14

## 2023-04-20 RX ADMIN — PETROLATUM: 420 OINTMENT TOPICAL at 09:47

## 2023-04-20 RX ADMIN — ANORECTAL OINTMENT: 15.7; .44; 24; 20.6 OINTMENT TOPICAL at 20:14

## 2023-04-20 RX ADMIN — ANTI-FUNGAL POWDER MICONAZOLE NITRATE TALC FREE: 1.42 POWDER TOPICAL at 20:13

## 2023-04-20 RX ADMIN — ANORECTAL OINTMENT: 15.7; .44; 24; 20.6 OINTMENT TOPICAL at 09:47

## 2023-04-20 RX ADMIN — LEVOTHYROXINE, LIOTHYRONINE 60 MG: 19; 4.5 TABLET ORAL at 09:43

## 2023-04-20 RX ADMIN — BARICITINIB 4 MG: 2 TABLET, FILM COATED ORAL at 09:44

## 2023-04-20 RX ADMIN — HEPARIN SODIUM 5000 UNITS: 10000 INJECTION INTRAVENOUS; SUBCUTANEOUS at 14:03

## 2023-04-20 RX ADMIN — ANTI-FUNGAL POWDER MICONAZOLE NITRATE TALC FREE: 1.42 POWDER TOPICAL at 09:47

## 2023-04-20 RX ADMIN — Medication: at 20:13

## 2023-04-20 RX ADMIN — HEPARIN SODIUM 5000 UNITS: 10000 INJECTION INTRAVENOUS; SUBCUTANEOUS at 20:14

## 2023-04-20 RX ADMIN — PETROLATUM: 420 OINTMENT TOPICAL at 20:13

## 2023-04-20 RX ADMIN — ALBUTEROL SULFATE 2 PUFF: 108 AEROSOL, METERED RESPIRATORY (INHALATION) at 20:13

## 2023-04-20 RX ADMIN — ALBUTEROL SULFATE 2 PUFF: 108 AEROSOL, METERED RESPIRATORY (INHALATION) at 09:43

## 2023-04-20 RX ADMIN — MORPHINE SULFATE 15 MG: 15 TABLET, FILM COATED, EXTENDED RELEASE ORAL at 01:14

## 2023-04-20 RX ADMIN — HEPARIN SODIUM 5000 UNITS: 10000 INJECTION INTRAVENOUS; SUBCUTANEOUS at 06:22

## 2023-04-20 RX ADMIN — ALBUTEROL SULFATE 2 PUFF: 108 AEROSOL, METERED RESPIRATORY (INHALATION) at 01:14

## 2023-04-20 RX ADMIN — ONDANSETRON 4 MG: 2 INJECTION INTRAMUSCULAR; INTRAVENOUS at 20:14

## 2023-04-20 RX ADMIN — ALBUTEROL SULFATE 2 PUFF: 108 AEROSOL, METERED RESPIRATORY (INHALATION) at 14:03

## 2023-04-20 RX ADMIN — Medication 50 MG: at 09:43

## 2023-04-20 RX ADMIN — MORPHINE SULFATE 15 MG: 15 TABLET, FILM COATED, EXTENDED RELEASE ORAL at 09:44

## 2023-04-20 RX ADMIN — DEXAMETHASONE SODIUM PHOSPHATE 6 MG: 4 INJECTION, SOLUTION INTRAMUSCULAR; INTRAVENOUS at 10:44

## 2023-04-20 ASSESSMENT — PAIN DESCRIPTION - LOCATION
LOCATION: BACK

## 2023-04-20 ASSESSMENT — PAIN DESCRIPTION - PAIN TYPE: TYPE: CHRONIC PAIN

## 2023-04-20 ASSESSMENT — PAIN DESCRIPTION - DESCRIPTORS
DESCRIPTORS: ACHING;DISCOMFORT;SORE
DESCRIPTORS: SORE
DESCRIPTORS: ACHING;DISCOMFORT;SORE
DESCRIPTORS: SORE

## 2023-04-20 ASSESSMENT — PAIN - FUNCTIONAL ASSESSMENT
PAIN_FUNCTIONAL_ASSESSMENT: PREVENTS OR INTERFERES SOME ACTIVE ACTIVITIES AND ADLS
PAIN_FUNCTIONAL_ASSESSMENT: PREVENTS OR INTERFERES SOME ACTIVE ACTIVITIES AND ADLS

## 2023-04-20 ASSESSMENT — PAIN SCALES - GENERAL
PAINLEVEL_OUTOF10: 8
PAINLEVEL_OUTOF10: 9
PAINLEVEL_OUTOF10: 6
PAINLEVEL_OUTOF10: 8

## 2023-04-20 ASSESSMENT — PAIN DESCRIPTION - ORIENTATION
ORIENTATION: LOWER
ORIENTATION: RIGHT;LEFT
ORIENTATION: LOWER

## 2023-04-20 ASSESSMENT — PAIN SCALES - WONG BAKER: WONGBAKER_NUMERICALRESPONSE: 0

## 2023-04-20 ASSESSMENT — PAIN DESCRIPTION - FREQUENCY: FREQUENCY: CONTINUOUS

## 2023-04-20 ASSESSMENT — PAIN DESCRIPTION - ONSET: ONSET: ON-GOING

## 2023-04-21 PROCEDURE — 1200000000 HC SEMI PRIVATE

## 2023-04-21 PROCEDURE — 6370000000 HC RX 637 (ALT 250 FOR IP): Performed by: NURSE PRACTITIONER

## 2023-04-21 PROCEDURE — 6360000002 HC RX W HCPCS: Performed by: NURSE PRACTITIONER

## 2023-04-21 PROCEDURE — 2580000003 HC RX 258: Performed by: NURSE PRACTITIONER

## 2023-04-21 RX ADMIN — SODIUM CHLORIDE, PRESERVATIVE FREE 10 ML: 5 INJECTION INTRAVENOUS at 20:27

## 2023-04-21 RX ADMIN — HEPARIN SODIUM 5000 UNITS: 10000 INJECTION INTRAVENOUS; SUBCUTANEOUS at 05:22

## 2023-04-21 RX ADMIN — HEPARIN SODIUM 5000 UNITS: 10000 INJECTION INTRAVENOUS; SUBCUTANEOUS at 20:26

## 2023-04-21 RX ADMIN — ONDANSETRON 4 MG: 2 INJECTION INTRAMUSCULAR; INTRAVENOUS at 12:42

## 2023-04-21 RX ADMIN — SODIUM CHLORIDE, PRESERVATIVE FREE 10 ML: 5 INJECTION INTRAVENOUS at 08:47

## 2023-04-21 RX ADMIN — Medication: at 20:39

## 2023-04-21 RX ADMIN — ATENOLOL 25 MG: 25 TABLET ORAL at 20:27

## 2023-04-21 RX ADMIN — DEXAMETHASONE SODIUM PHOSPHATE 6 MG: 4 INJECTION, SOLUTION INTRAMUSCULAR; INTRAVENOUS at 09:35

## 2023-04-21 RX ADMIN — ALBUTEROL SULFATE 2 PUFF: 108 AEROSOL, METERED RESPIRATORY (INHALATION) at 20:40

## 2023-04-21 RX ADMIN — MORPHINE SULFATE 15 MG: 15 TABLET, FILM COATED, EXTENDED RELEASE ORAL at 20:27

## 2023-04-21 RX ADMIN — LEVOTHYROXINE, LIOTHYRONINE 60 MG: 19; 4.5 TABLET ORAL at 08:48

## 2023-04-21 RX ADMIN — BARICITINIB 4 MG: 2 TABLET, FILM COATED ORAL at 08:47

## 2023-04-21 RX ADMIN — ANTI-FUNGAL POWDER MICONAZOLE NITRATE TALC FREE: 1.42 POWDER TOPICAL at 08:55

## 2023-04-21 RX ADMIN — ANTI-FUNGAL POWDER MICONAZOLE NITRATE TALC FREE: 1.42 POWDER TOPICAL at 20:39

## 2023-04-21 RX ADMIN — HEPARIN SODIUM 5000 UNITS: 10000 INJECTION INTRAVENOUS; SUBCUTANEOUS at 13:53

## 2023-04-21 RX ADMIN — ALBUTEROL SULFATE 2 PUFF: 108 AEROSOL, METERED RESPIRATORY (INHALATION) at 13:54

## 2023-04-21 RX ADMIN — MORPHINE SULFATE 15 MG: 15 TABLET, FILM COATED, EXTENDED RELEASE ORAL at 13:53

## 2023-04-21 RX ADMIN — ALBUTEROL SULFATE 2 PUFF: 108 AEROSOL, METERED RESPIRATORY (INHALATION) at 08:55

## 2023-04-21 RX ADMIN — Medication 50 MG: at 08:48

## 2023-04-21 RX ADMIN — MORPHINE SULFATE 15 MG: 15 TABLET, FILM COATED, EXTENDED RELEASE ORAL at 05:22

## 2023-04-21 RX ADMIN — PETROLATUM: 420 OINTMENT TOPICAL at 20:39

## 2023-04-21 RX ADMIN — ANORECTAL OINTMENT: 15.7; .44; 24; 20.6 OINTMENT TOPICAL at 20:39

## 2023-04-21 RX ADMIN — ANORECTAL OINTMENT: 15.7; .44; 24; 20.6 OINTMENT TOPICAL at 08:55

## 2023-04-21 RX ADMIN — PETROLATUM: 420 OINTMENT TOPICAL at 08:55

## 2023-04-21 ASSESSMENT — PAIN SCALES - GENERAL
PAINLEVEL_OUTOF10: 7
PAINLEVEL_OUTOF10: 5
PAINLEVEL_OUTOF10: 8

## 2023-04-21 ASSESSMENT — PAIN DESCRIPTION - ORIENTATION: ORIENTATION: LEFT

## 2023-04-21 ASSESSMENT — PAIN DESCRIPTION - DESCRIPTORS: DESCRIPTORS: ACHING;DISCOMFORT;SORE

## 2023-04-21 ASSESSMENT — PAIN DESCRIPTION - LOCATION: LOCATION: ARM;LEG

## 2023-04-21 ASSESSMENT — PAIN - FUNCTIONAL ASSESSMENT: PAIN_FUNCTIONAL_ASSESSMENT: ACTIVITIES ARE NOT PREVENTED

## 2023-04-22 PROCEDURE — 6370000000 HC RX 637 (ALT 250 FOR IP): Performed by: NURSE PRACTITIONER

## 2023-04-22 PROCEDURE — 6360000002 HC RX W HCPCS: Performed by: NURSE PRACTITIONER

## 2023-04-22 PROCEDURE — 2580000003 HC RX 258: Performed by: NURSE PRACTITIONER

## 2023-04-22 PROCEDURE — 1200000000 HC SEMI PRIVATE

## 2023-04-22 RX ADMIN — MORPHINE SULFATE 15 MG: 15 TABLET, FILM COATED, EXTENDED RELEASE ORAL at 06:10

## 2023-04-22 RX ADMIN — ANTI-FUNGAL POWDER MICONAZOLE NITRATE TALC FREE: 1.42 POWDER TOPICAL at 09:55

## 2023-04-22 RX ADMIN — PETROLATUM: 420 OINTMENT TOPICAL at 09:55

## 2023-04-22 RX ADMIN — HEPARIN SODIUM 5000 UNITS: 10000 INJECTION INTRAVENOUS; SUBCUTANEOUS at 13:46

## 2023-04-22 RX ADMIN — ATENOLOL 25 MG: 25 TABLET ORAL at 09:42

## 2023-04-22 RX ADMIN — HEPARIN SODIUM 5000 UNITS: 10000 INJECTION INTRAVENOUS; SUBCUTANEOUS at 22:04

## 2023-04-22 RX ADMIN — HEPARIN SODIUM 5000 UNITS: 10000 INJECTION INTRAVENOUS; SUBCUTANEOUS at 06:10

## 2023-04-22 RX ADMIN — ANORECTAL OINTMENT: 15.7; .44; 24; 20.6 OINTMENT TOPICAL at 22:31

## 2023-04-22 RX ADMIN — ANTI-FUNGAL POWDER MICONAZOLE NITRATE TALC FREE: 1.42 POWDER TOPICAL at 22:31

## 2023-04-22 RX ADMIN — FAMOTIDINE 20 MG: 20 TABLET, FILM COATED ORAL at 09:43

## 2023-04-22 RX ADMIN — LEVOTHYROXINE, LIOTHYRONINE 60 MG: 19; 4.5 TABLET ORAL at 09:41

## 2023-04-22 RX ADMIN — ANORECTAL OINTMENT: 15.7; .44; 24; 20.6 OINTMENT TOPICAL at 09:55

## 2023-04-22 RX ADMIN — SODIUM CHLORIDE, PRESERVATIVE FREE 10 ML: 5 INJECTION INTRAVENOUS at 21:58

## 2023-04-22 RX ADMIN — BARICITINIB 4 MG: 2 TABLET, FILM COATED ORAL at 09:42

## 2023-04-22 RX ADMIN — SODIUM CHLORIDE: 9 INJECTION, SOLUTION INTRAVENOUS at 09:59

## 2023-04-22 RX ADMIN — Medication: at 09:56

## 2023-04-22 RX ADMIN — PETROLATUM: 420 OINTMENT TOPICAL at 22:31

## 2023-04-22 RX ADMIN — DEXAMETHASONE SODIUM PHOSPHATE 6 MG: 4 INJECTION, SOLUTION INTRAMUSCULAR; INTRAVENOUS at 09:40

## 2023-04-22 RX ADMIN — ALBUTEROL SULFATE 2 PUFF: 108 AEROSOL, METERED RESPIRATORY (INHALATION) at 09:56

## 2023-04-22 RX ADMIN — ALBUTEROL SULFATE 2 PUFF: 108 AEROSOL, METERED RESPIRATORY (INHALATION) at 13:46

## 2023-04-22 RX ADMIN — Medication 50 MG: at 09:43

## 2023-04-22 ASSESSMENT — PAIN SCALES - GENERAL
PAINLEVEL_OUTOF10: 8
PAINLEVEL_OUTOF10: 8

## 2023-04-22 ASSESSMENT — PAIN DESCRIPTION - ONSET
ONSET: ON-GOING
ONSET: ON-GOING

## 2023-04-22 ASSESSMENT — PAIN DESCRIPTION - DESCRIPTORS
DESCRIPTORS: ACHING;DISCOMFORT
DESCRIPTORS: ACHING;DISCOMFORT

## 2023-04-22 ASSESSMENT — PAIN DESCRIPTION - FREQUENCY
FREQUENCY: CONTINUOUS
FREQUENCY: CONTINUOUS

## 2023-04-22 ASSESSMENT — PAIN DESCRIPTION - LOCATION
LOCATION: BACK
LOCATION: BACK

## 2023-04-23 PROCEDURE — 6360000002 HC RX W HCPCS: Performed by: NURSE PRACTITIONER

## 2023-04-23 PROCEDURE — 6370000000 HC RX 637 (ALT 250 FOR IP): Performed by: NURSE PRACTITIONER

## 2023-04-23 PROCEDURE — 1200000000 HC SEMI PRIVATE

## 2023-04-23 PROCEDURE — 2580000003 HC RX 258: Performed by: NURSE PRACTITIONER

## 2023-04-23 RX ADMIN — ALBUTEROL SULFATE 2 PUFF: 108 AEROSOL, METERED RESPIRATORY (INHALATION) at 14:07

## 2023-04-23 RX ADMIN — SODIUM CHLORIDE, PRESERVATIVE FREE 10 ML: 5 INJECTION INTRAVENOUS at 08:56

## 2023-04-23 RX ADMIN — ANORECTAL OINTMENT: 15.7; .44; 24; 20.6 OINTMENT TOPICAL at 20:50

## 2023-04-23 RX ADMIN — Medication 50 MG: at 08:52

## 2023-04-23 RX ADMIN — ANTI-FUNGAL POWDER MICONAZOLE NITRATE TALC FREE: 1.42 POWDER TOPICAL at 20:50

## 2023-04-23 RX ADMIN — HEPARIN SODIUM 5000 UNITS: 10000 INJECTION INTRAVENOUS; SUBCUTANEOUS at 06:27

## 2023-04-23 RX ADMIN — ALBUTEROL SULFATE 2 PUFF: 108 AEROSOL, METERED RESPIRATORY (INHALATION) at 08:58

## 2023-04-23 RX ADMIN — PETROLATUM: 420 OINTMENT TOPICAL at 08:53

## 2023-04-23 RX ADMIN — ANTI-FUNGAL POWDER MICONAZOLE NITRATE TALC FREE: 1.42 POWDER TOPICAL at 08:57

## 2023-04-23 RX ADMIN — PETROLATUM: 420 OINTMENT TOPICAL at 20:50

## 2023-04-23 RX ADMIN — HEPARIN SODIUM 5000 UNITS: 10000 INJECTION INTRAVENOUS; SUBCUTANEOUS at 14:07

## 2023-04-23 RX ADMIN — ANORECTAL OINTMENT: 15.7; .44; 24; 20.6 OINTMENT TOPICAL at 08:54

## 2023-04-23 RX ADMIN — HEPARIN SODIUM 5000 UNITS: 10000 INJECTION INTRAVENOUS; SUBCUTANEOUS at 20:59

## 2023-04-23 RX ADMIN — MORPHINE SULFATE 15 MG: 15 TABLET, FILM COATED, EXTENDED RELEASE ORAL at 06:33

## 2023-04-23 RX ADMIN — ATENOLOL 25 MG: 25 TABLET ORAL at 20:59

## 2023-04-23 RX ADMIN — SODIUM CHLORIDE, PRESERVATIVE FREE 10 ML: 5 INJECTION INTRAVENOUS at 21:20

## 2023-04-23 ASSESSMENT — PAIN DESCRIPTION - DESCRIPTORS
DESCRIPTORS: ACHING;DISCOMFORT
DESCRIPTORS: ACHING;DISCOMFORT

## 2023-04-23 ASSESSMENT — PAIN SCALES - GENERAL
PAINLEVEL_OUTOF10: 7
PAINLEVEL_OUTOF10: 8
PAINLEVEL_OUTOF10: 7

## 2023-04-23 ASSESSMENT — PAIN DESCRIPTION - FREQUENCY
FREQUENCY: CONTINUOUS
FREQUENCY: CONTINUOUS

## 2023-04-23 ASSESSMENT — PAIN DESCRIPTION - PAIN TYPE: TYPE: CHRONIC PAIN

## 2023-04-23 ASSESSMENT — PAIN DESCRIPTION - ORIENTATION
ORIENTATION: MID
ORIENTATION: MID

## 2023-04-23 ASSESSMENT — PAIN DESCRIPTION - LOCATION
LOCATION: BACK

## 2023-04-23 ASSESSMENT — PAIN SCALES - WONG BAKER: WONGBAKER_NUMERICALRESPONSE: 2

## 2023-04-23 ASSESSMENT — PAIN DESCRIPTION - ONSET: ONSET: ON-GOING

## 2023-04-24 VITALS
HEART RATE: 55 BPM | WEIGHT: 159.3 LBS | SYSTOLIC BLOOD PRESSURE: 135 MMHG | TEMPERATURE: 97 F | DIASTOLIC BLOOD PRESSURE: 72 MMHG | HEIGHT: 65 IN | RESPIRATION RATE: 16 BRPM | BODY MASS INDEX: 26.54 KG/M2 | OXYGEN SATURATION: 93 %

## 2023-04-24 PROCEDURE — 2580000003 HC RX 258: Performed by: NURSE PRACTITIONER

## 2023-04-24 PROCEDURE — 6370000000 HC RX 637 (ALT 250 FOR IP): Performed by: NURSE PRACTITIONER

## 2023-04-24 PROCEDURE — 6360000002 HC RX W HCPCS: Performed by: NURSE PRACTITIONER

## 2023-04-24 RX ADMIN — PETROLATUM: 420 OINTMENT TOPICAL at 09:03

## 2023-04-24 RX ADMIN — ANORECTAL OINTMENT: 15.7; .44; 24; 20.6 OINTMENT TOPICAL at 09:03

## 2023-04-24 RX ADMIN — ANTI-FUNGAL POWDER MICONAZOLE NITRATE TALC FREE: 1.42 POWDER TOPICAL at 09:02

## 2023-04-24 RX ADMIN — ALBUTEROL SULFATE 2 PUFF: 108 AEROSOL, METERED RESPIRATORY (INHALATION) at 09:11

## 2023-04-24 RX ADMIN — HEPARIN SODIUM 5000 UNITS: 10000 INJECTION INTRAVENOUS; SUBCUTANEOUS at 05:55

## 2023-04-24 RX ADMIN — BARICITINIB 4 MG: 2 TABLET, FILM COATED ORAL at 09:02

## 2023-04-24 RX ADMIN — LEVOTHYROXINE, LIOTHYRONINE 60 MG: 19; 4.5 TABLET ORAL at 09:01

## 2023-04-24 RX ADMIN — SODIUM CHLORIDE, PRESERVATIVE FREE 10 ML: 5 INJECTION INTRAVENOUS at 10:35

## 2023-04-24 NOTE — PROGRESS NOTES
Patient yelling at several staff members regarding her medication. Patient is adamant that she is not being administered the same medications she takes at home but is unable to name any medications that she thinks are being negated. Patient also yelling that she has \"not seen a doctor since she's been here\". Patient's bed in lowest position, call light within reach.

## 2023-04-24 NOTE — PROGRESS NOTES
Comprehensive Nutrition Assessment    Type and Reason for Visit:  Reassess    Nutrition Recommendations/Plan:     Continue Current Diet, Continue Oral Nutrition Supplements       Malnutrition Assessment:  Malnutrition Status: At risk for malnutrition (04/24/23 1037)    Context:  Acute Illness     Findings of the 6 clinical characteristics of malnutrition:  Energy Intake:  50% or less of estimated energy requirements for 5 or more days  Weight Loss:  Unable to assess (lack of hx/ large discrepancies)     Body Fat Loss:  Unable to assess (covid iso)     Muscle Mass Loss:  Unable to assess (covid iso)    Fluid Accumulation:  No significant fluid accumulation     Strength:  Not Performed    Nutrition Assessment:    Pt status is stable awaiting discharge. Admit w/ SOB 2/2 +COVID-19. Noted recent STEFANIA/ UTI. No significant PMHx. Noted multiple pressure injuries. PO intake appears poor 1-25%. Continue supplement regimen- Ensure & Maikel to aid in recovery. Nutrition Related Findings:    Pt alert, +I/O's 6L, trace edema, hypoactive BS, constipation     Wound Type: Multiple, Pressure Injury, Stage II, Stage III       Current Nutrition Intake & Therapies:    Average Meal Intake: 1-25% (doc flow for several days)    ADULT DIET; Regular; Low Fat/Low Chol/High Fiber/2 gm Na  ADULT ORAL NUTRITION SUPPLEMENT; Breakfast, Dinner; Wound Healing Oral Supplement  ADULT ORAL NUTRITION SUPPLEMENT; Lunch; Standard High Calorie/High Protein Oral Supplement    Anthropometric Measures:  Height: 5' 5\" (165.1 cm)  Ideal Body Weight (IBW): 125 lbs (57 kg)    Admission Body Weight: 200 lb 9.6 oz (91 kg) (4/15 first measured)  Current Body Weight: 159 lb 4.8 oz (72.3 kg) (4/24 actual - large wt discrepancy vs adm wt of 200lb?), 160.5 % IBW. Weight Source: Bed Scale  Current BMI (kg/m2): 26.5  Usual Body Weight:  (ROBERT d/t lack of wt hx on file to assess)                   BMI Categories: Overweight (BMI 25.0-29. 9)    Estimated Daily Nutrient

## 2023-04-24 NOTE — PLAN OF CARE
Problem: Discharge Planning  Goal: Discharge to home or other facility with appropriate resources  Outcome: Progressing  Flowsheets (Taken 4/23/2023 2059)  Discharge to home or other facility with appropriate resources: Identify barriers to discharge with patient and caregiver     Problem: ABCDS Injury Assessment  Goal: Absence of physical injury  Recent Flowsheet Documentation  Taken 4/23/2023 2059 by Joycelyn Bryant RN  Absence of Physical Injury: Implement safety measures based on patient assessment

## 2023-04-24 NOTE — DISCHARGE SUMMARY
[facility]   Discharge summary   Patient ID:  James Peguero  [id number]  18 y.o.  1960    Admit date: 4/13/2023    Discharge date and time: 4/24/2023    Admission Diagnoses:   Patient Active Problem List   Diagnosis    Lumbar radiculopathy    Septic shock ([facility] [facility]    Seizure ([facility]    Obesity (BMI 30-39. 9)    Metabolic encephalopathy    GI bleed    Essential hypertension    Hyperlipidemia LDL goal <100    Acquired hypothyroidism    Acute kidney injury ([facility]    Acute respiratory failure with hypoxia (H[facility])    Clostridium difficile colitis    Severe protein-calorie malnutrition ([facility]    Acute hypoxemic respiratory failure due to COVID-19 [facility])       Discharge Diagnoses: Acute respiratory failure with hypoxia due to COVID-19    Consults: none    Procedures: none    Hospital Course:    57year old female who was recently discharge with STEFANIA presents to the ED with complaints of shortness of breath is admitted to telemetry unit with     Covid 19 in a vaccinated patient  Barcitinib 4 mg daily  -Decadron 6 mg daily  -Vitamin D, zinc, vitamin C  -Monitor O2 saturations and supplement oxygen to keep saturations greater than 93%, wean as necessary, incentive spirometer, no nebulizers.   Currently on 4 liters O2  -Droplet plus isolation  -Encourage ISP use/self proning  -Follow inflammatory markers     4/15/23:  -Continue Barcitinib 4 mg daily and IV Decadron 6 mg daily  -Currently on 2L NC sating 95%   -CRP 3.5  -Vitals stable   --check labs in AM     4/16/23:  -Labs stable  -On 2L NC sating 98%, wean oxygen   -Check ambulatory pulse ox  -Mild bradycardia while sleeping, other vitals stable   -Likely discharge back to the facility tomorrow      4/17/23:  -Patient awaiting pre-CERT at facility  -Resume patient's home MS Contin  -Patient stable for discharge once pre-CERT is obtained  -Patient currently on 2 L nasal cannula satting 99%, can likely be weaned off  -Resume her multiple narcotic medications, likely

## 2023-04-24 NOTE — PROGRESS NOTES
Peer to Peer complete Spoke with lino BALLARD, states he is going to overturn denial regarding discharge plan to SNF as patient would benefit from rehab 5x a week in a inpatient SNF. Patient is medically stable for discharge to SNF today once transport has been set up.

## 2023-04-24 NOTE — CARE COORDINATION
Update CM Note: Spoke to liason with facility to follow up on precert/peer to peer. Given direct number to physician for Peer to Peer.  [phone]. Will need completed by 10:30am. Perfect serve to CNP. 1035--Peer to Peer completed and over turned. Per Danial Gonzalez CNP: \"Peer to Peer complete Spoke with a Dr BALLARD, states he is going to overturn denial regarding discharge plan to SNF as patient would benefit from rehab 5x a week in a inpatient SNF. Patient is medically stable for discharge to SNF today once transport has been set up\". Transport arranged with [facility] @ 2pm. Ambulance form and envelope on soft chart. No COVID needed per facility. Pt, nurse and facility liason updated.  (TF)    Erica Munguia RN  [phone]

## 2023-04-24 NOTE — PLAN OF CARE
Problem: Discharge Planning  Goal: Discharge to home or other facility with appropriate resources  4/24/2023 1129 by Sugey Baca RN  Outcome: Completed  4/24/2023 0147 by Loly Saleem RN  Outcome: Progressing  Flowsheets (Taken 4/23/2023 2059)  Discharge to home or other facility with appropriate resources: Identify barriers to discharge with patient and caregiver     Problem: Safety - Adult  Goal: Free from fall injury  4/24/2023 1129 by Sugey Baca RN  Outcome: Completed  4/24/2023 0147 by Loly Saleem RN  Outcome: Progressing  [address] (Taken 4/23/2023 2059)  Free From Fall Injury: Instruct family/caregiver on patient safety     Problem: Pain  Goal: Verbalizes/displays adequate comfort level or baseline comfort level  4/24/2023 1129 by Sugey Baca RN  Outcome: Completed  4/24/2023 0147 by Loly Saleem RN  Outcome: Progressing  Flowsheets (Taken 4/23/2023 2059)  Verbalizes/displays adequate comfort level or baseline comfort level: Encourage patient to monitor pain and request assistance     Problem: Skin/Tissue Integrity  Goal: Absence of new skin breakdown  Description: 1. Monitor for areas of redness and/or skin breakdown  2. Assess vascular access sites hourly  3. Every 4-6 hours minimum:  Change oxygen saturation probe site  4. Every 4-6 hours:  If on nasal continuous positive airway pressure, respiratory therapy assess nares and determine need for appliance change or resting period.   Outcome: Completed     Problem: Nutrition Deficit:  Goal: Optimize nutritional status  Outcome: Completed  Flowsheets (Taken 4/24/2023 1017 by Cameron Graham RD, HUMBERTO)  Nutrient intake appropriate for improving, restoring, or maintaining nutritional needs: Recommend appropriate diets, oral nutritional supplements, and vitamin/mineral supplements     Problem: ABCDS Injury Assessment  Goal: Absence of physical injury  Outcome: Completed

## 2023-04-24 NOTE — PROGRESS NOTES
Spoke with  over the patient's phone. The  enumerated the medications the patient has been taking at home. Told him that the patient is taking all of those in here except for Lyrica. Explained to him that we have been holding off atenolol and MS Contin as her heart rate has been running low. He and the patient still insisted on the MS Contin and atenolol to be given together even though the patient's heart rates been running low. This nurse was yelled at for not giving the MS Contin to her. Explained to them the risks but still very upset and insistent about it. She is  also saying that she's not getting any of her meds here and that the doctors took everything away from her chart even though this nurse read all the medications that she has been taking since she got here.

## 2023-05-02 NOTE — PROGRESS NOTES
Physician Progress Note      Grace Navarrete  CSN #:                  [id number]  :                       1960  ADMIT DATE:       2023 3:34 PM  [address] [place] DATE:        2023 2:42 PM  RESPONDING  PROVIDER #:        Vidya Castro MD          QUERY TEXT:    Per wound care consult, noted to have pressure ulcer. If possible, please   document in progress notes and discharge summary the location, present on   admission status and stage of the pressure ulcer: The medical record reflects the following:  Risk Factors: moderate assist with positioning  Clinical Indicators: 23 Wound care consultant notes Right Buttocks   Pressure Ulcer Stage 2 POA. Treatment: Calmoseptine, antifungal powder, aquaphor, TAPS    Thank you,  Sudha Hatch RN, BSN, CRCR, Clinical Documentation Improvement  Options provided:  -- Stage 2 Pressure Ulcer of the Right Buttocks present on admission  -- Other - I will add my own diagnosis  -- Disagree - Not applicable / Not valid  -- Disagree - Clinically unable to determine / Unknown  -- Refer to Clinical Documentation Reviewer    PROVIDER RESPONSE TEXT:    This patient has a Stage 2 Pressure Ulcer of the Right Buttocks present on   admission. Query created by: Charly Shelton on 2023 12:01 PM      QUERY TEXT:    Per wound care consult, noted to have pressure ulcer. If possible, please   document in progress notes and discharge summary the location, present on   admission status and stage of the pressure ulcer: The medical record reflects the following:  Risk Factors: moderate assist with positioning  Clinical Indicators: 23 Wound care consultant notes Left Buttocks   Pressure Ulcer Stage 3 POA.   Treatment: Calmoseptine, antifungal powder, aquaphor, TAPS    Thank you,  Sudha Hatch RN, BSN, CRCR, Clinical Documentation Improvement  Options provided:  -- Stage 3 Pressure Ulcer of the Left Buttocks present on admission  -- Other - I will add my own

## 2024-05-30 NOTE — PLAN OF CARE
Problem: Respiratory - Adult  Goal: Clear lung sounds  Outcome: Progressing  Note: Patient agrees to goals      Problem: Infection, Septic Shock:  Goal: Will show no infection signs and symptoms  Description  Will show no infection signs and symptoms  Outcome: Met This Shift

## 2024-08-12 DIAGNOSIS — I10 BENIGN HYPERTENSION: Primary | ICD-10-CM

## 2024-08-12 RX ORDER — ATENOLOL 50 MG/1
25 TABLET ORAL 2 TIMES DAILY
Qty: 90 TABLET | Refills: 1 | Status: SHIPPED | OUTPATIENT
Start: 2024-08-12

## 2024-08-12 NOTE — TELEPHONE ENCOUNTER
Antione called, Avis needs a refill on her Atenolol 50mg 1/2 a tab BID. Please order 90 days at [facility] in [place]

## 2024-08-12 NOTE — TELEPHONE ENCOUNTER
Last seen Visit date not found  Next appt Visit date not found    Requested Prescriptions     Pending Prescriptions Disp Refills    atenolol (TENORMIN) 50 MG tablet 90 tablet 1     Sig: Take 0.5 tablets by mouth 2 times daily

## 2024-08-16 ENCOUNTER — TELEPHONE (OUTPATIENT)
Age: 64
End: 2024-08-16

## 2024-08-16 NOTE — TELEPHONE ENCOUNTER
FYI- Pt's  called 08/14/24 because pt had UTI symptoms.I referred them to the walk in clinic in [facility]

## 2024-08-21 DIAGNOSIS — N30.00 ACUTE CYSTITIS WITHOUT HEMATURIA: Primary | ICD-10-CM

## 2024-08-21 RX ORDER — NITROFURANTOIN 25; 75 MG/1; MG/1
100 CAPSULE ORAL 2 TIMES DAILY
Qty: 14 CAPSULE | Refills: 0 | Status: CANCELLED | OUTPATIENT
Start: 2024-08-21 | End: 2024-08-28

## 2024-08-21 RX ORDER — CEFUROXIME AXETIL 250 MG/1
250 TABLET ORAL 2 TIMES DAILY
Qty: 14 TABLET | Refills: 0 | Status: CANCELLED | OUTPATIENT
Start: 2024-08-21 | End: 2024-08-28

## 2024-08-21 NOTE — TELEPHONE ENCOUNTER
Pt's  has called 3 times in the last week. Pt believes she may have a UTI. She is having urinary pressure, urgency & gushing and a little pain. No burning.  asked if you would like her to do a urine test. They did a home test last week and it showed possible light infection. Symptoms are no better or worse since then & they did not want to go to a walk in clinic.. Pt has many drug allergies. Uses [facility] in [place].

## 2024-08-21 NOTE — TELEPHONE ENCOUNTER
I spoke to Antione and Debra.    Gave them lab locations nick hours. She is going to call me back because she is not sure she can take Macrobid.

## 2024-08-21 NOTE — TELEPHONE ENCOUNTER
Have him Drop Off UrineC&S and a UA Dx RECURRENT CYSTITIS  , Put her empirically on Macrobid 100 mg # 14 1 bid til urine gets Back Send urine preferred to [facility] or [facility] so I get quickly , tell him but chk hours on the lab times

## 2024-08-22 DIAGNOSIS — N30.00 ACUTE CYSTITIS WITHOUT HEMATURIA: ICD-10-CM

## 2024-08-22 LAB
BACTERIA: ABNORMAL
BILIRUBIN, URINE: NEGATIVE
COLOR, UA: YELLOW
GLUCOSE URINE: NEGATIVE MG/DL
KETONES, URINE: NEGATIVE MG/DL
LEUKOCYTE ESTERASE, URINE: NEGATIVE
NITRITE, URINE: NEGATIVE
PH, URINE: 6 (ref 5–9)
PROTEIN UA: 30 MG/DL
RBC UA: ABNORMAL /HPF
SPECIFIC GRAVITY UA: 1.02 (ref 1–1.03)
TURBIDITY: CLEAR
URINE HGB: NEGATIVE
UROBILINOGEN, URINE: 0.2 EU/DL (ref 0–1)
WBC UA: ABNORMAL /HPF

## 2024-08-22 RX ORDER — CEFUROXIME AXETIL 250 MG/1
250 TABLET ORAL 2 TIMES DAILY
Qty: 14 TABLET | Refills: 0 | Status: SHIPPED | OUTPATIENT
Start: 2024-08-22 | End: 2024-08-29

## 2024-08-23 ENCOUNTER — TELEPHONE (OUTPATIENT)
Age: 64
End: 2024-08-23

## 2024-08-23 NOTE — TELEPHONE ENCOUNTER
Antione called about Pattie having nausea and diarrhea symptoms this morning from [facility]. She is stopping this and going to wait till you get the susceptibilities back. I told him you would call this weekend if need be the the final result if we do not get before we leave today.

## 2024-08-24 LAB
CULTURE: ABNORMAL
CULTURE: ABNORMAL
SPECIMEN DESCRIPTION: ABNORMAL

## 2024-08-26 DIAGNOSIS — N30.00 ACUTE CYSTITIS WITHOUT HEMATURIA: Primary | ICD-10-CM

## 2024-08-26 RX ORDER — CIPROFLOXACIN 250 MG/1
250 TABLET, FILM COATED ORAL 2 TIMES DAILY
Qty: 14 TABLET | Refills: 0 | Status: SHIPPED | OUTPATIENT
Start: 2024-08-26 | End: 2024-09-02

## 2024-08-26 RX ORDER — CIPROFLOXACIN 500 MG/1
500 TABLET, FILM COATED ORAL 2 TIMES DAILY
Qty: 14 TABLET | Refills: 0 | Status: SHIPPED
Start: 2024-08-26 | End: 2024-08-26 | Stop reason: CLARIF

## 2024-11-25 ENCOUNTER — OFFICE VISIT (OUTPATIENT)
Age: 64
End: 2024-11-25

## 2024-11-25 VITALS
TEMPERATURE: 97.6 F | DIASTOLIC BLOOD PRESSURE: 78 MMHG | WEIGHT: 201 LBS | HEART RATE: 62 BPM | OXYGEN SATURATION: 99 % | HEIGHT: 63 IN | SYSTOLIC BLOOD PRESSURE: 130 MMHG | BODY MASS INDEX: 35.61 KG/M2

## 2024-11-25 DIAGNOSIS — E66.9 OBESITY (BMI 30-39.9): Chronic | ICD-10-CM

## 2024-11-25 DIAGNOSIS — M54.16 LUMBAR RADICULOPATHY: Chronic | ICD-10-CM

## 2024-11-25 DIAGNOSIS — I10 ESSENTIAL HYPERTENSION: Chronic | ICD-10-CM

## 2024-11-25 DIAGNOSIS — Z12.31 ENCOUNTER FOR SCREENING MAMMOGRAM FOR MALIGNANT NEOPLASM OF BREAST: Primary | ICD-10-CM

## 2024-11-25 DIAGNOSIS — E03.9 ACQUIRED HYPOTHYROIDISM: Chronic | ICD-10-CM

## 2024-11-25 DIAGNOSIS — E78.5 HYPERLIPIDEMIA LDL GOAL <100: Chronic | ICD-10-CM

## 2024-11-25 RX ORDER — SOLIFENACIN SUCCINATE 10 MG/1
10 TABLET, FILM COATED ORAL DAILY
COMMUNITY

## 2024-11-25 RX ORDER — PREGABALIN 100 MG/1
100 CAPSULE ORAL 3 TIMES DAILY
COMMUNITY

## 2024-11-25 SDOH — ECONOMIC STABILITY: FOOD INSECURITY: WITHIN THE PAST 12 MONTHS, YOU WORRIED THAT YOUR FOOD WOULD RUN OUT BEFORE YOU GOT MONEY TO BUY MORE.: NEVER TRUE

## 2024-11-25 SDOH — ECONOMIC STABILITY: FOOD INSECURITY: WITHIN THE PAST 12 MONTHS, THE FOOD YOU BOUGHT JUST DIDN'T LAST AND YOU DIDN'T HAVE MONEY TO GET MORE.: NEVER TRUE

## 2024-11-25 SDOH — ECONOMIC STABILITY: INCOME INSECURITY: HOW HARD IS IT FOR YOU TO PAY FOR THE VERY BASICS LIKE FOOD, HOUSING, MEDICAL CARE, AND HEATING?: NOT HARD AT ALL

## 2024-11-25 ASSESSMENT — PATIENT HEALTH QUESTIONNAIRE - PHQ9
SUM OF ALL RESPONSES TO PHQ QUESTIONS 1-9: 0
SUM OF ALL RESPONSES TO PHQ9 QUESTIONS 1 & 2: 0
SUM OF ALL RESPONSES TO PHQ QUESTIONS 1-9: 0
2. FEELING DOWN, DEPRESSED OR HOPELESS: NOT AT ALL
SUM OF ALL RESPONSES TO PHQ QUESTIONS 1-9: 0
SUM OF ALL RESPONSES TO PHQ QUESTIONS 1-9: 0
1. LITTLE INTEREST OR PLEASURE IN DOING THINGS: NOT AT ALL

## 2024-11-25 ASSESSMENT — ENCOUNTER SYMPTOMS
ALLERGIC/IMMUNOLOGIC NEGATIVE: 1
FACIAL SWELLING: 0
BACK PAIN: 1
RESPIRATORY NEGATIVE: 1
GASTROINTESTINAL NEGATIVE: 1
SINUS PAIN: 0
EYES NEGATIVE: 1
RECTAL PAIN: 0

## 2024-11-25 NOTE — PROGRESS NOTES
Avis Haji (:  1960) is a 63 y.o. female,Established patient, here for evaluation of the following chief complaint(s):  Hypertension (F/u), Cholesterol Problem, Lower Back Pain, and Hypothyroidism         Assessment & Plan  Essential hypertension   Chronic, at goal (stable), continue current treatment plan, medication adherence emphasized, and lifestyle modifications recommended    Orders:    Comprehensive Metabolic Panel, Fasting; Future    CBC with Auto Differential; Future    Lipid, Fasting; Future    Acquired hypothyroidism   Chronic, at goal (stable), continue current treatment plan, medication adherence emphasized, and lifestyle modifications recommended    Orders:    TSH; Future    T4, Free; Future    Lumbar radiculopathy   Chronic, at goal (stable), continue current treatment plan, medication adherence emphasized, and lifestyle modifications recommended         Hyperlipidemia LDL goal <100   Chronic, at goal (stable), continue current treatment plan, medication adherence emphasized, and lifestyle modifications recommended         Obesity (BMI 30-39.9)   Chronic, worsening (exacerbation), changes made today: Continue diet and exercise med         Encounter for screening mammogram for malignant neoplasm of breast    Mammogram will be ordered after the first of the year    Orders:    PEPITO DIGITAL SCREEN BILATERAL PER PROTOCOL; Future    6-month follow-up will be given.  Please see below  No follow-ups on file.       Subjective   HPI:  Patient comes in today for a 6-month follow-up.  She has hypertension dyslipidemia hypothyroidism.  BMI is 35 blood pressure stable she is already had her flu vaccine and COVID-vaccine.  And RSV.  I am going to order a mammogram she will come here at the mammo van when it is here on 2025 at the [facility].  The patient is unable to do a Cologuard she does not want to have a colonoscopy.  She has significant difficulty with

## 2024-11-25 NOTE — ASSESSMENT & PLAN NOTE
Chronic, at goal (stable), continue current treatment plan, medication adherence emphasized, and lifestyle modifications recommended    Orders:    TSH; Future    T4, Free; Future

## 2024-11-26 RX ORDER — LEVOTHYROXINE, LIOTHYRONINE 38; 9 UG/1; UG/1
60 TABLET ORAL DAILY
Qty: 30 TABLET | Refills: 3 | Status: SHIPPED | OUTPATIENT
Start: 2024-11-26

## 2024-11-26 NOTE — TELEPHONE ENCOUNTER
Name of Medication(s) Requested:  Requested Prescriptions     Pending Prescriptions Disp Refills    NP THYROID 60 MG tablet [Pharmacy Med Name: NP THYROID 60 MG TABLET] 30 tablet 3     Sig: TAKE 1 TABLET BY MOUTH EVERY DAY AS DIRECTED       Medication is on current medication list Yes    Dosage and directions were verified? Yes    Quantity verified: 90 day supply     Pharmacy Verified?  Yes    Last Appointment:  11/25/2024    Future appts:  Future Appointments   Date Time Provider Department Center   1/30/2025 12:00 PM [facility] MOBILE [facility] RM 1 [facility] MOBILE [facility] Rad/Car   5/27/2025  3:00 PM Gavin Burdick, DO [place] E[facility] DEP        (If no appt send self scheduling link. .REFILLAPPT)  Scheduling request sent?     [] Yes  [x] No    Does patient need updated?  [] Yes  [x] No

## 2025-02-10 DIAGNOSIS — I10 BENIGN HYPERTENSION: ICD-10-CM

## 2025-02-10 RX ORDER — ATENOLOL 50 MG/1
TABLET ORAL
Qty: 90 TABLET | Refills: 1 | Status: SHIPPED | OUTPATIENT
Start: 2025-02-10

## 2025-02-23 RX ORDER — LEVOTHYROXINE, LIOTHYRONINE 38; 9 UG/1; UG/1
60 TABLET ORAL DAILY
Qty: 90 TABLET | Refills: 1 | Status: SHIPPED | OUTPATIENT
Start: 2025-02-23

## 2025-07-11 DIAGNOSIS — I10 ESSENTIAL HYPERTENSION: Chronic | ICD-10-CM

## 2025-07-11 DIAGNOSIS — E03.9 ACQUIRED HYPOTHYROIDISM: Chronic | ICD-10-CM

## 2025-07-11 LAB
ALBUMIN: 3.9 G/DL (ref 3.5–5.2)
ALP BLD-CCNC: 85 U/L (ref 35–104)
ALT SERPL-CCNC: 10 U/L (ref 0–35)
ANION GAP SERPL CALCULATED.3IONS-SCNC: 10 MMOL/L (ref 7–16)
AST SERPL-CCNC: 29 U/L (ref 0–35)
BASOPHILS ABSOLUTE: 0.07 K/UL (ref 0–0.2)
BASOPHILS RELATIVE PERCENT: 1 % (ref 0–2)
BILIRUB SERPL-MCNC: 0.2 MG/DL (ref 0–1.2)
BUN BLDV-MCNC: 18 MG/DL (ref 8–23)
CALCIUM SERPL-MCNC: 9.1 MG/DL (ref 8.8–10.2)
CHLORIDE BLD-SCNC: 103 MMOL/L (ref 98–107)
CHOLESTEROL, FASTING: 174 MG/DL
CO2: 26 MMOL/L (ref 22–29)
CREAT SERPL-MCNC: 1.1 MG/DL (ref 0.5–1)
EOSINOPHILS ABSOLUTE: 0.46 K/UL (ref 0.05–0.5)
EOSINOPHILS RELATIVE PERCENT: 8 % (ref 0–6)
GFR, ESTIMATED: 58 ML/MIN/1.73M2
GLUCOSE FASTING: 92 MG/DL (ref 74–99)
HCT VFR BLD CALC: 40.8 % (ref 34–48)
HDLC SERPL-MCNC: 36 MG/DL
HEMOGLOBIN: 12.9 G/DL (ref 11.5–15.5)
IMMATURE GRANULOCYTES %: 0 % (ref 0–5)
IMMATURE GRANULOCYTES ABSOLUTE: <0.03 K/UL (ref 0–0.58)
LDL CHOLESTEROL: 79 MG/DL
LYMPHOCYTES ABSOLUTE: 1.96 K/UL (ref 1.5–4)
LYMPHOCYTES RELATIVE PERCENT: 34 % (ref 20–42)
MCH RBC QN AUTO: 30.6 PG (ref 26–35)
MCHC RBC AUTO-ENTMCNC: 31.6 G/DL (ref 32–34.5)
MCV RBC AUTO: 96.9 FL (ref 80–99.9)
MONOCYTES ABSOLUTE: 0.49 K/UL (ref 0.1–0.95)
MONOCYTES RELATIVE PERCENT: 9 % (ref 2–12)
NEUTROPHILS ABSOLUTE: 2.8 K/UL (ref 1.8–7.3)
NEUTROPHILS RELATIVE PERCENT: 48 % (ref 43–80)
PDW BLD-RTO: 12.6 % (ref 11.5–15)
PLATELET # BLD: 236 K/UL (ref 130–450)
PMV BLD AUTO: 11.7 FL (ref 7–12)
POTASSIUM SERPL-SCNC: 4.5 MMOL/L (ref 3.5–5.1)
RBC # BLD: 4.21 M/UL (ref 3.5–5.5)
SODIUM BLD-SCNC: 139 MMOL/L (ref 136–145)
T4 FREE: 0.8 NG/DL (ref 0.9–1.7)
TOTAL PROTEIN: 7.2 G/DL (ref 6.4–8.3)
TRIGLYCERIDE, FASTING: 297 MG/DL
TSH SERPL DL<=0.05 MIU/L-ACNC: 4.34 UIU/ML (ref 0.27–4.2)
VLDLC SERPL CALC-MCNC: 59 MG/DL
WBC # BLD: 5.8 K/UL (ref 4.5–11.5)

## 2025-07-11 SDOH — ECONOMIC STABILITY: FOOD INSECURITY: WITHIN THE PAST 12 MONTHS, YOU WORRIED THAT YOUR FOOD WOULD RUN OUT BEFORE YOU GOT MONEY TO BUY MORE.: NEVER TRUE

## 2025-07-11 SDOH — ECONOMIC STABILITY: FOOD INSECURITY: WITHIN THE PAST 12 MONTHS, THE FOOD YOU BOUGHT JUST DIDN'T LAST AND YOU DIDN'T HAVE MONEY TO GET MORE.: NEVER TRUE

## 2025-07-11 SDOH — ECONOMIC STABILITY: INCOME INSECURITY: IN THE LAST 12 MONTHS, WAS THERE A TIME WHEN YOU WERE NOT ABLE TO PAY THE MORTGAGE OR RENT ON TIME?: NO

## 2025-07-11 SDOH — ECONOMIC STABILITY: TRANSPORTATION INSECURITY
IN THE PAST 12 MONTHS, HAS LACK OF TRANSPORTATION KEPT YOU FROM MEETINGS, WORK, OR FROM GETTING THINGS NEEDED FOR DAILY LIVING?: NO

## 2025-07-11 SDOH — ECONOMIC STABILITY: TRANSPORTATION INSECURITY
IN THE PAST 12 MONTHS, HAS THE LACK OF TRANSPORTATION KEPT YOU FROM MEDICAL APPOINTMENTS OR FROM GETTING MEDICATIONS?: NO

## 2025-07-14 ENCOUNTER — TELEMEDICINE (OUTPATIENT)
Age: 65
End: 2025-07-14
Payer: COMMERCIAL

## 2025-07-14 DIAGNOSIS — Z53.20 COLON CANCER SCREENING DECLINED: ICD-10-CM

## 2025-07-14 DIAGNOSIS — E78.5 HYPERLIPIDEMIA LDL GOAL <100: Chronic | ICD-10-CM

## 2025-07-14 DIAGNOSIS — I10 ESSENTIAL HYPERTENSION: Primary | Chronic | ICD-10-CM

## 2025-07-14 DIAGNOSIS — E03.9 ACQUIRED HYPOTHYROIDISM: Chronic | ICD-10-CM

## 2025-07-14 PROCEDURE — 1090F PRES/ABSN URINE INCON ASSESS: CPT | Performed by: FAMILY MEDICINE

## 2025-07-14 PROCEDURE — G8400 PT W/DXA NO RESULTS DOC: HCPCS | Performed by: FAMILY MEDICINE

## 2025-07-14 PROCEDURE — G8427 DOCREV CUR MEDS BY ELIG CLIN: HCPCS | Performed by: FAMILY MEDICINE

## 2025-07-14 PROCEDURE — 99214 OFFICE O/P EST MOD 30 MIN: CPT | Performed by: FAMILY MEDICINE

## 2025-07-14 PROCEDURE — 1123F ACP DISCUSS/DSCN MKR DOCD: CPT | Performed by: FAMILY MEDICINE

## 2025-07-14 PROCEDURE — 1036F TOBACCO NON-USER: CPT | Performed by: FAMILY MEDICINE

## 2025-07-14 PROCEDURE — 3017F COLORECTAL CA SCREEN DOC REV: CPT | Performed by: FAMILY MEDICINE

## 2025-07-14 PROCEDURE — G8417 CALC BMI ABV UP PARAM F/U: HCPCS | Performed by: FAMILY MEDICINE

## 2025-07-14 RX ORDER — PANTOPRAZOLE SODIUM 40 MG/1
40 TABLET, DELAYED RELEASE ORAL
Qty: 30 TABLET | Refills: 5 | Status: SHIPPED | OUTPATIENT
Start: 2025-07-14 | End: 2025-07-14

## 2025-07-14 RX ORDER — THYROID 60 MG/1
60 TABLET ORAL DAILY
Qty: 90 TABLET | Refills: 3 | Status: SHIPPED | OUTPATIENT
Start: 2025-07-14

## 2025-07-14 ASSESSMENT — ENCOUNTER SYMPTOMS
GASTROINTESTINAL NEGATIVE: 1
EYES NEGATIVE: 1
FACIAL SWELLING: 0
SINUS PAIN: 0
ALLERGIC/IMMUNOLOGIC NEGATIVE: 1
RESPIRATORY NEGATIVE: 1
RECTAL PAIN: 0

## 2025-07-14 NOTE — PROGRESS NOTES
Avis Haji, was evaluated through a synchronous (real-time) audio-video encounter. The patient (or guardian if applicable) is aware that this is a billable service, which includes applicable co-pays. This Virtual Visit was conducted with patient's (and/or legal guardian's) consent. Patient identification was verified, and a caregiver was present when appropriate.   The patient was located at Home: [facility] [address]  [place] [address]  Provider was located at Home (Appt Dept State): [place]  Confirm you are appropriately licensed, registered, or certified to deliver care in the state where the patient is located as indicated above. If you are not or unsure, please re-schedule the visit: Yes, I confirm.     Avis Haji (:  1960) is a Established patient, presenting virtually for evaluation of the following:      Below is the assessment and plan developed based on review of pertinent history, physical exam, labs, studies, and medications.     Assessment & Plan  Essential hypertension   Chronic, at goal (stable), continue current treatment plan, medication adherence emphasized, and lifestyle modifications recommended  Continue current dose of atenolol       Acquired hypothyroidism   Chronic, at goal (stable), continue current treatment plan, medication adherence emphasized, and lifestyle modifications recommended  Continue current dose of thyroid and I refilled       Hyperlipidemia LDL goal <100   Chronic, at goal (stable), continue current treatment plan, medication adherence emphasized, and lifestyle modifications recommended  Continue dietary and exercise measures       Colon cancer screening declined   She declines this at present or a Cologuard           Return in about 6 months (around 2026).     In summary she declines any type of colon cancer screening she declines a mammogram because she has a difficult time traveling and going up steps for mammograms.  If she change her mind she will let me

## 2025-07-14 NOTE — ASSESSMENT & PLAN NOTE
Chronic, at goal (stable), continue current treatment plan, medication adherence emphasized, and lifestyle modifications recommended  Continue dietary and exercise measures

## 2025-07-14 NOTE — ASSESSMENT & PLAN NOTE
Chronic, at goal (stable), continue current treatment plan, medication adherence emphasized, and lifestyle modifications recommended  Continue current dose of thyroid and I refilled

## 2025-07-14 NOTE — ASSESSMENT & PLAN NOTE
Chronic, at goal (stable), continue current treatment plan, medication adherence emphasized, and lifestyle modifications recommended  Continue current dose of atenolol

## 2025-08-08 DIAGNOSIS — I10 BENIGN HYPERTENSION: ICD-10-CM

## 2025-08-08 RX ORDER — ATENOLOL 50 MG/1
25 TABLET ORAL 2 TIMES DAILY
Qty: 90 TABLET | Refills: 1 | Status: SHIPPED | OUTPATIENT
Start: 2025-08-08